# Patient Record
Sex: MALE | Race: WHITE | NOT HISPANIC OR LATINO | Employment: OTHER | URBAN - METROPOLITAN AREA
[De-identification: names, ages, dates, MRNs, and addresses within clinical notes are randomized per-mention and may not be internally consistent; named-entity substitution may affect disease eponyms.]

---

## 2017-02-01 ENCOUNTER — HISTORICAL (OUTPATIENT)
Dept: LAB | Facility: HOSPITAL | Age: 67
End: 2017-02-01

## 2017-02-10 ENCOUNTER — HISTORICAL (OUTPATIENT)
Dept: ADMINISTRATIVE | Facility: HOSPITAL | Age: 67
End: 2017-02-10

## 2017-08-01 ENCOUNTER — HISTORICAL (OUTPATIENT)
Dept: LAB | Facility: HOSPITAL | Age: 67
End: 2017-08-01

## 2017-08-01 LAB
BUN SERPL-MCNC: 29 MG/DL (ref 7–18)
CALCIUM SERPL-MCNC: 9.2 MG/DL (ref 8.5–10.1)
CHLORIDE SERPL-SCNC: 106 MMOL/L (ref 98–107)
CO2 SERPL-SCNC: 28.1 MMOL/L (ref 21–32)
CREAT SERPL-MCNC: 1.33 MG/DL (ref 0.6–1.3)
EST. AVERAGE GLUCOSE BLD GHB EST-MCNC: 137 MG/DL
GLUCOSE SERPL-MCNC: 127 MG/DL (ref 74–106)
HBA1C MFR BLD: 6.4 % (ref 4.5–6.2)
POTASSIUM SERPL-SCNC: 4.6 MMOL/L (ref 3.5–5.1)
SODIUM SERPL-SCNC: 143 MMOL/L (ref 136–145)

## 2017-12-21 ENCOUNTER — HISTORICAL (OUTPATIENT)
Dept: INFUSION THERAPY | Facility: HOSPITAL | Age: 67
End: 2017-12-21

## 2017-12-21 LAB
ALBUMIN SERPL-MCNC: 4.1 GM/DL (ref 3.4–5)
ALP SERPL-CCNC: 40 UNIT/L (ref 46–116)
ALT SERPL-CCNC: 67 UNIT/L (ref 12–78)
AST SERPL-CCNC: 26 UNIT/L (ref 15–37)
BILIRUB SERPL-MCNC: 0.9 MG/DL (ref 0.2–1)
BILIRUBIN DIRECT+TOT PNL SERPL-MCNC: 0.2 MG/DL (ref 0–0.2)
BILIRUBIN DIRECT+TOT PNL SERPL-MCNC: 0.7 MG/DL (ref 0–0.8)
CHOLEST SERPL-MCNC: 127 MG/DL (ref 0–200)
CHOLEST/HDLC SERPL: 2.8 {RATIO} (ref 0–5)
HDLC SERPL-MCNC: 46 MG/DL (ref 40–60)
LDLC SERPL CALC-MCNC: 51 MG/DL (ref 0–129)
PROT SERPL-MCNC: 7.3 GM/DL (ref 6.4–8.2)
TRIGL SERPL-MCNC: 151 MG/DL
VLDLC SERPL CALC-MCNC: 30 MG/DL

## 2018-02-01 ENCOUNTER — HISTORICAL (OUTPATIENT)
Dept: LAB | Facility: HOSPITAL | Age: 68
End: 2018-02-01

## 2018-02-01 LAB
ALBUMIN SERPL-MCNC: 4.1 GM/DL (ref 3.4–5)
ALBUMIN/GLOB SERPL: 1.2 RATIO (ref 1.1–2)
ALP SERPL-CCNC: 45 UNIT/L (ref 46–116)
ALT SERPL-CCNC: 49 UNIT/L (ref 12–78)
AST SERPL-CCNC: 21 UNIT/L (ref 15–37)
BILIRUB SERPL-MCNC: 1 MG/DL (ref 0.2–1)
BILIRUBIN DIRECT+TOT PNL SERPL-MCNC: 0.27 MG/DL (ref 0–0.2)
BILIRUBIN DIRECT+TOT PNL SERPL-MCNC: 0.77 MG/DL (ref 0–0.8)
BUN SERPL-MCNC: 25.9 MG/DL (ref 7–18)
CALCIUM SERPL-MCNC: 9.3 MG/DL (ref 8.5–10.1)
CHLORIDE SERPL-SCNC: 104 MMOL/L (ref 98–107)
CO2 SERPL-SCNC: 29.2 MMOL/L (ref 21–32)
CREAT SERPL-MCNC: 1.03 MG/DL (ref 0.6–1.3)
CREAT UR-MCNC: 80.5 MG/DL (ref 30–125)
EST. AVERAGE GLUCOSE BLD GHB EST-MCNC: 131 MG/DL
GLOBULIN SER-MCNC: 3.4 GM/DL (ref 2.4–3.5)
GLUCOSE SERPL-MCNC: 222 MG/DL (ref 74–106)
HBA1C MFR BLD: 6.2 % (ref 4.5–6.2)
MICROALBUMIN UR-MCNC: 10 MG/DL (ref 0–3)
MICROALBUMIN/CREAT RATIO PNL UR: 233.5 MG/GM CR (ref 0–30)
POTASSIUM SERPL-SCNC: 4.6 MMOL/L (ref 3.5–5.1)
PROT SERPL-MCNC: 7.5 GM/DL (ref 6.4–8.2)
SODIUM SERPL-SCNC: 139 MMOL/L (ref 136–145)

## 2018-02-22 ENCOUNTER — HISTORICAL (OUTPATIENT)
Dept: ADMINISTRATIVE | Facility: HOSPITAL | Age: 68
End: 2018-02-22

## 2018-02-24 LAB — FINAL CULTURE: NORMAL

## 2018-03-08 ENCOUNTER — HISTORICAL (OUTPATIENT)
Dept: ADMINISTRATIVE | Facility: HOSPITAL | Age: 68
End: 2018-03-08

## 2018-04-10 ENCOUNTER — HISTORICAL (OUTPATIENT)
Dept: LAB | Facility: HOSPITAL | Age: 68
End: 2018-04-10

## 2018-04-12 LAB — GRAM STN SPEC: NORMAL

## 2018-04-14 LAB — FINAL CULTURE: NORMAL

## 2018-05-19 LAB — FINAL CULTURE: NORMAL

## 2018-12-06 ENCOUNTER — HISTORICAL (OUTPATIENT)
Dept: LAB | Facility: HOSPITAL | Age: 68
End: 2018-12-06

## 2018-12-06 LAB
ABS NEUT (OLG): 3.69 X10(3)/MCL (ref 2.1–9.2)
BASOPHILS # BLD AUTO: 0 X10(3)/MCL (ref 0–0.2)
BASOPHILS NFR BLD AUTO: 1 %
BUN SERPL-MCNC: 24.9 MG/DL (ref 7–18)
CALCIUM SERPL-MCNC: 10 MG/DL (ref 8.5–10.1)
CHLORIDE SERPL-SCNC: 102 MMOL/L (ref 98–107)
CO2 SERPL-SCNC: 34.8 MMOL/L (ref 21–32)
CREAT SERPL-MCNC: 1.01 MG/DL (ref 0.6–1.3)
CREAT/UREA NIT SERPL: 25
EOSINOPHIL # BLD AUTO: 0.2 X10(3)/MCL (ref 0–0.9)
EOSINOPHIL NFR BLD AUTO: 3 %
ERYTHROCYTE [DISTWIDTH] IN BLOOD BY AUTOMATED COUNT: 12.5 % (ref 11.5–17)
EST. AVERAGE GLUCOSE BLD GHB EST-MCNC: 214 MG/DL
GLUCOSE SERPL-MCNC: 230 MG/DL (ref 74–106)
HBA1C MFR BLD: 9.1 % (ref 4.5–6.2)
HCT VFR BLD AUTO: 45.7 % (ref 42–52)
HGB BLD-MCNC: 15 GM/DL (ref 14–18)
IMM GRANULOCYTES # BLD AUTO: 0.03 % (ref 0–0.02)
IMM GRANULOCYTES NFR BLD AUTO: 0.5 % (ref 0–0.43)
LYMPHOCYTES # BLD AUTO: 1.6 X10(3)/MCL (ref 0.6–4.6)
LYMPHOCYTES NFR BLD AUTO: 25 %
MCH RBC QN AUTO: 31.8 PG (ref 27–31)
MCHC RBC AUTO-ENTMCNC: 32.8 GM/DL (ref 33–36)
MCV RBC AUTO: 96.8 FL (ref 80–94)
MONOCYTES # BLD AUTO: 0.6 X10(3)/MCL (ref 0.1–1.3)
MONOCYTES NFR BLD AUTO: 10 %
NEUTROPHILS # BLD AUTO: 3.69 X10(3)/MCL (ref 1.4–7.9)
NEUTROPHILS NFR BLD AUTO: 60 %
PLATELET # BLD AUTO: 186 X10(3)/MCL (ref 130–400)
PMV BLD AUTO: 11.2 FL (ref 9.4–12.4)
POTASSIUM SERPL-SCNC: 4.8 MMOL/L (ref 3.5–5.1)
RBC # BLD AUTO: 4.72 X10(6)/MCL (ref 4.7–6.1)
SODIUM SERPL-SCNC: 142 MMOL/L (ref 136–145)
WBC # SPEC AUTO: 6.1 X10(3)/MCL (ref 4.5–11.5)

## 2019-04-10 ENCOUNTER — HISTORICAL (OUTPATIENT)
Dept: LAB | Facility: HOSPITAL | Age: 69
End: 2019-04-10

## 2019-04-10 LAB
BUN SERPL-MCNC: 27.4 MG/DL (ref 7–18)
CALCIUM SERPL-MCNC: 9.5 MG/DL (ref 8.5–10.1)
CHLORIDE SERPL-SCNC: 102 MMOL/L (ref 98–107)
CO2 SERPL-SCNC: 31.7 MMOL/L (ref 21–32)
CREAT SERPL-MCNC: 0.97 MG/DL (ref 0.6–1.3)
CREAT/UREA NIT SERPL: 28
EST. AVERAGE GLUCOSE BLD GHB EST-MCNC: 177 MG/DL
GLUCOSE SERPL-MCNC: 196 MG/DL (ref 74–106)
HBA1C MFR BLD: 7.8 % (ref 4.5–6.2)
POTASSIUM SERPL-SCNC: 4.4 MMOL/L (ref 3.5–5.1)
SODIUM SERPL-SCNC: 140 MMOL/L (ref 136–145)

## 2019-04-16 ENCOUNTER — HISTORICAL (OUTPATIENT)
Dept: LAB | Facility: HOSPITAL | Age: 69
End: 2019-04-16

## 2019-04-16 LAB
CREAT UR-MCNC: 88 MG/DL
MICROALBUMIN UR-MCNC: 77.5 MG/DL
MICROALBUMIN/CREAT RATIO PNL UR: 880.7 MG/GM CR (ref 0–30)

## 2019-07-12 ENCOUNTER — HISTORICAL (OUTPATIENT)
Dept: LAB | Facility: HOSPITAL | Age: 69
End: 2019-07-12

## 2019-07-12 LAB
ALBUMIN SERPL-MCNC: 3.7 GM/DL (ref 3.4–5)
ALP SERPL-CCNC: 36 UNIT/L (ref 46–116)
ALT SERPL-CCNC: 40 UNIT/L (ref 12–78)
AST SERPL-CCNC: 12 UNIT/L (ref 15–37)
BILIRUB SERPL-MCNC: 0.9 MG/DL (ref 0.2–1)
BILIRUBIN DIRECT+TOT PNL SERPL-MCNC: 0.19 MG/DL (ref 0–0.2)
BILIRUBIN DIRECT+TOT PNL SERPL-MCNC: 0.71 MG/DL (ref 0–0.8)
CHOLEST SERPL-MCNC: 128 MG/DL (ref 0–200)
CHOLEST/HDLC SERPL: 2.5 {RATIO} (ref 0–5)
HDLC SERPL-MCNC: 52 MG/DL (ref 40–60)
LDLC SERPL CALC-MCNC: 68 MG/DL (ref 0–129)
PROT SERPL-MCNC: 6.8 GM/DL (ref 6.4–8.2)
TRIGL SERPL-MCNC: 40 MG/DL
VLDLC SERPL CALC-MCNC: 8 MG/DL

## 2019-10-09 ENCOUNTER — HISTORICAL (OUTPATIENT)
Dept: ADMINISTRATIVE | Facility: HOSPITAL | Age: 69
End: 2019-10-09

## 2019-10-09 LAB
ABS NEUT (OLG): 5.28 X10(3)/MCL (ref 2.1–9.2)
ALBUMIN SERPL-MCNC: 4 GM/DL (ref 3.4–5)
ALBUMIN/GLOB SERPL: 1.3 {RATIO}
ALP SERPL-CCNC: 44 UNIT/L (ref 50–136)
ALT SERPL-CCNC: 44 UNIT/L (ref 12–78)
AST SERPL-CCNC: 23 UNIT/L (ref 15–37)
BASOPHILS # BLD AUTO: 0.1 X10(3)/MCL (ref 0–0.2)
BASOPHILS NFR BLD AUTO: 1 %
BILIRUB SERPL-MCNC: 1.3 MG/DL (ref 0.2–1)
BILIRUBIN DIRECT+TOT PNL SERPL-MCNC: 0.3 MG/DL (ref 0–0.2)
BILIRUBIN DIRECT+TOT PNL SERPL-MCNC: 1 MG/DL (ref 0–0.8)
BNP BLD-MCNC: 10 PG/ML (ref 0–125)
BUN SERPL-MCNC: 22 MG/DL (ref 7–18)
CALCIUM SERPL-MCNC: 9.3 MG/DL (ref 8.5–10.1)
CHLORIDE SERPL-SCNC: 103 MMOL/L (ref 98–107)
CO2 SERPL-SCNC: 32 MMOL/L (ref 21–32)
CREAT SERPL-MCNC: 1.01 MG/DL (ref 0.7–1.3)
D DIMER PPP IA.FEU-MCNC: 288 NG/ML FEU (ref 0–500)
EOSINOPHIL # BLD AUTO: 0.3 X10(3)/MCL (ref 0–0.9)
EOSINOPHIL NFR BLD AUTO: 3 %
ERYTHROCYTE [DISTWIDTH] IN BLOOD BY AUTOMATED COUNT: 13.6 % (ref 11.5–17)
EST. AVERAGE GLUCOSE BLD GHB EST-MCNC: 128 MG/DL
GLOBULIN SER-MCNC: 3 GM/DL (ref 2.4–3.5)
GLUCOSE SERPL-MCNC: 82 MG/DL (ref 74–106)
HBA1C MFR BLD: 6.1 % (ref 4.2–6.3)
HCT VFR BLD AUTO: 46.6 % (ref 42–52)
HGB BLD-MCNC: 14.6 GM/DL (ref 14–18)
LYMPHOCYTES # BLD AUTO: 1.5 X10(3)/MCL (ref 0.6–4.6)
LYMPHOCYTES NFR BLD AUTO: 19 %
MCH RBC QN AUTO: 31.5 PG (ref 27–31)
MCHC RBC AUTO-ENTMCNC: 31.3 GM/DL (ref 33–36)
MCV RBC AUTO: 100.6 FL (ref 80–94)
MONOCYTES # BLD AUTO: 1 X10(3)/MCL (ref 0.1–1.3)
MONOCYTES NFR BLD AUTO: 12 %
NEUTROPHILS # BLD AUTO: 5.28 X10(3)/MCL (ref 2.1–9.2)
NEUTROPHILS NFR BLD AUTO: 65 %
PLATELET # BLD AUTO: 175 X10(3)/MCL (ref 130–400)
PMV BLD AUTO: 11.4 FL (ref 9.4–12.4)
POTASSIUM SERPL-SCNC: 4.2 MMOL/L (ref 3.5–5.1)
PROT SERPL-MCNC: 7 GM/DL (ref 6.4–8.2)
RBC # BLD AUTO: 4.63 X10(6)/MCL (ref 4.7–6.1)
SODIUM SERPL-SCNC: 142 MMOL/L (ref 136–145)
WBC # SPEC AUTO: 8.1 X10(3)/MCL (ref 4.5–11.5)

## 2019-10-18 ENCOUNTER — HISTORICAL (OUTPATIENT)
Dept: RESPIRATORY THERAPY | Facility: HOSPITAL | Age: 69
End: 2019-10-18

## 2019-10-23 ENCOUNTER — HISTORICAL (OUTPATIENT)
Dept: INFUSION THERAPY | Facility: HOSPITAL | Age: 69
End: 2019-10-23

## 2020-02-13 ENCOUNTER — HISTORICAL (OUTPATIENT)
Dept: LAB | Facility: HOSPITAL | Age: 70
End: 2020-02-13

## 2020-02-13 LAB
ALBUMIN SERPL-MCNC: 3.9 GM/DL (ref 3.4–5)
ALBUMIN/GLOB SERPL: 1.3 RATIO (ref 1.1–2)
ALP SERPL-CCNC: 37 UNIT/L (ref 46–116)
ALT SERPL-CCNC: 37 UNIT/L (ref 12–78)
AST SERPL-CCNC: 24 UNIT/L (ref 15–37)
BILIRUB SERPL-MCNC: 1.4 MG/DL (ref 0.2–1)
BILIRUBIN DIRECT+TOT PNL SERPL-MCNC: 0.3 MG/DL (ref 0–0.2)
BILIRUBIN DIRECT+TOT PNL SERPL-MCNC: 1.1 MG/DL (ref 0–0.8)
BUN SERPL-MCNC: 27.8 MG/DL (ref 7–18)
CALCIUM SERPL-MCNC: 9.3 MG/DL (ref 8.5–10.1)
CHLORIDE SERPL-SCNC: 106 MMOL/L (ref 98–107)
CO2 SERPL-SCNC: 27.3 MMOL/L (ref 21–32)
CREAT SERPL-MCNC: 0.93 MG/DL (ref 0.6–1.3)
CREAT UR-MCNC: 84 MG/DL (ref 30–125)
EST. AVERAGE GLUCOSE BLD GHB EST-MCNC: 143 MG/DL
GLOBULIN SER-MCNC: 3 GM/DL (ref 2.4–3.5)
GLUCOSE SERPL-MCNC: 115 MG/DL (ref 74–106)
HBA1C MFR BLD: 6.6 % (ref 4.5–6.2)
MICROALBUMIN UR-MCNC: >10 MG/DL (ref 0–3)
MICROALBUMIN/CREAT RATIO PNL UR: >119 MG/GM CR (ref 0–30)
POTASSIUM SERPL-SCNC: 5 MMOL/L (ref 3.5–5.1)
PROT SERPL-MCNC: 6.9 GM/DL (ref 6.4–8.2)
SODIUM SERPL-SCNC: 143 MMOL/L (ref 136–145)

## 2020-06-25 ENCOUNTER — HISTORICAL (OUTPATIENT)
Dept: RESPIRATORY THERAPY | Facility: HOSPITAL | Age: 70
End: 2020-06-25

## 2020-07-08 LAB — CRC RECOMMENDATION EXT: NORMAL

## 2020-12-18 ENCOUNTER — HISTORICAL (OUTPATIENT)
Dept: LAB | Facility: HOSPITAL | Age: 70
End: 2020-12-18

## 2020-12-18 LAB
BUN SERPL-MCNC: 40 MG/DL (ref 8.4–25.7)
CALCIUM SERPL-MCNC: 9.2 MG/DL (ref 8.8–10)
CHLORIDE SERPL-SCNC: 105 MMOL/L (ref 98–107)
CO2 SERPL-SCNC: 25 MMOL/L (ref 23–31)
CREAT SERPL-MCNC: 1.17 MG/DL (ref 0.73–1.18)
CREAT UR-MCNC: 103.1 MG/DL (ref 58–161)
CREAT/UREA NIT SERPL: 34
EST. AVERAGE GLUCOSE BLD GHB EST-MCNC: 122.6 MG/DL
GLUCOSE SERPL-MCNC: 108 MG/DL (ref 82–115)
HBA1C MFR BLD: 5.9 %
MICROALBUMIN UR-MCNC: 99.8 UG/ML
MICROALBUMIN/CREAT RATIO PNL UR: 96.8 MG/GM CR (ref 0–30)
POTASSIUM SERPL-SCNC: 5.3 MMOL/L (ref 3.5–5.1)
SODIUM SERPL-SCNC: 140 MMOL/L (ref 136–145)

## 2021-03-11 ENCOUNTER — HISTORICAL (OUTPATIENT)
Dept: RESPIRATORY THERAPY | Facility: HOSPITAL | Age: 71
End: 2021-03-11

## 2021-06-18 ENCOUNTER — HISTORICAL (OUTPATIENT)
Dept: LAB | Facility: HOSPITAL | Age: 71
End: 2021-06-18

## 2021-06-18 LAB
ALBUMIN SERPL-MCNC: 4.2 GM/DL (ref 3.4–4.8)
ALBUMIN/GLOB SERPL: 1.7 RATIO (ref 1.1–2)
ALP SERPL-CCNC: 38 UNIT/L (ref 40–150)
ALT SERPL-CCNC: 46 UNIT/L (ref 0–55)
AST SERPL-CCNC: 27 UNIT/L (ref 5–34)
BILIRUB SERPL-MCNC: 2.4 MG/DL
BILIRUBIN DIRECT+TOT PNL SERPL-MCNC: 0.9 MG/DL (ref 0–0.5)
BILIRUBIN DIRECT+TOT PNL SERPL-MCNC: 1.5 MG/DL (ref 0–0.8)
BUN SERPL-MCNC: 26.6 MG/DL (ref 8.4–25.7)
CALCIUM SERPL-MCNC: 9 MG/DL (ref 8.8–10)
CHLORIDE SERPL-SCNC: 106 MMOL/L (ref 98–107)
CHOLEST SERPL-MCNC: 130 MG/DL
CHOLEST/HDLC SERPL: 3 {RATIO} (ref 0–5)
CO2 SERPL-SCNC: 28 MMOL/L (ref 23–31)
CREAT SERPL-MCNC: 1.03 MG/DL (ref 0.73–1.18)
CREAT UR-MCNC: 66.4 MG/DL (ref 58–161)
EST. AVERAGE GLUCOSE BLD GHB EST-MCNC: 157.1 MG/DL
GLOBULIN SER-MCNC: 2.5 GM/DL (ref 2.4–3.5)
GLUCOSE SERPL-MCNC: 103 MG/DL (ref 82–115)
HBA1C MFR BLD: 7.1 %
HDLC SERPL-MCNC: 41 MG/DL (ref 35–60)
LDLC SERPL CALC-MCNC: 66 MG/DL (ref 50–140)
MICROALBUMIN UR-MCNC: 109.8 UG/ML
MICROALBUMIN/CREAT RATIO PNL UR: 165.4 MG/GM CR (ref 0–30)
POTASSIUM SERPL-SCNC: 4.8 MMOL/L (ref 3.5–5.1)
PROT SERPL-MCNC: 6.7 GM/DL (ref 5.8–7.6)
SODIUM SERPL-SCNC: 143 MMOL/L (ref 136–145)
TRIGL SERPL-MCNC: 115 MG/DL (ref 34–140)
VLDLC SERPL CALC-MCNC: 23 MG/DL

## 2021-07-22 ENCOUNTER — HISTORICAL (OUTPATIENT)
Dept: RESPIRATORY THERAPY | Facility: HOSPITAL | Age: 71
End: 2021-07-22

## 2021-11-12 LAB
LEFT EYE DM RETINOPATHY: NEGATIVE
RIGHT EYE DM RETINOPATHY: NEGATIVE

## 2021-12-30 ENCOUNTER — HISTORICAL (OUTPATIENT)
Dept: LAB | Facility: HOSPITAL | Age: 71
End: 2021-12-30

## 2021-12-30 LAB
ABS NEUT (OLG): 3.77 X10(3)/MCL (ref 2.1–9.2)
ALBUMIN SERPL-MCNC: 4.2 GM/DL (ref 3.4–4.8)
ALBUMIN/GLOB SERPL: 1.4 RATIO (ref 1.1–2)
ALP SERPL-CCNC: 40 UNIT/L (ref 40–150)
ALT SERPL-CCNC: 34 UNIT/L (ref 0–55)
AST SERPL-CCNC: 20 UNIT/L (ref 5–34)
BASOPHILS # BLD AUTO: 0.1 X10(3)/MCL (ref 0–0.2)
BASOPHILS NFR BLD AUTO: 1 %
BILIRUB SERPL-MCNC: 0.9 MG/DL
BILIRUBIN DIRECT+TOT PNL SERPL-MCNC: 0.3 MG/DL (ref 0–0.5)
BILIRUBIN DIRECT+TOT PNL SERPL-MCNC: 0.6 MG/DL (ref 0–0.8)
BUN SERPL-MCNC: 40.9 MG/DL (ref 8.4–25.7)
CALCIUM SERPL-MCNC: 10.1 MG/DL (ref 8.7–10.5)
CHLORIDE SERPL-SCNC: 102 MMOL/L (ref 98–107)
CHOLEST SERPL-MCNC: 154 MG/DL
CHOLEST/HDLC SERPL: 4 {RATIO} (ref 0–5)
CO2 SERPL-SCNC: 28 MMOL/L (ref 23–31)
CREAT SERPL-MCNC: 1.42 MG/DL (ref 0.73–1.18)
CREAT UR-MCNC: 78.1 MG/DL (ref 58–161)
EOSINOPHIL # BLD AUTO: 0.4 X10(3)/MCL (ref 0–0.9)
EOSINOPHIL NFR BLD AUTO: 7 %
ERYTHROCYTE [DISTWIDTH] IN BLOOD BY AUTOMATED COUNT: 12.3 % (ref 11.5–17)
EST. AVERAGE GLUCOSE BLD GHB EST-MCNC: 145.6 MG/DL
GLOBULIN SER-MCNC: 3.1 GM/DL (ref 2.4–3.5)
GLUCOSE SERPL-MCNC: 207 MG/DL (ref 82–115)
HBA1C MFR BLD: 6.7 %
HCT VFR BLD AUTO: 47.5 % (ref 42–52)
HDLC SERPL-MCNC: 41 MG/DL (ref 35–60)
HGB BLD-MCNC: 14.9 GM/DL (ref 14–18)
IMM GRANULOCYTES # BLD AUTO: 0.03 % (ref 0–0.02)
IMM GRANULOCYTES NFR BLD AUTO: 0.5 % (ref 0–0.43)
LDLC SERPL CALC-MCNC: 71 MG/DL (ref 50–140)
LYMPHOCYTES # BLD AUTO: 1.6 X10(3)/MCL (ref 0.6–4.6)
LYMPHOCYTES NFR BLD AUTO: 24 %
MCH RBC QN AUTO: 31.2 PG (ref 27–31)
MCHC RBC AUTO-ENTMCNC: 31.4 GM/DL (ref 33–36)
MCV RBC AUTO: 99.4 FL (ref 80–94)
MICROALBUMIN UR-MCNC: 101.4 UG/ML
MICROALBUMIN/CREAT RATIO PNL UR: 129.8 MG/GM CR (ref 0–30)
MONOCYTES # BLD AUTO: 0.7 X10(3)/MCL (ref 0.1–1.3)
MONOCYTES NFR BLD AUTO: 10 %
NEUTROPHILS # BLD AUTO: 3.77 X10(3)/MCL (ref 1.4–7.9)
NEUTROPHILS NFR BLD AUTO: 58 %
PLATELET # BLD AUTO: 175 X10(3)/MCL (ref 130–400)
PMV BLD AUTO: 10.9 FL (ref 9.4–12.4)
POTASSIUM SERPL-SCNC: 5.5 MMOL/L (ref 3.5–5.1)
PROT SERPL-MCNC: 7.3 GM/DL (ref 5.8–7.6)
PSA SERPL-MCNC: 0.92 NG/ML
RBC # BLD AUTO: 4.78 X10(6)/MCL (ref 4.7–6.1)
SODIUM SERPL-SCNC: 141 MMOL/L (ref 136–145)
TRIGL SERPL-MCNC: 210 MG/DL (ref 34–140)
VLDLC SERPL CALC-MCNC: 42 MG/DL
WBC # SPEC AUTO: 6.5 X10(3)/MCL (ref 4.5–11.5)

## 2022-03-23 ENCOUNTER — HISTORICAL (OUTPATIENT)
Dept: LAB | Facility: HOSPITAL | Age: 72
End: 2022-03-23

## 2022-03-23 LAB
ABS NEUT (OLG): 3.92 (ref 2.1–9.2)
BASOPHILS # BLD AUTO: 0 10*3/UL (ref 0–0.2)
BASOPHILS NFR BLD AUTO: 1 %
BUN SERPL-MCNC: 20.1 MG/DL (ref 8.4–25.7)
CALCIUM SERPL-MCNC: 9.8 MG/DL (ref 8.7–10.5)
CHLORIDE SERPL-SCNC: 107 MMOL/L (ref 98–107)
CO2 SERPL-SCNC: 25 MMOL/L (ref 23–31)
CREAT SERPL-MCNC: 1.14 MG/DL (ref 0.73–1.18)
CREAT/UREA NIT SERPL: 18
EOSINOPHIL # BLD AUTO: 0.3 10*3/UL (ref 0–0.9)
EOSINOPHIL NFR BLD AUTO: 4 %
ERYTHROCYTE [DISTWIDTH] IN BLOOD BY AUTOMATED COUNT: 13.7 % (ref 11.5–17)
GLUCOSE SERPL-MCNC: 49 MG/DL (ref 82–115)
HCT VFR BLD AUTO: 44.3 % (ref 42–52)
HEMOLYSIS INTERF INDEX SERPL-ACNC: 8
HGB BLD-MCNC: 14 G/DL (ref 14–18)
ICTERIC INTERF INDEX SERPL-ACNC: 1
IMM GRANULOCYTES # BLD AUTO: 0.03 10*3/UL (ref 0–0.02)
IMM GRANULOCYTES NFR BLD AUTO: 0.4 % (ref 0–0.43)
LIPEMIC INTERF INDEX SERPL-ACNC: 12
LYMPHOCYTES # BLD AUTO: 1.7 10*3/UL (ref 0.6–4.6)
LYMPHOCYTES NFR BLD AUTO: 25 %
MANUAL DIFF? (OHS): NO
MCH RBC QN AUTO: 31.9 PG (ref 27–31)
MCHC RBC AUTO-ENTMCNC: 31.6 G/DL (ref 33–36)
MCV RBC AUTO: 100.9 FL (ref 80–94)
MONOCYTES # BLD AUTO: 1 10*3/UL (ref 0.1–1.3)
MONOCYTES NFR BLD AUTO: 14 %
NEUTROPHILS # BLD AUTO: 3.92 10*3/UL (ref 1.4–7.9)
NEUTROPHILS NFR BLD AUTO: 57 %
PLATELET # BLD AUTO: 162 10*3/UL (ref 130–400)
PMV BLD AUTO: 11.1 FL (ref 9.4–12.4)
POTASSIUM SERPL-SCNC: 4.8 MMOL/L (ref 3.5–5.1)
RBC # BLD AUTO: 4.39 10*6/UL (ref 4.7–6.1)
SODIUM SERPL-SCNC: 137 MMOL/L (ref 136–145)
WBC # SPEC AUTO: 6.9 10*3/UL (ref 4.5–11.5)

## 2022-04-11 ENCOUNTER — HISTORICAL (OUTPATIENT)
Dept: ADMINISTRATIVE | Facility: HOSPITAL | Age: 72
End: 2022-04-11
Payer: MEDICARE

## 2022-04-28 VITALS
HEIGHT: 67 IN | OXYGEN SATURATION: 98 % | SYSTOLIC BLOOD PRESSURE: 120 MMHG | WEIGHT: 228.63 LBS | BODY MASS INDEX: 35.88 KG/M2 | DIASTOLIC BLOOD PRESSURE: 68 MMHG

## 2022-05-18 DIAGNOSIS — J44.9 CHRONIC OBSTRUCTIVE PULMONARY DISEASE WITHOUT EXACERBATION: Primary | ICD-10-CM

## 2022-05-23 ENCOUNTER — PROCEDURE VISIT (OUTPATIENT)
Dept: RESPIRATORY THERAPY | Facility: HOSPITAL | Age: 72
End: 2022-05-23
Attending: INTERNAL MEDICINE
Payer: MEDICARE

## 2022-05-23 DIAGNOSIS — J44.9 CHRONIC OBSTRUCTIVE PULMONARY DISEASE WITHOUT EXACERBATION: ICD-10-CM

## 2022-05-23 PROCEDURE — 94729 DIFFUSING CAPACITY: CPT

## 2022-05-23 PROCEDURE — 94727 GAS DIL/WSHOT DETER LNG VOL: CPT

## 2022-06-30 DIAGNOSIS — Z12.5 SCREENING PSA (PROSTATE SPECIFIC ANTIGEN): ICD-10-CM

## 2022-06-30 DIAGNOSIS — R53.83 FATIGUE, UNSPECIFIED TYPE: ICD-10-CM

## 2022-06-30 DIAGNOSIS — E11.69 TYPE 2 DIABETES MELLITUS WITH OTHER SPECIFIED COMPLICATION, WITH LONG-TERM CURRENT USE OF INSULIN: ICD-10-CM

## 2022-06-30 DIAGNOSIS — I10 HYPERTENSION, UNSPECIFIED TYPE: ICD-10-CM

## 2022-06-30 DIAGNOSIS — E78.5 HYPERLIPIDEMIA, UNSPECIFIED HYPERLIPIDEMIA TYPE: ICD-10-CM

## 2022-06-30 DIAGNOSIS — Z00.00 WELLNESS EXAMINATION: Primary | ICD-10-CM

## 2022-06-30 DIAGNOSIS — Z79.4 TYPE 2 DIABETES MELLITUS WITH OTHER SPECIFIED COMPLICATION, WITH LONG-TERM CURRENT USE OF INSULIN: ICD-10-CM

## 2022-07-05 ENCOUNTER — LAB VISIT (OUTPATIENT)
Dept: LAB | Facility: HOSPITAL | Age: 72
End: 2022-07-05
Attending: INTERNAL MEDICINE
Payer: MEDICARE

## 2022-07-05 DIAGNOSIS — Z12.5 SCREENING PSA (PROSTATE SPECIFIC ANTIGEN): ICD-10-CM

## 2022-07-05 DIAGNOSIS — E78.5 HYPERLIPIDEMIA, UNSPECIFIED HYPERLIPIDEMIA TYPE: ICD-10-CM

## 2022-07-05 DIAGNOSIS — Z00.00 WELLNESS EXAMINATION: ICD-10-CM

## 2022-07-05 DIAGNOSIS — E11.69 TYPE 2 DIABETES MELLITUS WITH OTHER SPECIFIED COMPLICATION, WITH LONG-TERM CURRENT USE OF INSULIN: ICD-10-CM

## 2022-07-05 DIAGNOSIS — R53.83 FATIGUE, UNSPECIFIED TYPE: ICD-10-CM

## 2022-07-05 DIAGNOSIS — Z79.4 TYPE 2 DIABETES MELLITUS WITH OTHER SPECIFIED COMPLICATION, WITH LONG-TERM CURRENT USE OF INSULIN: ICD-10-CM

## 2022-07-05 DIAGNOSIS — I10 HYPERTENSION, UNSPECIFIED TYPE: ICD-10-CM

## 2022-07-06 ENCOUNTER — TELEPHONE (OUTPATIENT)
Dept: INTERNAL MEDICINE | Facility: CLINIC | Age: 72
End: 2022-07-06
Payer: MEDICARE

## 2022-07-06 NOTE — TELEPHONE ENCOUNTER
----- Message from Cheryl Lejeune sent at 7/1/2022  8:07 AM CDT -----  Regarding: RE: vishnu haley 7/7 1300  Pt informed of OV, labs, and check in protocol.  He verbalized understanding.  ----- Message -----  From: Leah Hamilton LPN  Sent: 6/30/2022   5:42 PM CDT  To: Charlette Lejeune  Subject: vishnu haley 7/7 1300                                Are there any outstanding tasks in chart? No, but needs FASTING labs PRIOR to appt    Is there any documentation of tasks? no    Has the pt seen another physician, been to ER, UCC, or admitted to hospital since last visit?    Has the pt done blood work or imaging since last visit?

## 2022-07-07 ENCOUNTER — OFFICE VISIT (OUTPATIENT)
Dept: INTERNAL MEDICINE | Facility: CLINIC | Age: 72
End: 2022-07-07
Payer: MEDICARE

## 2022-07-07 VITALS
OXYGEN SATURATION: 96 % | RESPIRATION RATE: 20 BRPM | HEART RATE: 79 BPM | WEIGHT: 225 LBS | DIASTOLIC BLOOD PRESSURE: 58 MMHG | HEIGHT: 67 IN | SYSTOLIC BLOOD PRESSURE: 118 MMHG | BODY MASS INDEX: 35.31 KG/M2

## 2022-07-07 DIAGNOSIS — E11.9 TYPE 2 DIABETES MELLITUS WITHOUT COMPLICATION, WITHOUT LONG-TERM CURRENT USE OF INSULIN: ICD-10-CM

## 2022-07-07 DIAGNOSIS — R19.7 DIARRHEA, UNSPECIFIED TYPE: ICD-10-CM

## 2022-07-07 DIAGNOSIS — Z00.00 ANNUAL PHYSICAL EXAM: Primary | ICD-10-CM

## 2022-07-07 PROCEDURE — 3288F PR FALLS RISK ASSESSMENT DOCUMENTED: ICD-10-PCS | Mod: CPTII,,, | Performed by: INTERNAL MEDICINE

## 2022-07-07 PROCEDURE — 1160F RVW MEDS BY RX/DR IN RCRD: CPT | Mod: CPTII,,, | Performed by: INTERNAL MEDICINE

## 2022-07-07 PROCEDURE — 1160F PR REVIEW ALL MEDS BY PRESCRIBER/CLIN PHARMACIST DOCUMENTED: ICD-10-PCS | Mod: CPTII,,, | Performed by: INTERNAL MEDICINE

## 2022-07-07 PROCEDURE — 3008F BODY MASS INDEX DOCD: CPT | Mod: CPTII,,, | Performed by: INTERNAL MEDICINE

## 2022-07-07 PROCEDURE — G0439 PR MEDICARE ANNUAL WELLNESS SUBSEQUENT VISIT: ICD-10-PCS | Mod: ,,, | Performed by: INTERNAL MEDICINE

## 2022-07-07 PROCEDURE — 3078F DIAST BP <80 MM HG: CPT | Mod: CPTII,,, | Performed by: INTERNAL MEDICINE

## 2022-07-07 PROCEDURE — 1101F PR PT FALLS ASSESS DOC 0-1 FALLS W/OUT INJ PAST YR: ICD-10-PCS | Mod: CPTII,,, | Performed by: INTERNAL MEDICINE

## 2022-07-07 PROCEDURE — 1159F MED LIST DOCD IN RCRD: CPT | Mod: CPTII,,, | Performed by: INTERNAL MEDICINE

## 2022-07-07 PROCEDURE — 3074F PR MOST RECENT SYSTOLIC BLOOD PRESSURE < 130 MM HG: ICD-10-PCS | Mod: CPTII,,, | Performed by: INTERNAL MEDICINE

## 2022-07-07 PROCEDURE — 3288F FALL RISK ASSESSMENT DOCD: CPT | Mod: CPTII,,, | Performed by: INTERNAL MEDICINE

## 2022-07-07 PROCEDURE — 3008F PR BODY MASS INDEX (BMI) DOCUMENTED: ICD-10-PCS | Mod: CPTII,,, | Performed by: INTERNAL MEDICINE

## 2022-07-07 PROCEDURE — 1101F PT FALLS ASSESS-DOCD LE1/YR: CPT | Mod: CPTII,,, | Performed by: INTERNAL MEDICINE

## 2022-07-07 PROCEDURE — G0439 PPPS, SUBSEQ VISIT: HCPCS | Mod: ,,, | Performed by: INTERNAL MEDICINE

## 2022-07-07 PROCEDURE — 1159F PR MEDICATION LIST DOCUMENTED IN MEDICAL RECORD: ICD-10-PCS | Mod: CPTII,,, | Performed by: INTERNAL MEDICINE

## 2022-07-07 PROCEDURE — 4010F ACE/ARB THERAPY RXD/TAKEN: CPT | Mod: CPTII,,, | Performed by: INTERNAL MEDICINE

## 2022-07-07 PROCEDURE — 4010F PR ACE/ARB THEARPY RXD/TAKEN: ICD-10-PCS | Mod: CPTII,,, | Performed by: INTERNAL MEDICINE

## 2022-07-07 PROCEDURE — 3078F PR MOST RECENT DIASTOLIC BLOOD PRESSURE < 80 MM HG: ICD-10-PCS | Mod: CPTII,,, | Performed by: INTERNAL MEDICINE

## 2022-07-07 PROCEDURE — 3074F SYST BP LT 130 MM HG: CPT | Mod: CPTII,,, | Performed by: INTERNAL MEDICINE

## 2022-07-07 RX ORDER — CITALOPRAM 20 MG/1
20 TABLET, FILM COATED ORAL DAILY
COMMUNITY
Start: 2022-05-25 | End: 2022-09-28 | Stop reason: SDUPTHER

## 2022-07-07 RX ORDER — GLIPIZIDE AND METFORMIN HCL 2.5; 25 MG/1; MG/1
1 TABLET, FILM COATED ORAL DAILY
COMMUNITY
Start: 2022-05-25 | End: 2022-09-28 | Stop reason: SDUPTHER

## 2022-07-07 RX ORDER — INSULIN GLARGINE 300 U/ML
30 INJECTION, SOLUTION SUBCUTANEOUS NIGHTLY
COMMUNITY
Start: 2022-04-11 | End: 2022-08-25 | Stop reason: SDUPTHER

## 2022-07-07 RX ORDER — MECLIZINE HCL 12.5 MG 12.5 MG/1
12.5 TABLET ORAL 2 TIMES DAILY
COMMUNITY
Start: 2022-04-21 | End: 2022-07-07 | Stop reason: SDUPTHER

## 2022-07-07 RX ORDER — INSULIN LISPRO 100 [IU]/ML
20 INJECTION, SOLUTION INTRAVENOUS; SUBCUTANEOUS 3 TIMES DAILY
COMMUNITY
Start: 2022-02-03 | End: 2022-08-25 | Stop reason: SDUPTHER

## 2022-07-07 RX ORDER — SIMVASTATIN 40 MG/1
40 TABLET, FILM COATED ORAL
COMMUNITY
Start: 2022-06-29 | End: 2022-09-28 | Stop reason: SDUPTHER

## 2022-07-07 RX ORDER — WARFARIN 10 MG/1
10 TABLET ORAL DAILY
COMMUNITY
Start: 2022-05-25 | End: 2023-05-18

## 2022-07-07 RX ORDER — POTASSIUM CHLORIDE 1500 MG/1
20 TABLET, EXTENDED RELEASE ORAL DAILY
COMMUNITY
Start: 2022-06-17 | End: 2022-09-28 | Stop reason: SDUPTHER

## 2022-07-07 RX ORDER — LISINOPRIL 10 MG/1
10 TABLET ORAL DAILY
COMMUNITY
Start: 2022-05-25 | End: 2022-09-28 | Stop reason: SDUPTHER

## 2022-07-07 RX ORDER — MECLIZINE HCL 12.5 MG 12.5 MG/1
12.5 TABLET ORAL 2 TIMES DAILY
Qty: 90 TABLET | Refills: 3 | Status: SHIPPED | OUTPATIENT
Start: 2022-07-07 | End: 2022-09-28 | Stop reason: SDUPTHER

## 2022-07-07 RX ORDER — DOXAZOSIN 8 MG/1
4 TABLET ORAL EVERY 12 HOURS
COMMUNITY
Start: 2022-02-04 | End: 2022-09-28 | Stop reason: SDUPTHER

## 2022-07-07 RX ORDER — FUROSEMIDE 40 MG/1
40 TABLET ORAL DAILY
COMMUNITY
Start: 2022-05-25 | End: 2022-09-28 | Stop reason: SDUPTHER

## 2022-07-07 RX ORDER — ALBUTEROL SULFATE 0.83 MG/ML
SOLUTION RESPIRATORY (INHALATION)
COMMUNITY
Start: 2022-06-27 | End: 2024-01-23 | Stop reason: SDUPTHER

## 2022-07-07 NOTE — PROGRESS NOTES
Patient ID: Stevie Cummins is a 72 y.o. male.    Chief Complaint: Medicare AWV (Discuss labs 7/5/C/o watery BM)      HPI:   Patient presents here today for above reason.     To discuss blood work.  All blood were discussed with the patient within normal limits since patient used the continues monitoring for diabetes his blood sugar has significantly improved from 6.7-6.2  Other no wellness exam patient complaining of watering soft stools after each meal.  With that said I provided with samples are Zenpep to take with the 1st bite of each meal if symptoms improve to contact my office  Patient advised also once he completes the combination medication has metformin monitor.  Metformin altogether which may be also the culprit of the abdominal bloatedness diarrhea etc.      The patient's Health Maintenance was reviewed and the following appears to be due at this time:   Health Maintenance Due   Topic Date Due    Hepatitis C Screening  Never done    Shingles Vaccine (1 of 2) Never done    Abdominal Aortic Aneurysm Screening  Never done    Pneumococcal Vaccines (Age 65+) (2 - PCV) 10/13/2015    COVID-19 Vaccine (4 - Booster for Moderna series) 04/02/2022        Past Medical History:  Past Medical History:   Diagnosis Date    BPH (benign prostatic hyperplasia)     CVA (cerebral vascular accident)     Depression     History of claustrophobia     HLD (hyperlipidemia)     HTN (hypertension)     Type 2 diabetes mellitus without complications      Past Surgical History:   Procedure Laterality Date    PROSTATE SURGERY  2021    [facility]     Review of patient's allergies indicates:   Allergen Reactions    Nitrofurantoin Hives    Iodinated contrast media      No current outpatient medications on file prior to visit.     No current facility-administered medications on file prior to visit.     Social History     Socioeconomic History    Marital status:    Tobacco Use    Smoking status: Former Smoker    Smokeless  "tobacco: Never Used   Substance and Sexual Activity    Alcohol use: Not Currently    Drug use: Never    Sexual activity: Not Currently     History reviewed. No pertinent family history.    ROS:   Review of Systems  Constitutional: No weight gain, No fever, No chills, No fatigue.   Eyes: No blurring, No visual disturbances.   Ear/Nose/Mouth/Throat: No decreased hearing, No ear pain, No nasal congestion, No sore throat.   Respiratory: No shortness of breath, No cough, No wheezing.   Cardiovascular: No chest pain, No palpitations, No peripheral edema.   Gastrointestinal: No nausea, No vomiting, No diarrhea, No constipation, No abdominal pain.   Genitourinary: No dysuria, No hematuria.   Hematology/Lymphatics: No bruising tendency, No bleeding tendency, No swollen lymph glands.   Endocrine: No excessive thirst, No polyuria, No excessive hunger.   Musculoskeletal: No joint pain, No muscle pain, No decreased range of motion.   Integumentary: No rash, No pruritus.   Neurologic: No abnormal balance, No confusion, No headache.   Psychiatric: No anxiety, No depression, Not suicidal, No hallucinations.      Vitals/PE:   BP (!) 118/58 (BP Location: Left arm)   Pulse 79   Resp 20   Ht 5' 6.93" (1.7 m)   Wt 102.1 kg (225 lb)   SpO2 96%   BMI 35.31 kg/m²   Physical Exam    General: Alert and oriented, No acute distress.   Eye: Normal conjunctiva without exudate.  HENMT: Normocephalic/AT, Normal hearing, Oral mucosa is moist and pink   Neck: No goiter visualized.   Respiratory: Lungs CTAB, Respirations are non-labored, Breath sounds are equal, Symmetrical chest wall expansion.  Cardiovascular: Normal rate, Regular rhythm, No murmur, No edema.   Gastrointestinal: Non-distended.   Genitourinary: Deferred.  Musculoskeletal: Normal ROM, Normal gait, No deformities or amputations.  Integumentary: Warm, Dry, Intact. No diaphoresis, or flushing.  Neurologic: No focal deficits, Cranial Nerves II-XII are grossly intact. "   Psychiatric: Cooperative, Appropriate mood & affect, Normal judgment, Non-suicidal.    Assessment/Plan:   ..  Problem List Items Addressed This Visit    None     Visit Diagnoses     Annual physical exam    -  Primary    Type 2 diabetes mellitus without complication, without long-term current use of insulin        Diarrhea, unspecified type             Recommendations:  Diet (healthy food choices, reduce portions and overall calorie intake)  Exercise 30-45 minutes at least 3x per week  Avoid excessive alcohol intake and tobacco use  Stay UTD with immunizations and preventative screenings   Yearly Labs     ..  Medications Ordered This Encounter   Medications    meclizine (ANTIVERT) 12.5 mg tablet     Sig: Take 1 tablet (12.5 mg total) by mouth 2 (two) times daily. [facility]     Dispense:  90 tablet     Refill:  3        ..No orders of the defined types were placed in this encounter.        I have changed Stevie Cummins's meclizine. I am also having him maintain his albuterol, citalopram, doxazosin, furosemide, glipizide-metformin, TOUJEO SOLOSTAR U-300 INSULIN, insulin lispro, lisinopriL, KLOR-CON M20, simvastatin, and warfarin.    No orders of the defined types were placed in this encounter.      Education and counseling done face to face regarding medical conditions and plan. Contact office if new symptoms develop. Should any symptoms ever significantly worsen seek emergency medical attention/go to ER. Follow up at least yearly for wellness or sooner PRN. Nurse to call patient with any results. The patient is receptive, expresses understanding and is agreeable to plan. All questions have been answered.    No follow-ups on file.       RTC  6 months

## 2022-07-07 NOTE — PROGRESS NOTES
"TIME UP & GO (TUG)  Test begins with patient sitting back in standard arm chair.   When "Go" is said, the patient stands up and walks 10 feet at a normal pace before turning, walking back and sitting down.    Observe the patients postural stability, gait, stride length, and sway.  Check all that apply:  ? [x] Slow tentative pace  ? [] Loss of balance  ? [x] Short strides  ? [] Little or no arm swing  ? [] Steadying self on walls  ? [] Shuffling  ? [] En bloc turning  ? [] Not using assistive device properly    Time in seconds:  9 Seconds  (Older adults who takes = or > 12 seconds to complete TUG is at risk for falling.      WHISPER TEST  Test begins with patient standing arms length away (2 feet), facing away from examiner.  Patient covers the ear that is NOT being tested with one finger over the tragus.  Whisper a number-letter-number combination.  If a patient gets 3 total letters and/or numbers correct after a second attempt, it is considered a pass.    Right Ear: passed    [] 8-M-3   [] K-5-R   [] 2-K-7   [] S-4-G  Left Ear: passed       [] 8-M-3   [] K-5-R   [] 2-K-7   [] S-4-G      VISION SCREENING  unable to measure      MINI-COGNITIVE  Three Word Registration   []Version 1 []Version 2 []Version 3 []Version 4 []Version 5 []Version 6   [facility] Captain Daughter   Amado Season Kitchen Nation Garden Heaven   Chair Table Baby Finger Picture Moutain     Word Recall 3 points  Clock Drawing 2      HOME SAFETY QUESTIONNAIRE  Are emergency numbers kept by the phone and regularly updated? Yes  Are all household members aware of the dangers of smoking, especially in bed? Yes  Are working smoke alarm(s) and fire extinguisher(s) available for use? Yes  Do all household members know how to use them? Yes  Are firearms stored unloaded and securely locked? Yes  Have throw rugs been removed or fastened down? Yes  Are non-slip mats in all bathtubs and showers?  Yes  Do all stairways have a railing or " banister?  Yes  Are sidewalks and all outdoor steps clear of tools, toys and other articles?  Yes  Are doorways, halls, and stairs free of clutter?  Yes  Are all electrical cords in working order, easily seen, and not run under rug/carpets or wrapped around nails? N/A

## 2022-07-28 ENCOUNTER — TELEPHONE (OUTPATIENT)
Dept: INTERNAL MEDICINE | Facility: CLINIC | Age: 72
End: 2022-07-28
Payer: MEDICARE

## 2022-07-28 DIAGNOSIS — E11.9 TYPE 2 DIABETES MELLITUS WITHOUT COMPLICATION, WITHOUT LONG-TERM CURRENT USE OF INSULIN: Primary | ICD-10-CM

## 2022-07-28 RX ORDER — FLASH GLUCOSE SENSOR
2 KIT MISCELLANEOUS
Qty: 2 KIT | Refills: 11 | Status: SHIPPED | OUTPATIENT
Start: 2022-07-28 | End: 2024-01-23 | Stop reason: SDUPTHER

## 2022-07-28 RX ORDER — FLASH GLUCOSE SENSOR
1 KIT MISCELLANEOUS
Qty: 1 KIT | Refills: 0 | Status: SHIPPED | OUTPATIENT
Start: 2022-07-28

## 2022-07-28 NOTE — TELEPHONE ENCOUNTER
Pt is no longer able to use [facility] for Freestyle Hailey Device or Sensors.  Pt spoke to insurance and now is able to use [facility]  Phone #[phone]  Fax #[phone] (direct dept) or [phone] (alt)     **Chart Note, Demographics and Order has been printed and Faxed

## 2022-08-08 ENCOUNTER — TELEPHONE (OUTPATIENT)
Dept: INTERNAL MEDICINE | Facility: CLINIC | Age: 72
End: 2022-08-08
Payer: MEDICARE

## 2022-08-11 ENCOUNTER — LAB VISIT (OUTPATIENT)
Dept: LAB | Facility: HOSPITAL | Age: 72
End: 2022-08-11
Attending: INTERNAL MEDICINE
Payer: MEDICARE

## 2022-08-11 DIAGNOSIS — E78.5 HYPERLIPIDEMIA, UNSPECIFIED HYPERLIPIDEMIA TYPE: ICD-10-CM

## 2022-08-11 DIAGNOSIS — I10 ESSENTIAL HYPERTENSION, MALIGNANT: Primary | ICD-10-CM

## 2022-08-11 LAB
ALBUMIN SERPL-MCNC: 4 GM/DL (ref 3.4–4.8)
ALBUMIN/GLOB SERPL: 1.3 RATIO (ref 1.1–2)
ALP SERPL-CCNC: 46 UNIT/L (ref 40–150)
ALT SERPL-CCNC: 29 UNIT/L (ref 0–55)
AST SERPL-CCNC: 18 UNIT/L (ref 5–34)
BILIRUBIN DIRECT+TOT PNL SERPL-MCNC: 1.2 MG/DL
BUN SERPL-MCNC: 22.8 MG/DL (ref 8.4–25.7)
CALCIUM SERPL-MCNC: 9.7 MG/DL (ref 8.8–10)
CHLORIDE SERPL-SCNC: 105 MMOL/L (ref 98–107)
CHOLEST SERPL-MCNC: 125 MG/DL
CHOLEST/HDLC SERPL: 3 {RATIO} (ref 0–5)
CO2 SERPL-SCNC: 27 MMOL/L (ref 23–31)
CREAT SERPL-MCNC: 0.9 MG/DL (ref 0.73–1.18)
GFR SERPLBLD CREATININE-BSD FMLA CKD-EPI: >60 MLS/MIN/1.73/M2
GLOBULIN SER-MCNC: 3 GM/DL (ref 2.4–3.5)
GLUCOSE SERPL-MCNC: 167 MG/DL (ref 82–115)
HDLC SERPL-MCNC: 38 MG/DL (ref 35–60)
LDLC SERPL CALC-MCNC: 62 MG/DL (ref 50–140)
POTASSIUM SERPL-SCNC: 5 MMOL/L (ref 3.5–5.1)
PROT SERPL-MCNC: 7 GM/DL (ref 5.8–7.6)
SODIUM SERPL-SCNC: 140 MMOL/L (ref 136–145)
TRIGL SERPL-MCNC: 123 MG/DL (ref 34–140)
VLDLC SERPL CALC-MCNC: 25 MG/DL

## 2022-08-11 PROCEDURE — 80061 LIPID PANEL: CPT

## 2022-08-11 PROCEDURE — 36415 COLL VENOUS BLD VENIPUNCTURE: CPT

## 2022-08-11 PROCEDURE — 80053 COMPREHEN METABOLIC PANEL: CPT

## 2022-08-12 ENCOUNTER — HOSPITAL ENCOUNTER (OUTPATIENT)
Dept: RADIOLOGY | Facility: HOSPITAL | Age: 72
Discharge: HOME | End: 2022-08-12
Attending: INTERNAL MEDICINE
Payer: MEDICARE

## 2022-08-12 DIAGNOSIS — R07.89 OTHER CHEST PAIN: ICD-10-CM

## 2022-08-12 DIAGNOSIS — I25.118 ATHSCL HEART DISEASE OF NATIVE COR ART W OTH ANG PCTRS: ICD-10-CM

## 2022-08-12 DIAGNOSIS — R06.09 OTHER FORM OF DYSPNEA: ICD-10-CM

## 2022-08-12 PROCEDURE — 71046 X-RAY EXAM CHEST 2 VIEWS: CPT | Mod: TC

## 2022-08-24 ENCOUNTER — DOCUMENTATION ONLY (OUTPATIENT)
Dept: INTERNAL MEDICINE | Facility: CLINIC | Age: 72
End: 2022-08-24
Payer: MEDICARE

## 2022-08-25 DIAGNOSIS — E11.9 TYPE 2 DIABETES MELLITUS WITHOUT COMPLICATION, WITHOUT LONG-TERM CURRENT USE OF INSULIN: Primary | ICD-10-CM

## 2022-08-25 RX ORDER — INSULIN GLARGINE 300 U/ML
30 INJECTION, SOLUTION SUBCUTANEOUS NIGHTLY
Qty: 4 PEN | Refills: 5 | Status: SHIPPED | OUTPATIENT
Start: 2022-08-25 | End: 2023-01-23 | Stop reason: SDUPTHER

## 2022-08-25 RX ORDER — INSULIN LISPRO 100 [IU]/ML
20 INJECTION, SOLUTION INTRAVENOUS; SUBCUTANEOUS 3 TIMES DAILY
Qty: 10 ML | Refills: 4 | Status: SHIPPED | OUTPATIENT
Start: 2022-08-25 | End: 2022-12-07 | Stop reason: SDUPTHER

## 2022-08-31 ENCOUNTER — TELEPHONE (OUTPATIENT)
Dept: INTERNAL MEDICINE | Facility: CLINIC | Age: 72
End: 2022-08-31
Payer: MEDICARE

## 2022-08-31 NOTE — TELEPHONE ENCOUNTER
Due to new [employer] Insurance Coverage--Hailey Device info placed in mail today for [facility] [place]    Also scanned into chart

## 2022-09-28 DIAGNOSIS — F32.A DEPRESSION, UNSPECIFIED DEPRESSION TYPE: ICD-10-CM

## 2022-09-28 DIAGNOSIS — R42 DIZZINESS: ICD-10-CM

## 2022-09-28 DIAGNOSIS — E11.9 TYPE 2 DIABETES MELLITUS WITHOUT COMPLICATION, WITHOUT LONG-TERM CURRENT USE OF INSULIN: Primary | ICD-10-CM

## 2022-09-28 DIAGNOSIS — R60.9 FLUID RETENTION: ICD-10-CM

## 2022-09-28 DIAGNOSIS — E87.6 LOW POTASSIUM SYNDROME: ICD-10-CM

## 2022-09-28 DIAGNOSIS — I10 HYPERTENSION, UNSPECIFIED TYPE: ICD-10-CM

## 2022-09-28 DIAGNOSIS — E78.5 HYPERLIPIDEMIA, UNSPECIFIED HYPERLIPIDEMIA TYPE: ICD-10-CM

## 2022-09-28 RX ORDER — CITALOPRAM 20 MG/1
20 TABLET, FILM COATED ORAL DAILY
Qty: 90 TABLET | Refills: 3 | Status: SHIPPED | OUTPATIENT
Start: 2022-09-28 | End: 2024-01-23 | Stop reason: SDUPTHER

## 2022-09-28 RX ORDER — DOXAZOSIN 8 MG/1
TABLET ORAL
Qty: 90 TABLET | Refills: 3 | Status: SHIPPED | OUTPATIENT
Start: 2022-09-28 | End: 2023-10-05 | Stop reason: SDUPTHER

## 2022-09-28 RX ORDER — POTASSIUM CHLORIDE 1500 MG/1
20 TABLET, EXTENDED RELEASE ORAL DAILY
Qty: 90 TABLET | Refills: 3 | Status: SHIPPED | OUTPATIENT
Start: 2022-09-28 | End: 2023-09-22 | Stop reason: SDUPTHER

## 2022-09-28 RX ORDER — SIMVASTATIN 40 MG/1
40 TABLET, FILM COATED ORAL
Qty: 36 TABLET | Refills: 3 | Status: SHIPPED | OUTPATIENT
Start: 2022-09-28 | End: 2022-10-17 | Stop reason: SDUPTHER

## 2022-09-28 RX ORDER — GLIPIZIDE AND METFORMIN HCL 2.5; 25 MG/1; MG/1
1 TABLET, FILM COATED ORAL DAILY
Qty: 90 TABLET | Refills: 3 | Status: SHIPPED | OUTPATIENT
Start: 2022-09-28 | End: 2024-01-23 | Stop reason: SDUPTHER

## 2022-09-28 RX ORDER — MECLIZINE HCL 12.5 MG 12.5 MG/1
12.5 TABLET ORAL 2 TIMES DAILY
Qty: 180 TABLET | Refills: 3 | Status: SHIPPED | OUTPATIENT
Start: 2022-09-28 | End: 2022-11-17 | Stop reason: SDUPTHER

## 2022-09-28 RX ORDER — FUROSEMIDE 40 MG/1
40 TABLET ORAL DAILY
Qty: 90 TABLET | Refills: 3 | Status: SHIPPED | OUTPATIENT
Start: 2022-09-28 | End: 2024-01-23

## 2022-09-28 RX ORDER — LISINOPRIL 10 MG/1
10 TABLET ORAL DAILY
Qty: 90 TABLET | Refills: 3 | Status: ON HOLD | OUTPATIENT
Start: 2022-09-28 | End: 2023-12-13 | Stop reason: HOSPADM

## 2022-10-04 ENCOUNTER — TELEPHONE (OUTPATIENT)
Dept: INTERNAL MEDICINE | Facility: CLINIC | Age: 72
End: 2022-10-04
Payer: MEDICARE

## 2022-10-04 DIAGNOSIS — T78.40XS ALLERGY, SEQUELA: Primary | ICD-10-CM

## 2022-10-04 NOTE — TELEPHONE ENCOUNTER
Patient assistance Meds-Toujeo are in. Pt has been notified and he stated he will pick meds up tomorrow

## 2022-10-05 RX ORDER — MONTELUKAST SODIUM 10 MG/1
10 TABLET ORAL NIGHTLY
COMMUNITY
End: 2022-10-05 | Stop reason: SDUPTHER

## 2022-10-05 RX ORDER — MONTELUKAST SODIUM 10 MG/1
10 TABLET ORAL NIGHTLY
Qty: 90 TABLET | Refills: 3 | Status: SHIPPED | OUTPATIENT
Start: 2022-10-05 | End: 2023-10-30 | Stop reason: SDUPTHER

## 2022-10-17 DIAGNOSIS — E78.5 HYPERLIPIDEMIA, UNSPECIFIED HYPERLIPIDEMIA TYPE: ICD-10-CM

## 2022-10-17 RX ORDER — SIMVASTATIN 40 MG/1
40 TABLET, FILM COATED ORAL
Qty: 36 TABLET | Refills: 3 | Status: SHIPPED | OUTPATIENT
Start: 2022-10-17 | End: 2023-10-02

## 2022-11-17 DIAGNOSIS — R42 DIZZINESS: ICD-10-CM

## 2022-11-17 RX ORDER — MECLIZINE HCL 12.5 MG 12.5 MG/1
12.5 TABLET ORAL 2 TIMES DAILY
Qty: 180 TABLET | Refills: 3 | Status: SHIPPED | OUTPATIENT
Start: 2022-11-17 | End: 2023-06-12

## 2022-12-07 ENCOUNTER — TELEPHONE (OUTPATIENT)
Dept: INTERNAL MEDICINE | Facility: CLINIC | Age: 72
End: 2022-12-07
Payer: MEDICARE

## 2022-12-07 DIAGNOSIS — E11.9 TYPE 2 DIABETES MELLITUS WITHOUT COMPLICATION, WITHOUT LONG-TERM CURRENT USE OF INSULIN: ICD-10-CM

## 2022-12-07 RX ORDER — INSULIN LISPRO 100 [IU]/ML
20 INJECTION, SOLUTION INTRAVENOUS; SUBCUTANEOUS 3 TIMES DAILY
Qty: 10 ML | Refills: 4 | Status: SHIPPED | OUTPATIENT
Start: 2023-01-01 | End: 2023-01-23 | Stop reason: SDUPTHER

## 2022-12-07 NOTE — TELEPHONE ENCOUNTER
Received PAP Toujeo. Attempted to contact pt, left voicemail to either pickup meds at his convenience or call us if he had questions

## 2022-12-07 NOTE — TELEPHONE ENCOUNTER
Diabetes Management/Supplies Update:    [facility]--Dexcom and Sensors  [employer]--Toujeo   Yamileth--Humalog

## 2022-12-13 ENCOUNTER — DOCUMENTATION ONLY (OUTPATIENT)
Dept: FAMILY MEDICINE | Facility: CLINIC | Age: 72
End: 2022-12-13
Payer: MEDICARE

## 2023-01-12 ENCOUNTER — OFFICE VISIT (OUTPATIENT)
Dept: INTERNAL MEDICINE | Facility: CLINIC | Age: 73
End: 2023-01-12
Payer: MEDICARE

## 2023-01-12 VITALS
HEART RATE: 81 BPM | OXYGEN SATURATION: 96 % | RESPIRATION RATE: 20 BRPM | BODY MASS INDEX: 35.31 KG/M2 | DIASTOLIC BLOOD PRESSURE: 62 MMHG | HEIGHT: 67 IN | WEIGHT: 225 LBS | SYSTOLIC BLOOD PRESSURE: 110 MMHG

## 2023-01-12 DIAGNOSIS — N39.45 CONTINUOUS LEAKAGE OF URINE: ICD-10-CM

## 2023-01-12 DIAGNOSIS — I10 PRIMARY HYPERTENSION: ICD-10-CM

## 2023-01-12 DIAGNOSIS — E11.9 TYPE 2 DIABETES MELLITUS WITHOUT COMPLICATION, WITHOUT LONG-TERM CURRENT USE OF INSULIN: Primary | ICD-10-CM

## 2023-01-12 PROCEDURE — 3074F SYST BP LT 130 MM HG: CPT | Mod: CPTII,,, | Performed by: INTERNAL MEDICINE

## 2023-01-12 PROCEDURE — 3074F PR MOST RECENT SYSTOLIC BLOOD PRESSURE < 130 MM HG: ICD-10-PCS | Mod: CPTII,,, | Performed by: INTERNAL MEDICINE

## 2023-01-12 PROCEDURE — 1160F PR REVIEW ALL MEDS BY PRESCRIBER/CLIN PHARMACIST DOCUMENTED: ICD-10-PCS | Mod: CPTII,,, | Performed by: INTERNAL MEDICINE

## 2023-01-12 PROCEDURE — 1159F MED LIST DOCD IN RCRD: CPT | Mod: CPTII,,, | Performed by: INTERNAL MEDICINE

## 2023-01-12 PROCEDURE — 3288F FALL RISK ASSESSMENT DOCD: CPT | Mod: CPTII,,, | Performed by: INTERNAL MEDICINE

## 2023-01-12 PROCEDURE — 3288F PR FALLS RISK ASSESSMENT DOCUMENTED: ICD-10-PCS | Mod: CPTII,,, | Performed by: INTERNAL MEDICINE

## 2023-01-12 PROCEDURE — 99214 PR OFFICE/OUTPT VISIT, EST, LEVL IV, 30-39 MIN: ICD-10-PCS | Mod: ,,, | Performed by: INTERNAL MEDICINE

## 2023-01-12 PROCEDURE — 99214 OFFICE O/P EST MOD 30 MIN: CPT | Mod: ,,, | Performed by: INTERNAL MEDICINE

## 2023-01-12 PROCEDURE — 1160F RVW MEDS BY RX/DR IN RCRD: CPT | Mod: CPTII,,, | Performed by: INTERNAL MEDICINE

## 2023-01-12 PROCEDURE — 1159F PR MEDICATION LIST DOCUMENTED IN MEDICAL RECORD: ICD-10-PCS | Mod: CPTII,,, | Performed by: INTERNAL MEDICINE

## 2023-01-12 PROCEDURE — 1101F PR PT FALLS ASSESS DOC 0-1 FALLS W/OUT INJ PAST YR: ICD-10-PCS | Mod: CPTII,,, | Performed by: INTERNAL MEDICINE

## 2023-01-12 PROCEDURE — 3078F DIAST BP <80 MM HG: CPT | Mod: CPTII,,, | Performed by: INTERNAL MEDICINE

## 2023-01-12 PROCEDURE — 3078F PR MOST RECENT DIASTOLIC BLOOD PRESSURE < 80 MM HG: ICD-10-PCS | Mod: CPTII,,, | Performed by: INTERNAL MEDICINE

## 2023-01-12 PROCEDURE — 1101F PT FALLS ASSESS-DOCD LE1/YR: CPT | Mod: CPTII,,, | Performed by: INTERNAL MEDICINE

## 2023-01-12 RX ORDER — OXYBUTYNIN CHLORIDE 5 MG/1
5 TABLET ORAL
COMMUNITY
Start: 2023-01-06 | End: 2023-01-12

## 2023-01-12 RX ORDER — TAMSULOSIN HYDROCHLORIDE 0.4 MG/1
0.4 CAPSULE ORAL DAILY
Qty: 90 CAPSULE | Refills: 3 | Status: SHIPPED | OUTPATIENT
Start: 2023-01-12 | End: 2023-05-18

## 2023-01-12 NOTE — PROGRESS NOTES
Subjective:       Patient ID: Stevie Cummins is a 73 y.o. male.    Chief Complaint: Follow-up (6 mo) and Med concerns (Oxybutynin-dizziness)    HPI    73-year-old male history of insulin-dependent diabetes, hypertension, dyslipidemia, he will follow-up in the recent past Dr. Granados did [facility], he worked for few weeks, now he has got urinary incontinence with leakage.  They had tried Ditropan a half tablet of 5 mg twice a day but worsening his chronic vertigo.  With that said will go ahead and give her samples of mybetriq  to take at supper time and a prescription for Flomax to take in the morning.  Patient advised after few weeks on it to give us an update.  Otherwise no fever no chills     Review of Systems    Pertinent for urinary leakage, otherwise nocturia.  No fever no chills denies chest pain or shortness of breath, occasionally chronic vertigo worsening since the addition of oxybutynin  The rest of the review of systems essentially negative  Objective:      Physical Exam  patient alert oriented x3   HEENT within normal limits neck supple   Heart regular rhythm faint murmur 2/6   Lungs clear bilaterally no wheezing rales or rhonchi   Abdomen benign  Extremities no edema  Dry ear canal more so on the right side patient advised to put related of Benadryl on q tip   Assessment:       1. Type 2 diabetes mellitus without complication, without long-term current use of insulin    2. Continuous leakage of urine    3. Primary hypertension      Plan:        Samples of mybetric prescription for Flomax 0.4 hours once a day, continue same medication.  Return to clinic in 4 months with a hemoglobin A1c BMP

## 2023-01-23 DIAGNOSIS — E11.9 TYPE 2 DIABETES MELLITUS WITHOUT COMPLICATION, WITHOUT LONG-TERM CURRENT USE OF INSULIN: ICD-10-CM

## 2023-01-23 RX ORDER — INSULIN GLARGINE 300 U/ML
30 INJECTION, SOLUTION SUBCUTANEOUS NIGHTLY
Qty: 4 PEN | Refills: 5 | Status: SHIPPED | OUTPATIENT
Start: 2023-01-23 | End: 2024-01-23 | Stop reason: SDUPTHER

## 2023-01-23 RX ORDER — INSULIN LISPRO 100 [IU]/ML
20 INJECTION, SOLUTION INTRAVENOUS; SUBCUTANEOUS 3 TIMES DAILY
Qty: 10 ML | Refills: 4 | Status: SHIPPED | OUTPATIENT
Start: 2023-01-23 | End: 2023-09-28 | Stop reason: SDUPTHER

## 2023-01-26 ENCOUNTER — DOCUMENTATION ONLY (OUTPATIENT)
Dept: ADMINISTRATIVE | Facility: HOSPITAL | Age: 73
End: 2023-01-26
Payer: MEDICARE

## 2023-01-27 DIAGNOSIS — M54.50 LUMBAR PAIN: Primary | ICD-10-CM

## 2023-02-06 ENCOUNTER — TELEPHONE (OUTPATIENT)
Dept: NEUROSURGERY | Facility: CLINIC | Age: 73
End: 2023-02-06
Payer: MEDICARE

## 2023-02-06 NOTE — TELEPHONE ENCOUNTER
The patient is being referred to our office for low back pain.  I spoke to Dr. Solis's office to see if they had any testing or office notes in regards to the lower back.  She stated that the patient just complained of low back pain and asked to be referred to Dr. Trejo.  I tried calling the patient to see if he's ok with seeing Dr. Morris and get his symptoms and testing.  BH

## 2023-03-22 ENCOUNTER — TELEPHONE (OUTPATIENT)
Dept: INTERNAL MEDICINE | Facility: CLINIC | Age: 73
End: 2023-03-22
Payer: MEDICARE

## 2023-03-22 DIAGNOSIS — J44.9 CHRONIC OBSTRUCTIVE PULMONARY DISEASE, UNSPECIFIED COPD TYPE: Primary | ICD-10-CM

## 2023-03-22 DIAGNOSIS — M54.9 BACK PAIN, UNSPECIFIED BACK LOCATION, UNSPECIFIED BACK PAIN LATERALITY, UNSPECIFIED CHRONICITY: Primary | ICD-10-CM

## 2023-03-22 NOTE — TELEPHONE ENCOUNTER
----- Message from Olivia Montes sent at 3/21/2023 11:13 AM CDT -----  Regarding: referral  Type:  Patient Requesting Referral    Who Called:pt  Does the patient already have the specialty appointment scheduled?:  If yes, what is the date of that appointment?:  Referral to What Specialty:pain mgmnt  Reason for Referral:  Does the patient want the referral with a specific physician?:Dr Noonan @ [phone]  Is the specialist an [facility]?:  Patient Requesting a Response?:yes  Would the patient rather a call back or a response via [facility]? C/b  Best Call Back Number:[phone]  Additional Information: pt requested referral for doctor for pain management  Pt would like a call back  Thank you

## 2023-03-23 ENCOUNTER — PROCEDURE VISIT (OUTPATIENT)
Dept: RESPIRATORY THERAPY | Facility: HOSPITAL | Age: 73
End: 2023-03-23
Attending: INTERNAL MEDICINE
Payer: MEDICARE

## 2023-03-23 DIAGNOSIS — J44.9 CHRONIC OBSTRUCTIVE PULMONARY DISEASE, UNSPECIFIED COPD TYPE: ICD-10-CM

## 2023-03-23 PROCEDURE — 94729 DIFFUSING CAPACITY: CPT

## 2023-03-23 PROCEDURE — 99900031 HC PATIENT EDUCATION (STAT)

## 2023-03-23 PROCEDURE — 94727 GAS DIL/WSHOT DETER LNG VOL: CPT

## 2023-04-13 NOTE — TELEPHONE ENCOUNTER
I was able to speak to the patient.  He states that he went to [facility] and saw Dr. Patel.  He has an MRI lumbar and cervical there along with one or two injections in his back.  He stated that the injections only gave him temporary relief.  He has a CD of the images he will bring to the office and he will ask [facility] to fax over records for review.  Once I receive this information I will call to schedule him accordingly.  TIO

## 2023-04-19 NOTE — TELEPHONE ENCOUNTER
I tried calling the patient to check for an update on the records and images.  I spoke to his wife who stated that he was at the store.  She will ask him to return my call.  TIO

## 2023-04-20 NOTE — TELEPHONE ENCOUNTER
The patient returned my call.  He stated that he is waiting on a call back from [facility] that all of his records and imaging is ready to be picked up.  He will drop everything off to our office and I will review and schedule appropriately with Dr. Morris.  BH

## 2023-04-24 NOTE — TELEPHONE ENCOUNTER
[place], looks like patient dropped off information from [facility]. I do not think he dropped disc off, just reports. I tried to call him to see if he had disc, but no answer and was unable to leave a vm.   63

## 2023-04-26 ENCOUNTER — TELEPHONE (OUTPATIENT)
Dept: INTERNAL MEDICINE | Facility: CLINIC | Age: 73
End: 2023-04-26
Payer: MEDICARE

## 2023-04-26 NOTE — TELEPHONE ENCOUNTER
Spoke to Maribel, she advised that the complete referral did not come through.    Referral Printed and Faxed

## 2023-04-26 NOTE — PROGRESS NOTES
Patient contacted the office c/o back pain, requesting referral to Dr. Hutchinson.  Will refer to Dr. Hutchinson for further evaluation    For information on Fall & Injury Prevention, visit [email or web address]

## 2023-04-26 NOTE — TELEPHONE ENCOUNTER
----- Message from Wendi Turner sent at 4/26/2023  1:41 PM CDT -----  Regarding: Referral  .Type:  Needs Medical Advice    Who Called: Dr Jasper López's office  Symptoms (please be specific):    How long has patient had these symptoms:    Pharmacy name and phone #:    Would the patient rather a call back or a response via [facility]? Call back  Best Call Back Number: [phone]  Additional Information: Needs to speak with someone regarding a referral that was sent over.

## 2023-05-10 ENCOUNTER — TELEPHONE (OUTPATIENT)
Dept: INTERNAL MEDICINE | Facility: CLINIC | Age: 73
End: 2023-05-10
Payer: MEDICARE

## 2023-05-10 NOTE — TELEPHONE ENCOUNTER
All of this info, including Rx was faxed to [facility] in January, it is good for the year.  Patient also given copy when he last came into the office.    Faxed Again

## 2023-05-10 NOTE — TELEPHONE ENCOUNTER
----- Message from Heena Avalos sent at 5/10/2023  3:04 PM CDT -----  Regarding: message for Ghanshyam  Type:  Needs Medical Advice    Who Called: pt   Would the patient rather a call back or a response via [facility]? Call back  Best Call Back Number: [phone]  Additional Information: pt ask me to send this message to Ghanshyam.  He stated that she is aware the medication Novolog.  He ask that she send the script to [facility] and to give this contact number for them [phone].  Please call patient when able

## 2023-05-11 ENCOUNTER — TELEPHONE (OUTPATIENT)
Dept: INTERNAL MEDICINE | Facility: CLINIC | Age: 73
End: 2023-05-11
Payer: MEDICARE

## 2023-05-11 NOTE — TELEPHONE ENCOUNTER
"----- Message from Gloria Saunders MA sent at 5/11/2023  9:48 AM CDT -----  Regarding: KARISSA Alarcon 5/18 @10:20  Are there any outstanding tasks in chart? No, but needs Non FASTING labs, TO BE DONE AT  "[facility]" or lab location of choice PRIOR to appt    Is there any documentation of tasks? no    Has the pt seen another physician, been to ER, UCC, or admitted to hospital since last visit?    Has the pt done blood work or imaging since last visit?    5. PLEASE HAVE PATIENT BRING MEDICATION LIST OR BOTTLES TO EVERY OFFICE VISIT     "

## 2023-05-13 ENCOUNTER — LAB VISIT (OUTPATIENT)
Dept: LAB | Facility: HOSPITAL | Age: 73
End: 2023-05-13
Attending: INTERNAL MEDICINE
Payer: MEDICARE

## 2023-05-13 DIAGNOSIS — E11.9 TYPE 2 DIABETES MELLITUS WITHOUT COMPLICATION, WITHOUT LONG-TERM CURRENT USE OF INSULIN: ICD-10-CM

## 2023-05-13 LAB
ALBUMIN SERPL-MCNC: 4.4 G/DL (ref 3.4–4.8)
ALBUMIN/GLOB SERPL: 1.5 RATIO (ref 1.1–2)
ALP SERPL-CCNC: 47 UNIT/L (ref 40–150)
ALT SERPL-CCNC: 24 UNIT/L (ref 0–55)
AST SERPL-CCNC: 17 UNIT/L (ref 5–34)
BILIRUBIN DIRECT+TOT PNL SERPL-MCNC: 1.4 MG/DL
BUN SERPL-MCNC: 27.2 MG/DL (ref 8.4–25.7)
CALCIUM SERPL-MCNC: 9.9 MG/DL (ref 8.8–10)
CHLORIDE SERPL-SCNC: 105 MMOL/L (ref 98–107)
CO2 SERPL-SCNC: 27 MMOL/L (ref 23–31)
CREAT SERPL-MCNC: 1.02 MG/DL (ref 0.73–1.18)
CREAT UR-MCNC: 69 MG/DL (ref 63–166)
EST. AVERAGE GLUCOSE BLD GHB EST-MCNC: 108.3 MG/DL
GFR SERPLBLD CREATININE-BSD FMLA CKD-EPI: >60 MLS/MIN/1.73/M2
GLOBULIN SER-MCNC: 3 GM/DL (ref 2.4–3.5)
GLUCOSE SERPL-MCNC: 147 MG/DL (ref 82–115)
HBA1C MFR BLD: 5.4 %
MICROALBUMIN UR-MCNC: 221 UG/ML
MICROALBUMIN/CREAT RATIO PNL UR: 320.3 MG/GM CR (ref 0–30)
POTASSIUM SERPL-SCNC: 4.9 MMOL/L (ref 3.5–5.1)
PROT SERPL-MCNC: 7.4 GM/DL (ref 5.8–7.6)
SODIUM SERPL-SCNC: 139 MMOL/L (ref 136–145)

## 2023-05-13 PROCEDURE — 82043 UR ALBUMIN QUANTITATIVE: CPT

## 2023-05-13 PROCEDURE — 36415 COLL VENOUS BLD VENIPUNCTURE: CPT

## 2023-05-13 PROCEDURE — 80053 COMPREHEN METABOLIC PANEL: CPT

## 2023-05-13 PROCEDURE — 83036 HEMOGLOBIN GLYCOSYLATED A1C: CPT

## 2023-05-18 ENCOUNTER — OFFICE VISIT (OUTPATIENT)
Dept: INTERNAL MEDICINE | Facility: CLINIC | Age: 73
End: 2023-05-18
Payer: MEDICARE

## 2023-05-18 ENCOUNTER — TELEPHONE (OUTPATIENT)
Dept: INTERNAL MEDICINE | Facility: CLINIC | Age: 73
End: 2023-05-18

## 2023-05-18 VITALS
HEIGHT: 67 IN | HEART RATE: 87 BPM | DIASTOLIC BLOOD PRESSURE: 62 MMHG | RESPIRATION RATE: 18 BRPM | OXYGEN SATURATION: 95 % | BODY MASS INDEX: 35.31 KG/M2 | SYSTOLIC BLOOD PRESSURE: 110 MMHG | WEIGHT: 225 LBS

## 2023-05-18 DIAGNOSIS — J44.9 CHRONIC OBSTRUCTIVE PULMONARY DISEASE, UNSPECIFIED COPD TYPE: ICD-10-CM

## 2023-05-18 DIAGNOSIS — E11.9 TYPE 2 DIABETES MELLITUS WITHOUT COMPLICATION, WITHOUT LONG-TERM CURRENT USE OF INSULIN: Primary | ICD-10-CM

## 2023-05-18 DIAGNOSIS — I50.42 CHRONIC COMBINED SYSTOLIC AND DIASTOLIC HEART FAILURE: ICD-10-CM

## 2023-05-18 DIAGNOSIS — I10 PRIMARY HYPERTENSION: ICD-10-CM

## 2023-05-18 DIAGNOSIS — I25.118 ATHSCL HEART DISEASE OF NATIVE COR ART W OTH ANG PCTRS: ICD-10-CM

## 2023-05-18 DIAGNOSIS — M54.9 BACK PAIN, UNSPECIFIED BACK LOCATION, UNSPECIFIED BACK PAIN LATERALITY, UNSPECIFIED CHRONICITY: Primary | ICD-10-CM

## 2023-05-18 DIAGNOSIS — E66.01 SEVERE OBESITY (BMI 35.0-39.9) WITH COMORBIDITY: ICD-10-CM

## 2023-05-18 PROBLEM — E11.69 TYPE 2 DIABETES MELLITUS WITH OTHER SPECIFIED COMPLICATION, WITHOUT LONG-TERM CURRENT USE OF INSULIN: Status: ACTIVE | Noted: 2023-05-18

## 2023-05-18 PROCEDURE — 99213 PR OFFICE/OUTPT VISIT, EST, LEVL III, 20-29 MIN: ICD-10-PCS | Mod: ,,, | Performed by: INTERNAL MEDICINE

## 2023-05-18 PROCEDURE — 3066F NEPHROPATHY DOC TX: CPT | Mod: CPTII,,, | Performed by: INTERNAL MEDICINE

## 2023-05-18 PROCEDURE — 1101F PT FALLS ASSESS-DOCD LE1/YR: CPT | Mod: CPTII,,, | Performed by: INTERNAL MEDICINE

## 2023-05-18 PROCEDURE — 4010F PR ACE/ARB THEARPY RXD/TAKEN: ICD-10-PCS | Mod: CPTII,,, | Performed by: INTERNAL MEDICINE

## 2023-05-18 PROCEDURE — 1159F MED LIST DOCD IN RCRD: CPT | Mod: CPTII,,, | Performed by: INTERNAL MEDICINE

## 2023-05-18 PROCEDURE — 4010F ACE/ARB THERAPY RXD/TAKEN: CPT | Mod: CPTII,,, | Performed by: INTERNAL MEDICINE

## 2023-05-18 PROCEDURE — 3078F PR MOST RECENT DIASTOLIC BLOOD PRESSURE < 80 MM HG: ICD-10-PCS | Mod: CPTII,,, | Performed by: INTERNAL MEDICINE

## 2023-05-18 PROCEDURE — 3062F PR POS MACROALBUMINURIA RESULT DOCUMENTED/REVIEW: ICD-10-PCS | Mod: CPTII,,, | Performed by: INTERNAL MEDICINE

## 2023-05-18 PROCEDURE — 3074F SYST BP LT 130 MM HG: CPT | Mod: CPTII,,, | Performed by: INTERNAL MEDICINE

## 2023-05-18 PROCEDURE — 3288F PR FALLS RISK ASSESSMENT DOCUMENTED: ICD-10-PCS | Mod: CPTII,,, | Performed by: INTERNAL MEDICINE

## 2023-05-18 PROCEDURE — 3066F PR DOCUMENTATION OF TREATMENT FOR NEPHROPATHY: ICD-10-PCS | Mod: CPTII,,, | Performed by: INTERNAL MEDICINE

## 2023-05-18 PROCEDURE — 1160F RVW MEDS BY RX/DR IN RCRD: CPT | Mod: CPTII,,, | Performed by: INTERNAL MEDICINE

## 2023-05-18 PROCEDURE — 3062F POS MACROALBUMINURIA REV: CPT | Mod: CPTII,,, | Performed by: INTERNAL MEDICINE

## 2023-05-18 PROCEDURE — 1160F PR REVIEW ALL MEDS BY PRESCRIBER/CLIN PHARMACIST DOCUMENTED: ICD-10-PCS | Mod: CPTII,,, | Performed by: INTERNAL MEDICINE

## 2023-05-18 PROCEDURE — 99213 OFFICE O/P EST LOW 20 MIN: CPT | Mod: ,,, | Performed by: INTERNAL MEDICINE

## 2023-05-18 PROCEDURE — 1101F PR PT FALLS ASSESS DOC 0-1 FALLS W/OUT INJ PAST YR: ICD-10-PCS | Mod: CPTII,,, | Performed by: INTERNAL MEDICINE

## 2023-05-18 PROCEDURE — 1159F PR MEDICATION LIST DOCUMENTED IN MEDICAL RECORD: ICD-10-PCS | Mod: CPTII,,, | Performed by: INTERNAL MEDICINE

## 2023-05-18 PROCEDURE — 3288F FALL RISK ASSESSMENT DOCD: CPT | Mod: CPTII,,, | Performed by: INTERNAL MEDICINE

## 2023-05-18 PROCEDURE — 3074F PR MOST RECENT SYSTOLIC BLOOD PRESSURE < 130 MM HG: ICD-10-PCS | Mod: CPTII,,, | Performed by: INTERNAL MEDICINE

## 2023-05-18 PROCEDURE — 3078F DIAST BP <80 MM HG: CPT | Mod: CPTII,,, | Performed by: INTERNAL MEDICINE

## 2023-05-18 RX ORDER — MIRABEGRON 50 MG/1
1 TABLET, FILM COATED, EXTENDED RELEASE ORAL DAILY
COMMUNITY
Start: 2023-05-03 | End: 2024-01-23 | Stop reason: SDUPTHER

## 2023-05-18 NOTE — PROGRESS NOTES
Subjective:       Patient ID: Stevie Cummins is a 73 y.o. male.    Chief Complaint: Follow-up (Discuss labs 5/13) and NEEDS foot exam    HPI   72 yo male DM , CAD, HBP, CHF  compensated , here to discuss blood work.  Patient under the care of Dr. Hutchinson neurosurgeon due to spinal stenosis.  Has appointment coming up with him for the next plan of care   Patient refers significantly lower extremity pain and lower extremity weakness after he walks 50 yd or so.  No recent trauma no falls     Review of Systems    Pertinent for radiculopathy with lower  extremity weakness ,  refers  BRAVO, no angina ,  Denies nausea vomiting diarrhea constipation  Denies polyuria polydipsia polyphagia   No recent falls no chest pain    Objective:      Physical Exam       Patient alert O X3    HEENT  WNL    HEAR R1R2   LUNGS  CTA   no whezzing   ABD  benign   EXT  see foot exam      Protective Sensation (w/ 10 gram monofilament):  Right: Intact  Left: Intact    Visual Inspection:  Normal -  Bilateral    Pedal Pulses:   Right: Present  Left: Present1.    Posterior Tibialis Pulses:   Right:Present  Left: Present   Assessment:       1. Type 2 diabetes mellitus without complication, without long-term current use of insulin  - Hemoglobin A1C; Future  - Comprehensive Metabolic Panel; Future  - Microalbumin/Creatinine Ratio, Urine; Future    2. Primary hypertension    3. Severe obesity (BMI 35.0-39.9) with comorbidity    4. Chronic combined systolic and diastolic heart failure    5. 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 Chronic obstructive pulmonary disease, unspecified COPD type      Plan:          Hga1c great ,  continue  same  treatment , tolerates well meds  , controlled   Stable , well controlled   Low caloric intake ,dietary education provided   Compensated  , tolerates well meds   Stable ,continue  monitoring  Stable  , no exacerbation    RTC 4 months  Hga1c

## 2023-05-18 NOTE — TELEPHONE ENCOUNTER
Patient is asking for Rx of Norco 7.5 and Methocarbamol 750 due to back pain.  Indicated Dr. Alarcon wrote the scripts in the past.    Meds not indicated in med list--Not Proposed   Please Advise

## 2023-05-19 RX ORDER — METHOCARBAMOL 500 MG/1
500 TABLET, FILM COATED ORAL NIGHTLY
Qty: 20 TABLET | Refills: 0 | Status: SHIPPED | OUTPATIENT
Start: 2023-05-19 | End: 2023-09-22 | Stop reason: SDUPTHER

## 2023-05-19 RX ORDER — IBUPROFEN 600 MG/1
600 TABLET ORAL 2 TIMES DAILY
Qty: 10 TABLET | Refills: 0 | Status: SHIPPED | OUTPATIENT
Start: 2023-05-19 | End: 2023-05-24

## 2023-05-25 NOTE — TELEPHONE ENCOUNTER
I called the patient to schedule with Zoey since his testing is from 2021 and see if he received a CD of the images.  He stated that he had his doctors confused and brought a CD of the images to Dr. Hutchinson's office.  He is treating with Dr. Hutchinson.  I cancelled the referral with us.  TIO

## 2023-06-09 DIAGNOSIS — R42 DIZZINESS: ICD-10-CM

## 2023-06-12 RX ORDER — MECLIZINE HCL 12.5 MG 12.5 MG/1
TABLET ORAL
Qty: 180 TABLET | Refills: 0 | Status: ON HOLD | OUTPATIENT
Start: 2023-06-12 | End: 2023-12-13 | Stop reason: HOSPADM

## 2023-06-22 ENCOUNTER — TELEPHONE (OUTPATIENT)
Dept: INTERNAL MEDICINE | Facility: CLINIC | Age: 73
End: 2023-06-22
Payer: MEDICARE

## 2023-06-22 NOTE — TELEPHONE ENCOUNTER
PAP Toujeo received. Attempted to contact Mr Hubbard; mobile number did not work x2 attempts. Left detailed VM regarding medication on home number.    2 boxes received and placed in the medication refrigerator.

## 2023-06-26 ENCOUNTER — TELEPHONE (OUTPATIENT)
Dept: INTERNAL MEDICINE | Facility: CLINIC | Age: 73
End: 2023-06-26
Payer: MEDICARE

## 2023-07-10 NOTE — PROGRESS NOTES
All blood were discussed with the patient within normal limits since patient used the continues monitoring for diabetes his blood sugar has significantly improved from 7.1-5.4

## 2023-07-18 DIAGNOSIS — M47.817 SPONDYLOSIS WITHOUT MYELOPATHY OR RADICULOPATHY, LUMBOSACRAL REGION: Primary | ICD-10-CM

## 2023-07-21 ENCOUNTER — HOSPITAL ENCOUNTER (OUTPATIENT)
Dept: RADIOLOGY | Facility: HOSPITAL | Age: 73
Discharge: HOME | End: 2023-07-21
Attending: NEUROLOGICAL SURGERY
Payer: MEDICARE

## 2023-07-21 DIAGNOSIS — M47.817 SPONDYLOSIS WITHOUT MYELOPATHY OR RADICULOPATHY, LUMBOSACRAL REGION: ICD-10-CM

## 2023-07-21 PROCEDURE — 72148 MRI LUMBAR SPINE W/O DYE: CPT | Mod: TC

## 2023-07-22 ENCOUNTER — HOSPITAL ENCOUNTER (EMERGENCY)
Facility: HOSPITAL | Age: 73
Discharge: HOME | End: 2023-07-22
Attending: FAMILY MEDICINE
Payer: MEDICARE

## 2023-07-22 VITALS
RESPIRATION RATE: 18 BRPM | HEART RATE: 89 BPM | BODY MASS INDEX: 34.8 KG/M2 | TEMPERATURE: 99 F | OXYGEN SATURATION: 95 % | WEIGHT: 235 LBS | SYSTOLIC BLOOD PRESSURE: 131 MMHG | HEIGHT: 69 IN | DIASTOLIC BLOOD PRESSURE: 80 MMHG
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
## 2023-07-22 DIAGNOSIS — S61.451A DOG BITE OF RIGHT HAND, INITIAL ENCOUNTER: Primary | ICD-10-CM

## 2023-07-22 PROCEDURE — 63600175 PHARM REV CODE 636 W HCPCS: Performed by: FAMILY MEDICINE

## 2023-07-22 PROCEDURE — 99284 EMERGENCY DEPT VISIT MOD MDM: CPT

## 2023-07-22 PROCEDURE — 96372 THER/PROPH/DIAG INJ SC/IM: CPT | Performed by: FAMILY MEDICINE

## 2023-07-22 RX ORDER — CEFTRIAXONE 1 G/1
1 INJECTION, POWDER, FOR SOLUTION INTRAMUSCULAR; INTRAVENOUS
Status: COMPLETED | OUTPATIENT
Start: 2023-07-22 | End: 2023-07-22

## 2023-07-22 RX ORDER — AMOXICILLIN AND CLAVULANATE POTASSIUM 875; 125 MG/1; MG/1
1 TABLET, FILM COATED ORAL 2 TIMES DAILY
Qty: 14 TABLET | Refills: 0 | Status: SHIPPED | OUTPATIENT
Start: 2023-07-22 | End: 2023-07-29

## 2023-07-22 RX ORDER — DICLOFENAC SODIUM 50 MG/1
50 TABLET, DELAYED RELEASE ORAL 3 TIMES DAILY
Qty: 9 TABLET | Refills: 0 | Status: SHIPPED | OUTPATIENT
Start: 2023-07-22 | End: 2023-07-25

## 2023-07-22 RX ADMIN — CEFTRIAXONE SODIUM 1 G: 1 INJECTION, POWDER, FOR SOLUTION INTRAMUSCULAR; INTRAVENOUS at 11:07

## 2023-07-22 NOTE — ED PROVIDER NOTES
Encounter Date: 7/22/2023       History     Chief Complaint   Patient presents with    Animal Bite     Pt reports had a dog bite from 2 weeks right hand pain with swelling and redness      Dog bite  73-year-old male patient comes in with dog bite to the right hand 2 weeks ago since then patient has had inflammation swelling consistent with cellulitis patient otherwise is a diabetic and subsequently developed a ladder infection patient has no fever chills or night sweats no dizziness no chest pain patient has chronic history of diabetes could be related to the infection patient otherwise is own history source      Review of patient's allergies indicates:   Allergen Reactions    Nitrofurantoin Hives    Iodinated contrast media      Past Medical History:   Diagnosis Date    BPH (benign prostatic hyperplasia)     CVA (cerebral vascular accident)     Depression     History of claustrophobia     HLD (hyperlipidemia)     HTN (hypertension)     Personal history of colonic polyps 07/08/2020    Colonoscopy Report    Type 2 diabetes mellitus without complications      Past Surgical History:   Procedure Laterality Date    COLONOSCOPY  07/08/2020    [facility], Sha Grimaldo MD    PATENT FORAMEN OVALE CLOSURE  11/04/2022    PROSTATE SURGERY  2021    [facility]     No family history on file.  Social History     Tobacco Use    Smoking status: Former    Smokeless tobacco: Never   Substance Use Topics    Alcohol use: Not Currently    Drug use: Never     Review of Systems   Constitutional:  Negative for fever.   HENT:  Negative for sore throat.    Respiratory:  Negative for shortness of breath.    Cardiovascular:  Negative for chest pain.   Gastrointestinal:  Negative for nausea.   Genitourinary:  Negative for dysuria.   Musculoskeletal:  Negative for back pain.   Skin:  Positive for rash and wound.   Neurological:  Negative for weakness.   Hematological:  Does not bruise/bleed easily.     Physical Exam     Initial  Vitals [07/22/23 1101]   BP Pulse Resp Temp SpO2   131/80 89 18 98.7 °F (37.1 °C) 95 %      MAP       --         Physical Exam    Nursing note and vitals reviewed.  Constitutional: He appears well-developed and well-nourished. He is not diaphoretic. No distress.   HENT:   Head: Normocephalic and atraumatic.   Right Ear: External ear normal.   Left Ear: External ear normal.   Nose: Nose normal.   Mouth/Throat: Oropharynx is clear and moist. No oropharyngeal exudate.   Eyes: Conjunctivae and EOM are normal. Pupils are equal, round, and reactive to light. Right eye exhibits no discharge. Left eye exhibits no discharge.   Neck: Neck supple. No thyromegaly present. No tracheal deviation present. No JVD present.   Normal range of motion.  Cardiovascular:  Normal rate, regular rhythm, normal heart sounds and intact distal pulses.     Exam reveals no gallop and no friction rub.       No murmur heard.  Pulmonary/Chest: Breath sounds normal. No stridor. No respiratory distress. He has no wheezes. He has no rhonchi. He has no rales. He exhibits no tenderness.   Abdominal: Abdomen is soft. Bowel sounds are normal. He exhibits no distension. There is no abdominal tenderness. There is no rebound and no guarding.   Musculoskeletal:         General: Edema present. No tenderness. Normal range of motion.      Cervical back: Normal range of motion and neck supple.     Lymphadenopathy:     He has no cervical adenopathy.   Neurological: He is alert and oriented to person, place, and time. He has normal strength and normal reflexes. No cranial nerve deficit or sensory deficit. GCS score is 15. GCS eye subscore is 4. GCS verbal subscore is 5. GCS motor subscore is 6.   Skin: Skin is warm and dry. Rash noted. No abscess noted. No erythema.   Psychiatric: He has a normal mood and affect. His behavior is normal. Judgment and thought content normal.       ED Course   Procedures  Labs Reviewed - No data to display       Imaging Results     None          Medications   cefTRIAXone injection 1 g (has no administration in time range)     Medical Decision Making:   Differential Diagnosis:   Infection contusion injury                        Clinical Impression:   Final diagnoses:  [S61.451A, W54.0XXA] Dog bite of right hand, initial encounter (Primary)        ED Disposition Condition    Discharge Stable          ED Prescriptions       Medication Sig Dispense Start Date End Date Auth. Provider    amoxicillin-clavulanate 875-125mg (AUGMENTIN) 875-125 mg per tablet Take 1 tablet by mouth 2 (two) times daily. for 7 days 14 tablet 7/22/2023 7/29/2023 Rishi Young MD    diclofenac (VOLTAREN) 50 MG EC tablet Take 1 tablet (50 mg total) by mouth 3 (three) times daily. for 3 days 9 tablet 7/22/2023 7/25/2023 Rishi Young MD          Follow-up Information    None          Rishi Young MD  07/22/23 4567       Rishi Young MD  07/22/23 2334

## 2023-09-14 ENCOUNTER — TELEPHONE (OUTPATIENT)
Dept: INTERNAL MEDICINE | Facility: CLINIC | Age: 73
End: 2023-09-14
Payer: MEDICARE

## 2023-09-14 DIAGNOSIS — E78.2 MIXED HYPERLIPIDEMIA: ICD-10-CM

## 2023-09-14 DIAGNOSIS — I10 PRIMARY HYPERTENSION: ICD-10-CM

## 2023-09-14 DIAGNOSIS — Z00.00 WELL ADULT EXAM: Primary | ICD-10-CM

## 2023-09-14 DIAGNOSIS — R73.01 IMPAIRED FASTING GLUCOSE: ICD-10-CM

## 2023-09-14 DIAGNOSIS — Z12.5 SCREENING PSA (PROSTATE SPECIFIC ANTIGEN): ICD-10-CM

## 2023-09-14 DIAGNOSIS — E11.9 TYPE 2 DIABETES MELLITUS WITHOUT COMPLICATION, WITHOUT LONG-TERM CURRENT USE OF INSULIN: ICD-10-CM

## 2023-09-14 NOTE — TELEPHONE ENCOUNTER
"----- Message from Camille Herrera MA sent at 9/14/2023 12:56 PM CDT -----  Regarding: DR. TINAJERO PV 9/22/2023 10:20 CHANGE TO WELLNESS  Are there any outstanding tasks in chart? No, but needs FASTING labs, TO BE DONE AT  "[facility]" or lab location of choice PRIOR to appt     Is there any documentation of tasks? no     Has the pt seen another physician, been to ER, [facility], or admitted to hospital since last visit?     Has the pt done blood work or imaging since last visit?    PLEASE BRING MEDS OR LIST OF MEDS      "

## 2023-09-22 ENCOUNTER — OFFICE VISIT (OUTPATIENT)
Dept: INTERNAL MEDICINE | Facility: CLINIC | Age: 73
End: 2023-09-22
Payer: MEDICARE

## 2023-09-22 ENCOUNTER — LAB VISIT (OUTPATIENT)
Dept: LAB | Facility: HOSPITAL | Age: 73
End: 2023-09-22
Attending: INTERNAL MEDICINE
Payer: MEDICARE

## 2023-09-22 VITALS
HEIGHT: 69 IN | OXYGEN SATURATION: 94 % | HEART RATE: 83 BPM | WEIGHT: 223 LBS | BODY MASS INDEX: 33.03 KG/M2 | SYSTOLIC BLOOD PRESSURE: 122 MMHG | DIASTOLIC BLOOD PRESSURE: 72 MMHG

## 2023-09-22 DIAGNOSIS — E87.6 LOW POTASSIUM SYNDROME: ICD-10-CM

## 2023-09-22 DIAGNOSIS — R73.01 IMPAIRED FASTING GLUCOSE: ICD-10-CM

## 2023-09-22 DIAGNOSIS — G89.4 CHRONIC PAIN SYNDROME: ICD-10-CM

## 2023-09-22 DIAGNOSIS — I10 PRIMARY HYPERTENSION: ICD-10-CM

## 2023-09-22 DIAGNOSIS — M54.9 BACK PAIN, UNSPECIFIED BACK LOCATION, UNSPECIFIED BACK PAIN LATERALITY, UNSPECIFIED CHRONICITY: ICD-10-CM

## 2023-09-22 DIAGNOSIS — E11.9 TYPE 2 DIABETES MELLITUS WITHOUT COMPLICATION, WITHOUT LONG-TERM CURRENT USE OF INSULIN: ICD-10-CM

## 2023-09-22 DIAGNOSIS — I73.9 PERIPHERAL VASCULAR DISEASE, UNSPECIFIED: ICD-10-CM

## 2023-09-22 DIAGNOSIS — E11.69 TYPE 2 DIABETES MELLITUS WITH OTHER SPECIFIED COMPLICATION, WITH LONG-TERM CURRENT USE OF INSULIN: ICD-10-CM

## 2023-09-22 DIAGNOSIS — Z00.00 WELL ADULT EXAM: ICD-10-CM

## 2023-09-22 DIAGNOSIS — M14.60 NEUROPATHIC ARTHRITIS: ICD-10-CM

## 2023-09-22 DIAGNOSIS — Z00.00 ANNUAL PHYSICAL EXAM: Primary | ICD-10-CM

## 2023-09-22 DIAGNOSIS — E78.2 MIXED HYPERLIPIDEMIA: ICD-10-CM

## 2023-09-22 DIAGNOSIS — M54.42 CHRONIC MIDLINE LOW BACK PAIN WITH BILATERAL SCIATICA: ICD-10-CM

## 2023-09-22 DIAGNOSIS — Z79.4 TYPE 2 DIABETES MELLITUS WITH OTHER SPECIFIED COMPLICATION, WITH LONG-TERM CURRENT USE OF INSULIN: ICD-10-CM

## 2023-09-22 DIAGNOSIS — M54.41 CHRONIC MIDLINE LOW BACK PAIN WITH BILATERAL SCIATICA: ICD-10-CM

## 2023-09-22 DIAGNOSIS — Z12.5 SCREENING PSA (PROSTATE SPECIFIC ANTIGEN): ICD-10-CM

## 2023-09-22 DIAGNOSIS — G89.29 CHRONIC MIDLINE LOW BACK PAIN WITH BILATERAL SCIATICA: ICD-10-CM

## 2023-09-22 DIAGNOSIS — I48.20 CHRONIC ATRIAL FIBRILLATION: ICD-10-CM

## 2023-09-22 LAB
ALBUMIN SERPL-MCNC: 4.2 G/DL (ref 3.4–4.8)
ALBUMIN/GLOB SERPL: 1.3 RATIO (ref 1.1–2)
ALP SERPL-CCNC: 46 UNIT/L (ref 40–150)
ALT SERPL-CCNC: 26 UNIT/L (ref 0–55)
AST SERPL-CCNC: 20 UNIT/L (ref 5–34)
BASOPHILS # BLD AUTO: 0.06 X10(3)/MCL
BASOPHILS NFR BLD AUTO: 0.6 %
BILIRUB SERPL-MCNC: 1.3 MG/DL
BUN SERPL-MCNC: 25.5 MG/DL (ref 8.4–25.7)
CALCIUM SERPL-MCNC: 10.1 MG/DL (ref 8.8–10)
CHLORIDE SERPL-SCNC: 105 MMOL/L (ref 98–107)
CHOLEST SERPL-MCNC: 156 MG/DL
CHOLEST/HDLC SERPL: 4 {RATIO} (ref 0–5)
CO2 SERPL-SCNC: 30 MMOL/L (ref 23–31)
CREAT SERPL-MCNC: 1.08 MG/DL (ref 0.73–1.18)
CREAT UR-MCNC: 55.1 MG/DL (ref 63–166)
EOSINOPHIL # BLD AUTO: 0.23 X10(3)/MCL (ref 0–0.9)
EOSINOPHIL NFR BLD AUTO: 2.4 %
ERYTHROCYTE [DISTWIDTH] IN BLOOD BY AUTOMATED COUNT: 13.5 % (ref 11.5–17)
EST. AVERAGE GLUCOSE BLD GHB EST-MCNC: 108.3 MG/DL
GFR SERPLBLD CREATININE-BSD FMLA CKD-EPI: >60 MLS/MIN/1.73/M2
GLOBULIN SER-MCNC: 3.3 GM/DL (ref 2.4–3.5)
GLUCOSE SERPL-MCNC: 67 MG/DL (ref 82–115)
HBA1C MFR BLD: 5.4 %
HCT VFR BLD AUTO: 44.2 % (ref 42–52)
HDLC SERPL-MCNC: 38 MG/DL (ref 35–60)
HGB BLD-MCNC: 13.7 G/DL (ref 14–18)
IMM GRANULOCYTES # BLD AUTO: 0.07 X10(3)/MCL (ref 0–0.04)
IMM GRANULOCYTES NFR BLD AUTO: 0.7 %
LDLC SERPL CALC-MCNC: 88 MG/DL (ref 50–140)
LYMPHOCYTES # BLD AUTO: 1.39 X10(3)/MCL (ref 0.6–4.6)
LYMPHOCYTES NFR BLD AUTO: 14.2 %
MCH RBC QN AUTO: 31.5 PG (ref 27–31)
MCHC RBC AUTO-ENTMCNC: 31 G/DL (ref 33–36)
MCV RBC AUTO: 101.6 FL (ref 80–94)
MICROALBUMIN UR-MCNC: 136.7 UG/ML
MICROALBUMIN/CREAT RATIO PNL UR: 248.1 MG/GM CR (ref 0–30)
MONOCYTES # BLD AUTO: 1.04 X10(3)/MCL (ref 0.1–1.3)
MONOCYTES NFR BLD AUTO: 10.6 %
NEUTROPHILS # BLD AUTO: 6.99 X10(3)/MCL (ref 2.1–9.2)
NEUTROPHILS NFR BLD AUTO: 71.5 %
PLATELET # BLD AUTO: 178 X10(3)/MCL (ref 130–400)
PMV BLD AUTO: 10.7 FL (ref 7.4–10.4)
POTASSIUM SERPL-SCNC: 4.6 MMOL/L (ref 3.5–5.1)
PROT SERPL-MCNC: 7.5 GM/DL (ref 5.8–7.6)
PSA SERPL-MCNC: 0.74 NG/ML
RBC # BLD AUTO: 4.35 X10(6)/MCL (ref 4.7–6.1)
SODIUM SERPL-SCNC: 143 MMOL/L (ref 136–145)
TRIGL SERPL-MCNC: 149 MG/DL (ref 34–140)
TSH SERPL-ACNC: 3.19 UIU/ML (ref 0.35–4.94)
VLDLC SERPL CALC-MCNC: 30 MG/DL
WBC # SPEC AUTO: 9.78 X10(3)/MCL (ref 4.5–11.5)

## 2023-09-22 PROCEDURE — 1100F PTFALLS ASSESS-DOCD GE2>/YR: CPT | Mod: CPTII,,, | Performed by: INTERNAL MEDICINE

## 2023-09-22 PROCEDURE — 1159F MED LIST DOCD IN RCRD: CPT | Mod: CPTII,,, | Performed by: INTERNAL MEDICINE

## 2023-09-22 PROCEDURE — 1160F RVW MEDS BY RX/DR IN RCRD: CPT | Mod: CPTII,,, | Performed by: INTERNAL MEDICINE

## 2023-09-22 PROCEDURE — 84153 ASSAY OF PSA TOTAL: CPT

## 2023-09-22 PROCEDURE — 82043 UR ALBUMIN QUANTITATIVE: CPT

## 2023-09-22 PROCEDURE — 3062F PR POS MACROALBUMINURIA RESULT DOCUMENTED/REVIEW: ICD-10-PCS | Mod: CPTII,,, | Performed by: INTERNAL MEDICINE

## 2023-09-22 PROCEDURE — 3078F PR MOST RECENT DIASTOLIC BLOOD PRESSURE < 80 MM HG: ICD-10-PCS | Mod: CPTII,,, | Performed by: INTERNAL MEDICINE

## 2023-09-22 PROCEDURE — 84443 ASSAY THYROID STIM HORMONE: CPT

## 2023-09-22 PROCEDURE — 80061 LIPID PANEL: CPT

## 2023-09-22 PROCEDURE — 3066F NEPHROPATHY DOC TX: CPT | Mod: CPTII,,, | Performed by: INTERNAL MEDICINE

## 2023-09-22 PROCEDURE — 4010F PR ACE/ARB THEARPY RXD/TAKEN: ICD-10-PCS | Mod: CPTII,,, | Performed by: INTERNAL MEDICINE

## 2023-09-22 PROCEDURE — 3062F POS MACROALBUMINURIA REV: CPT | Mod: CPTII,,, | Performed by: INTERNAL MEDICINE

## 2023-09-22 PROCEDURE — 80053 COMPREHEN METABOLIC PANEL: CPT

## 2023-09-22 PROCEDURE — 3078F DIAST BP <80 MM HG: CPT | Mod: CPTII,,, | Performed by: INTERNAL MEDICINE

## 2023-09-22 PROCEDURE — G0439 PPPS, SUBSEQ VISIT: HCPCS | Mod: ,,, | Performed by: INTERNAL MEDICINE

## 2023-09-22 PROCEDURE — 1100F PR PT FALLS ASSESS DOC 2+ FALLS/FALL W/INJURY/YR: ICD-10-PCS | Mod: CPTII,,, | Performed by: INTERNAL MEDICINE

## 2023-09-22 PROCEDURE — 4010F ACE/ARB THERAPY RXD/TAKEN: CPT | Mod: CPTII,,, | Performed by: INTERNAL MEDICINE

## 2023-09-22 PROCEDURE — 3288F PR FALLS RISK ASSESSMENT DOCUMENTED: ICD-10-PCS | Mod: CPTII,,, | Performed by: INTERNAL MEDICINE

## 2023-09-22 PROCEDURE — 3066F PR DOCUMENTATION OF TREATMENT FOR NEPHROPATHY: ICD-10-PCS | Mod: CPTII,,, | Performed by: INTERNAL MEDICINE

## 2023-09-22 PROCEDURE — 3288F FALL RISK ASSESSMENT DOCD: CPT | Mod: CPTII,,, | Performed by: INTERNAL MEDICINE

## 2023-09-22 PROCEDURE — G0439 PR MEDICARE ANNUAL WELLNESS SUBSEQUENT VISIT: ICD-10-PCS | Mod: ,,, | Performed by: INTERNAL MEDICINE

## 2023-09-22 PROCEDURE — 3074F PR MOST RECENT SYSTOLIC BLOOD PRESSURE < 130 MM HG: ICD-10-PCS | Mod: CPTII,,, | Performed by: INTERNAL MEDICINE

## 2023-09-22 PROCEDURE — 3044F HG A1C LEVEL LT 7.0%: CPT | Mod: CPTII,,, | Performed by: INTERNAL MEDICINE

## 2023-09-22 PROCEDURE — 83036 HEMOGLOBIN GLYCOSYLATED A1C: CPT

## 2023-09-22 PROCEDURE — 36415 COLL VENOUS BLD VENIPUNCTURE: CPT

## 2023-09-22 PROCEDURE — 1160F PR REVIEW ALL MEDS BY PRESCRIBER/CLIN PHARMACIST DOCUMENTED: ICD-10-PCS | Mod: CPTII,,, | Performed by: INTERNAL MEDICINE

## 2023-09-22 PROCEDURE — 3044F PR MOST RECENT HEMOGLOBIN A1C LEVEL <7.0%: ICD-10-PCS | Mod: CPTII,,, | Performed by: INTERNAL MEDICINE

## 2023-09-22 PROCEDURE — 3074F SYST BP LT 130 MM HG: CPT | Mod: CPTII,,, | Performed by: INTERNAL MEDICINE

## 2023-09-22 PROCEDURE — 1159F PR MEDICATION LIST DOCUMENTED IN MEDICAL RECORD: ICD-10-PCS | Mod: CPTII,,, | Performed by: INTERNAL MEDICINE

## 2023-09-22 PROCEDURE — 85025 COMPLETE CBC W/AUTO DIFF WBC: CPT

## 2023-09-22 RX ORDER — ASPIRIN 81 MG/1
81 TABLET ORAL DAILY
COMMUNITY
End: 2024-01-23 | Stop reason: SDUPTHER

## 2023-09-22 RX ORDER — TAMSULOSIN HYDROCHLORIDE 0.4 MG/1
1 CAPSULE ORAL DAILY
COMMUNITY
Start: 2023-08-23 | End: 2024-01-23 | Stop reason: SDUPTHER

## 2023-09-22 RX ORDER — GABAPENTIN 300 MG/1
300 CAPSULE ORAL 3 TIMES DAILY
Qty: 90 CAPSULE | Refills: 11 | Status: SHIPPED | OUTPATIENT
Start: 2023-09-22 | End: 2024-01-23

## 2023-09-22 RX ORDER — POTASSIUM CHLORIDE 1500 MG/1
20 TABLET, EXTENDED RELEASE ORAL DAILY
Qty: 90 TABLET | Refills: 3 | Status: SHIPPED | OUTPATIENT
Start: 2023-09-22 | End: 2024-01-23

## 2023-09-22 RX ORDER — TRAMADOL HYDROCHLORIDE 50 MG/1
50 TABLET ORAL
Qty: 30 TABLET | Refills: 1 | Status: ON HOLD | OUTPATIENT
Start: 2023-09-22 | End: 2023-11-29 | Stop reason: HOSPADM

## 2023-09-22 RX ORDER — METHOCARBAMOL 500 MG/1
500 TABLET, FILM COATED ORAL NIGHTLY
Qty: 20 TABLET | Refills: 0 | Status: ON HOLD | OUTPATIENT
Start: 2023-09-22 | End: 2023-12-13 | Stop reason: HOSPADM

## 2023-09-22 RX ORDER — METHOCARBAMOL 500 MG/1
500 TABLET, FILM COATED ORAL 2 TIMES DAILY
Qty: 14 TABLET | Refills: 0 | Status: SHIPPED | OUTPATIENT
Start: 2023-09-22 | End: 2023-09-26 | Stop reason: SDUPTHER

## 2023-09-22 RX ORDER — MULTIVITAMIN
1 TABLET ORAL EVERY MORNING
COMMUNITY
End: 2024-01-23 | Stop reason: SDUPTHER

## 2023-09-22 NOTE — PROGRESS NOTES
Patient ID: Stevie Cummins is a 73 y.o. male.    Chief Complaint: Medicare AWV      Patient Care Team:  Robert Alarcon MD as PCP - General (Internal Medicine)  Jordan Serna Jr., MD as Consulting Physician (Pulmonary Disease)  Néstor Solis MD as Consulting Physician (Urology)  Arya Hutchinson MD as Consulting Physician (Neurosurgery)     HPI:   Patient presents here today for above reason.     Has not done blood work he will do right after the appointment   Severe low back pain with neuropathy and radiculopathy more so to the left lower extremity, unable to do his daily living activities at home, pain is severe, he had seen Dr. Nuñez with the epidural shots with only a short relief of symptoms   Will go ahead and give her a prescription for methocarbamol take twice a day, a prescription for gabapentin to take at bedtime  a prescription for tramadol to take together with the anti-inflammatory that he takes to break through the pain.  In my opinion patient is not a surgical candidate.    The patient's Health Maintenance was reviewed and the following appears to be due at this time:   Health Maintenance Due   Topic Date Due    Hepatitis C Screening  Never done    Shingles Vaccine (1 of 2) Never done    Abdominal Aortic Aneurysm Screening  Never done    Pneumococcal Vaccines (Age 65+) (2 - PCV) 10/13/2015    COVID-19 Vaccine (4 - Moderna series) 01/27/2022    Eye Exam  11/12/2022    Lipid Panel  08/11/2023    Influenza Vaccine (1) 09/01/2023        Past Medical History:  Past Medical History:   Diagnosis Date    BPH (benign prostatic hyperplasia)     CVA (cerebral vascular accident)     Depression     History of claustrophobia     HLD (hyperlipidemia)     HTN (hypertension)     Personal history of colonic polyps 07/08/2020    Colonoscopy Report    Type 2 diabetes mellitus without complications      Past Surgical History:   Procedure Laterality Date    [facility]  07/08/[facility] [place]    PATENT FORAMEN OVALE CLOSURE  11/04/2022    PROSTATE SURGERY  2021    Urolift     Review of patient's allergies indicates:   Allergen Reactions    Nitrofurantoin Hives    Iodinated contrast media      Current Outpatient Medications on File Prior to Visit   Medication Sig Dispense Refill    albuterol (PROVENTIL) 2.5 mg /3 mL (0.083 %) nebulizer solution Take by nebulization.      citalopram (CELEXA) 20 MG tablet Take 1 tablet (20 mg total) by mouth once daily. 90 tablet 3    doxazosin (CARDURA) 8 MG Tab Take 0.5 tabs bid (Patient taking differently: Take 4 mg by mouth every 12 (twelve) hours. Take 0.5 tabs bid) 90 tablet 3    flash glucose sensor (FREESTYLE MITCH 14 DAY SENSOR) Kit 1 each by Misc.(Non-Drug; Combo Route) route 2 hours after meals and at bedtime. 1 kit 0    flash glucose sensor (FREESTYLE MITCH 2 SENSOR) Kit 2 each by Misc.(Non-Drug; Combo Route) route 2 hours after meals and at bedtime. 2 kit 11    furosemide (LASIX) 40 MG tablet Take 1 tablet (40 mg total) by mouth once daily. 90 tablet 3    glipizide-metformin (METAGLIP) 2.5-250 mg per tablet Take 1 tablet by mouth once daily. 90 tablet 3    insulin lispro 100 unit/mL pen Inject 20 Units into the skin 3 (three) times daily. 10 mL 4    lisinopriL 10 MG tablet Take 1 tablet (10 mg total) by mouth once daily. 90 tablet 3    meclizine (ANTIVERT) 12.5 mg tablet TAKE 1 TABLET BY MOUTH TWICE DAILY FOR DIZZINESS 180 tablet 0    montelukast (SINGULAIR) 10 mg tablet Take 1 tablet (10 mg total) by mouth every evening. 90 tablet 3    MYRBETRIQ 50 mg Tb24 Take 1 tablet by mouth once daily.      simvastatin (ZOCOR) 40 MG tablet Take 1 tablet (40 mg total) by mouth 3 (three) times a week. MWF 36 tablet 3    TOUJEO SOLOSTAR U-300 INSULIN 300 unit/mL (1.5 mL) InPn pen Inject 30 Units into the skin every evening. 4 pen 5    [DISCONTINUED] KLOR-CON M20 20 mEq tablet Take 1 tablet (20 mEq total) by mouth once daily. 90 tablet 3    [DISCONTINUED]  methocarbamoL (ROBAXIN) 500 MG Tab Take 1 tablet (500 mg total) by mouth every evening. 20 tablet 0    aspirin (ECOTRIN) 81 MG EC tablet Take 81 mg by mouth once daily.      fluticasone-umeclidin-vilanter (TRELEGY ELLIPTA) 100-62.5-25 mcg DsDv Inhale 1 puff into the lungs nightly.      multivitamin (THERAGRAN) per tablet Take 1 tablet by mouth every morning.      tamsulosin (FLOMAX) 0.4 mg Cap Take 1 capsule by mouth once daily.       No current facility-administered medications on file prior to visit.     Social History     Socioeconomic History    Marital status:    Tobacco Use    Smoking status: Former    Smokeless tobacco: Never   Substance and Sexual Activity    Alcohol use: Not Currently    Drug use: Never    Sexual activity: Not Currently     History reviewed. No pertinent family history.    ROS:   Review of Systems  Constitutional: No weight gain, No fever, No chills, No fatigue.   Eyes: No blurring, No visual disturbances.   Ear/Nose/Mouth/Throat: No decreased hearing, No ear pain, No nasal congestion, No sore throat.   Respiratory: No shortness of breath, No cough, No wheezing.   Cardiovascular: No chest pain, No palpitations, No peripheral edema.   Gastrointestinal: No nausea, No vomiting, No diarrhea, No constipation, No abdominal pain.   Genitourinary: No dysuria, No hematuria. Frequency    Hematology/Lymphatics: No bruising tendency, No bleeding tendency, No swollen lymph glands.   Endocrine: No excessive thirst, No polyuria, No excessive hunger.   Musculoskeletal: severe  joint pain, No muscle pain, severe  decreased range of motion. Low back with radiculopathy   Integumentary: No rash, No pruritus.   Neurologic: No abnormal balance, No confusion, No headache. Sciatica   Psychiatric: No anxiety, No depression, Not suicidal, No hallucinations.        Patient Reported Health Risk Assessment  What [age] 70-79  Are you male or female?: Male  During the past four weeks, how much have you  been bothered by emotional problems such as feeling anxious, depressed, irritable, sad, or downhearted and blue?: Quite a bit  During the past five weeks, has your physical and/or emotional health limited your social activities with family, friends, neighbors, or groups?: Moderately  During the past four weeks, how much bodily pain have you generally had?: Mild pain  During the past four weeks, was someone available to help if you needed and wanted help?: Yes, as much as I wanted  During the past four weeks, what was the hardest physical activity you could do for at least two minutes?: Light  Can you get to places out of walking distance without help?  (For example, can you travel alone on buses or taxis, or drive your own car?): Yes  Can you go shopping for groceries or clothes without someone's help?: Yes  Can you prepare your own meals?: Yes  Can you do your own housework without help?: Yes  Because of any health problems, do you need the help of another person with your personal care needs such as eating, bathing, dressing, or getting around the house?: Yes  Can you handle your own money without help?: Yes  During the past four weeks, how would you rate your health in general?: Good  How have things been going for you during the past four weeks?: Pretty well  Are you having difficulties driving your car?: No  Do you always fasten your seat belt when you are in a car?: Yes, usually  How often in the past four weeks have you been bothered by falling or dizzy when standing up?: Never  How often in the past four weeks have you been bothered by sexual problems?: Never  How often in the past four weeks have you been bothered by trouble eating well?: Never  How often in the past four weeks have you been bothered by teeth or denture problems?: Never  How often in the past four weeks have you been bothered with problems using the telephone?: Never  How often in the past four weeks have you been bothered by tiredness or  "fatigue?: Never  Have you fallen two or more times in the past year?: Yes  Are you afraid of falling?: No  Are you a smoker?: Yes, I might quit  During the past four weeks, how many drinks of wine, beer, or other alcoholic beverages did you have?: No alcohol at all  Do you exercise for about 20 minutes three or more days a week?: No, I usually do not exercise this much  Have you been given any information to help you with hazards in your house that might hurt you?: No  Have you been given any information to help you with keeping track of your medications?: Yes  How often do you have trouble taking medicines the way you've been told to take them?: I always take them as prescribed  How confident are you that you can control and manage most of your health problems?: Somewhat confident  What is your race? (Check all that apply.):       Vitals/PE:   /72 (BP Location: Left arm, Patient Position: Sitting, BP Method: Medium (Manual))   Pulse 83   Ht 5' 9" (1.753 m)   Wt 101.2 kg (223 lb)   SpO2 (!) 94%   BMI 32.93 kg/m²   Physical Exam    General: Alert and oriented, No acute distress.   Eye: Normal conjunctiva without exudate.  HENMT: Normocephalic/AT, Normal hearing, Oral mucosa is moist and pink   Neck: No goiter visualized.   Respiratory: Lungs CTAB, Respirations are non-labored, Breath sounds are equal, Symmetrical chest wall expansion.  Cardiovascular: Normal rate, Regular rhythm, No murmur, No edema.   Gastrointestinal: Non-distended.   Genitourinary: Deferred.  Musculoskeletal: decrease ROM of lumbar area,abnormal  gait using  rolling walker to ambulate , No deformities or amputations.  Integumentary: Warm, Dry, Intact. No diaphoresis, or flushing.  Neurologic: No focal deficits, Cranial Nerves II-XII are grossly intact.   Psychiatric: Cooperative, Appropriate mood & affect, Normal judgment, Non-suicidal.        7/7/2022     2:00 PM   Checklist of Activities of Daily Living   Bathing Independent "   Dressing Independent   Grooming Independent   Oral Care Independent   Toileting Independent   Transferring Independent   Walking Independent   Climbing Stairs Independent   Eating Independent   Shopping Independent   Cooking Independent   Managing Medications Independent   Using the Phone Independent   Housework Indpendent   Laundry Independent   Driving Independent   Managing Finances Independent         9/22/2023    10:20 AM 5/18/2023    10:20 AM 1/12/2023    10:00 AM 7/7/2022     1:00 PM   Fall Risk Assessment - Outpatient   Mobility Status Ambulatory w/ assistance Ambulatory Ambulatory Ambulatory   Number of falls 1 with injury 1 0 0   Identified as fall risk True False False False           Depression Screening  Over the past two weeks, has the patient felt down, depressed, or hopeless?: Yes  Over the past two weeks, has the patient felt little interest or pleasure in doing things?: No  Functional Ability/Safety Screening  Was the patient's timed Up & Go test unsteady or longer than 30 seconds?: No  Does the patient need help with phone, transportation, shopping, preparing meals, housework, laundry, meds, or managing money?: No  Does the patient's home have rugs in the hallway, lack grab bars in the bathroom, lack handrails on the stairs or have poor lighting?: No  Have you noticed any hearing difficulties?: No  Cognitive Function (Assessed through direct observation with due consideration of information obtained by way of patient reports and/or concerns raised by family, friends, caretakers, or others)    Does the patient repeat questions/statements in the same day?: No  Does the patient have trouble remembering the date, year, and time?: No  Does the patient have difficulty managing finances?: No  Does the patient have a decreased sense of direction?: No    Assessment/Plan:   ..  Problem List Items Addressed This Visit          Neuro    Chronic pain syndrome    Relevant Medications    traMADoL (ULTRAM) 50  mg tablet       Cardiac/Vascular    Chronic atrial fibrillation    Peripheral vascular disease, unspecified       Endocrine    Type 2 diabetes mellitus with other specified complication, with long-term current use of insulin       Orthopedic    Chronic midline low back pain with bilateral sciatica    Neuropathic arthritis    Relevant Medications    gabapentin (NEURONTIN) 300 MG capsule       Other    Annual physical exam - Primary     Other Visit Diagnoses       Back pain, unspecified back location, unspecified back pain laterality, unspecified chronicity        Relevant Medications    methocarbamoL (ROBAXIN) 500 MG Tab    Low potassium syndrome        Relevant Medications    KLOR-CON M20 20 mEq tablet           Recommendations:  Diet (healthy food choices, reduce portions and overall calorie intake)  Exercise 30-45 minutes at least 3x per week  Avoid excessive alcohol intake and tobacco use  Stay UTD with immunizations and preventative screenings   Yearly Labs     ..  Medications Ordered This Encounter   Medications    gabapentin (NEURONTIN) 300 MG capsule     Sig: Take 1 capsule (300 mg total) by mouth 3 (three) times daily.     Dispense:  90 capsule     Refill:  11    KLOR-CON M20 20 mEq tablet     Sig: Take 1 tablet (20 mEq total) by mouth once daily.     Dispense:  90 tablet     Refill:  3    methocarbamoL (ROBAXIN) 500 MG Tab     Sig: Take 1 tablet (500 mg total) by mouth every evening.     Dispense:  20 tablet     Refill:  0    methocarbamoL (ROBAXIN) 500 MG Tab     Sig: Take 1 tablet (500 mg total) by mouth 2 (two) times daily. for 10 days     Dispense:  14 tablet     Refill:  0    traMADoL (ULTRAM) 50 mg tablet     Sig: Take 1 tablet (50 mg total) by mouth every 24 hours as needed for Pain.     Dispense:  30 tablet     Refill:  1     Quantity prescribed more than 7 day supply? Yes, quantity medically necessary     Order Specific Question:   I have reviewed the Prescription Drug Monitoring Program (PDMP)  database for this patient prior to prescribing the above opioid medication     Answer:   Yes        ..No orders of the defined types were placed in this encounter.        I am having Stevie Cummins start on gabapentin, traMADoL, and methocarbamoL. I am also having him maintain his albuterol, FREESTYLE MITCH 14 DAY SENSOR, FREESTYLE MITCH 2 SENSOR, citalopram, doxazosin, furosemide, glipizide-metformin, lisinopriL, montelukast, simvastatin, insulin lispro, TOUJEO SOLOSTAR U-300 INSULIN, MYRBETRIQ, meclizine, aspirin, fluticasone-umeclidin-vilanter, multivitamin, tamsulosin, methocarbamoL, and KLOR-CON M20.    No orders of the defined types were placed in this encounter.      Education and counseling done face to face regarding medical conditions and plan. Contact office if new symptoms develop. Should any symptoms ever significantly worsen seek emergency medical attention/go to ER. Follow up at least yearly for wellness or sooner PRN. Nurse to call patient with any results. The patient is receptive, expresses understanding and is agreeable to plan. All questions have been answered.    In detail patient informed of plan , Rx tramadol  once a day  a Rx  for gabapentin at HS and  Robaxin  bid     Refills  Meds  sent  to Mail order      Advance Care Planning   I attest to discussing Advance Care Planning with patient and/or family member.  Education was provided including the importance of the Health Care Power of , Advance Directives, and/or [place] documentation.  The patient expressed understanding to the importance of this information and discussion.           Follow up in about 4 months (around 1/22/2024).

## 2023-09-26 ENCOUNTER — TELEPHONE (OUTPATIENT)
Dept: INTERNAL MEDICINE | Facility: CLINIC | Age: 73
End: 2023-09-26
Payer: MEDICARE

## 2023-09-26 DIAGNOSIS — M54.41 CHRONIC MIDLINE LOW BACK PAIN WITH BILATERAL SCIATICA: Primary | ICD-10-CM

## 2023-09-26 DIAGNOSIS — M54.42 CHRONIC MIDLINE LOW BACK PAIN WITH BILATERAL SCIATICA: Primary | ICD-10-CM

## 2023-09-26 DIAGNOSIS — G89.29 CHRONIC MIDLINE LOW BACK PAIN WITH BILATERAL SCIATICA: Primary | ICD-10-CM

## 2023-09-26 DIAGNOSIS — M62.838 MUSCLE SPASM: ICD-10-CM

## 2023-09-26 RX ORDER — METHOCARBAMOL 500 MG/1
500 TABLET, FILM COATED ORAL 2 TIMES DAILY
Qty: 60 TABLET | Refills: 0 | Status: SHIPPED | OUTPATIENT
Start: 2023-09-26 | End: 2023-10-26

## 2023-09-26 NOTE — TELEPHONE ENCOUNTER
2 Scripts of Robaxin on file, neither have a dispense amount of #60--Okay to send to [facility] for #60?

## 2023-09-26 NOTE — TELEPHONE ENCOUNTER
----- Message from Fany Navarro sent at 9/26/2023  1:46 PM CDT -----  Regarding: Medication  .Type:  Patient Returning Call    Who Called:Stevie  Who Left Message for Patient:PT  Does the patient know what this is regarding?:Medication  Would the patient rather a call back or a response via [facility]?   Best Call Back Number:[phone] or [phone]   Additional Information: Mail order does not want to fill with out prior authorization methocarbamoL (ROBAXIN) 500 MG Tab    Patient also say this can be sent to [facility] for 60 days so he can get this because he is out. Please call back.

## 2023-09-28 ENCOUNTER — DOCUMENTATION ONLY (OUTPATIENT)
Dept: INTERNAL MEDICINE | Facility: CLINIC | Age: 73
End: 2023-09-28
Payer: MEDICARE

## 2023-09-28 DIAGNOSIS — E11.9 TYPE 2 DIABETES MELLITUS WITHOUT COMPLICATION, WITHOUT LONG-TERM CURRENT USE OF INSULIN: ICD-10-CM

## 2023-09-28 RX ORDER — INSULIN LISPRO 100 [IU]/ML
20 INJECTION, SOLUTION INTRAVENOUS; SUBCUTANEOUS 3 TIMES DAILY
Qty: 10 ML | Refills: 4 | Status: SHIPPED | OUTPATIENT
Start: 2023-09-28 | End: 2024-01-23 | Stop reason: SDUPTHER

## 2023-09-30 DIAGNOSIS — E78.5 HYPERLIPIDEMIA, UNSPECIFIED HYPERLIPIDEMIA TYPE: ICD-10-CM

## 2023-10-02 RX ORDER — SIMVASTATIN 40 MG/1
TABLET, FILM COATED ORAL
Qty: 36 TABLET | Refills: 3 | Status: SHIPPED | OUTPATIENT
Start: 2023-10-02 | End: 2024-01-23 | Stop reason: SDUPTHER

## 2023-10-05 DIAGNOSIS — I10 HYPERTENSION, UNSPECIFIED TYPE: ICD-10-CM

## 2023-10-05 RX ORDER — DOXAZOSIN 8 MG/1
TABLET ORAL
Qty: 90 TABLET | Refills: 3 | Status: SHIPPED | OUTPATIENT
Start: 2023-10-05 | End: 2024-01-23 | Stop reason: SDUPTHER

## 2023-10-26 DIAGNOSIS — M54.41 CHRONIC MIDLINE LOW BACK PAIN WITH BILATERAL SCIATICA: ICD-10-CM

## 2023-10-26 DIAGNOSIS — M54.42 CHRONIC MIDLINE LOW BACK PAIN WITH BILATERAL SCIATICA: ICD-10-CM

## 2023-10-26 DIAGNOSIS — G89.29 CHRONIC MIDLINE LOW BACK PAIN WITH BILATERAL SCIATICA: ICD-10-CM

## 2023-10-26 DIAGNOSIS — M62.838 MUSCLE SPASM: ICD-10-CM

## 2023-10-26 RX ORDER — METHOCARBAMOL 500 MG/1
500 TABLET, FILM COATED ORAL 2 TIMES DAILY
Qty: 60 TABLET | Refills: 0 | Status: ON HOLD | OUTPATIENT
Start: 2023-10-26 | End: 2023-11-29 | Stop reason: HOSPADM

## 2023-10-30 ENCOUNTER — TELEPHONE (OUTPATIENT)
Dept: INTERNAL MEDICINE | Facility: CLINIC | Age: 73
End: 2023-10-30
Payer: MEDICARE

## 2023-10-30 DIAGNOSIS — T78.40XS ALLERGY, SEQUELA: ICD-10-CM

## 2023-10-30 DIAGNOSIS — M25.50 ARTHRALGIA, UNSPECIFIED JOINT: Primary | ICD-10-CM

## 2023-10-30 RX ORDER — MONTELUKAST SODIUM 10 MG/1
10 TABLET ORAL NIGHTLY
Qty: 90 TABLET | Refills: 3 | Status: SHIPPED | OUTPATIENT
Start: 2023-10-30 | End: 2024-01-23 | Stop reason: SDUPTHER

## 2023-10-30 RX ORDER — DICLOFENAC POTASSIUM 50 MG/1
50 TABLET, FILM COATED ORAL DAILY
COMMUNITY
End: 2023-10-30 | Stop reason: SDUPTHER

## 2023-10-30 RX ORDER — DICLOFENAC POTASSIUM 50 MG/1
50 TABLET, FILM COATED ORAL DAILY
Qty: 90 TABLET | Refills: 3 | Status: SHIPPED | OUTPATIENT
Start: 2023-10-30 | End: 2024-03-15 | Stop reason: SDUPTHER

## 2023-10-30 NOTE — TELEPHONE ENCOUNTER
----- Message from Ronit Mckeon sent at 10/30/2023 10:30 AM CDT -----  Regarding: Refill Request  .Type:  RX Refill Request    Who Called: pt  Refill or New Rx:refill  RX Name and Strength:montelukast (SINGULAIR) 10 mg tablet  How is the patient currently taking it? (ex. 1XDay):1  Is this a 30 day or 90 day RX:90  Preferred Pharmacy with phone number:[facility]  Local or Mail Order:mail  Ordering Provider:ISABEL Alarcon  Would the patient rather a call back or a response via [facility]?   Best Call Back Number:[phone]  Additional Information: refill request     .Type:  RX Refill Request    Who Called: pt  Refill or New Rx:refill  RX Name and Strength:diclofenac (VOLTAREN) 50 MG EC tablet  How is the patient currently taking it? (ex. 1XDay):1x  Is this a 30 day or 90 day RX:90  Preferred Pharmacy with phone number:[facility] AT [facility] [facility]  Local or Mail Order:Mail  Ordering Provider:ISABEL Alarcon  Would the patient rather a call back or a response via [facility]?   Best Call Back Number:[phone]  Additional Information: refill request

## 2023-11-03 ENCOUNTER — TELEPHONE (OUTPATIENT)
Dept: INTERNAL MEDICINE | Facility: CLINIC | Age: 73
End: 2023-11-03
Payer: MEDICARE

## 2023-11-03 NOTE — TELEPHONE ENCOUNTER
[employer] Patient Assistance Paperwork--Physician portion completed.  Contacted patient for Income Verification Needed, also need to sign patient portion of paperwork.    No Answer/Left VM--Advised patient he can come into the office some time next week to complete forms

## 2023-11-14 ENCOUNTER — HOSPITAL ENCOUNTER (INPATIENT)
Facility: HOSPITAL | Age: 73
LOS: 15 days | Discharge: NURSING, REHAB OR LONG-TERM CARE | DRG: 472 | End: 2023-11-29
Attending: STUDENT IN AN ORGANIZED HEALTH CARE EDUCATION/TRAINING PROGRAM | Admitting: INTERNAL MEDICINE
Payer: MEDICARE

## 2023-11-14 DIAGNOSIS — M46.44 DISCITIS OF THORACIC REGION: ICD-10-CM

## 2023-11-14 DIAGNOSIS — N17.9 AKI (ACUTE KIDNEY INJURY): ICD-10-CM

## 2023-11-14 DIAGNOSIS — M48.00 SPINAL STENOSIS, UNSPECIFIED SPINAL REGION: ICD-10-CM

## 2023-11-14 DIAGNOSIS — I35.0 AORTIC VALVE STENOSIS, ETIOLOGY OF CARDIAC VALVE DISEASE UNSPECIFIED: Primary | ICD-10-CM

## 2023-11-14 DIAGNOSIS — E87.5 HYPERKALEMIA: ICD-10-CM

## 2023-11-14 DIAGNOSIS — M48.00 SPINAL STENOSIS: ICD-10-CM

## 2023-11-14 LAB
ALBUMIN SERPL-MCNC: 4.1 G/DL (ref 3.4–4.8)
ALBUMIN/GLOB SERPL: 1.5 RATIO (ref 1.1–2)
ALP SERPL-CCNC: 48 UNIT/L (ref 40–150)
ALT SERPL-CCNC: 31 UNIT/L (ref 0–55)
APPEARANCE UR: CLEAR
AST SERPL-CCNC: 28 UNIT/L (ref 5–34)
BACTERIA #/AREA URNS AUTO: NORMAL /HPF
BASOPHILS # BLD AUTO: 0.11 X10(3)/MCL
BASOPHILS NFR BLD AUTO: 1.1 %
BILIRUB SERPL-MCNC: 0.8 MG/DL
BILIRUB UR QL STRIP.AUTO: NEGATIVE
BUN SERPL-MCNC: 57.7 MG/DL (ref 8.4–25.7)
CALCIUM SERPL-MCNC: 9.7 MG/DL (ref 8.8–10)
CHLORIDE SERPL-SCNC: 105 MMOL/L (ref 98–107)
CO2 SERPL-SCNC: 27 MMOL/L (ref 23–31)
COLOR UR AUTO: NORMAL
CREAT SERPL-MCNC: 1.53 MG/DL (ref 0.73–1.18)
CRP SERPL-MCNC: 2.5 MG/L
EOSINOPHIL # BLD AUTO: 0.41 X10(3)/MCL (ref 0–0.9)
EOSINOPHIL NFR BLD AUTO: 4.1 %
ERYTHROCYTE [DISTWIDTH] IN BLOOD BY AUTOMATED COUNT: 12.9 % (ref 11.5–17)
GFR SERPLBLD CREATININE-BSD FMLA CKD-EPI: 47 MLS/MIN/1.73/M2
GLOBULIN SER-MCNC: 2.8 GM/DL (ref 2.4–3.5)
GLUCOSE SERPL-MCNC: 217 MG/DL (ref 70–110)
GLUCOSE SERPL-MCNC: 79 MG/DL (ref 82–115)
GLUCOSE UR QL STRIP.AUTO: NORMAL
HCT VFR BLD AUTO: 44 % (ref 42–52)
HGB BLD-MCNC: 14 G/DL (ref 14–18)
IMM GRANULOCYTES # BLD AUTO: 0.08 X10(3)/MCL (ref 0–0.04)
IMM GRANULOCYTES NFR BLD AUTO: 0.8 %
KETONES UR QL STRIP.AUTO: NEGATIVE
LACTATE SERPL-SCNC: 1 MMOL/L (ref 0.5–2.2)
LEUKOCYTE ESTERASE UR QL STRIP.AUTO: NEGATIVE
LYMPHOCYTES # BLD AUTO: 1.49 X10(3)/MCL (ref 0.6–4.6)
LYMPHOCYTES NFR BLD AUTO: 14.8 %
MCH RBC QN AUTO: 31.7 PG (ref 27–31)
MCHC RBC AUTO-ENTMCNC: 31.8 G/DL (ref 33–36)
MCV RBC AUTO: 99.8 FL (ref 80–94)
MONOCYTES # BLD AUTO: 1.23 X10(3)/MCL (ref 0.1–1.3)
MONOCYTES NFR BLD AUTO: 12.2 %
NEUTROPHILS # BLD AUTO: 6.75 X10(3)/MCL (ref 2.1–9.2)
NEUTROPHILS NFR BLD AUTO: 67 %
NITRITE UR QL STRIP.AUTO: NEGATIVE
NRBC BLD AUTO-RTO: 0 %
PH UR STRIP.AUTO: 5 [PH]
PLATELET # BLD AUTO: 149 X10(3)/MCL (ref 130–400)
PMV BLD AUTO: 11.1 FL (ref 7.4–10.4)
POCT GLUCOSE: 217 MG/DL (ref 70–110)
POCT GLUCOSE: 80 MG/DL (ref 70–110)
POCT GLUCOSE: 99 MG/DL (ref 70–110)
POTASSIUM SERPL-SCNC: 6.1 MMOL/L (ref 3.5–5.1)
PROT SERPL-MCNC: 6.9 GM/DL (ref 5.8–7.6)
PROT UR QL STRIP.AUTO: NEGATIVE
RBC # BLD AUTO: 4.41 X10(6)/MCL (ref 4.7–6.1)
RBC #/AREA URNS AUTO: NORMAL /HPF
RBC UR QL AUTO: NEGATIVE
SODIUM SERPL-SCNC: 141 MMOL/L (ref 136–145)
SP GR UR STRIP.AUTO: 1.01 (ref 1–1.03)
SQUAMOUS #/AREA URNS LPF: NORMAL /HPF
UROBILINOGEN UR STRIP-ACNC: NORMAL
WBC # SPEC AUTO: 10.07 X10(3)/MCL (ref 4.5–11.5)
WBC #/AREA URNS AUTO: NORMAL /HPF

## 2023-11-14 PROCEDURE — 80053 COMPREHEN METABOLIC PANEL: CPT

## 2023-11-14 PROCEDURE — 99285 EMERGENCY DEPT VISIT HI MDM: CPT | Mod: 25

## 2023-11-14 PROCEDURE — 11000001 HC ACUTE MED/SURG PRIVATE ROOM

## 2023-11-14 PROCEDURE — 25000003 PHARM REV CODE 250: Performed by: STUDENT IN AN ORGANIZED HEALTH CARE EDUCATION/TRAINING PROGRAM

## 2023-11-14 PROCEDURE — 96361 HYDRATE IV INFUSION ADD-ON: CPT

## 2023-11-14 PROCEDURE — 86140 C-REACTIVE PROTEIN: CPT | Performed by: PHYSICIAN ASSISTANT

## 2023-11-14 PROCEDURE — 82962 GLUCOSE BLOOD TEST: CPT

## 2023-11-14 PROCEDURE — 83605 ASSAY OF LACTIC ACID: CPT | Performed by: PHYSICIAN ASSISTANT

## 2023-11-14 PROCEDURE — 81001 URINALYSIS AUTO W/SCOPE: CPT

## 2023-11-14 PROCEDURE — 85025 COMPLETE CBC W/AUTO DIFF WBC: CPT

## 2023-11-14 PROCEDURE — 96375 TX/PRO/DX INJ NEW DRUG ADDON: CPT

## 2023-11-14 PROCEDURE — 96374 THER/PROPH/DIAG INJ IV PUSH: CPT

## 2023-11-14 PROCEDURE — 63600175 PHARM REV CODE 636 W HCPCS: Performed by: STUDENT IN AN ORGANIZED HEALTH CARE EDUCATION/TRAINING PROGRAM

## 2023-11-14 PROCEDURE — 87040 BLOOD CULTURE FOR BACTERIA: CPT | Performed by: PHYSICIAN ASSISTANT

## 2023-11-14 RX ORDER — CALCIUM GLUCONATE 20 MG/ML
1 INJECTION, SOLUTION INTRAVENOUS
Status: COMPLETED | OUTPATIENT
Start: 2023-11-14 | End: 2023-11-14

## 2023-11-14 RX ORDER — ONDANSETRON HYDROCHLORIDE 2 MG/ML
4 INJECTION, SOLUTION INTRAVENOUS EVERY 8 HOURS PRN
Status: DISCONTINUED | OUTPATIENT
Start: 2023-11-14 | End: 2023-11-29 | Stop reason: HOSPADM

## 2023-11-14 RX ORDER — ACETAMINOPHEN 325 MG/1
650 TABLET ORAL EVERY 8 HOURS PRN
Status: DISCONTINUED | OUTPATIENT
Start: 2023-11-14 | End: 2023-11-20

## 2023-11-14 RX ORDER — HYDROCODONE BITARTRATE AND ACETAMINOPHEN 5; 325 MG/1; MG/1
1 TABLET ORAL EVERY 4 HOURS PRN
Status: DISCONTINUED | OUTPATIENT
Start: 2023-11-14 | End: 2023-11-20

## 2023-11-14 RX ORDER — HYDROMORPHONE HYDROCHLORIDE 2 MG/ML
1 INJECTION, SOLUTION INTRAMUSCULAR; INTRAVENOUS; SUBCUTANEOUS EVERY 4 HOURS PRN
Status: DISCONTINUED | OUTPATIENT
Start: 2023-11-14 | End: 2023-11-20

## 2023-11-14 RX ORDER — TALC
6 POWDER (GRAM) TOPICAL NIGHTLY PRN
Status: DISCONTINUED | OUTPATIENT
Start: 2023-11-14 | End: 2023-11-29 | Stop reason: HOSPADM

## 2023-11-14 RX ORDER — CALCIUM GLUCONATE 20 MG/ML
1 INJECTION, SOLUTION INTRAVENOUS EVERY 10 MIN PRN
Status: DISCONTINUED | OUTPATIENT
Start: 2023-11-14 | End: 2023-11-29 | Stop reason: HOSPADM

## 2023-11-14 RX ORDER — SODIUM CHLORIDE 0.9 % (FLUSH) 0.9 %
10 SYRINGE (ML) INJECTION
Status: DISCONTINUED | OUTPATIENT
Start: 2023-11-14 | End: 2023-11-29 | Stop reason: HOSPADM

## 2023-11-14 RX ADMIN — DEXTROSE MONOHYDRATE 500 ML: 100 INJECTION, SOLUTION INTRAVENOUS at 06:11

## 2023-11-14 RX ADMIN — SODIUM CHLORIDE, POTASSIUM CHLORIDE, SODIUM LACTATE AND CALCIUM CHLORIDE 1000 ML: 600; 310; 30; 20 INJECTION, SOLUTION INTRAVENOUS at 07:11

## 2023-11-14 RX ADMIN — SODIUM ZIRCONIUM CYCLOSILICATE 10 G: 10 POWDER, FOR SUSPENSION ORAL at 06:11

## 2023-11-14 RX ADMIN — INSULIN HUMAN 10 UNITS: 100 INJECTION, SOLUTION PARENTERAL at 07:11

## 2023-11-14 RX ADMIN — CALCIUM GLUCONATE 1 G: 20 INJECTION, SOLUTION INTRAVENOUS at 06:11

## 2023-11-14 NOTE — Clinical Note
Diagnosis: Spinal stenosis [827483]   Admitting Provider:: KRISTI TINAJERO [[id number]]   Admit to which facility:: [facility] [[id number]]   Reason for IP Medical Treatment  (Clinical interventions that can only be accomplished in the IP setting? ) :: Spinal stenosis   I certify that Inpatient services for greater than or equal to 2 midnights are medically necessary:: Yes   Plans for Post-Acute care--if anticipated (pick the single best option):: A. No post acute care anticipated at this time

## 2023-11-14 NOTE — ED PROVIDER NOTES
Encounter Date: 11/14/2023       History     Chief Complaint   Patient presents with    Extremity Weakness     Hx of spinal stenosis. Reports weakness and trouble walking over the last few days. States he feels as if his legs and arms are weak.      73-year-old male presents to ED for evaluation of generalized weakness worsening over the past 4 days.  Patient reports he was had weakness in his arms in his legs increasing over the last several months.  States that he was seen and worked up for severe spinal stenosis.  States he follows with neurosurgery who recommended surgery.  Reports that he had been doing physical therapy without improvement.  States that he would previously been able to walk at home with walker but over the last 2 days he was unable to move around his house at all due to weakness.  Patient reports intermittent neck and back pain.  States he was not able to get up to urinate due to pain.  Follows with Dr. Hutchinson.    The history is provided by the patient. No  was used.     Review of patient's allergies indicates:   Allergen Reactions    Nitrofurantoin Hives    Iodinated contrast media      Past Medical History:   Diagnosis Date    BPH (benign prostatic hyperplasia)     CVA (cerebral vascular accident)     Depression     History of claustrophobia     HLD (hyperlipidemia)     HTN (hypertension)     Personal history of colonic polyps 07/08/2020    Colonoscopy Report    Type 2 diabetes mellitus without complications      Past Surgical History:   Procedure Laterality Date    COLONOSCOPY  07/08/2020    [facility], Sha Grimaldo MD    PATENT FORAMEN OVALE CLOSURE  11/04/2022    PROSTATE SURGERY  2021    Urolift     No family history on file.  Social History     Tobacco Use    Smoking status: Former    Smokeless tobacco: Never   Substance Use Topics    Alcohol use: Not Currently    Drug use: Never     Review of Systems   Constitutional:  Negative for chills, fatigue  and fever.   HENT:  Negative for sore throat.    Respiratory:  Negative for cough and shortness of breath.    Cardiovascular:  Negative for chest pain.   Gastrointestinal:  Negative for abdominal pain, nausea and vomiting.   Genitourinary:  Negative for dysuria and frequency.   Musculoskeletal:  Positive for back pain and myalgias. Negative for neck pain.   Skin:  Negative for rash.   Neurological:  Positive for weakness. Negative for dizziness and headaches.   Hematological:  Does not bruise/bleed easily.   All other systems reviewed and are negative.      Physical Exam     Initial Vitals [11/14/23 1256]   BP Pulse Resp Temp SpO2   (!) 171/89 83 16 97.9 °F (36.6 °C) 99 %      MAP       --         Physical Exam    Nursing note and vitals reviewed.  Constitutional: He appears well-developed. He is cooperative.   HENT:   Head: Normocephalic and atraumatic.   Right Ear: Tympanic membrane and external ear normal.   Left Ear: Tympanic membrane and external ear normal.   Mouth/Throat: Uvula is midline, oropharynx is clear and moist and mucous membranes are normal. No trismus in the jaw. No uvula swelling.   Eyes: Conjunctivae are normal. Pupils are equal, round, and reactive to light.   Neck: Neck supple.   Normal range of motion.  Cardiovascular:  Normal rate, regular rhythm and normal heart sounds.           Pulmonary/Chest: Breath sounds normal. No respiratory distress. He has no wheezes. He has no rhonchi. He has no rales.   Abdominal: Abdomen is soft. Bowel sounds are normal. There is no abdominal tenderness. There is no rebound and no guarding.   Musculoskeletal:         General: Normal range of motion.      Cervical back: Normal range of motion and neck supple. Tenderness present. No swelling, deformity, spasms or bony tenderness.      Thoracic back: Tenderness present. No swelling, deformity, spasms or bony tenderness.      Lumbar back: Tenderness present. No swelling, deformity, spasms or bony tenderness.      Neurological: He is alert and oriented to person, place, and time. He has normal strength. No cranial nerve deficit or sensory deficit. GCS score is 15. GCS eye subscore is 4. GCS verbal subscore is 5. GCS motor subscore is 6.   Skin: Skin is warm and dry. Capillary refill takes less than 2 seconds.   Psychiatric: He has a normal mood and affect.         ED Course   Procedures  Labs Reviewed   COMPREHENSIVE METABOLIC PANEL - Abnormal; Notable for the following components:       Result Value    Potassium Level 6.1 (*)     Glucose Level 79 (*)     Blood Urea Nitrogen 57.7 (*)     Creatinine 1.53 (*)     All other components within normal limits   CBC WITH DIFFERENTIAL - Abnormal; Notable for the following components:    RBC 4.41 (*)     MCV 99.8 (*)     MCH 31.7 (*)     MCHC 31.8 (*)     MPV 11.1 (*)     IG# 0.08 (*)     All other components within normal limits   URINALYSIS, REFLEX TO URINE CULTURE - Normal   LACTIC ACID, PLASMA - Normal   C-REACTIVE PROTEIN - Normal   BLOOD CULTURE OLG   BLOOD CULTURE OLG   CBC W/ AUTO DIFFERENTIAL    Narrative:     The following orders were created for panel order CBC auto differential.  Procedure                               Abnormality         Status                     ---------                               -----------         ------                     CBC with Differential[[id number]]        Abnormal            Final result                 Please view results for these tests on the individual orders.   POCT GLUCOSE   POCT GLUCOSE MONITORING CONTINUOUS          Imaging Results              MRI Lumbar Spine Without Contrast (Final result)  Result time 11/14/23 18:16:57      Final result by Emilia Palacio MD (11/14/23 18:16:57)                   Impression:      Unchanged chronic degenerative change with central canal and neural foraminal stenosis as above.      Electronically signed by: Emilia Palacio  Date:    11/14/2023  Time:    18:16               Narrative:     EXAMINATION:  MRI LUMBAR SPINE WITHOUT CONTRAST    CLINICAL HISTORY:  Low back pain, progressive neurologic deficit;    TECHNIQUE:  Multiplanar, multisequence MR images were acquired from the thoracolumbar junction to the sacrum without the administration of contrast.    COMPARISON:  Lumbar spine 07/21/2023    FINDINGS:  Alignment: Normal.    Vertebrae: Normal marrow signal. No fracture.    Discs: Normal height and signal.    Cord: Normal.  Conus terminates at L1    Degenerative findings:    T12-L1: Disc space narrowing with small posterior protrusion.  No significant central canal or neural foraminal stenosis.    L1-L2: Disc space narrowing with tiny circumferential disc bulge.  No significant central canal or neural foraminal stenosis.    L2-L3: Small disc bulge, ligamentum flavum hypertrophy and facet hypertrophy.  Narrowing of the central canal.  AP diameter of the thecal sac approximately 5 mm.  There is effacement of the CSF with crowding of the nerve roots, unchanged from previous.  No significant neural foraminal stenosis.    L3-L4: Disc bulge, ligamentum flavum hypertrophy and facet hypertrophy.  Narrowing of the central canal.  AP diameter of the thecal sac approximately 4 mm.  There is effacement of the CSF with crowding of the nerve roots, unchanged from previous.  Mild narrowing of the neural foramina inferiorly without appreciable nerve root impingement.    L4-L5: Disc space narrowing.  Disc bulge, ligamentum flavum hypertrophy and facet arthropathy.  Severe narrowing of the central canal.  AP diameter of the thecal sac less than 4 mm.  There is effacement of the CSF with crowding of the nerve roots, similar to previous.  Severe bilateral neural foraminal stenosis.    L5-S1: Severe bilateral facet arthropathy.  Facet arthropathy contributes to severe bilateral lateral recess and neural foraminal stenosis.    Paraspinal muscles & soft tissues: Aortic atherosclerosis.  Incidental renal cyst on the  right.                                        MRI Thoracic Spine Without Contrast (Final result)  Result time 11/14/23 18:17:44      Final result by Angeles Mims MD (11/14/23 18:17:44)                   Impression:      Slightly increased signal seen in the disc at T11-T12 possibly representing early discitis.  No epidural abscess is seen.  No evidence of osteomyelitis is seen.  Follow-up is recommended.    Multilevel degenerative changes seen in the mid to lower thoracic spine    This report was flagged in Epic as abnormal.      Electronically signed by: Angeles Mims  Date:    11/14/2023  Time:    18:17               Narrative:    EXAMINATION:  MRI THORACIC SPINE WITHOUT CONTRAST    CLINICAL HISTORY:  Mid-back pain, neuro deficit;    TECHNIQUE:  Multiplanar, multisequence images were performed through the thoracic spine.  Contrast was not administered.    COMPARISON:  None    FINDINGS:  The vertebral body heights are well maintained.  Alignment is well maintained.  No fracture is seen.  No dislocation is seen.  Cord signal appears normal.  Cord ends at T12-L1.  Conus appears unremarkable.  There is some areas of mild broad-based disc bulge seen at multiple levels in the lower thoracic spine with some areas of lateral recess narrowing.  This is seen predominately from the level of T5 through T12.  There is an area of focal increased signal seen within the disc at T11-T12.  Findings could represent early discitis.  No significant marrow edema or osteomyelitis is seen.  No epidural abscess or lesion is seen.                                       MRI Cervical Spine Without Contrast (Final result)  Result time 11/14/23 18:10:20      Final result by Emilia Palacio MD (11/14/23 18:10:20)                   Impression:      Moderately limited exam due to motion artifact.  There is chronic multilevel central canal stenosis with effacement of the CSF.  This is most severe at C3-C4 with mild chronic  cord compression.  Limited assessment for cord signal abnormality due to significant artifact.      Electronically signed by: Emilia Palacio  Date:    11/14/2023  Time:    18:10               Narrative:    EXAMINATION:  MRI CERVICAL SPINE WITHOUT CONTRAST    CLINICAL HISTORY:  Spinal stenosis, cervical;    TECHNIQUE:  Multiplanar, multisequence MR images of the cervical spine were performed without the administration of contrast.    COMPARISON:  CT cervical spine 08/25/2019    FINDINGS:  LIMITATIONS: Motion degraded exam.    Alignment: Normal.    Vertebrae: Normal marrow signal. No fracture.    Discs: Disc space narrowing.  See below.    Cord: Limited assessment for cord signal abnormality due to significant motion artifact.  Probable mild chronic cord compression at C3-C4.  No appreciable hemorrhage/susceptibility artifact.    Skull base and craniocervical junction: Normal.    Degenerative findings:    C2-C3: No significant central canal or neural foraminal stenosis.    C3-C4: Circumferential disc bulge grossly similar to prior CT exam.  Narrowing of the central canal with effacement of the CSF.  AP diameter of the thecal sac estimated 6 mm.  Limited assessment of neural foraminal stenosis due to motion artifact.    C4-C5: Small circumferential disc bulge similar to previous CT.  Narrowing of the central canal with effacement of the CSF anteriorly.  AP diameter of the thecal sac estimated 7 mm.  Limited assessment of neural foramina due to motion artifact.    C5-C6: Moderate to severe narrowing of the disc space with anterior and posterior disc osteophytes.  This is similar to prior CT exam.  On prior CT exam there was evidence of left uncovertebral hypertrophy with severe narrowing of the left neural foramen.  This is suboptimally evaluated on the current images but likely similar.  AP diameter of the thecal sac estimated 7 mm with effacement of the anterior CSF.    C6-C7: Moderate to severe narrowing of the  disc space with anterior and posterior disc osteophytes.  This is similar to prior CT exam.  AP diameter of the thecal sac estimated 7 mm with effacement of the anterior CSF.  On prior CT exam there was bilateral uncovertebral hypertrophy with mild neural foraminal stenosis.  Suboptimally characterized on the current images.    C7-T1: No significant central canal stenosis.    T1-T2: See dedicated MRI thoracic spine    Paraspinal muscles & soft tissues: Unremarkable.                                       Medications   dextrose 10% bolus 500 mL 500 mL (0 mLs Intravenous Stopped 11/14/23 1935)     And   dextrose 10% bolus 250 mL 250 mL (has no administration in time range)     And   insulin regular injection 10 Units 0.1 mL (10 Units Intravenous Given 11/14/23 1946)   calcium gluconate 1 g in NS IVPB (premixed) (0 g Intravenous Stopped 11/14/23 1820)     And   calcium gluconate 1 g in NS IVPB (premixed) (has no administration in time range)   sodium chloride 0.9% flush 10 mL (has no administration in time range)   melatonin tablet 6 mg (has no administration in time range)   acetaminophen tablet 650 mg (has no administration in time range)   HYDROcodone-acetaminophen 5-325 mg per tablet 1 tablet (has no administration in time range)   HYDROmorphone (PF) injection 1 mg (has no administration in time range)   ondansetron injection 4 mg (has no administration in time range)   sodium zirconium cyclosilicate packet 10 g (10 g Oral Given 11/14/23 1851)   lactated ringers bolus 1,000 mL (0 mLs Intravenous Stopped 11/14/23 2047)     Medical Decision Making  73-year-old male presents to ED for evaluation of generalized weakness worsening over the past 4 days.  Patient reports he was had weakness in his arms in his legs increasing over the last several months.  States that he was seen and worked up for severe spinal stenosis.  States he follows with neurosurgery who recommended surgery.  Reports that he had been doing physical  therapy without improvement.  States that he would previously been able to walk at home with walker but over the last 2 days he was unable to move around his house at all due to weakness.  Patient reports intermittent neck and back pain.  States he was not able to get up to urinate due to pain.  Follows with Dr. Hutchinson.    Amount and/or Complexity of Data Reviewed  Labs: ordered. Decision-making details documented in ED Course.  Radiology: ordered.    Risk  OTC drugs.  Prescription drug management.  Decision regarding hospitalization.  Risk Details: Patient afebrile presents to ED for evaluation of back pain and weakness worsening over the past 3-5 days. Patient with history of severe spinal stenosis. States worsening weakness where he is unable to walk at home over the 2-3 days. States urinating on himself. Follows with neurosurgery, Dr. Hutchinson, who recommended surgery. No leukocytosis noted. CRP negative with no elevation of lactic. UA negative. CORKY noted with hyperkalemia. Hyperkalemia meds initiated with fluids. MRI of spine obtained showing chronic stenosis with cord compression. Possible discitis noted to thoracic. Discussed case with Dr. Burton, neurosurgery on call, reviewed imaging and labs. Will see patient in consult. Will hold antibiotics at this time. As patient is afebrile, no leukocytosis, with negative CRP. Discussed likely chronic stenosis vs discitis. Will have Dr. Hutchinson see patient tomorrow. Discussed with Dr. Medina, PCP on call, will admit patient. Discussed with ED attending, Dr. Delcid, he had face to face encounter with patient.                 ED Course as of 11/14/23 2124 Tue Nov 14, 2023 2108 WBC: 10.07 [SL]   2108 CRP: 2.50 [SL]   2108 Lactate, Hiram: 1.0 [SL]   2108 Potassium(!): 6.1 [SL]   2108 Creatinine(!): 1.53  Hyperkalemia meds started. Starting fluids. [SL]      ED Course User Index  [SL] Camille Stern PA                    Clinical Impression:   Final diagnoses:  [N17.9] CORKY (acute  kidney injury) (Primary)  [E87.5] Hyperkalemia  [M48.00] Spinal stenosis, unspecified spinal region  [M48.00] Spinal stenosis        ED Disposition Condition    Admit                 Camille Stern PA  11/14/23 [id number]

## 2023-11-14 NOTE — FIRST PROVIDER EVALUATION
Medical screening examination initiated.  I have conducted a focused provider triage encounter, findings are as follows:    Brief history of present illness:  patient arrived to ED due to extremity weakness and recurrent falls for several weeks. States he is seeing Dr. Hutchinson regarding complaints. States he has difficulty using the restroom at times due to unsteadiness on his feet.     Vitals:    11/14/23 1318 11/14/23 1326 11/14/23 1328 11/14/23 1329   BP: 136/61      Pulse:  78 79 80   Resp:  10  20   Temp:       TempSrc:       SpO2:    97%   Weight:       Height:           Pertinent physical exam:  awake, alert, has non-labored breathing, follows commands.     Brief workup plan:  labs & imaging     Preliminary workup initiated; this workup will be continued and followed by the physician or advanced practice provider that is assigned to the patient when roomed.

## 2023-11-15 PROBLEM — M46.44 DISCITIS OF THORACIC REGION: Status: ACTIVE | Noted: 2023-11-15

## 2023-11-15 LAB
ALBUMIN SERPL-MCNC: 3.9 G/DL (ref 3.4–4.8)
ALBUMIN/GLOB SERPL: 1.3 RATIO (ref 1.1–2)
ALP SERPL-CCNC: 45 UNIT/L (ref 40–150)
ALT SERPL-CCNC: 28 UNIT/L (ref 0–55)
AST SERPL-CCNC: 24 UNIT/L (ref 5–34)
BASOPHILS # BLD AUTO: 0.14 X10(3)/MCL
BASOPHILS NFR BLD AUTO: 1.4 %
BILIRUB SERPL-MCNC: 1.1 MG/DL
BUN SERPL-MCNC: 44.7 MG/DL (ref 8.4–25.7)
CALCIUM SERPL-MCNC: 9.9 MG/DL (ref 8.8–10)
CHLORIDE SERPL-SCNC: 106 MMOL/L (ref 98–107)
CO2 SERPL-SCNC: 24 MMOL/L (ref 23–31)
CREAT SERPL-MCNC: 1.13 MG/DL (ref 0.73–1.18)
CRP SERPL HS-MCNC: 2.04 MG/L
EOSINOPHIL # BLD AUTO: 0.44 X10(3)/MCL (ref 0–0.9)
EOSINOPHIL NFR BLD AUTO: 4.3 %
ERYTHROCYTE [DISTWIDTH] IN BLOOD BY AUTOMATED COUNT: 13 % (ref 11.5–17)
GFR SERPLBLD CREATININE-BSD FMLA CKD-EPI: >60 MLS/MIN/1.73/M2
GLOBULIN SER-MCNC: 2.9 GM/DL (ref 2.4–3.5)
GLUCOSE SERPL-MCNC: 96 MG/DL (ref 82–115)
HCT VFR BLD AUTO: 42.8 % (ref 42–52)
HGB BLD-MCNC: 13.9 G/DL (ref 14–18)
IMM GRANULOCYTES # BLD AUTO: 0.1 X10(3)/MCL (ref 0–0.04)
IMM GRANULOCYTES NFR BLD AUTO: 1 %
LYMPHOCYTES # BLD AUTO: 1.77 X10(3)/MCL (ref 0.6–4.6)
LYMPHOCYTES NFR BLD AUTO: 17.2 %
MCH RBC QN AUTO: 31.6 PG (ref 27–31)
MCHC RBC AUTO-ENTMCNC: 32.5 G/DL (ref 33–36)
MCV RBC AUTO: 97.3 FL (ref 80–94)
MONOCYTES # BLD AUTO: 1.37 X10(3)/MCL (ref 0.1–1.3)
MONOCYTES NFR BLD AUTO: 13.3 %
NEUTROPHILS # BLD AUTO: 6.5 X10(3)/MCL (ref 2.1–9.2)
NEUTROPHILS NFR BLD AUTO: 62.8 %
NRBC BLD AUTO-RTO: 0 %
PLATELET # BLD AUTO: 149 X10(3)/MCL (ref 130–400)
PMV BLD AUTO: 10.9 FL (ref 7.4–10.4)
POCT GLUCOSE: 106 MG/DL (ref 70–110)
POCT GLUCOSE: 134 MG/DL (ref 70–110)
POCT GLUCOSE: 91 MG/DL (ref 70–110)
POTASSIUM SERPL-SCNC: 5.2 MMOL/L (ref 3.5–5.1)
PROT SERPL-MCNC: 6.8 GM/DL (ref 5.8–7.6)
RBC # BLD AUTO: 4.4 X10(6)/MCL (ref 4.7–6.1)
SODIUM SERPL-SCNC: 139 MMOL/L (ref 136–145)
WBC # SPEC AUTO: 10.32 X10(3)/MCL (ref 4.5–11.5)

## 2023-11-15 PROCEDURE — A9577 INJ MULTIHANCE: HCPCS | Performed by: INTERNAL MEDICINE

## 2023-11-15 PROCEDURE — 25000003 PHARM REV CODE 250: Performed by: INTERNAL MEDICINE

## 2023-11-15 PROCEDURE — 86141 C-REACTIVE PROTEIN HS: CPT | Performed by: NURSE PRACTITIONER

## 2023-11-15 PROCEDURE — 99233 SBSQ HOSP IP/OBS HIGH 50: CPT | Mod: ,,, | Performed by: INTERNAL MEDICINE

## 2023-11-15 PROCEDURE — 25500020 PHARM REV CODE 255: Performed by: INTERNAL MEDICINE

## 2023-11-15 PROCEDURE — 85025 COMPLETE CBC W/AUTO DIFF WBC: CPT | Performed by: STUDENT IN AN ORGANIZED HEALTH CARE EDUCATION/TRAINING PROGRAM

## 2023-11-15 PROCEDURE — 21400001 HC TELEMETRY ROOM

## 2023-11-15 PROCEDURE — 80053 COMPREHEN METABOLIC PANEL: CPT | Performed by: STUDENT IN AN ORGANIZED HEALTH CARE EDUCATION/TRAINING PROGRAM

## 2023-11-15 PROCEDURE — 99223 1ST HOSP IP/OBS HIGH 75: CPT | Mod: FS,,, | Performed by: GENERAL PRACTICE

## 2023-11-15 RX ORDER — SODIUM CHLORIDE 450 MG/100ML
INJECTION, SOLUTION INTRAVENOUS CONTINUOUS
Status: ACTIVE | OUTPATIENT
Start: 2023-11-15 | End: 2023-11-17

## 2023-11-15 RX ORDER — MICONAZOLE NITRATE 2 %
POWDER (GRAM) TOPICAL 2 TIMES DAILY
Status: DISCONTINUED | OUTPATIENT
Start: 2023-11-15 | End: 2023-11-29 | Stop reason: HOSPADM

## 2023-11-15 RX ADMIN — GADOBENATE DIMEGLUMINE 20 ML: 529 INJECTION, SOLUTION INTRAVENOUS at 11:11

## 2023-11-15 RX ADMIN — SODIUM CHLORIDE: 4.5 INJECTION, SOLUTION INTRAVENOUS at 10:11

## 2023-11-15 RX ADMIN — MICONAZOLE NITRATE: 20 POWDER TOPICAL at 01:11

## 2023-11-15 RX ADMIN — MICONAZOLE NITRATE: 20 POWDER TOPICAL at 08:11

## 2023-11-15 NOTE — NURSING
Nurses Note -- 4 Eyes      11/15/2023   12:00 PM      Skin assessed during: Admit      [x] No Altered Skin Integrity Present    [x]Prevention Measures Documented      [] Yes- Altered Skin Integrity Present or Discovered   [] LDA Added if Not in Epic (Describe Wound)   [] New Altered Skin Integrity was Present on Admit and Documented in LDA   [] Wound Image Taken    Wound Care Consulted? No    Attending Nurse:  Martha Prado RN/Staff Member:  Grazyna Chua RN

## 2023-11-15 NOTE — H&P
Progress Note  Hospital Medicine  HISTORY  and  PHYSICAL    Patient Name: Stevie Cummins  YOB: 1950    Admit Date: 11/14/2023                     LOS: 1    SUBJECTIVE:     Reason for Admission:  Discitis of thoracic region  See H&P for detailed presentating history and ROS.      Interval history:  Old male diabetes, hypertension, CAD, who was in his usual health until yesterday when he started loading noticing lower extremity weakness to a point he could not walk.  Patient decided to come to the emergency room due to known history of lumbar disc disease for which he has been followed closely by Dr. Mcconnell.  Accidental findings of diskitis in between T 2011, with that said will go ahead and consult Infectious Disease once we get a CT-guided aspiration to find out if he is infectious or not for antibiotic recommendation   Will also get physical therapy involved to assess his strength and weakness, resume home medications,      OBJECTIVE:     Vital Signs Range (Last 24H):  Temp:  [97.6 °F (36.4 °C)-98.8 °F (37.1 °C)]   Pulse:  [67-82]   Resp:  [11-19]   BP: (115-156)/(58-77)   SpO2:  [92 %-96 %] Body mass index is 32.34 kg/m².  Wt Readings from Last 3 Encounters:   11/14/23 1256 99.3 kg (219 lb)   09/22/23 1011 101.2 kg (223 lb)   07/22/23 1101 106.6 kg (235 lb)       I & O (Last 24H):  Intake/Output Summary (Last 24 hours) at 11/15/2023 1415  Last data filed at 11/15/2023 0200  Gross per 24 hour   Intake --   Output 1301 ml   Net -1301 ml       Physical Exam:  Patient alert oriented x3 in no distress   HEENT within normal limits   Heart irregular irregular systolic murmur 2/6  Lungs are clear bilaterally on anterior and posterior approach with no wheezing rales or rhonchi   Abdomen obese soft and depressible nontender   Extremities full range of motion adequate strength against resistance with no edema    Diagnostic Results:  Lab Results   Component Value Date    WBC 10.32 11/15/2023    HGB 13.9 (L)  11/15/2023    HCT 42.8 11/15/2023    MCV 97.3 (H) 11/15/2023     11/15/2023     Recent Labs   Lab 11/15/23  0309      K 5.2*   CO2 24   BUN 44.7*   CREATININE 1.13   CALCIUM 9.9     Lab Results   Component Value Date    INR 1.1 11/04/2022    INR 1.1 03/29/2022    INR 1.4 (H) 08/25/2019    PROTIME 17.0 (H) 08/25/2019    PROTIME 27.0 (H) 02/21/2018     Lab Results   Component Value Date    HGBA1C 5.4 09/22/2023     Recent Labs     11/14/23  1822 11/14/23  1938 11/14/23  2136 11/15/23  1058   POCTGLUCOSE 80 217* 99 106       ASSESSMENT/PLAN:     Active Hospital Problems    Diagnosis  POA    *Discitis of thoracic region [M46.44]  Unknown      Resolved Hospital Problems   No resolved problems to display.        Problems Addressed Today:    No problem-specific Assessment & Plan notes found for this encounter.        DISCHARGE PLANNING:   Start IV fluids, consult cardiologist in case he would need surgery if everything comes out negative for infection for clearance   Consult infectious disease for the recommendations after the aspiration for antibiotic treatment   Resume home meds repeat labs in the am     Signing Physician:  Robert Valdes MD

## 2023-11-15 NOTE — PLAN OF CARE
Patient stated that he was getting outpatient rehab prior to admission at [facility] but he was progressively getting weaker. Discussed with him possible discharge options for inpatient rehab

## 2023-11-15 NOTE — CONSULTS
[facility]  Infectious Diseases Consult        ASSESSMENT & PLAN:   He is a 73-year-old male with a past medical history of hypertension, diabetes mellitus, and BPH who presented to the emergency room on 11/14/2023 with worsening generalized weakness over the past 4-6 weeks as well as worsening issues with bowel and bladder incontinence.  He would previously been seen by Dr. Carmichael and was found to have severe lumbar spinal stenosis with recommendations for surgery if no improvement with physical therapy.  On admit here, he underwent noncontrasted MRIs of the cervical, thoracic, and lumbar spine which again showed severe spinal stenosis in the lumbar spine and disc bulging as well as concern for discitis at T11-T12.  Patient denies any recent fevers, chills, sweats, infections, or procedures.  He is not currently on any antimicrobials and has not received any recently.  Blood cultures were obtained today and are pending.  Neurosurgery has been consulted - awaiting input.  We have been consulted given concern for thoracic discitis.     Abnormal MRI thoracic spine with concern for discitis at T11-12  Severe lumbar spine stenosis  Lumbar radiculopathy / incontinence suspect s/t above  DM2 / HTN      PLAN:  Send inflammatory markers.  Get MRIs thoracic and lumbar spines w/contrast - performed yesterday w/o.  BCx obtained today - follow.  Maintain off abx.   F/u NSY input and plans. If surgery planned for lumbar spine, would appreciate cultures of thoracic disc.  If no NSY intervention planned and BCx negative, may need to pursue IR biopsy.  Discussed with patient, nursing, and Dr. Alarcon.         HISTORY OF PRESENT ILLNESS:     He is a 73-year-old male with a past medical history of hypertension, diabetes mellitus, and BPH who presented to the emergency room on 11/14/2023 with worsening generalized weakness over the past 4-6 weeks as well as worsening issues with bowel and bladder incontinence.  He would previously been seen  by Dr. Carmichael and was found to have severe lumbar spinal stenosis with recommendations for surgery if no improvement with physical therapy.  On admit here, he underwent noncontrasted MRIs of the cervical, thoracic, and lumbar spine which again showed severe spinal stenosis in the lumbar spine and disc bulging as well as concern for discitis at T11-T12.  Patient denies any recent fevers, chills, sweats, infections, or procedures.  He is not currently on any antimicrobials and has not received any recently.  Blood cultures were obtained today and are pending.  Neurosurgery has been consulted - awaiting input.  We have been consulted given concern for thoracic discitis.       PAST MEDICAL HISTORY:     Past Medical History:   Diagnosis Date    BPH (benign prostatic hyperplasia)     CVA (cerebral vascular accident)     Depression     History of claustrophobia     HLD (hyperlipidemia)     HTN (hypertension)     Personal history of colonic polyps 07/08/2020    Colonoscopy Report    Type 2 diabetes mellitus without complications        PAST SURGICAL HISTORY:     Past Surgical History:   Procedure Laterality Date    COLONOSCOPY  07/08/2020    [facility] [place], Sha Grimaldo MD    PATENT FORAMEN OVALE CLOSURE  11/04/2022    PROSTATE SURGERY  2021    Urolift       ALLERGIES:   Nitrofurantoin and Iodinated contrast media    FAMILY HISTORY:   Reviewed and non-contributory     SOCIAL HISTORY:     Social History     Tobacco Use    Smoking status: Former    Smokeless tobacco: Never   Substance Use Topics    Alcohol use: Not Currently        MEDICATIONS:   Reviewed in EMR    REVIEW OF SYSTEMS:   Except as documented, all other systems reviewed and negative     PHYSICAL EXAM:   T 98.8 °F (37.1 °C)   /73   P 81   RR 18   O2 (!) 94 %  GENERAL: NAD; does not appear toxic  SKIN: no rash  HEENT: sclera non-icteric; PERRL  NECK: supple; no LAD  CHEST: CTA; nonlabored, equal expansion; no adventitious BS  CARDIOVASCULAR:  "RRR, S1S2; no murmur; strong, equal peripheral pulses; no edema  ABDOMEN:  active bowel sounds; abdomen soft, nondistended, nontender to palpation  GENITOURINARY: no suprapubic tenderness; no CVA tenderness   EXTREMITIES: no cyanosis or clubbing  NEURO: AAO x4; CN II-XII grossly intact  PSYCH: Mentation and affect appropriate     LABS AND IMAGING:     Recent Labs     11/14/23  1426 11/15/23  0309   WBC 10.07 10.32   RBC 4.41* 4.40*   HGB 14.0 13.9*   HCT 44.0 42.8   MCV 99.8* 97.3*   MCH 31.7* 31.6*   MCHC 31.8* 32.5*   RDW 12.9 13.0    149     Recent Labs     11/14/23  1924   LACTIC 1.0     No results for input(s): "INR", "APTT", "D-DIMER" in the last 72 hours.  No results for input(s): "HGBA1C", "CHOL", "TRIG", "LDL", "VLDL", "HDL" in the last 72 hours.   Recent Labs     11/14/23  1426 11/15/23  0309    139   K 6.1* 5.2*   CHLORIDE 105 106   CO2 27 24   BUN 57.7* 44.7*   CREATININE 1.53* 1.13   GLUCOSE 79* 96   CALCIUM 9.7 9.9   ALBUMIN 4.1 3.9   GLOBULIN 2.8 2.9   ALKPHOS 48 45   ALT 31 28   AST 28 24   BILITOT 0.8 1.1   CRP 2.50  --      No results for input(s): "BNP", "CPK", "TROPONINI" in the last 72 hours.       MRI Thoracic Spine Without Contrast  Narrative: EXAMINATION:  MRI THORACIC SPINE WITHOUT CONTRAST    CLINICAL HISTORY:  Mid-back pain, neuro deficit;    TECHNIQUE:  Multiplanar, multisequence images were performed through the thoracic spine.  Contrast was not administered.    COMPARISON:  None    FINDINGS:  The vertebral body heights are well maintained.  Alignment is well maintained.  No fracture is seen.  No dislocation is seen.  Cord signal appears normal.  Cord ends at T12-L1.  Conus appears unremarkable.  There is some areas of mild broad-based disc bulge seen at multiple levels in the lower thoracic spine with some areas of lateral recess narrowing.  This is seen predominately from the level of T5 through T12.  There is an area of focal increased signal seen within the disc at " T11-T12.  Findings could represent early discitis.  No significant marrow edema or osteomyelitis is seen.  No epidural abscess or lesion is seen.  Impression: Slightly increased signal seen in the disc at T11-T12 possibly representing early discitis.  No epidural abscess is seen.  No evidence of osteomyelitis is seen.  Follow-up is recommended.    Multilevel degenerative changes seen in the mid to lower thoracic spine    This report was flagged in Epic as abnormal.    Electronically signed by: Angeles Mims  Date:    11/14/2023  Time:    18:17  MRI Lumbar Spine Without Contrast  Narrative: EXAMINATION:  MRI LUMBAR SPINE WITHOUT CONTRAST    CLINICAL HISTORY:  Low back pain, progressive neurologic deficit;    TECHNIQUE:  Multiplanar, multisequence MR images were acquired from the thoracolumbar junction to the sacrum without the administration of contrast.    COMPARISON:  Lumbar spine 07/21/2023    FINDINGS:  Alignment: Normal.    Vertebrae: Normal marrow signal. No fracture.    Discs: Normal height and signal.    Cord: Normal.  Conus terminates at L1    Degenerative findings:    T12-L1: Disc space narrowing with small posterior protrusion.  No significant central canal or neural foraminal stenosis.    L1-L2: Disc space narrowing with tiny circumferential disc bulge.  No significant central canal or neural foraminal stenosis.    L2-L3: Small disc bulge, ligamentum flavum hypertrophy and facet hypertrophy.  Narrowing of the central canal.  AP diameter of the thecal sac approximately 5 mm.  There is effacement of the CSF with crowding of the nerve roots, unchanged from previous.  No significant neural foraminal stenosis.    L3-L4: Disc bulge, ligamentum flavum hypertrophy and facet hypertrophy.  Narrowing of the central canal.  AP diameter of the thecal sac approximately 4 mm.  There is effacement of the CSF with crowding of the nerve roots, unchanged from previous.  Mild narrowing of the neural foramina inferiorly  without appreciable nerve root impingement.    L4-L5: Disc space narrowing.  Disc bulge, ligamentum flavum hypertrophy and facet arthropathy.  Severe narrowing of the central canal.  AP diameter of the thecal sac less than 4 mm.  There is effacement of the CSF with crowding of the nerve roots, similar to previous.  Severe bilateral neural foraminal stenosis.    L5-S1: Severe bilateral facet arthropathy.  Facet arthropathy contributes to severe bilateral lateral recess and neural foraminal stenosis.    Paraspinal muscles & soft tissues: Aortic atherosclerosis.  Incidental renal cyst on the right.  Impression: Unchanged chronic degenerative change with central canal and neural foraminal stenosis as above.    Electronically signed by: Emilia Palacio  Date:    11/14/2023  Time:    18:16  MRI Cervical Spine Without Contrast  Narrative: EXAMINATION:  MRI CERVICAL SPINE WITHOUT CONTRAST    CLINICAL HISTORY:  Spinal stenosis, cervical;    TECHNIQUE:  Multiplanar, multisequence MR images of the cervical spine were performed without the administration of contrast.    COMPARISON:  CT cervical spine 08/25/2019    FINDINGS:  LIMITATIONS: Motion degraded exam.    Alignment: Normal.    Vertebrae: Normal marrow signal. No fracture.    Discs: Disc space narrowing.  See below.    Cord: Limited assessment for cord signal abnormality due to significant motion artifact.  Probable mild chronic cord compression at C3-C4.  No appreciable hemorrhage/susceptibility artifact.    Skull base and craniocervical junction: Normal.    Degenerative findings:    C2-C3: No significant central canal or neural foraminal stenosis.    C3-C4: Circumferential disc bulge grossly similar to prior CT exam.  Narrowing of the central canal with effacement of the CSF.  AP diameter of the thecal sac estimated 6 mm.  Limited assessment of neural foraminal stenosis due to motion artifact.    C4-C5: Small circumferential disc bulge similar to previous CT.   Narrowing of the central canal with effacement of the CSF anteriorly.  AP diameter of the thecal sac estimated 7 mm.  Limited assessment of neural foramina due to motion artifact.    C5-C6: Moderate to severe narrowing of the disc space with anterior and posterior disc osteophytes.  This is similar to prior CT exam.  On prior CT exam there was evidence of left uncovertebral hypertrophy with severe narrowing of the left neural foramen.  This is suboptimally evaluated on the current images but likely similar.  AP diameter of the thecal sac estimated 7 mm with effacement of the anterior CSF.    C6-C7: Moderate to severe narrowing of the disc space with anterior and posterior disc osteophytes.  This is similar to prior CT exam.  AP diameter of the thecal sac estimated 7 mm with effacement of the anterior CSF.  On prior CT exam there was bilateral uncovertebral hypertrophy with mild neural foraminal stenosis.  Suboptimally characterized on the current images.    C7-T1: No significant central canal stenosis.    T1-T2: See dedicated MRI thoracic spine    Paraspinal muscles & soft tissues: Unremarkable.  Impression: Moderately limited exam due to motion artifact.  There is chronic multilevel central canal stenosis with effacement of the CSF.  This is most severe at C3-C4 with mild chronic cord compression.  Limited assessment for cord signal abnormality due to significant artifact.    Electronically signed by: Emliia Palacio  Date:    11/14/2023  Time:    18:10         NIYAH Dyer  Infectious Diseases

## 2023-11-15 NOTE — CONSULTS
[facility] - 5th Floor Med Surg  Neurosurgery  Consult Note    Inpatient consult to Neurosurgery  Consult performed by: Anais Zhang PA  Consult ordered by: Robert Alarcon MD        Subjective:     Chief Complaint/Reason for Admission: Leg weakness, progressive over the last 2 months    History of Present Illness: Patient is a 73-year-old male with PMHx significant for HTN, CVA, HLD, DM and BPH, who presents to ED on 11/14 for evaluation of generalized weakness worsening over the past 2 months.  Patient reports he was had weakness in his arms and legs that has been increasing over the last several months. States that he was seen and worked up for severe spinal stenosis by Dr. Hutchinson with plans for lumbar surgery. Reports that he had been doing physical therapy without improvement.  States that he would previously been able to walk at home with walker but over the last 2 days he was unable to move around his house at all due to weakness.  Patient reports intermittent neck and back pain.  States he was not able to get up to urinate due to pain.     MRI of his T spine was notable for slightly increased signal seen in the disc at T11-T12 possibly representing early discitis. His lumbar MRI is unchanged from prior exams with severe stenosis at L3-5. Dr. Hutchinson was consulted for evaluation and treatment recommendations.    On PE today he is sitting up in bed, NAD. He denies neck and back pain. He c/o weakness with standing. He c/o numbness in the bottom of both feet with numbness in all lesser toes. He denies saddle anesthesia and bladder and bowel dysfunction. He also c/o dizziness upon standing which causes him to be unsteady on his feet.     PTA Medications   Medication Sig    albuterol (PROVENTIL) 2.5 mg /3 mL (0.083 %) nebulizer solution Take by nebulization.    aspirin (ECOTRIN) 81 MG EC tablet Take 81 mg by mouth once daily.    citalopram (CELEXA) 20 MG tablet Take 1 tablet (20 mg total) by  mouth once daily.    diclofenac (CATAFLAM) 50 MG tablet Take 1 tablet (50 mg total) by mouth Daily.    doxazosin (CARDURA) 8 MG Tab Take 0.5 tabs bid    flash glucose sensor (FREESTYLE MITCH 14 DAY SENSOR) Kit 1 each by Misc.(Non-Drug; Combo Route) route 2 hours after meals and at bedtime.    flash glucose sensor (FREESTYLE MITCH 2 SENSOR) Kit 2 each by Misc.(Non-Drug; Combo Route) route 2 hours after meals and at bedtime.    fluticasone-umeclidin-vilanter (TRELEGY ELLIPTA) 100-62.5-25 mcg DsDv Inhale 1 puff into the lungs nightly.    furosemide (LASIX) 40 MG tablet Take 1 tablet (40 mg total) by mouth once daily.    gabapentin (NEURONTIN) 300 MG capsule Take 1 capsule (300 mg total) by mouth 3 (three) times daily.    glipizide-metformin (METAGLIP) 2.5-250 mg per tablet Take 1 tablet by mouth once daily.    insulin lispro 100 unit/mL pen Inject 20 Units into the skin 3 (three) times daily.    KLOR-CON M20 20 mEq tablet Take 1 tablet (20 mEq total) by mouth once daily.    lisinopriL 10 MG tablet Take 1 tablet (10 mg total) by mouth once daily.    meclizine (ANTIVERT) 12.5 mg tablet TAKE 1 TABLET BY MOUTH TWICE DAILY FOR DIZZINESS    methocarbamoL (ROBAXIN) 500 MG Tab Take 1 tablet (500 mg total) by mouth every evening.    methocarbamoL (ROBAXIN) 500 MG Tab Take 1 tablet by mouth twice daily    montelukast (SINGULAIR) 10 mg tablet Take 1 tablet (10 mg total) by mouth every evening.    multivitamin (THERAGRAN) per tablet Take 1 tablet by mouth every morning.    MYRBETRIQ 50 mg Tb24 Take 1 tablet by mouth once daily.    simvastatin (ZOCOR) 40 MG tablet TAKE 1 TABLET THREE TIMES  WEEKLY MONDAY, WEDNESDAY,  AND FRIDAY    tamsulosin (FLOMAX) 0.4 mg Cap Take 1 capsule by mouth once daily.    TOUJEO SOLOSTAR U-300 INSULIN 300 unit/mL (1.5 mL) InPn pen Inject 30 Units into the skin every evening.    traMADoL (ULTRAM) 50 mg tablet Take 1 tablet (50 mg total) by mouth every 24 hours as needed for Pain.       Review of patient's  allergies indicates:   Allergen Reactions    Nitrofurantoin Hives    Iodinated contrast media        Past Medical History:   Diagnosis Date    BPH (benign prostatic hyperplasia)     CVA (cerebral vascular accident)     Depression     History of claustrophobia     HLD (hyperlipidemia)     HTN (hypertension)     Personal history of colonic polyps 07/08/2020    Colonoscopy Report    Type 2 diabetes mellitus without complications      Past Surgical History:   Procedure Laterality Date    COLONOSCOPY  07/08/2020    [facility], Sha Grimaldo MD    PATENT FORAMEN OVALE CLOSURE  11/04/2022    PROSTATE SURGERY  2021    Urolift     Family History    None       Tobacco Use    Smoking status: Former    Smokeless tobacco: Never   Substance and Sexual Activity    Alcohol use: Not Currently    Drug use: Never    Sexual activity: Not Currently     Review of Systems  Objective:   12 pt ROS WNL, except for HPI    Weight: 99.3 kg (219 lb)  Body mass index is 32.34 kg/m².  Vital Signs (Most Recent):  Temp: 97.6 °F (36.4 °C) (11/15/23 0906)  Pulse: 78 (11/15/23 0906)  Resp: 19 (11/15/23 0733)  BP: 132/66 (11/15/23 0906)  SpO2: (!) 92 % (11/15/23 0906) Vital Signs (24h Range):  Temp:  [97.6 °F (36.4 °C)-97.9 °F (36.6 °C)] 97.6 °F (36.4 °C)  Pulse:  [67-83] 78  Resp:  [10-20] 19  SpO2:  [92 %-99 %] 92 %  BP: (115-171)/(58-89) 132/66       Physical Exam:  Nursing note and vitals reviewed.    Constitutional: He appears well-developed and well-nourished.     Eyes: Pupils are equal, round, and reactive to light. EOM are normal.     Cardiovascular: Intact distal pulses.     Abdominal: Soft.     Skin:   Rash to bilateral calves     Psych/Behavior: He is alert. He is oriented to person, place, and time. He has a normal mood and affect.     Musculoskeletal:        Neck: Range of motion is full.        Back: Range of motion is full.        Right Upper Extremities: Range of motion is full. Muscle strength is 5/5.        Left Upper  "Extremities: Range of motion is full. Muscle strength is 5/5.       Right Lower Extremities: Range of motion is full. Muscle strength is 5/5.        Left Lower Extremities: Range of motion is full. Muscle strength is 5/5.      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 There is sensory deficit in the extremities. Sensory deficit is located Bilateral plantar feet.        DTRs: DTRs are DTRS NORMAL AND SYMMETRICnormal and symmetric. He displays no Babinski's sign on the right side. He displays no Babinski's sign on the left side.        Cranial nerves: Cranial nerve(s) II, III, IV, V, VI, VII, VIII, IX, X, XI and XII are intact.     Neg Hoffmans  Neg clonus    Significant Labs:  Recent Labs   Lab 11/14/23  1426 11/15/23  0309    139   K 6.1* 5.2*   CO2 27 24   BUN 57.7* 44.7*   CREATININE 1.53* 1.13   CALCIUM 9.7 9.9     Recent Labs   Lab 11/14/23  1426 11/15/23  0309   WBC 10.07 10.32   HGB 14.0 13.9*   HCT 44.0 42.8    149     No results for input(s): "LABPT", "INR", "APTT" in the last 48 hours.  Microbiology Results (last 7 days)       Procedure Component Value Units Date/Time    Blood culture #2 **CANNOT BE ORDERED STAT** [[id number]] Collected: 11/14/23 1924    Order Status: Resulted Specimen: Blood Updated: 11/14/23 2209    Blood culture #1 **CANNOT BE ORDERED STAT** [[id number]] Collected: 11/14/23 1925    Order Status: Resulted Specimen: Blood Updated: 11/14/23 1933            Significant Diagnostics:  MRI Cervical Spine Without Contrast [[id number]] Resulted: 11/14/23 1810   Order Status: Completed Updated: 11/14/23 1812   Narrative:     EXAMINATION:  MRI CERVICAL SPINE WITHOUT CONTRAST    CLINICAL HISTORY:  Spinal stenosis, cervical;    TECHNIQUE:  Multiplanar, multisequence MR images of the cervical spine were performed without the administration of contrast.    COMPARISON:  CT cervical spine " 08/25/2019    FINDINGS:  LIMITATIONS: Motion degraded exam.    Alignment: Normal.    Vertebrae: Normal marrow signal. No fracture.    Discs: Disc space narrowing.  See below.    Cord: Limited assessment for cord signal abnormality due to significant motion artifact.  Probable mild chronic cord compression at C3-C4.  No appreciable hemorrhage/susceptibility artifact.    Skull base and craniocervical junction: Normal.    Degenerative findings:    C2-C3: No significant central canal or neural foraminal stenosis.    C3-C4: Circumferential disc bulge grossly similar to prior CT exam.  Narrowing of the central canal with effacement of the CSF.  AP diameter of the thecal sac estimated 6 mm.  Limited assessment of neural foraminal stenosis due to motion artifact.    C4-C5: Small circumferential disc bulge similar to previous CT.  Narrowing of the central canal with effacement of the CSF anteriorly.  AP diameter of the thecal sac estimated 7 mm.  Limited assessment of neural foramina due to motion artifact.    C5-C6: Moderate to severe narrowing of the disc space with anterior and posterior disc osteophytes.  This is similar to prior CT exam.  On prior CT exam there was evidence of left uncovertebral hypertrophy with severe narrowing of the left neural foramen.  This is suboptimally evaluated on the current images but likely similar.  AP diameter of the thecal sac estimated 7 mm with effacement of the anterior CSF.    C6-C7: Moderate to severe narrowing of the disc space with anterior and posterior disc osteophytes.  This is similar to prior CT exam.  AP diameter of the thecal sac estimated 7 mm with effacement of the anterior CSF.  On prior CT exam there was bilateral uncovertebral hypertrophy with mild neural foraminal stenosis.  Suboptimally characterized on the current images.    C7-T1: No significant central canal stenosis.    T1-T2: See dedicated MRI thoracic spine    Paraspinal muscles & soft tissues: Unremarkable.    Impression:       Moderately limited exam due to motion artifact.  There is chronic multilevel central canal stenosis with effacement of the CSF.  This is most severe at C3-C4 with mild chronic cord compression.  Limited assessment for cord signal abnormality due to significant artifact.     MRI Thoracic Spine Without Contrast [[id number]] (Abnormal) Resulted: 11/14/23 1817   Order Status: Completed Updated: 11/14/23 1820   Narrative:     EXAMINATION:  MRI THORACIC SPINE WITHOUT CONTRAST    CLINICAL HISTORY:  Mid-back pain, neuro deficit;    TECHNIQUE:  Multiplanar, multisequence images were performed through the thoracic spine.  Contrast was not administered.    COMPARISON:  None    FINDINGS:  The vertebral body heights are well maintained.  Alignment is well maintained.  No fracture is seen.  No dislocation is seen.  Cord signal appears normal.  Cord ends at T12-L1.  Conus appears unremarkable.  There is some areas of mild broad-based disc bulge seen at multiple levels in the lower thoracic spine with some areas of lateral recess narrowing.  This is seen predominately from the level of T5 through T12.  There is an area of focal increased signal seen within the disc at T11-T12.  Findings could represent early discitis.  No significant marrow edema or osteomyelitis is seen.  No epidural abscess or lesion is seen.   Impression:       Slightly increased signal seen in the disc at T11-T12 possibly representing early discitis.  No epidural abscess is seen.  No evidence of osteomyelitis is seen.  Follow-up is recommended.    Multilevel degenerative changes seen in the mid to lower thoracic spine     MRI Lumbar Spine Without Contrast [[id number]] Resulted: 11/14/23 1816   Order Status: Completed Updated: 11/14/23 1819   Narrative:     EXAMINATION:  MRI LUMBAR SPINE WITHOUT CONTRAST    CLINICAL HISTORY:  Low back pain, progressive neurologic deficit;    TECHNIQUE:  Multiplanar, multisequence MR images were acquired from  the thoracolumbar junction to the sacrum without the administration of contrast.    COMPARISON:  Lumbar spine 07/21/2023    FINDINGS:  Alignment: Normal.    Vertebrae: Normal marrow signal. No fracture.    Discs: Normal height and signal.    Cord: Normal.  Conus terminates at L1    Degenerative findings:    T12-L1: Disc space narrowing with small posterior protrusion.  No significant central canal or neural foraminal stenosis.    L1-L2: Disc space narrowing with tiny circumferential disc bulge.  No significant central canal or neural foraminal stenosis.    L2-L3: Small disc bulge, ligamentum flavum hypertrophy and facet hypertrophy.  Narrowing of the central canal.  AP diameter of the thecal sac approximately 5 mm.  There is effacement of the CSF with crowding of the nerve roots, unchanged from previous.  No significant neural foraminal stenosis.    L3-L4: Disc bulge, ligamentum flavum hypertrophy and facet hypertrophy.  Narrowing of the central canal.  AP diameter of the thecal sac approximately 4 mm.  There is effacement of the CSF with crowding of the nerve roots, unchanged from previous.  Mild narrowing of the neural foramina inferiorly without appreciable nerve root impingement.    L4-L5: Disc space narrowing.  Disc bulge, ligamentum flavum hypertrophy and facet arthropathy.  Severe narrowing of the central canal.  AP diameter of the thecal sac less than 4 mm.  There is effacement of the CSF with crowding of the nerve roots, similar to previous.  Severe bilateral neural foraminal stenosis.    L5-S1: Severe bilateral facet arthropathy.  Facet arthropathy contributes to severe bilateral lateral recess and neural foraminal stenosis.    Paraspinal muscles & soft tissues: Aortic atherosclerosis.  Incidental renal cyst on the right.   Impression:       Unchanged chronic degenerative change with central canal and neural foraminal stenosis as above.     Assessment/Plan:     Active Diagnoses:    Diagnosis Date Noted POA     PRINCIPAL PROBLEM:  Discitis of thoracic region [M46.44] 11/15/2023 Unknown      Problems Resolved During this Admission:     He currently denies back pain. LE are motor intact.  I will have Dr. Hutchinson review MRI for recs  I will order flex/ext xrays of his lumbar spine  ID consulted for possible discitis. WBC and CRP WNL. Sed rate pending.  He will need cardiac clearance  Continue PT/OT  Further recs to follow    Thank you for your consult. I will follow-up with patient. Please contact us if you have any additional questions.    TAYLOR Rossi  Neurosurgery  [facility] - 5th Floor Med Surg

## 2023-11-15 NOTE — PLAN OF CARE
11/15/23 1113   Discharge Assessment   Assessment Type Discharge Planning Assessment   Confirmed/corrected address, phone number and insurance Yes   Confirmed Demographics Correct on Facesheet   Source of Information patient   Communicated NAZANIN with patient/caregiver Date not available/Unable to determine   Reason For Admission CORKY, Spinal stenosis   People in Home significant other   Do you expect to return to your current living situation? Yes   Do you have help at home or someone to help you manage your care at home? Yes   Who are your caregiver(s) and their phone number(s)? DESI MICHELLE (Significant other)  [phone]   Prior to hospitilization cognitive status: Alert/Oriented   Current cognitive status: Alert/Oriented   Walking or Climbing Stairs ambulation difficulty, requires equipment;stair climbing difficulty, assistance 1 person;transferring difficulty, dependent   Mobility Management walker   Home Accessibility wheelchair accessible   Number of Stairs, Main Entrance one;other (see comments)  (has a ramp)   Stair Railings, Main Entrance none   Equipment Currently Used at Home power chair;walker, rolling   Readmission within 30 days? No   Patient currently being followed by outpatient case management? No   Do you currently have service(s) that help you manage your care at home? No   Do you take prescription medications? Yes   Do you have prescription coverage? Yes   Coverage [employer]   Do you have any problems affording any of your prescribed medications? No   Is the patient taking medications as prescribed? yes   Who is going to help you get home at discharge? Bill Quinn   How do you get to doctors appointments? car, drives self;family or friend will provide   Are you on dialysis? No   Do you take coumadin? No   DME Needed Upon Discharge  none   Discharge Plan discussed with: Patient   Transition of Care Barriers None   Discharge Plan A Rehab   Discharge Plan B Skilled Nursing Facility

## 2023-11-16 LAB
ALBUMIN SERPL-MCNC: 4 G/DL (ref 3.4–4.8)
ALBUMIN/GLOB SERPL: 1.5 RATIO (ref 1.1–2)
ALP SERPL-CCNC: 49 UNIT/L (ref 40–150)
ALT SERPL-CCNC: 27 UNIT/L (ref 0–55)
AST SERPL-CCNC: 21 UNIT/L (ref 5–34)
BASOPHILS # BLD AUTO: 0.15 X10(3)/MCL
BASOPHILS NFR BLD AUTO: 1.3 %
BILIRUB SERPL-MCNC: 1 MG/DL
BUN SERPL-MCNC: 27.4 MG/DL (ref 8.4–25.7)
CALCIUM SERPL-MCNC: 9.6 MG/DL (ref 8.8–10)
CHLORIDE SERPL-SCNC: 106 MMOL/L (ref 98–107)
CO2 SERPL-SCNC: 27 MMOL/L (ref 23–31)
CREAT SERPL-MCNC: 1.12 MG/DL (ref 0.73–1.18)
EOSINOPHIL # BLD AUTO: 0.38 X10(3)/MCL (ref 0–0.9)
EOSINOPHIL NFR BLD AUTO: 3.3 %
ERYTHROCYTE [DISTWIDTH] IN BLOOD BY AUTOMATED COUNT: 12.9 % (ref 11.5–17)
ERYTHROCYTE [SEDIMENTATION RATE] IN BLOOD: 12 MM/HR (ref 0–15)
GFR SERPLBLD CREATININE-BSD FMLA CKD-EPI: >60 MLS/MIN/1.73/M2
GLOBULIN SER-MCNC: 2.7 GM/DL (ref 2.4–3.5)
GLUCOSE SERPL-MCNC: 127 MG/DL (ref 82–115)
HCT VFR BLD AUTO: 46.3 % (ref 42–52)
HGB BLD-MCNC: 15.2 G/DL (ref 14–18)
IMM GRANULOCYTES # BLD AUTO: 0.12 X10(3)/MCL (ref 0–0.04)
IMM GRANULOCYTES NFR BLD AUTO: 1 %
INR PPP: 1
LYMPHOCYTES # BLD AUTO: 1.75 X10(3)/MCL (ref 0.6–4.6)
LYMPHOCYTES NFR BLD AUTO: 15.1 %
MCH RBC QN AUTO: 32.2 PG (ref 27–31)
MCHC RBC AUTO-ENTMCNC: 32.8 G/DL (ref 33–36)
MCV RBC AUTO: 98.1 FL (ref 80–94)
MONOCYTES # BLD AUTO: 1.34 X10(3)/MCL (ref 0.1–1.3)
MONOCYTES NFR BLD AUTO: 11.5 %
NEUTROPHILS # BLD AUTO: 7.88 X10(3)/MCL (ref 2.1–9.2)
NEUTROPHILS NFR BLD AUTO: 67.8 %
NRBC BLD AUTO-RTO: 0 %
PLATELET # BLD AUTO: 137 X10(3)/MCL (ref 130–400)
PMV BLD AUTO: 11.5 FL (ref 7.4–10.4)
POCT GLUCOSE: 112 MG/DL (ref 70–110)
POCT GLUCOSE: 123 MG/DL (ref 70–110)
POCT GLUCOSE: 141 MG/DL (ref 70–110)
POTASSIUM SERPL-SCNC: 5.3 MMOL/L (ref 3.5–5.1)
PROT SERPL-MCNC: 6.7 GM/DL (ref 5.8–7.6)
PROTHROMBIN TIME: 13.4 SECONDS (ref 12.5–14.5)
RBC # BLD AUTO: 4.72 X10(6)/MCL (ref 4.7–6.1)
SODIUM SERPL-SCNC: 139 MMOL/L (ref 136–145)
WBC # SPEC AUTO: 11.62 X10(3)/MCL (ref 4.5–11.5)

## 2023-11-16 PROCEDURE — 25000003 PHARM REV CODE 250: Performed by: INTERNAL MEDICINE

## 2023-11-16 PROCEDURE — 85652 RBC SED RATE AUTOMATED: CPT | Performed by: INTERNAL MEDICINE

## 2023-11-16 PROCEDURE — 85610 PROTHROMBIN TIME: CPT | Performed by: NEUROLOGICAL SURGERY

## 2023-11-16 PROCEDURE — 85025 COMPLETE CBC W/AUTO DIFF WBC: CPT | Performed by: INTERNAL MEDICINE

## 2023-11-16 PROCEDURE — 21400001 HC TELEMETRY ROOM

## 2023-11-16 PROCEDURE — 99232 SBSQ HOSP IP/OBS MODERATE 35: CPT | Mod: FS,,, | Performed by: GENERAL PRACTICE

## 2023-11-16 PROCEDURE — 99233 SBSQ HOSP IP/OBS HIGH 50: CPT | Mod: ,,, | Performed by: INTERNAL MEDICINE

## 2023-11-16 PROCEDURE — 80053 COMPREHEN METABOLIC PANEL: CPT | Performed by: INTERNAL MEDICINE

## 2023-11-16 PROCEDURE — 97162 PT EVAL MOD COMPLEX 30 MIN: CPT

## 2023-11-16 RX ORDER — CEFAZOLIN SODIUM 2 G/50ML
2 SOLUTION INTRAVENOUS
Status: DISCONTINUED | OUTPATIENT
Start: 2023-11-20 | End: 2023-11-20 | Stop reason: SDUPTHER

## 2023-11-16 RX ADMIN — MICONAZOLE NITRATE: 20 POWDER TOPICAL at 09:11

## 2023-11-16 RX ADMIN — SODIUM CHLORIDE: 4.5 INJECTION, SOLUTION INTRAVENOUS at 01:11

## 2023-11-16 NOTE — PT/OT/SLP EVAL
Physical Therapy Evaluation    Patient Name:  Stevie Cummins   MRN:  [id number]    Recommendations:     Discharge therapy intensity: High Intensity Therapy   Discharge Equipment Recommendations: none   Barriers to discharge: Ongoing medical needs    Assessment:     Stevie Cummins is a 73 y.o. male admitted with a medical diagnosis of Discitis of thoracic region.  He presents with the following impairments/functional limitations: weakness, impaired functional mobility, decreased safety awareness, impaired coordination, impaired endurance, gait instability, impaired balance, decreased lower extremity function, decreased upper extremity function. Pt cleared for mobility by neurosx prior to possible neck sx. Pt with weakness in BLE (L>R) and significant coordination/proprioception impairments increasing risk for falls. Pt required ModA for stand step t/f to bedside chair, ataxic stepping present with B knee buckling, unsafe for ambulation away from bedside at this time.     Rehab Prognosis: Good; patient would benefit from acute skilled PT services to address these deficits and reach maximum level of function.    Recent Surgery: Procedure(s) (LRB):  FUSION, SPINE, POSTERIOR SPINAL COLUMN, CERVICAL, USING COMPUTER-ASSISTED NAVIGATION (N/A)      Plan:     During this hospitalization, patient to be seen 6 x/week to address the identified rehab impairments via gait training, therapeutic activities, therapeutic exercises and progress toward the following goals:    Plan of Care Expires:  12/16/23    Subjective     Chief Complaint: weakness  Patient/Family Comments/goals: to get stronger  Pain/Comfort:  Pain Rating 1: 0/10    Patients cultural, spiritual, Taoist conflicts given the current situation:      Living Environment:  Pt lives with girlfriend in a 2 story home but everything needed is on ground level.   Prior to admission, patients level of function was Ignacio with RW, progressive decline over the past 3 weeks unable to  ambulate .  Equipment used at home: walker, rolling, bedside commode, shower chair.  DME owned (not currently used):  ordered a power scooter but not currently in .  Upon discharge, patient will have assistance from gf.    Objective:     Communicated with RN and neurosx PA prior to session.  Patient found HOB elevated with peripheral IV  upon PT entry to room.    General Precautions: Standard, fall  Orthopedic Precautions:N/A   Braces: N/A  Respiratory Status: Room air      Exams:  Cognitive Exam:  Patient is oriented to Person, Place, Time, and Situation  Fine Motor Coordination:    -       Impaired  RLE heel shin   and LLE heel shin    Sensation:    -       Intact  RLE Strength: Deficits: 4/5  LLE Strength: Deficits: 3- hip flexion, 4/5 DF, knee extension        Functional Mobility:  Bed Mobility:     Rolling Left:  stand by assistance  Supine to Sit: minimum assistance  Transfers:     Sit to Stand:  moderate assistance with rolling walker  Bed to Chair: moderate assistance with  hand-held assist  using  Step Transfer, ataxic stepping pattern with knee buckling present      AM-PAC 6 CLICK MOBILITY  Total Score:15       Treatment & Education:    Patient provided with verbal education education regarding PT POC.  Understanding was verbalized.     Patient left up in chair with all lines intact, call button in reach, and CNA notified.    GOALS:   Multidisciplinary Problems       Physical Therapy Goals          Problem: Physical Therapy    Goal Priority Disciplines Outcome Goal Variances Interventions   Physical Therapy Goal     PT, PT/OT Ongoing, Progressing     Description: Goals to be met by: d/c     Patient will increase functional independence with mobility by performin. Supine to sit with Stand-by Assistance  2. Sit to supine with Stand-by Assistance  3. Sit to stand transfer with Stand-by Assistance  4. Bed to chair transfer with Stand-by Assistance using Rolling Walker  5. Gait  x 150 feet with Stand-by  Assistance using Rolling Walker.                          History:     Past Medical History:   Diagnosis Date    BPH (benign prostatic hyperplasia)     CVA (cerebral vascular accident)     Depression     History of claustrophobia     HLD (hyperlipidemia)     HTN (hypertension)     Personal history of colonic polyps 07/08/2020    Colonoscopy Report    Type 2 diabetes mellitus without complications        Past Surgical History:   Procedure Laterality Date    COLONOSCOPY  07/08/2020    [facility], Sha Grimaldo MD    PATENT FORAMEN OVALE CLOSURE  11/04/2022    PROSTATE SURGERY  2021    Urolift       Time Tracking:     PT Received On: 11/16/23  PT Start Time: 0826     PT Stop Time: 0846  PT Total Time (min): 20 min     Billable Minutes: Evaluation 20      11/16/2023

## 2023-11-16 NOTE — PROGRESS NOTES
[facility]  Infectious Disease Progress Note            ASSESSMENT & PLAN:     He is a 73-year-old male with a past medical history of hypertension, diabetes mellitus, and BPH who presented to the emergency room on 11/14/2023 with worsening generalized weakness over the past 4-6 weeks as well as worsening issues with bowel and bladder incontinence.  He would previously been seen by Dr. Carmichael and was found to have severe lumbar spinal stenosis with recommendations for surgery if no improvement with physical therapy.  On admit here, he underwent noncontrasted MRIs of the cervical, thoracic, and lumbar spine which again showed severe spinal stenosis in the lumbar spine and disc bulging as well as concern for discitis at T11-T12.  Patient denies any recent fevers, chills, sweats, infections, or procedures.  He is not currently on any antimicrobials and has not received any recently.  Blood cultures were obtained today and are pending.  Neurosurgery has been consulted - awaiting input.  We have been consulted given concern for thoracic discitis.      Abnormal MRI thoracic spine with concern for discitis at T11-12  Severe lumbar spine stenosis  Lumbar radiculopathy / incontinence suspect s/t above  DM2 / HTN        PLAN:  MRIs with contrast unremarkable for evidence of infection.   No need for further work-up from infection standpoint or abx.  Defer timing of prior planned neurosurgical intervention to NSY.  Will sign off.  Please call if need arises.   Discussed with patient.       SUBJECTIVE:   AF, VSS.  No new issues or complaints.     MEDICATIONS:   Reviewed in EMR    REVIEW OF SYSTEMS:   Except as documented, all other systems reviewed and negative     PHYSICAL EXAM:   T 98.3 °F (36.8 °C)   BP (!) 150/78   P 94   RR 18   O2 95 %  GENERAL: NAD; does not appear toxic  SKIN: no rash  HEENT: sclera non-icteric; PERRL  NECK: supple; no LAD  CHEST: CTA; nonlabored, equal expansion; no adventitious BS  CARDIOVASCULAR: RRR,  "S1S2; no murmur; strong, equal peripheral pulses; no edema  ABDOMEN:  active bowel sounds; abdomen soft, nondistended, nontender to palpation  EXTREMITIES: no cyanosis or clubbing  NEURO: AAO x4; CN II-XII grossly intact  PSYCH: Mentation and affect appropriate     LABS AND IMAGING:     Recent Labs     11/15/23  0309 11/16/23  0515   WBC 10.32 11.62*   RBC 4.40* 4.72   HGB 13.9* 15.2   HCT 42.8 46.3   MCV 97.3* 98.1*   MCH 31.6* 32.2*   MCHC 32.5* 32.8*   RDW 13.0 12.9    137     Recent Labs     11/14/23  1924   LACTIC 1.0     Recent Labs     11/16/23  0740   INR 1.0     No results for input(s): "HGBA1C", "CHOL", "TRIG", "LDL", "VLDL", "HDL" in the last 72 hours.   Recent Labs     11/14/23  1426 11/15/23  0309 11/16/23  0515    139 139   K 6.1* 5.2* 5.3*   CHLORIDE 105 106 106   CO2 27 24 27   BUN 57.7* 44.7* 27.4*   CREATININE 1.53* 1.13 1.12   GLUCOSE 79* 96 127*   CALCIUM 9.7 9.9 9.6   ALBUMIN 4.1 3.9 4.0   GLOBULIN 2.8 2.9 2.7   ALKPHOS 48 45 49   ALT 31 28 27   AST 28 24 21   BILITOT 0.8 1.1 1.0   CRP 2.50  --   --      No results for input(s): "BNP", "CPK", "TROPONINI" in the last 72 hours.       MRI Lumbar Spine With Contrast  Narrative: EXAMINATION:  MRI LUMBAR SPINE WITH CONTRAST    CLINICAL HISTORY:  concern for infection;    TECHNIQUE:  MRI lumbar spine performed after intravenous contrast using routine protocol.  20 cc administered.    COMPARISON:  Noncontrast MRI lumbar spine 11/14/2023    FINDINGS:  Postcontrast images demonstrate no abnormal osseous or soft tissue enhancement to suggest infection.  Impression: No abnormal soft tissue or osseous enhancement to suggest infection.    Electronically signed by: Rosa Arana MD  Date:    11/16/2023  Time:    08:45  MRI Thoracic Spine With Contrast  Narrative: EXAMINATION:  MRI THORACIC SPINE WITH CONTRAST    CLINICAL HISTORY:  concern for infection;    TECHNIQUE:  MRI thoracic spine performed after intravenous contrast using routine " protocol.  20 cc administered.    COMPARISON:  Noncontrast MRI thoracic spine 11/14/2023    FINDINGS:  Advanced degenerative disc changes noted including severe disc space narrowing at the mid to lower thoracic levels with severe multilevel disc bulging.  Facet arthrosis also noted at the lower thoracic levels with mild or moderate foraminal narrowing.  No abnormal osseous or soft tissue enhancement on postcontrast images.  Impression: No abnormal enhancement to suggest infection.    Electronically signed by: Rosa Arana MD  Date:    11/16/2023  Time:    08:44          NIYAH Dyer  Infectious Disease

## 2023-11-16 NOTE — PROGRESS NOTES
Progress Note  Hospital Medicine    Patient Name: Stevie Cummins  YOB: 1950    Admit Date: 11/14/2023                     LOS: 2    SUBJECTIVE:     Reason for Admission:  Discitis of thoracic region  See H&P for detailed presentating history and ROS.      Interval history:  Patient had a rough night, complaining of lower extremity weakness, and had seen Dr. Tesha og who apparently will have him schedule if all clear for Monday to do neck surgery         OBJECTIVE:     Vital Signs Range (Last 24H):  Temp:  [97.8 °F (36.6 °C)-99.1 °F (37.3 °C)]   Pulse:  [77-94]   Resp:  [16-20]   BP: (121-150)/(62-81)   SpO2:  [92 %-95 %] Body mass index is 32.34 kg/m².  Wt Readings from Last 3 Encounters:   11/14/23 1256 99.3 kg (219 lb)   09/22/23 1011 101.2 kg (223 lb)   07/22/23 1101 106.6 kg (235 lb)       I & O (Last 24H):  Intake/Output Summary (Last 24 hours) at 11/16/2023 1318  Last data filed at 11/16/2023 0544  Gross per 24 hour   Intake 960 ml   Output 1300 ml   Net -340 ml       Physical Exam:  Patient alert laying down in bed in no obvious distress   HEENT within normal limits   Heart systolic murmur 2/6 irregular irregular  Lungs are essentially clear bilaterally with no wheezing rales or rhonchi   Abdomen markedly obese soft and depressible nontender   Extremities no edema    Diagnostic Results:  Lab Results   Component Value Date    WBC 11.62 (H) 11/16/2023    HGB 15.2 11/16/2023    HCT 46.3 11/16/2023    MCV 98.1 (H) 11/16/2023     11/16/2023     Recent Labs   Lab 11/16/23  0515      K 5.3*   CO2 27   BUN 27.4*   CREATININE 1.12   CALCIUM 9.6     Lab Results   Component Value Date    INR 1.0 11/16/2023    INR 1.1 11/04/2022    INR 1.1 03/29/2022    PROTIME 13.4 11/16/2023    PROTIME 17.0 (H) 08/25/2019    PROTIME 27.0 (H) 02/21/2018     Lab Results   Component Value Date    HGBA1C 5.4 09/22/2023     Recent Labs     11/14/23  1822 11/14/23  1938 11/14/23 2136 11/15/23  1058  11/15/23  1654 11/15/23  2018 11/16/23  0534 11/16/23  1109   POCTGLUCOSE 80 217* 99 106 91 134* 112* 141*       ASSESSMENT/PLAN:     Active Hospital Problems    Diagnosis  POA    *Discitis of thoracic region [M46.44]  Unknown      Resolved Hospital Problems   No resolved problems to display.        Problems Addressed Today:    No problem-specific Assessment & Plan notes found for this encounter.        DISCHARGE PLANNING:     Get cardiac clearance prior to neck surgery, still waiting on recommendations by Infectious Disease, they did an MRI that does not reflect any evidence of infection, most likely severe arthritis with that said if that is the case patient will be cleared to have neck surgery  Signing Physician:  Robert Valdes MD

## 2023-11-16 NOTE — CONSULTS
[facility]                       [address] [place] [id number]    PATIENT NAME:       AARON CRAWFORD  YOB: 1950  CSN:                [id number]   MRN:                [id number]  ADMIT DATE:         11/14/2023 13:04:00  PHYSICIAN:          Arya Hutchinson MD                            CONSULTATION    DATE OF CONSULT:      Mr. Crawford continues to have difficulty with arms and legs.  His back does not   hurt much.  He has weakness in his arms, numbness.  He walks with a spastic   unsteady gait.  I reviewed the scans, MRI of cervical, thoracic, lumbar spine.    We are waiting for the results of the repeat MRI of the thoracic spine with   contrast.  At this time, his main concern is severe stenosis in the cervical   spine.  He would need a posterior cervical decompression fusion.  I discussed   with him how it is done.  He is in agreement in doing it.  We will wait for ID   to discuss his infection status in the spine.  It is unclear if infection is   just degenerative changes in the spine it is probably what it is.  I will wait   for the final results.  If those are negative, we will proceed with a posterior   cervical decompression and fusion, we discussed how it is done.  Discussed risk   of bleeding, infection, weakness, prior CSF leak, hemorrhage discussed with him   in detail.  He wants me to proceed with surgery.        ______________________________  MD NANCY Farfan/AQS  DD:  11/16/2023  Time:  06:28AM  DT:  11/16/2023  Time:  06:54AM  Job #:  [id number]      CONSULTATION

## 2023-11-16 NOTE — CONSULTS
Cardiovascular Consultation    Patient Name: Stevie Cummins  Age: 73 y.o.  : 1950  MRN: [id number]  Admission Date: 2023  Primary Cardiologist: Luis Eduardo  ?  Chief Complaint:   Chief Complaint   Patient presents with    Extremity Weakness     Hx of spinal stenosis. Reports weakness and trouble walking over the last few days. States he feels as if his legs and arms are weak.        History of Present Illness:  Stevie Cummins is a 73 y.o. male with past medical history including CAD, mild AS, PFO closure in , PVD post LE stenting, HTN, HLD, and prior CVA.     Patient had been followed in the outpatient NSY clinic. There were plans for possible surgery but patient reports he was instructed to complete a PT program first.  He presented to [facility] ER on 2023 with complaints of progression weakness to the UE and LE.  He had MRI with severe lumbar stenosis and possible discitis. NSY and ID consulted. ID had evaluated patient do not feel that there is an infectious process at this time. NSY is planning for cervical decompression and fusion, likely next week. Cardiology has been consulted for surgery clearance. Patient did have an CORKY and hyperkalemia on admission. Renal function has improved but potassium level remains high normal. CBC stable.     Patient denies any complaints of chest discomfort, shortness of breath, dizziness, or palpitations. He only notes spastic extremities and trouble walking. Patient had outpatient PET stress test in 2022 that was without ischemia. Note that patient was recently evaluated by Dr. Hoff in the clinic and he has already been cleared for surgery procedure pending echo.            Review of Systems:  Review of Systems - 12 point review of systems was performed and reviewed with the patient and was negative except as indicated in the History of Present Illness.    Health Status  Review of patient's allergies indicates:   Allergen Reactions    Nitrofurantoin Hives     Iodinated contrast media        Past Medical History:   Diagnosis Date    BPH (benign prostatic hyperplasia)     CVA (cerebral vascular accident)     Depression     History of claustrophobia     HLD (hyperlipidemia)     HTN (hypertension)     Personal history of colonic polyps 07/08/2020    Colonoscopy Report    Type 2 diabetes mellitus without complications        Current Facility-Administered Medications   Medication Dose Route Frequency Provider Last Rate Last Admin    0.45% NaCl infusion   Intravenous Continuous Robert Alarcon MD 75 mL/hr at 11/16/23 0132 New Bag at 11/16/23 0132    acetaminophen tablet 650 mg  650 mg Oral Q8H PRN Robert Alarcon MD        calcium gluconate 1 g in NS IVPB (premixed)  1 g Intravenous Q10 Min PRN Robert Alarcon MD        [START ON 11/20/2023] cefazolin (ANCEF) 2 gram in dextrose 5% 50 mL IVPB (premix)  2 g Intravenous Q8H Arya Hutchinson MD        HYDROcodone-acetaminophen 5-325 mg per tablet 1 tablet  1 tablet Oral Q4H PRN Robert Alarcon MD        HYDROmorphone (PF) injection 1 mg  1 mg Intravenous Q4H PRN Robert Alarcon MD        melatonin tablet 6 mg  6 mg Oral Nightly PRN Robert Alarcon MD        miconazole NITRATE 2 % top powder   Topical (Top) BID Robert Alarcon MD   Given at 11/16/23 0902    ondansetron injection 4 mg  4 mg Intravenous Q8H PRN Robert Alarcon MD        sodium chloride 0.9% flush 10 mL  10 mL Intravenous PRN Robert Alarcon MD           No family history on file.    Past Surgical History:   Procedure Laterality Date    COLONOSCOPY  07/08/2020    [facility] [place], Sha Grimaldo MD    PATENT FORAMEN OVALE CLOSURE  11/04/2022    PROSTATE SURGERY  2021    Urolift       Social History     Socioeconomic History    Marital status:    Tobacco Use    Smoking status: Former    Smokeless tobacco: Never   Substance and Sexual Activity    Alcohol use: Not Currently    Drug use: Never    Sexual activity: Not Currently       Physical Examination:  Vital  signs:  Temp:  [97.8 °F (36.6 °C)-99.1 °F (37.3 °C)] 98.1 °F (36.7 °C)  Pulse:  [77-87] 87  Resp:  [16-20] 20  SpO2:  [92 %-95 %] 93 %  BP: (121-147)/(62-81) 147/81  Patient Vitals for the past 8 hrs:   BP Temp Temp src Pulse Resp SpO2   11/16/23 0731 (!) 147/81 98.1 °F (36.7 °C) Oral 87 20 (!) 93 %   11/16/23 0400 -- -- -- -- 16 --   11/16/23 0330 -- -- -- -- -- (!) 94 %   11/16/23 0249 129/72 97.8 °F (36.6 °C) Oral 77 -- (!) 92 %        Recent Results (from the past 24 hour(s))   POCT glucose    Collection Time: 11/15/23 10:58 AM   Result Value Ref Range    POCT Glucose 106 70 - 110 mg/dL   POCT glucose    Collection Time: 11/15/23  4:54 PM   Result Value Ref Range    POCT Glucose 91 70 - 110 mg/dL   POCT glucose    Collection Time: 11/15/23  8:18 PM   Result Value Ref Range    POCT Glucose 134 (H) 70 - 110 mg/dL   Comprehensive Metabolic Panel    Collection Time: 11/16/23  5:15 AM   Result Value Ref Range    Sodium Level 139 136 - 145 mmol/L    Potassium Level 5.3 (H) 3.5 - 5.1 mmol/L    Chloride 106 98 - 107 mmol/L    Carbon Dioxide 27 23 - 31 mmol/L    Glucose Level 127 (H) 82 - 115 mg/dL    Blood Urea Nitrogen 27.4 (H) 8.4 - 25.7 mg/dL    Creatinine 1.12 0.73 - 1.18 mg/dL    Calcium Level Total 9.6 8.8 - 10.0 mg/dL    Protein Total 6.7 5.8 - 7.6 gm/dL    Albumin Level 4.0 3.4 - 4.8 g/dL    Globulin 2.7 2.4 - 3.5 gm/dL    Albumin/Globulin Ratio 1.5 1.1 - 2.0 ratio    Bilirubin Total 1.0 <=1.5 mg/dL    Alkaline Phosphatase 49 40 - 150 unit/L    Alanine Aminotransferase 27 0 - 55 unit/L    Aspartate Aminotransferase 21 5 - 34 unit/L    eGFR >60 mls/min/1.73/m2   CBC with Differential    Collection Time: 11/16/23  5:15 AM   Result Value Ref Range    WBC 11.62 (H) 4.50 - 11.50 x10(3)/mcL    RBC 4.72 4.70 - 6.10 x10(6)/mcL    Hgb 15.2 14.0 - 18.0 g/dL    Hct 46.3 42.0 - 52.0 %    MCV 98.1 (H) 80.0 - 94.0 fL    MCH 32.2 (H) 27.0 - 31.0 pg    MCHC 32.8 (L) 33.0 - 36.0 g/dL    RDW 12.9 11.5 - 17.0 %    Platelet 137 130  - 400 x10(3)/mcL    MPV 11.5 (H) 7.4 - 10.4 fL    Neut % 67.8 %    Lymph % 15.1 %    Mono % 11.5 %    Eos % 3.3 %    Basophil % 1.3 %    Lymph # 1.75 0.6 - 4.6 x10(3)/mcL    Neut # 7.88 2.1 - 9.2 x10(3)/mcL    Mono # 1.34 (H) 0.1 - 1.3 x10(3)/mcL    Eos # 0.38 0 - 0.9 x10(3)/mcL    Baso # 0.15 <=0.2 x10(3)/mcL    IG# 0.12 (H) 0 - 0.04 x10(3)/mcL    IG% 1.0 %    NRBC% 0.0 %   POCT glucose    Collection Time: 11/16/23  5:34 AM   Result Value Ref Range    POCT Glucose 112 (H) 70 - 110 mg/dL   Sedimentation rate    Collection Time: 11/16/23  7:40 AM   Result Value Ref Range    Sed Rate 12 0 - 15 mm/hr   Protime-INR    Collection Time: 11/16/23  7:40 AM   Result Value Ref Range    PT 13.4 12.5 - 14.5 seconds    INR 1.0 <=1.3     [unfilled]  Wt Readings from Last 3 Encounters:   11/14/23 99.3 kg (219 lb)   09/22/23 101.2 kg (223 lb)   07/22/23 106.6 kg (235 lb)         Physical Exam   Constitutional: Oriented to person, place, and time and well-developed, well-nourished, and in no distress.   Eyes: Conjunctivae and EOM are normal. Pupils are equal, round, and reactive to light.   Neck: Normal range of motion. Neck supple.   Cardiovascular: Normal rate, regular rhythm and normal heart sounds.   Pulmonary/Chest: Effort normal and breath sounds normal.   Abdominal: Soft. Bowel sounds are normal. There is no tenderness.   Musculoskeletal: Normal range of motion.   Neurological: Alert and oriented to person, place, and time. Gait normal.   Skin: Skin is warm and dry.   Psychiatric: Affect normal.       Assessment/Plan:    Lumbar and cervical stenosis  - NSY planning for surgery  - will check echo now   - if echo with stable AS, will give patient a low risk for cardiac events during procedure with NSY  - further plan pending     Mild AS  - echo as above    CAD  - non-obstructive  - had PET stress test in August 2022 that was without ischemia  - monitor for angina  - monitor on telemetry and call with concerns    Prior PFO  closure  - can re-eval on echo  - would resume Aspirin therapy when ok with NSY team (likely post-op phase    HTN  - BP mildly elevated today  - home meds to be resumed when reconciled    Hyperkalemia, CORKY  - renal function normalized but potassium remains high-normal  - on Lisinopril at home - would hold off on resumption for now  - can resume cardura when dose is verified    Addendum:   Patient examined independently, and chart reviewed.  Patient with  lumbar stenosis, and mild aortic valve stenosis we will review echocardiogram to evaluate surgical risk prior to Cardiac clearance.    ADIEL Young M.D.         *Patient of Dr. Hoff.           NIYAH Torres-GENEVA  [facility]

## 2023-11-17 LAB
AV INDEX (PROSTH): 0.34
AV MEAN GRADIENT: 10 MMHG
AV PEAK GRADIENT: 18 MMHG
AV VELOCITY RATIO: 0.41
BSA FOR ECHO PROCEDURE: 2.2 M2
CV ECHO LV RWT: 0.48 CM
DOP CALC AO PEAK VEL: 2.13 M/S
DOP CALC AO VTI: 42.8 CM
DOP CALC LVOT PEAK VEL: 0.88 M/S
DOP CALCLVOT PEAK VEL VTI: 14.7 CM
E WAVE DECELERATION TIME: 172 MSEC
E/A RATIO: 0.52
E/E' RATIO: 7.5 M/S
ECHO LV POSTERIOR WALL: 1.24 CM (ref 0.6–1.1)
FRACTIONAL SHORTENING: 20 % (ref 28–44)
INTERVENTRICULAR SEPTUM: 0.92 CM (ref 0.6–1.1)
LEFT ATRIUM SIZE: 4 CM
LEFT ATRIUM VOLUME INDEX MOD: 31.8 ML/M2
LEFT ATRIUM VOLUME MOD: 68.3 CM3
LEFT INTERNAL DIMENSION IN SYSTOLE: 4.14 CM (ref 2.1–4)
LEFT VENTRICLE DIASTOLIC VOLUME INDEX: 59.07 ML/M2
LEFT VENTRICLE DIASTOLIC VOLUME: 127 ML
LEFT VENTRICLE MASS INDEX: 99 G/M2
LEFT VENTRICLE SYSTOLIC VOLUME INDEX: 35.3 ML/M2
LEFT VENTRICLE SYSTOLIC VOLUME: 75.9 ML
LEFT VENTRICULAR INTERNAL DIMENSION IN DIASTOLE: 5.16 CM (ref 3.5–6)
LEFT VENTRICULAR MASS: 212.62 G
LV LATERAL E/E' RATIO: 6 M/S
LV SEPTAL E/E' RATIO: 10 M/S
LVOT MG: 2 MMHG
LVOT MV: 0.57 CM/S
MV PEAK A VEL: 1.16 M/S
MV PEAK E VEL: 0.6 M/S
OHS LV EJECTION FRACTION SIMPSONS BIPLANE MOD: 52 %
POCT GLUCOSE: 148 MG/DL (ref 70–110)
POCT GLUCOSE: 195 MG/DL (ref 70–110)
POCT GLUCOSE: 196 MG/DL (ref 70–110)
TDI LATERAL: 0.1 M/S
TDI SEPTAL: 0.06 M/S
TDI: 0.08 M/S
TRICUSPID ANNULAR PLANE SYSTOLIC EXCURSION: 1.97 CM
Z-SCORE OF LEFT VENTRICULAR DIMENSION IN END DIASTOLE: -3.02
Z-SCORE OF LEFT VENTRICULAR DIMENSION IN END SYSTOLE: -0.21

## 2023-11-17 PROCEDURE — 25000003 PHARM REV CODE 250: Performed by: INTERNAL MEDICINE

## 2023-11-17 PROCEDURE — 86901 BLOOD TYPING SEROLOGIC RH(D): CPT | Performed by: NEUROLOGICAL SURGERY

## 2023-11-17 PROCEDURE — 97530 THERAPEUTIC ACTIVITIES: CPT

## 2023-11-17 PROCEDURE — 21400001 HC TELEMETRY ROOM

## 2023-11-17 PROCEDURE — 99233 SBSQ HOSP IP/OBS HIGH 50: CPT | Mod: ,,, | Performed by: INTERNAL MEDICINE

## 2023-11-17 PROCEDURE — 97110 THERAPEUTIC EXERCISES: CPT

## 2023-11-17 PROCEDURE — 25000003 PHARM REV CODE 250: Performed by: NURSE PRACTITIONER

## 2023-11-17 RX ORDER — DOXAZOSIN 4 MG/1
4 TABLET ORAL DAILY
Status: DISCONTINUED | OUTPATIENT
Start: 2023-11-17 | End: 2023-11-29 | Stop reason: HOSPADM

## 2023-11-17 RX ORDER — GABAPENTIN 100 MG/1
100 CAPSULE ORAL NIGHTLY
Status: COMPLETED | OUTPATIENT
Start: 2023-11-17 | End: 2023-11-21

## 2023-11-17 RX ADMIN — GABAPENTIN 100 MG: 100 CAPSULE ORAL at 08:11

## 2023-11-17 RX ADMIN — HYDROCODONE BITARTRATE AND ACETAMINOPHEN 1 TABLET: 5; 325 TABLET ORAL at 05:11

## 2023-11-17 RX ADMIN — HYDROCODONE BITARTRATE AND ACETAMINOPHEN 1 TABLET: 5; 325 TABLET ORAL at 08:11

## 2023-11-17 RX ADMIN — MICONAZOLE NITRATE: 20 POWDER TOPICAL at 08:11

## 2023-11-17 RX ADMIN — DOXAZOSIN 4 MG: 4 TABLET ORAL at 08:11

## 2023-11-17 NOTE — PROGRESS NOTES
"Progress Note  Hospital Medicine    Patient Name: Stevie Cummins  YOB: 1950    Admit Date: 11/14/2023                     LOS: 3    SUBJECTIVE:     Reason for Admission:  Discitis of thoracic region  See H&P for detailed presentating history and ROS.      Interval history:  Uneventful night last night, except patient with severe lower extremity weakness upon standing up and significant neuropathic pain.  Otherwise patient has been cleared by cardiologist for surgery on the cervical area on Monday by Dr. Young      OBJECTIVE:     Vital Signs Range (Last 24H):  Temp:  [98 °F (36.7 °C)-98.9 °F (37.2 °C)]   Pulse:  []   Resp:  [18-22]   BP: (136-162)/(70-92)   SpO2:  [82 %-94 %] Body mass index is 32.34 kg/m².  Wt Readings from Last 3 Encounters:   11/17/23 0759 99.3 kg (219 lb)   11/14/23 1256 99.3 kg (219 lb)   09/22/23 1011 101.2 kg (223 lb)   07/22/23 1101 106.6 kg (235 lb)       I & O (Last 24H):  Intake/Output Summary (Last 24 hours) at 11/17/2023 1226  Last data filed at 11/17/2023 0404  Gross per 24 hour   Intake 800 ml   Output 2300 ml   Net -1500 ml       Physical Exam:  Alert oriented x3 participating with physical therapy, is noticeable his lower extremity weakness  HEENT within normal limits   Heart systolic murmur 2/6   Lungs essentially clear bilateral no wheezing rales or rhonchi   Abdomen obese soft and depressible nontender  Extremities trace pedal edema    Diagnostic Results:  Lab Results   Component Value Date    WBC 11.62 (H) 11/16/2023    HGB 15.2 11/16/2023    HCT 46.3 11/16/2023    MCV 98.1 (H) 11/16/2023     11/16/2023     No results for input(s): "GLU", "NA", "K", "CL", "CO2", "BUN", "CREATININE", "CALCIUM", "MG" in the last 24 hours.  Lab Results   Component Value Date    INR 1.0 11/16/2023    INR 1.1 11/04/2022    INR 1.1 03/29/2022    PROTIME 13.4 11/16/2023    PROTIME 17.0 (H) 08/25/2019    PROTIME 27.0 (H) 02/21/2018     Lab Results   Component Value Date    " HGBA1C 5.4 09/22/2023     Recent Labs     11/14/23  2136 11/15/23  1058 11/15/23  1654 11/15/23  2018 11/16/23  0534 11/16/23  1109 11/16/23 2027 11/17/23  0532   POCTGLUCOSE 99 106 91 134* 112* 141* 123* 148*       ASSESSMENT/PLAN:     Active Hospital Problems    Diagnosis  POA    *Discitis of thoracic region [M46.44]  No      Resolved Hospital Problems   No resolved problems to display.        Problems Addressed Today:    No problem-specific Assessment & Plan notes found for this encounter.        DISCHARGE PLANNING:     Add gabapentin or Neurontin at bedtime, also add occupational therapy  Signing Physician:  Robert Valdes MD

## 2023-11-17 NOTE — PROGRESS NOTES
[facility] - 5th Floor Med Surg  Neurosurgery  Progress Note    Subjective:     Interval History: No acute events overnight. Complains of continued cervical pain, decreased upper extremity motor function, and gait instability.     Post-Op Info:  Procedure(s) (LRB):  FUSION, SPINE, POSTERIOR SPINAL COLUMN, CERVICAL, USING COMPUTER-ASSISTED NAVIGATION (N/A)          Medications:  Continuous Infusions:   sodium chloride 0.45% 75 mL/hr at 11/16/23 0132     Scheduled Meds:   [START ON 11/20/2023] ceFAZolin (ANCEF) IVPB  2 g Intravenous Q8H    doxazosin  4 mg Oral Daily    miconazole NITRATE 2 %   Topical (Top) BID     PRN Meds:acetaminophen, [COMPLETED] calcium gluconate IVPB **AND** calcium gluconate IVPB, HYDROcodone-acetaminophen, HYDROmorphone, melatonin, ondansetron, sodium chloride 0.9%       Objective:     Weight: 99.3 kg (219 lb)  Body mass index is 32.34 kg/m².  Vital Signs (Most Recent):  Temp: 98 °F (36.7 °C) (11/17/23 0500)  Pulse: 81 (11/17/23 0500)  Resp: 18 (11/17/23 0542)  BP: (!) 162/92 (11/17/23 0500)  SpO2: (!) 94 % (11/17/23 0500) Vital Signs (24h Range):  Temp:  [98 °F (36.7 °C)-98.7 °F (37.1 °C)] 98 °F (36.7 °C)  Pulse:  [81-94] 81  Resp:  [18-20] 18  SpO2:  [92 %-95 %] 94 %  BP: (136-162)/(71-92) 162/92                              Neurosurgery Physical Exam  Awake, alert, oriented.   Resting in bed. NAD.   Decreased upper motor coordination. Weakness bilaterally.   EOMI. Answers questions appropriately.     Significant Labs:  Recent Labs   Lab 11/16/23 0515      K 5.3*   CO2 27   BUN 27.4*   CREATININE 1.12   CALCIUM 9.6     Recent Labs   Lab 11/16/23  0515   WBC 11.62*   HGB 15.2   HCT 46.3        Recent Labs   Lab 11/16/23  0740   INR 1.0     Microbiology Results (last 7 days)       Procedure Component Value Units Date/Time    Blood culture #1 **CANNOT BE ORDERED STAT** [[id number]]  (Normal) Collected: 11/14/23 1925    Order Status: Completed Specimen: Blood  Updated: 11/16/23 2300     CULTURE, BLOOD ([facility]) No Growth At 48 Hours    Blood culture #2 **CANNOT BE ORDERED STAT** [[id number]]  (Normal) Collected: 11/14/23 1924    Order Status: Completed Specimen: Blood Updated: 11/16/23 2300     CULTURE, BLOOD ([facility]) No Growth At 48 Hours              Assessment/Plan:     Active Diagnoses:    Diagnosis Date Noted POA    PRINCIPAL PROBLEM:  Discitis of thoracic region [M46.44] 11/15/2023 No      Problems Resolved During this Admission:     -Dr. Hutchinson planning for PCDF C3-T1 on 11/20/23.   -Discussed surgery in detail with patient.   -NPO at midnight 11/19/23.     TAYLOR Cabrera  Neurosurgery  [facility] - 5th Floor Med Surg

## 2023-11-17 NOTE — PT/OT/SLP PROGRESS
Physical Therapy Treatment    Patient Name:  Stevie Cummins   MRN:  [id number]    Recommendations:     Discharge therapy intensity: High Intensity Therapy   Discharge Equipment Recommendations: none  Barriers to discharge: Impaired mobility    Assessment:     Stevie Cummins is a 73 y.o. male admitted with a medical diagnosis of Discitis of thoracic region.  He presents with the following impairments/functional limitations: weakness, impaired functional mobility, decreased safety awareness, impaired coordination, impaired endurance, gait instability, impaired balance, decreased lower extremity function, decreased upper extremity function. Per documentation, plans for sx 11/20. Pt with increased pain and burning to BLE this session, requiring MaxA x 2 for transfers. Significant difficulty with glut clearance, left on  jt pad for choi t/f back to bed for safety.     Rehab Prognosis: Good; patient would benefit from acute skilled PT services to address these deficits and reach maximum level of function.    Recent Surgery: Procedure(s) (LRB):  FUSION, SPINE, POSTERIOR SPINAL COLUMN, CERVICAL, USING COMPUTER-ASSISTED NAVIGATION (N/A)      Plan:     During this hospitalization, patient to be seen 6 x/week to address the identified rehab impairments via gait training, therapeutic activities, therapeutic exercises and progress toward the following goals:    Plan of Care Expires:  12/16/23    Subjective     Chief Complaint: BLE burning   Patient/Family Comments/goals: to return to PLOF  Pain/Comfort:  Pain Rating 1: 6/10  Location - Side 1: Bilateral  Location 1: foot      Objective:     Communicated with RN prior to session.  Patient found HOB elevated with peripheral IV upon PT entry to room.     General Precautions: Standard, fall  Orthopedic Precautions: N/A  Braces: N/A  Respiratory Status: Room air  Skin Integrity: Visible skin intact      Functional Mobility:  Bed Mobility:     Rolling Left:  minimum assistance  Rolling  Right: minimum assistance  Supine to Sit: moderate assistance  Transfers:     Sit to Stand:  maximal assistance and of 2 persons with rolling walker  Bed to Chair: maximal assistance and of 2 persons with  hand-held assist  using  Squat Pivot    Therapeutic Activities/Exercises:  Seated 2 x 10  LAQ  MARCHING  ANKLE PUMP  HEEL RAISES    Education:  Patient provided with verbal education education regarding pt poc.  Understanding was verbalized.     Patient left up in chair with all lines intact, call button in reach, RN/CNA notified, and pt left on jt pad ..    GOALS:   Multidisciplinary Problems       Physical Therapy Goals          Problem: Physical Therapy    Goal Priority Disciplines Outcome Goal Variances Interventions   Physical Therapy Goal     PT, PT/OT Ongoing, Progressing     Description: Goals to be met by: d/c     Patient will increase functional independence with mobility by performin. Supine to sit with Stand-by Assistance  2. Sit to supine with Stand-by Assistance  3. Sit to stand transfer with Stand-by Assistance  4. Bed to chair transfer with Stand-by Assistance using Rolling Walker  5. Gait  x 150 feet with Stand-by Assistance using Rolling Walker.                          Time Tracking:     PT Received On: 23  PT Start Time: 924     PT Stop Time: 947  PT Total Time (min): 23 min     Billable Minutes: Therapeutic Activity 15 and Therapeutic Exercise 8    Treatment Type: Treatment  PT/PTA: PT     Number of PTA visits since last PT visit: 2023

## 2023-11-17 NOTE — PROGRESS NOTES
Cardiology Daily Progress Note    Patient Name: Stevie Cummins  Age: 73 y.o.  : 1950  MRN: [id number]  Admission Date: 2023      Subjective: No acute cardiac events overnight.  Patient feels well.      Review of Systems   General ROS: negative.  Respiratory ROS: no cough, shortness of breath, or wheezing.  Cardiovascular ROS: no chest pain or dyspnea on exertion.  Gastrointestinal ROS: no abdominal pain, change in bowel habits, or black or bloody stools.  Genito-Urinary ROS: no dysuria, trouble voiding, or hematuria.  Musculoskeletal ROS: negative.  Neurological ROS: negative.      Health Status:  Review of patient's allergies indicates:   Allergen Reactions    Nitrofurantoin Hives    Iodinated contrast media        Current Facility-Administered Medications   Medication Dose Route Frequency Provider Last Rate Last Admin    0.45% NaCl infusion   Intravenous Continuous Robert Alarcon MD 75 mL/hr at 23 0132 New Bag at 23 0132    acetaminophen tablet 650 mg  650 mg Oral Q8H PRN Robert Alarcon MD        calcium gluconate 1 g in NS IVPB (premixed)  1 g Intravenous Q10 Min PRN Robert Alarcon MD        [START ON 2023] cefazolin (ANCEF) 2 gram in dextrose 5% 50 mL IVPB (premix)  2 g Intravenous Q8H Arya Hutchinson MD        HYDROcodone-acetaminophen 5-325 mg per tablet 1 tablet  1 tablet Oral Q4H PRN Robert Alarcon MD   1 tablet at 23 0542    HYDROmorphone (PF) injection 1 mg  1 mg Intravenous Q4H PRN Robert Alarcon MD        melatonin tablet 6 mg  6 mg Oral Nightly PRN Robert Alarcon MD        miconazole NITRATE 2 % top powder   Topical (Top) BID Robert Alarcon MD   Given at 23 0902    ondansetron injection 4 mg  4 mg Intravenous Q8H PRN Robert Alarcon MD        sodium chloride 0.9% flush 10 mL  10 mL Intravenous PRN Robert Alarcon MD           Objective:  Patient Vitals for the past 24 hrs:   BP Temp Temp src Pulse Resp SpO2   23 0542 -- -- -- -- 18 --   23 0500 (!) 162/92  98 °F (36.7 °C) -- 81 18 (!) 94 %   11/16/23 2300 (!) 145/75 98 °F (36.7 °C) Oral 85 20 (!) 92 %   11/16/23 1956 (!) 155/72 98.7 °F (37.1 °C) Oral 82 18 (!) 93 %   11/16/23 1621 136/71 98.6 °F (37 °C) Oral 90 18 (!) 93 %   11/16/23 1129 (!) 150/78 98.3 °F (36.8 °C) Oral 94 18 95 %   11/16/23 0731 (!) 147/81 98.1 °F (36.7 °C) Oral 87 20 (!) 93 %     Recent Results (from the past 24 hour(s))   Sedimentation rate    Collection Time: 11/16/23  7:40 AM   Result Value Ref Range    Sed Rate 12 0 - 15 mm/hr   Protime-INR    Collection Time: 11/16/23  7:40 AM   Result Value Ref Range    PT 13.4 12.5 - 14.5 seconds    INR 1.0 <=1.3   POCT glucose    Collection Time: 11/16/23 11:09 AM   Result Value Ref Range    POCT Glucose 141 (H) 70 - 110 mg/dL   Echo Saline Bubble? No    Collection Time: 11/16/23  1:51 PM   Result Value Ref Range    BSA 2.2 m2    Ramirez's Biplane MOD Ejection Fraction 52 %    LVIDd 5.16 3.5 - 6.0 cm    LV Systolic Volume 75.90 mL    LV Systolic Volume Index 35.3 mL/m2    LVIDs 4.14 (A) 2.1 - 4.0 cm    LV Diastolic Volume 127.00 mL    LV Diastolic Volume Index 59.07 mL/m2    IVS 0.92 0.6 - 1.1 cm    FS 20 (A) 28 - 44 %    Left Ventricle Relative Wall Thickness 0.48 cm    Posterior Wall 1.24 (A) 0.6 - 1.1 cm    LV mass 212.62 g    LV Mass Index 99 g/m2    MV Peak E Steffen 0.60 m/s    TDI LATERAL 0.10 m/s    TDI SEPTAL 0.06 m/s    E/E' ratio 7.50 m/s    MV Peak A Steffen 1.16 m/s    E/A ratio 0.52     E wave deceleration time 172.00 msec    LV SEPTAL E/E' RATIO 10.00 m/s    LV LATERAL E/E' RATIO 6.00 m/s    LVOT peak steffen 0.88 m/s    Left Ventricular Outflow Tract Mean Velocity 0.57 cm/s    Left Ventricular Outflow Tract Mean Gradient 2.00 mmHg    TAPSE 1.97 cm    LA size 4.00 cm    LA volume (mod) 68.30 cm3    LA Volume Index (Mod) 31.8 mL/m2    AV mean gradient 10 mmHg    AV peak gradient 18 mmHg    Ao peak steffen 2.13 m/s    Ao VTI 42.80 cm    LVOT peak VTI 14.70 cm    AV Velocity Ratio 0.41     AV index  (prosthetic) 0.34     Mean e' 0.08 m/s    ZLVIDS -0.21     ZLVIDD -3.02    POCT glucose    Collection Time: 11/16/23  8:27 PM   Result Value Ref Range    POCT Glucose 123 (H) 70 - 110 mg/dL   POCT glucose    Collection Time: 11/17/23  5:32 AM   Result Value Ref Range    POCT Glucose 148 (H) 70 - 110 mg/dL     [unfilled]  Wt Readings from Last 3 Encounters:   11/14/23 99.3 kg (219 lb)   09/22/23 101.2 kg (223 lb)   07/22/23 106.6 kg (235 lb)       Physical Exam:  General: Alert and oriented, no acute distress.  Neck: No carotid bruit, no jugular venous distention.  Respiratory: Breath sounds are equal, symmetrical chest wall expansion. Breath sounds are clear, on room air.  Cardiovascular: Normal rate, regular rhythm. No murmur. No gallop. No edema noted. Patient is normal sinus rhythm on tele.  Integumentary: Clean, warm, dry, and intact.  Neurologic: Alert and oriented.   Psychiatric: Cooperative, appropriate mood and affect.        Assessment/Plan:    Lumbar and cervical stenosis  - NSY planning for surgery  - echo with LVEF 50-55%, grade 1 diastolic dysfunction, mild aortic stenosis  - okay from Cardiology standpoint to proceed with procedure as planned with low risk for cardiac events      Mild AS  - echo as above     CAD  - non-obstructive  - had PET stress test in August 2022 that was without ischemia  - monitor for angina  - monitor on telemetry and call with concerns     Prior PFO closure  - would resume Aspirin therapy when ok with NSY team (likely post-op phase     HTN  - BP mildly elevated today  - resume Cardura 4 mg daily  - monitor and continue to adjust as indicated     Hyperkalemia, CORKY  - renal function normalized but potassium remains high-normal one day prior   - labs pending for this morning  - on Lisinopril at home - would hold off on resumption for now          *Patient of Dr. Hoff. Will likely sign off today given echo with stable findings. He will need to follow up with Dr. Hoff in the  clinic after surgery and any type of rehab program. Patient states understanding.           NIYAH Torres-C  [facility]

## 2023-11-18 PROBLEM — M47.10: Status: ACTIVE | Noted: 2023-11-18

## 2023-11-18 PROBLEM — M25.579 ANKLE PAIN: Status: ACTIVE | Noted: 2023-11-18

## 2023-11-18 LAB
ABORH RETYPE: NORMAL
GROUP & RH: NORMAL
INDIRECT COOMBS: NORMAL
POCT GLUCOSE: 179 MG/DL (ref 70–110)
POCT GLUCOSE: 185 MG/DL (ref 70–110)
POCT GLUCOSE: 191 MG/DL (ref 70–110)
POCT GLUCOSE: 207 MG/DL (ref 70–110)
SPECIMEN OUTDATE: NORMAL
URATE SERPL-MCNC: 8.6 MG/DL (ref 3.5–7.2)

## 2023-11-18 PROCEDURE — 25000003 PHARM REV CODE 250: Performed by: NURSE PRACTITIONER

## 2023-11-18 PROCEDURE — 25000003 PHARM REV CODE 250: Performed by: INTERNAL MEDICINE

## 2023-11-18 PROCEDURE — 97530 THERAPEUTIC ACTIVITIES: CPT | Mod: CQ

## 2023-11-18 PROCEDURE — 84550 ASSAY OF BLOOD/URIC ACID: CPT | Performed by: INTERNAL MEDICINE

## 2023-11-18 PROCEDURE — 21400001 HC TELEMETRY ROOM

## 2023-11-18 PROCEDURE — 99233 SBSQ HOSP IP/OBS HIGH 50: CPT | Mod: ,,, | Performed by: INTERNAL MEDICINE

## 2023-11-18 RX ORDER — TAMSULOSIN HYDROCHLORIDE 0.4 MG/1
1 CAPSULE ORAL DAILY
Status: DISCONTINUED | OUTPATIENT
Start: 2023-11-18 | End: 2023-11-29 | Stop reason: HOSPADM

## 2023-11-18 RX ORDER — COLCHICINE 0.6 MG/1
1.2 TABLET, FILM COATED ORAL ONCE
Status: COMPLETED | OUTPATIENT
Start: 2023-11-18 | End: 2023-11-18

## 2023-11-18 RX ORDER — GLUCAGON 1 MG
1 KIT INJECTION
Status: DISCONTINUED | OUTPATIENT
Start: 2023-11-18 | End: 2023-11-25

## 2023-11-18 RX ORDER — COLCHICINE 0.6 MG/1
0.6 TABLET, FILM COATED ORAL ONCE
Status: COMPLETED | OUTPATIENT
Start: 2023-11-18 | End: 2023-11-18

## 2023-11-18 RX ORDER — LISINOPRIL 10 MG/1
10 TABLET ORAL DAILY
Status: DISCONTINUED | OUTPATIENT
Start: 2023-11-18 | End: 2023-11-29 | Stop reason: HOSPADM

## 2023-11-18 RX ORDER — IBUPROFEN 200 MG
24 TABLET ORAL
Status: DISCONTINUED | OUTPATIENT
Start: 2023-11-18 | End: 2023-11-25

## 2023-11-18 RX ORDER — CITALOPRAM 20 MG/1
20 TABLET, FILM COATED ORAL DAILY
Status: DISCONTINUED | OUTPATIENT
Start: 2023-11-18 | End: 2023-11-29 | Stop reason: HOSPADM

## 2023-11-18 RX ORDER — IBUPROFEN 200 MG
16 TABLET ORAL
Status: DISCONTINUED | OUTPATIENT
Start: 2023-11-18 | End: 2023-11-25

## 2023-11-18 RX ADMIN — MICONAZOLE NITRATE: 20 POWDER TOPICAL at 08:11

## 2023-11-18 RX ADMIN — MICONAZOLE NITRATE: 20 POWDER TOPICAL at 09:11

## 2023-11-18 RX ADMIN — LISINOPRIL 10 MG: 10 TABLET ORAL at 11:11

## 2023-11-18 RX ADMIN — COLCHICINE 0.6 MG: 0.6 CAPSULE ORAL at 08:11

## 2023-11-18 RX ADMIN — CITALOPRAM HYDROBROMIDE 20 MG: 20 TABLET ORAL at 11:11

## 2023-11-18 RX ADMIN — HYDROCODONE BITARTRATE AND ACETAMINOPHEN 1 TABLET: 5; 325 TABLET ORAL at 04:11

## 2023-11-18 RX ADMIN — DOXAZOSIN 4 MG: 4 TABLET ORAL at 08:11

## 2023-11-18 RX ADMIN — GABAPENTIN 100 MG: 100 CAPSULE ORAL at 08:11

## 2023-11-18 RX ADMIN — COLCHICINE 1.2 MG: 0.6 CAPSULE ORAL at 04:11

## 2023-11-18 RX ADMIN — TAMSULOSIN HYDROCHLORIDE 0.4 MG: 0.4 CAPSULE ORAL at 11:11

## 2023-11-18 NOTE — ASSESSMENT & PLAN NOTE
Chronic, uncontrolled. Latest blood pressure and vitals reviewed-     Temp:  [98.4 °F (36.9 °C)-100.3 °F (37.9 °C)]   Pulse:  []   Resp:  [16-20]   BP: (137-172)/(67-78)   SpO2:  [82 %-95 %] .   Home meds for hypertension were reviewed and noted below.   Hypertension Medications               doxazosin (CARDURA) 8 MG Tab Take 0.5 tabs bid    furosemide (LASIX) 40 MG tablet Take 1 tablet (40 mg total) by mouth once daily.    lisinopriL 10 MG tablet Take 1 tablet (10 mg total) by mouth once daily.            While in the hospital, will manage blood pressure as follows; Continue home antihypertensive regimen    Will utilize p.r.n. blood pressure medication only if patient's blood pressure greater than 160/100 and he develops symptoms such as worsening chest pain or shortness of breath.

## 2023-11-18 NOTE — SUBJECTIVE & OBJECTIVE
Interval History: Patient doing ok this am.  He stood up with PT, but he has ankle pain that is bothering him.  No h/o gout.    Review of Systems  Objective:     Vital Signs (Most Recent):  Temp: 98.7 °F (37.1 °C) (11/18/23 0822)  Pulse: 87 (11/18/23 0822)  Resp: 20 (11/18/23 0822)  BP: (!) 146/67 (11/18/23 0822)  SpO2: (!) 90 % (11/18/23 0822) Vital Signs (24h Range):  Temp:  [98.4 °F (36.9 °C)-100.3 °F (37.9 °C)] 98.7 °F (37.1 °C)  Pulse:  [] 87  Resp:  [16-20] 20  SpO2:  [82 %-95 %] 90 %  BP: (137-172)/(67-78) 146/67     Weight: 99.3 kg (219 lb)  Body mass index is 32.34 kg/m².    Intake/Output Summary (Last 24 hours) at 11/18/2023 1027  Last data filed at 11/17/2023 2100  Gross per 24 hour   Intake 360 ml   Output 650 ml   Net -290 ml         Physical Exam  Constitutional:       General: He is not in acute distress.     Appearance: Normal appearance. He is not toxic-appearing.   HENT:      Head: Normocephalic and atraumatic.   Eyes:      General: No scleral icterus.  Pulmonary:      Effort: Pulmonary effort is normal.   Musculoskeletal:         General: Tenderness (pain w/ passive rom of both ankles) present.   Skin:     General: Skin is warm and dry.   Neurological:      General: No focal deficit present.      Mental Status: He is alert and oriented to person, place, and time. Mental status is at baseline.      Gait: Gait is intact.   Psychiatric:         Mood and Affect: Mood normal.         Behavior: Behavior normal.         Thought Content: Thought content normal.         Judgment: Judgment normal.             Significant Labs: All pertinent labs within the past 24 hours have been reviewed.    Significant Imaging: I have reviewed all pertinent imaging results/findings within the past 24 hours.

## 2023-11-18 NOTE — PROGRESS NOTES
[facility] - 5th Floor [facility]  Progress Note    Patient Name: Stevie Cummins  MRN: [id number]  Patient Class: IP- Inpatient   Admission Date: 11/14/2023  Length of Stay: 4 days  Attending Physician: Robert Alarcon MD  Primary Care Provider: Robert Alarcon MD        Subjective:     Principal Problem:Discitis of thoracic region        HPI:  No notes on file    Overview/Hospital Course:  No notes on file    Interval History: Patient doing ok this am.  He stood up with PT, but he has ankle pain that is bothering him.  No h/o gout.    Review of Systems  Objective:     Vital Signs (Most Recent):  Temp: 98.7 °F (37.1 °C) (11/18/23 0822)  Pulse: 87 (11/18/23 0822)  Resp: 20 (11/18/23 0822)  BP: (!) 146/67 (11/18/23 0822)  SpO2: (!) 90 % (11/18/23 0822) Vital Signs (24h Range):  Temp:  [98.4 °F (36.9 °C)-100.3 °F (37.9 °C)] 98.7 °F (37.1 °C)  Pulse:  [] 87  Resp:  [16-20] 20  SpO2:  [82 %-95 %] 90 %  BP: (137-172)/(67-78) 146/67     Weight: 99.3 kg (219 lb)  Body mass index is 32.34 kg/m².    Intake/Output Summary (Last 24 hours) at 11/18/2023 1027  Last data filed at 11/17/2023 2100  Gross per 24 hour   Intake 360 ml   Output 650 ml   Net -290 ml         Physical Exam  Constitutional:       General: He is not in acute distress.     Appearance: Normal appearance. He is not toxic-appearing.   HENT:      Head: Normocephalic and atraumatic.   Eyes:      General: No scleral icterus.  Pulmonary:      Effort: Pulmonary effort is normal.   Musculoskeletal:         General: Tenderness (pain w/ passive rom of both ankles) present.   Skin:     General: Skin is warm and dry.   Neurological:      General: No focal deficit present.      Mental Status: He is alert and oriented to person, place, and time. Mental status is at baseline.      Gait: Gait is intact.   Psychiatric:         Mood and Affect: Mood normal.         Behavior: Behavior normal.         Thought Content: Thought content normal.          Judgment: Judgment normal.             Significant Labs: All pertinent labs within the past 24 hours have been reviewed.    Significant Imaging: I have reviewed all pertinent imaging results/findings within the past 24 hours.    Assessment/Plan:      Ankle pain  Check uric acid level.      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 Hutchinson on Monday.      Primary hypertension  Chronic, uncontrolled. Latest blood pressure and vitals reviewed-     Temp:  [98.4 °F (36.9 °C)-100.3 °F (37.9 °C)]   Pulse:  []   Resp:  [16-20]   BP: (137-172)/(67-78)   SpO2:  [82 %-95 %] .   Home meds for hypertension were reviewed and noted below.   Hypertension Medications               doxazosin (CARDURA) 8 MG Tab Take 0.5 tabs bid    furosemide (LASIX) 40 MG tablet Take 1 tablet (40 mg total) by mouth once daily.    lisinopriL 10 MG tablet Take 1 tablet (10 mg total) by mouth once daily.            While in the hospital, will manage blood pressure as follows; Continue home antihypertensive regimen    Will utilize p.r.n. blood pressure medication only if patient's blood pressure greater than 160/100 and he develops symptoms such as worsening chest pain or shortness of breath.    Type 2 diabetes mellitus with other specified complication, with long-term current use of insulin  He isn't currently on his home meds.  Will add SSI prn.      VTE Risk Mitigation (From admission, onward)           Ordered     IP VTE HIGH RISK PATIENT  Once         11/14/23 2111     Place sequential compression device  Until discontinued         11/14/23 2111                    Discharge Planning   NAZANIN:      Code Status: Full Code   Is the patient medically ready for discharge?:     Reason for patient still in hospital (select all that apply): Patient trending condition  Discharge Plan A: Rehab                  Gloria Turk MD  Department of Hospital Medicine   [facility] - 5th Floor Med Surg

## 2023-11-18 NOTE — PT/OT/SLP PROGRESS
Physical Therapy Treatment    Patient Name:  Stevie Cummins   MRN:  [id number]    Recommendations:     Discharge Recommendations: High Intensity Therapy  Discharge Equipment Recommendations: none  Barriers to discharge: Decreased caregiver support    Assessment:     Stevie Cummins is a 73 y.o. male admitted with a medical diagnosis of Discitis of thoracic region.  He presents with the following impairments/functional limitations: weakness, impaired functional mobility, decreased safety awareness, impaired coordination, impaired endurance, gait instability, impaired balance, decreased lower extremity function, decreased upper extremity function .    Rehab Prognosis: Good; patient would benefit from acute skilled PT services to address these deficits and reach maximum level of function.    Recent Surgery: Procedure(s) (LRB):  FUSION, SPINE, POSTERIOR SPINAL COLUMN, CERVICAL, USING COMPUTER-ASSISTED NAVIGATION (N/A)      Plan:     During this hospitalization, patient to be seen 6 x/week to address the identified rehab impairments via gait training, therapeutic activities, therapeutic exercises and progress toward the following goals:    Plan of Care Expires:  12/16/23    Subjective     Chief Complaint: his B ankle  Patient/Family Comments/goals: to walk and transfer safely  Pain/Comfort: B ankles         Objective:     Communicated with nsg prior to session.  Patient found supine with   upon PT entry to room.     General Precautions: Standard, fall  Orthopedic Precautions: N/A  Braces: N/A  Respiratory Status: Room air     Functional Mobility:  Pt was CGA from supine to sitting EOB with many VC pt was able to transfer.pt sat EOB for several minutes during B LE therex for 20 reps, pt was asst with sit to standing 3 trials needing max A pt and needing many VC to stand upright and push down on the RW. Pt was able to return himself back to supine from sitting with CGA.       AM-PAC 6 CLICK MOBILITY          Treatment &  Education:      Patient left supine with call button in reach..    GOALS:   Multidisciplinary Problems       Physical Therapy Goals          Problem: Physical Therapy    Goal Priority Disciplines Outcome Goal Variances Interventions   Physical Therapy Goal     PT, PT/OT Ongoing, Progressing     Description: Goals to be met by: d/c     Patient will increase functional independence with mobility by performin. Supine to sit with Stand-by Assistance  2. Sit to supine with Stand-by Assistance  3. Sit to stand transfer with Stand-by Assistance  4. Bed to chair transfer with Stand-by Assistance using Rolling Walker  5. Gait  x 150 feet with Stand-by Assistance using Rolling Walker.                          Time Tracking:     PT Received On:    PT Start Time:       PT Stop Time:    PT Total Time (min):       Billable Minutes: Therapeutic Activity 23       PT/PTA: PTA     Number of PTA visits since last PT visit: 2     2023

## 2023-11-19 LAB
BACTERIA BLD CULT: NORMAL
BACTERIA BLD CULT: NORMAL
POCT GLUCOSE: 187 MG/DL (ref 70–110)
POCT GLUCOSE: 193 MG/DL (ref 70–110)
POCT GLUCOSE: 193 MG/DL (ref 70–110)
POCT GLUCOSE: 213 MG/DL (ref 70–110)

## 2023-11-19 PROCEDURE — 99900031 HC PATIENT EDUCATION (STAT)

## 2023-11-19 PROCEDURE — 25000003 PHARM REV CODE 250: Performed by: NURSE PRACTITIONER

## 2023-11-19 PROCEDURE — 21400001 HC TELEMETRY ROOM

## 2023-11-19 PROCEDURE — 63600175 PHARM REV CODE 636 W HCPCS: Performed by: INTERNAL MEDICINE

## 2023-11-19 PROCEDURE — 94761 N-INVAS EAR/PLS OXIMETRY MLT: CPT

## 2023-11-19 PROCEDURE — 27000221 HC OXYGEN, UP TO 24 HOURS

## 2023-11-19 PROCEDURE — 25000003 PHARM REV CODE 250: Performed by: INTERNAL MEDICINE

## 2023-11-19 PROCEDURE — 97166 OT EVAL MOD COMPLEX 45 MIN: CPT

## 2023-11-19 PROCEDURE — 99233 SBSQ HOSP IP/OBS HIGH 50: CPT | Mod: ,,, | Performed by: INTERNAL MEDICINE

## 2023-11-19 RX ORDER — GLUCAGON 1 MG
1 KIT INJECTION
Status: DISCONTINUED | OUTPATIENT
Start: 2023-11-19 | End: 2023-11-19

## 2023-11-19 RX ORDER — INSULIN ASPART 100 [IU]/ML
0-5 INJECTION, SOLUTION INTRAVENOUS; SUBCUTANEOUS
Status: DISCONTINUED | OUTPATIENT
Start: 2023-11-19 | End: 2023-11-19

## 2023-11-19 RX ORDER — IBUPROFEN 200 MG
24 TABLET ORAL
Status: DISCONTINUED | OUTPATIENT
Start: 2023-11-19 | End: 2023-11-19

## 2023-11-19 RX ORDER — IBUPROFEN 200 MG
16 TABLET ORAL
Status: DISCONTINUED | OUTPATIENT
Start: 2023-11-19 | End: 2023-11-19

## 2023-11-19 RX ORDER — INSULIN ASPART 100 [IU]/ML
0-5 INJECTION, SOLUTION INTRAVENOUS; SUBCUTANEOUS
Status: DISCONTINUED | OUTPATIENT
Start: 2023-11-19 | End: 2023-11-29 | Stop reason: HOSPADM

## 2023-11-19 RX ORDER — ALLOPURINOL 100 MG/1
100 TABLET ORAL DAILY
Status: DISCONTINUED | OUTPATIENT
Start: 2023-11-19 | End: 2023-11-29 | Stop reason: HOSPADM

## 2023-11-19 RX ADMIN — LISINOPRIL 10 MG: 10 TABLET ORAL at 08:11

## 2023-11-19 RX ADMIN — ALLOPURINOL 100 MG: 100 TABLET ORAL at 10:11

## 2023-11-19 RX ADMIN — HYDROCODONE BITARTRATE AND ACETAMINOPHEN 1 TABLET: 5; 325 TABLET ORAL at 08:11

## 2023-11-19 RX ADMIN — CITALOPRAM HYDROBROMIDE 20 MG: 20 TABLET ORAL at 08:11

## 2023-11-19 RX ADMIN — MICONAZOLE NITRATE: 20 POWDER TOPICAL at 08:11

## 2023-11-19 RX ADMIN — TAMSULOSIN HYDROCHLORIDE 0.4 MG: 0.4 CAPSULE ORAL at 08:11

## 2023-11-19 RX ADMIN — GABAPENTIN 100 MG: 100 CAPSULE ORAL at 08:11

## 2023-11-19 RX ADMIN — INSULIN ASPART 2 UNITS: 100 INJECTION, SOLUTION INTRAVENOUS; SUBCUTANEOUS at 04:11

## 2023-11-19 RX ADMIN — MICONAZOLE NITRATE: 20 POWDER TOPICAL at 09:11

## 2023-11-19 RX ADMIN — DOXAZOSIN 4 MG: 4 TABLET ORAL at 08:11

## 2023-11-19 NOTE — PLAN OF CARE
Problem: Occupational Therapy  Goal: Occupational Therapy Goal  Description: Goals to be met by: 12/17/23     Patient will increase functional independence with ADLs by performing:    LE Dressing with Modified [place].  Grooming while standing with Modified [place].  Toileting from toilet with Modified [place] for hygiene and clothing management.   Toilet transfer to toilet with Modified [place].    Outcome: Ongoing, Progressing

## 2023-11-19 NOTE — PT/OT/SLP EVAL
Occupational Therapy  Evaluation    Name: Stevie Cummins  MRN: [id number]  Admitting Diagnosis: T spine discitis  Recent Surgery: Procedure(s) (LRB):  FUSION, SPINE, POSTERIOR SPINAL COLUMN, CERVICAL, USING COMPUTER-ASSISTED NAVIGATION (N/A)      Recommendations:     Discharge therapy intensity: High Intensity Therapy   Discharge Equipment Recommendations:  none  Barriers to discharge:  None    Assessment:     Stevie Cummins is a 73 y.o. male with a medical diagnosis of C-spine osteo.  He presents with good effort with activity, still with pain in B feet likelyh d/t gout per pt but improved standing and able to take a few side steps at EOB, he also presents with the following performance deficits affecting function: weakness, impaired self care skills, impaired functional mobility, impaired balance, decreased lower extremity function, pain.     Rehab Prognosis: good; patient would benefit from acute skilled OT services to address these deficits and reach maximum level of function.       Plan:     Patient to be seen 5 x/week to address the above listed problems via self-care/home management, therapeutic activities, therapeutic exercises  Plan of Care Expires:    Plan of Care Reviewed with: patient    Subjective     Chief Complaint: c/o some heel pain  Patient/Family Comments/goals: sit up EOB to eat    Occupational Profile:  Living Environment: lives with GF in 2 story home but live downstairs, steps to enter but with ramp, shower  Previous level of function: independent except for socks- GF assists, ambulated with RW and without  Roles and Routines: Boyfriend, dog dad  Equipment Used at Home: walker, rolling, bedside commode, shower chair  Assistance upon Discharge: yes, GF    Pain/Comfort:  Pain Rating 1: 4/10 (B heels)  Pain Addressed 1: Reposition, Distraction, Cessation of Activity    Patients cultural, spiritual, Roman [employer] conflicts given the current situation:      Objective:     OT communicated with nsg prior to  session.      Patient was found supine with peripheral IV upon OT entry to room.    General Precautions: Standard, fall  Orthopedic Precautions:    Braces:      Vital Signs:     Bed Mobility:    Sup to sit with min assist  Sit to sup with SBA    Functional Mobility/Transfers:  Sit to stand with mod assist  Functional Mobility: able to take side steps with RW with min assist    Activities of Daily Living:  Socks with total assist EOB    AMPAC 6 Click ADL:  AMPA Total Score:      Functional Cognition:  intact    Visual Perceptual Skills:  intact    Upper Extremity Function:  Right Upper Extremity:   wfl    Left Upper Extremity:  Wfl, noted with old digit amputations    Balance:   Good seated Eob, fair standing with RW      Patient Education:  Patient provided with verbal education education regarding OT role/goals/POC, fall prevention, safety awareness, and Discharge/DME recommendations.  Understanding was verbalized.     Patient left HOB elevated with all lines intact, call button in reach, and nsg notified that pt is ok to sit EOB for meals with min assist sup to sit but with good balance EOB.    GOALS:   Multidisciplinary Problems       Occupational Therapy Goals          Problem: Occupational Therapy    Goal Priority Disciplines Outcome Interventions   Occupational Therapy Goal     OT, PT/OT Ongoing, Progressing    Description: Goals to be met by: 12/17/23     Patient will increase functional independence with ADLs by performing:    LE Dressing with Modified [place].  Grooming while standing with Modified [place].  Toileting from toilet with Modified [place] for hygiene and clothing management.   Toilet transfer to toilet with Modified [place].                         History:     Past Medical History:   Diagnosis Date    BPH (benign prostatic hyperplasia)     CVA (cerebral vascular accident)     Depression     History of claustrophobia     HLD (hyperlipidemia)     HTN (hypertension)      Personal history of colonic polyps 07/08/2020    Colonoscopy Report    Type 2 diabetes mellitus without complications          Past Surgical History:   Procedure Laterality Date    COLONOSCOPY  07/08/2020    [facility], Sha Grimaldo MD    PATENT FORAMEN OVALE CLOSURE  11/04/2022    PROSTATE SURGERY  2021    Urolift       Time Tracking:     OT Date of Treatment: 11/19/23  OT Start Time: 1030  OT Stop Time: 1056  OT Total Time (min): 26 min    Billable Minutes:Evaluation mod complexity    11/19/2023

## 2023-11-19 NOTE — PROGRESS NOTES
[facility] - 5th Floor [facility] Medicine  Progress Note    Patient Name: Stevie Cummins  MRN: [id number]  Patient Class: IP- Inpatient   Admission Date: 11/14/2023  Length of Stay: 5 days  Attending Physician: Robert Alarcon MD  Primary Care Provider: Robert Alarcon MD        Subjective:     Principal Problem:Discitis of thoracic region        HPI:  No notes on file    Overview/Hospital Course:  No notes on file    Interval History: Mr. Cummins's ankle pain is better after the colchicine yesterday.  He is also c/o his right sided trigger finger.    Review of Systems  Objective:     Vital Signs (Most Recent):  Temp: 97.6 °F (36.4 °C) (11/19/23 0752)  Pulse: 80 (11/19/23 0807)  Resp: 18 (11/19/23 0807)  BP: 117/66 (11/19/23 0432)  SpO2: (!) 94 % (11/19/23 0807) Vital Signs (24h Range):  Temp:  [97.6 °F (36.4 °C)-99.7 °F (37.6 °C)] 97.6 °F (36.4 °C)  Pulse:  [80-96] 80  Resp:  [18-20] 18  SpO2:  [89 %-94 %] 94 %  BP: (116-133)/(66-72) 117/66     Weight: 99.3 kg (219 lb)  Body mass index is 32.34 kg/m².    Intake/Output Summary (Last 24 hours) at 11/19/2023 1025  Last data filed at 11/19/2023 0600  Gross per 24 hour   Intake 480 ml   Output 800 ml   Net -320 ml         Physical Exam  Constitutional:       General: He is not in acute distress.     Appearance: Normal appearance. He is not toxic-appearing.   HENT:      Head: Normocephalic and atraumatic.   Eyes:      General: No scleral icterus.  Pulmonary:      Effort: Pulmonary effort is normal.   Musculoskeletal:         General: Deformity (rt middle trigger finger) present.      Comments: Rom in both ankles is much better today -- significantly less pain     Skin:     General: Skin is warm and dry.   Neurological:      General: No focal deficit present.      Mental Status: He is alert and oriented to person, place, and time. Mental status is at baseline.      Gait: Gait is intact.   Psychiatric:         Mood and Affect: Mood normal.          Behavior: Behavior normal.         Thought Content: Thought content normal.         Judgment: Judgment normal.             Significant Labs: All pertinent labs within the past 24 hours have been reviewed.  Recent Lab Results         11/18/23  2032   11/18/23  1521   11/18/23  1108   11/18/23  1101        POCT Glucose 207   185   179         Uric Acid       8.6               Significant Imaging: I have reviewed all pertinent imaging results/findings within the past 24 hours.    Assessment/Plan:      Ankle pain  Uric acid level was elevated.  Pain seemed to improve with colchicine.  Will start allopurinol.      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 Hutchinson on Monday.      Primary hypertension  BP is stable.    Type 2 diabetes mellitus with other specified complication, with long-term current use of insulin  He isn't currently on his home meds.  Resume SSI.    Trigger finger:  Consider outpatient referral for injection.  VTE Risk Mitigation (From admission, onward)           Ordered     IP VTE HIGH RISK PATIENT  Once         11/14/23 2111     Place sequential compression device  Until discontinued         11/14/23 2111                    Discharge Planning   NAZANIN:      Code Status: Full Code   Is the patient medically ready for discharge?:     Reason for patient still in hospital (select all that apply): Consult recommendations  Discharge Plan A: Rehab                  Gloria Turk MD  Department of Hospital Medicine   [facility] - 5th Floor Med Surg

## 2023-11-19 NOTE — SUBJECTIVE & OBJECTIVE
Interval History: Mr. Cummins's ankle pain is better after the colchicine yesterday.  He is also c/o his right sided trigger finger.    Review of Systems  Objective:     Vital Signs (Most Recent):  Temp: 97.6 °F (36.4 °C) (11/19/23 0752)  Pulse: 80 (11/19/23 0807)  Resp: 18 (11/19/23 0807)  BP: 117/66 (11/19/23 0432)  SpO2: (!) 94 % (11/19/23 0807) Vital Signs (24h Range):  Temp:  [97.6 °F (36.4 °C)-99.7 °F (37.6 °C)] 97.6 °F (36.4 °C)  Pulse:  [80-96] 80  Resp:  [18-20] 18  SpO2:  [89 %-94 %] 94 %  BP: (116-133)/(66-72) 117/66     Weight: 99.3 kg (219 lb)  Body mass index is 32.34 kg/m².    Intake/Output Summary (Last 24 hours) at 11/19/2023 1025  Last data filed at 11/19/2023 0600  Gross per 24 hour   Intake 480 ml   Output 800 ml   Net -320 ml         Physical Exam  Constitutional:       General: He is not in acute distress.     Appearance: Normal appearance. He is not toxic-appearing.   HENT:      Head: Normocephalic and atraumatic.   Eyes:      General: No scleral icterus.  Pulmonary:      Effort: Pulmonary effort is normal.   Musculoskeletal:         General: Deformity (rt middle trigger finger) present.      Comments: Rom in both ankles is much better today -- significantly less pain     Skin:     General: Skin is warm and dry.   Neurological:      General: No focal deficit present.      Mental Status: He is alert and oriented to person, place, and time. Mental status is at baseline.      Gait: Gait is intact.   Psychiatric:         Mood and Affect: Mood normal.         Behavior: Behavior normal.         Thought Content: Thought content normal.         Judgment: Judgment normal.             Significant Labs: All pertinent labs within the past 24 hours have been reviewed.  Recent Lab Results         11/18/23  2032 11/18/23  1521   11/18/23  1108   11/18/23  1101        POCT Glucose 207   185   179         Uric Acid       8.6               Significant Imaging: I have reviewed all pertinent imaging  results/findings within the past 24 hours.

## 2023-11-19 NOTE — ASSESSMENT & PLAN NOTE
Plan is for surgery with Dr. Hutchinson on Monday.     Problem: Patient Care Overview  Goal: Plan of Care Review  Outcome: Ongoing (interventions implemented as appropriate)   09/13/19 6097   Coping/Psychosocial   Care Plan Reviewed With mother   Plan of Care Review   Progress improving   OTHER   Outcome Summary vss, head to toe wnl, breastfeeding fair, voiding and stooling

## 2023-11-19 NOTE — PROGRESS NOTES
Neurologically stable; still with quadriparesis from myelopathy  MRI thoracic spine with contrast shows no evidence of infection   Surgery tomorrow with Dr. Hutchinson

## 2023-11-20 ENCOUNTER — ANESTHESIA EVENT (OUTPATIENT)
Dept: SURGERY | Facility: HOSPITAL | Age: 73
DRG: 472 | End: 2023-11-20
Payer: MEDICARE

## 2023-11-20 ENCOUNTER — ANESTHESIA (OUTPATIENT)
Dept: SURGERY | Facility: HOSPITAL | Age: 73
DRG: 472 | End: 2023-11-20
Payer: MEDICARE

## 2023-11-20 LAB
ANION GAP SERPL CALC-SCNC: 6 MEQ/L
BASOPHILS # BLD AUTO: 0.15 X10(3)/MCL
BASOPHILS NFR BLD AUTO: 1.3 %
BUN SERPL-MCNC: 53.3 MG/DL (ref 8.4–25.7)
CALCIUM SERPL-MCNC: 9.1 MG/DL (ref 8.8–10)
CHLORIDE SERPL-SCNC: 107 MMOL/L (ref 98–107)
CO2 SERPL-SCNC: 28 MMOL/L (ref 23–31)
CREAT SERPL-MCNC: 1.28 MG/DL (ref 0.73–1.18)
CREAT/UREA NIT SERPL: 42
EOSINOPHIL # BLD AUTO: 0.25 X10(3)/MCL (ref 0–0.9)
EOSINOPHIL NFR BLD AUTO: 2.1 %
ERYTHROCYTE [DISTWIDTH] IN BLOOD BY AUTOMATED COUNT: 12.5 % (ref 11.5–17)
GFR SERPLBLD CREATININE-BSD FMLA CKD-EPI: 58 MLS/MIN/1.73/M2
GLUCOSE SERPL-MCNC: 174 MG/DL (ref 82–115)
HCT VFR BLD AUTO: 43.4 % (ref 42–52)
HGB BLD-MCNC: 14 G/DL (ref 14–18)
IMM GRANULOCYTES # BLD AUTO: 0.18 X10(3)/MCL (ref 0–0.04)
IMM GRANULOCYTES NFR BLD AUTO: 1.5 %
LYMPHOCYTES # BLD AUTO: 1.21 X10(3)/MCL (ref 0.6–4.6)
LYMPHOCYTES NFR BLD AUTO: 10.2 %
MCH RBC QN AUTO: 32.3 PG (ref 27–31)
MCHC RBC AUTO-ENTMCNC: 32.3 G/DL (ref 33–36)
MCV RBC AUTO: 100.2 FL (ref 80–94)
MONOCYTES # BLD AUTO: 1.54 X10(3)/MCL (ref 0.1–1.3)
MONOCYTES NFR BLD AUTO: 12.9 %
NEUTROPHILS # BLD AUTO: 8.59 X10(3)/MCL (ref 2.1–9.2)
NEUTROPHILS NFR BLD AUTO: 72 %
NRBC BLD AUTO-RTO: 0 %
PLATELET # BLD AUTO: 158 X10(3)/MCL (ref 130–400)
PMV BLD AUTO: 11.4 FL (ref 7.4–10.4)
POCT GLUCOSE: 164 MG/DL (ref 70–110)
POCT GLUCOSE: 235 MG/DL (ref 70–110)
POCT GLUCOSE: 313 MG/DL (ref 70–110)
POTASSIUM SERPL-SCNC: 4.7 MMOL/L (ref 3.5–5.1)
RBC # BLD AUTO: 4.33 X10(6)/MCL (ref 4.7–6.1)
SODIUM SERPL-SCNC: 141 MMOL/L (ref 136–145)
WBC # SPEC AUTO: 11.92 X10(3)/MCL (ref 4.5–11.5)

## 2023-11-20 PROCEDURE — 25000003 PHARM REV CODE 250: Performed by: INTERNAL MEDICINE

## 2023-11-20 PROCEDURE — 63600175 PHARM REV CODE 636 W HCPCS: Performed by: INTERNAL MEDICINE

## 2023-11-20 PROCEDURE — D9220A PRA ANESTHESIA: Mod: ANES,,, | Performed by: ANESTHESIOLOGY

## 2023-11-20 PROCEDURE — 25000003 PHARM REV CODE 250: Performed by: NURSE PRACTITIONER

## 2023-11-20 PROCEDURE — 00NW0ZZ RELEASE CERVICAL SPINAL CORD, OPEN APPROACH: ICD-10-PCS | Performed by: NEUROLOGICAL SURGERY

## 2023-11-20 PROCEDURE — 01N10ZZ RELEASE CERVICAL NERVE, OPEN APPROACH: ICD-10-PCS | Performed by: NEUROLOGICAL SURGERY

## 2023-11-20 PROCEDURE — 25000003 PHARM REV CODE 250: Performed by: NEUROLOGICAL SURGERY

## 2023-11-20 PROCEDURE — 25000003 PHARM REV CODE 250: Performed by: NURSE ANESTHETIST, CERTIFIED REGISTERED

## 2023-11-20 PROCEDURE — 63600175 PHARM REV CODE 636 W HCPCS: Performed by: NEUROLOGICAL SURGERY

## 2023-11-20 PROCEDURE — 63600175 PHARM REV CODE 636 W HCPCS

## 2023-11-20 PROCEDURE — 80048 BASIC METABOLIC PNL TOTAL CA: CPT | Performed by: INTERNAL MEDICINE

## 2023-11-20 PROCEDURE — 0RG2071 FUSION OF 2 OR MORE CERVICAL VERTEBRAL JOINTS WITH AUTOLOGOUS TISSUE SUBSTITUTE, POSTERIOR APPROACH, POSTERIOR COLUMN, OPEN APPROACH: ICD-10-PCS | Performed by: NEUROLOGICAL SURGERY

## 2023-11-20 PROCEDURE — D9220A PRA ANESTHESIA: ICD-10-PCS | Mod: CRNA,,, | Performed by: NURSE ANESTHETIST, CERTIFIED REGISTERED

## 2023-11-20 PROCEDURE — 36620 INSERTION CATHETER ARTERY: CPT | Performed by: NURSE ANESTHETIST, CERTIFIED REGISTERED

## 2023-11-20 PROCEDURE — 86923 COMPATIBILITY TEST ELECTRIC: CPT | Performed by: NEUROLOGICAL SURGERY

## 2023-11-20 PROCEDURE — 27000221 HC OXYGEN, UP TO 24 HOURS

## 2023-11-20 PROCEDURE — 0RG4071 FUSION OF CERVICOTHORACIC VERTEBRAL JOINT WITH AUTOLOGOUS TISSUE SUBSTITUTE, POSTERIOR APPROACH, POSTERIOR COLUMN, OPEN APPROACH: ICD-10-PCS | Performed by: NEUROLOGICAL SURGERY

## 2023-11-20 PROCEDURE — 37000008 HC ANESTHESIA 1ST 15 MINUTES: Performed by: NEUROLOGICAL SURGERY

## 2023-11-20 PROCEDURE — 36000712 HC OR TIME LEV V 1ST 15 MIN: Performed by: NEUROLOGICAL SURGERY

## 2023-11-20 PROCEDURE — 01N80ZZ RELEASE THORACIC NERVE, OPEN APPROACH: ICD-10-PCS | Performed by: NEUROLOGICAL SURGERY

## 2023-11-20 PROCEDURE — 21400001 HC TELEMETRY ROOM

## 2023-11-20 PROCEDURE — 71000033 HC RECOVERY, INTIAL HOUR: Performed by: NEUROLOGICAL SURGERY

## 2023-11-20 PROCEDURE — 94799 UNLISTED PULMONARY SVC/PX: CPT | Mod: XB

## 2023-11-20 PROCEDURE — 00NX0ZZ RELEASE THORACIC SPINAL CORD, OPEN APPROACH: ICD-10-PCS | Performed by: NEUROLOGICAL SURGERY

## 2023-11-20 PROCEDURE — 94761 N-INVAS EAR/PLS OXIMETRY MLT: CPT

## 2023-11-20 PROCEDURE — 27201423 OPTIME MED/SURG SUP & DEVICES STERILE SUPPLY: Performed by: NEUROLOGICAL SURGERY

## 2023-11-20 PROCEDURE — 37000009 HC ANESTHESIA EA ADD 15 MINS: Performed by: NEUROLOGICAL SURGERY

## 2023-11-20 PROCEDURE — 63600175 PHARM REV CODE 636 W HCPCS: Performed by: NURSE ANESTHETIST, CERTIFIED REGISTERED

## 2023-11-20 PROCEDURE — D9220A PRA ANESTHESIA: ICD-10-PCS | Mod: ANES,,, | Performed by: ANESTHESIOLOGY

## 2023-11-20 PROCEDURE — 71000039 HC RECOVERY, EACH ADD'L HOUR: Performed by: NEUROLOGICAL SURGERY

## 2023-11-20 PROCEDURE — 99233 SBSQ HOSP IP/OBS HIGH 50: CPT | Mod: ,,, | Performed by: INTERNAL MEDICINE

## 2023-11-20 PROCEDURE — 25000003 PHARM REV CODE 250

## 2023-11-20 PROCEDURE — 63600175 PHARM REV CODE 636 W HCPCS: Performed by: ANESTHESIOLOGY

## 2023-11-20 PROCEDURE — C1713 ANCHOR/SCREW BN/BN,TIS/BN: HCPCS | Performed by: NEUROLOGICAL SURGERY

## 2023-11-20 PROCEDURE — 85025 COMPLETE CBC W/AUTO DIFF WBC: CPT | Performed by: INTERNAL MEDICINE

## 2023-11-20 PROCEDURE — D9220A PRA ANESTHESIA: Mod: CRNA,,, | Performed by: NURSE ANESTHETIST, CERTIFIED REGISTERED

## 2023-11-20 PROCEDURE — 36000713 HC OR TIME LEV V EA ADD 15 MIN: Performed by: NEUROLOGICAL SURGERY

## 2023-11-20 DEVICE — FILLER BONE SYN 1CC PARTIC: Type: IMPLANTABLE DEVICE | Site: SPINE CERVICAL | Status: FUNCTIONAL

## 2023-11-20 DEVICE — IMPLANTABLE DEVICE: Type: IMPLANTABLE DEVICE | Site: SPINE CERVICAL | Status: FUNCTIONAL

## 2023-11-20 DEVICE — SCREW POLYAXIAL CERV 3.8X14MM: Type: IMPLANTABLE DEVICE | Site: SPINE CERVICAL | Status: FUNCTIONAL

## 2023-11-20 RX ORDER — ACETAMINOPHEN 325 MG/1
650 TABLET ORAL EVERY 4 HOURS PRN
Status: DISCONTINUED | OUTPATIENT
Start: 2023-11-20 | End: 2023-11-29 | Stop reason: HOSPADM

## 2023-11-20 RX ORDER — HYDROCODONE BITARTRATE AND ACETAMINOPHEN 5; 325 MG/1; MG/1
1 TABLET ORAL EVERY 4 HOURS PRN
Status: DISCONTINUED | OUTPATIENT
Start: 2023-11-20 | End: 2023-11-29 | Stop reason: HOSPADM

## 2023-11-20 RX ORDER — MORPHINE SULFATE 10 MG/ML
1 INJECTION INTRAMUSCULAR; INTRAVENOUS; SUBCUTANEOUS
Status: DISCONTINUED | OUTPATIENT
Start: 2023-11-20 | End: 2023-11-29 | Stop reason: HOSPADM

## 2023-11-20 RX ORDER — ENOXAPARIN SODIUM 100 MG/ML
40 INJECTION SUBCUTANEOUS EVERY 24 HOURS
Status: DISCONTINUED | OUTPATIENT
Start: 2023-11-22 | End: 2023-11-29 | Stop reason: HOSPADM

## 2023-11-20 RX ORDER — ONDANSETRON HYDROCHLORIDE 2 MG/ML
4 INJECTION, SOLUTION INTRAVENOUS DAILY PRN
Status: DISCONTINUED | OUTPATIENT
Start: 2023-11-20 | End: 2023-11-20 | Stop reason: HOSPADM

## 2023-11-20 RX ORDER — MORPHINE SULFATE 10 MG/ML
2 INJECTION INTRAMUSCULAR; INTRAVENOUS; SUBCUTANEOUS
Status: DISCONTINUED | OUTPATIENT
Start: 2023-11-20 | End: 2023-11-29 | Stop reason: HOSPADM

## 2023-11-20 RX ORDER — DEXMEDETOMIDINE HYDROCHLORIDE 100 UG/ML
INJECTION, SOLUTION INTRAVENOUS
Status: DISCONTINUED | OUTPATIENT
Start: 2023-11-20 | End: 2023-11-20

## 2023-11-20 RX ORDER — HYDROCODONE BITARTRATE AND ACETAMINOPHEN 7.5; 325 MG/1; MG/1
2 TABLET ORAL EVERY 4 HOURS PRN
Status: DISCONTINUED | OUTPATIENT
Start: 2023-11-20 | End: 2023-11-29 | Stop reason: HOSPADM

## 2023-11-20 RX ORDER — LIDOCAINE HYDROCHLORIDE 20 MG/ML
INJECTION, SOLUTION EPIDURAL; INFILTRATION; INTRACAUDAL; PERINEURAL
Status: DISCONTINUED | OUTPATIENT
Start: 2023-11-20 | End: 2023-11-20

## 2023-11-20 RX ORDER — HYDROCODONE BITARTRATE AND ACETAMINOPHEN 10; 325 MG/1; MG/1
1 TABLET ORAL EVERY 4 HOURS PRN
Status: DISCONTINUED | OUTPATIENT
Start: 2023-11-20 | End: 2023-11-29 | Stop reason: HOSPADM

## 2023-11-20 RX ORDER — CEFAZOLIN SODIUM IN 0.9 % NACL 2 G/100 ML
PLASTIC BAG, INJECTION (ML) INTRAVENOUS
Status: DISCONTINUED | OUTPATIENT
Start: 2023-11-20 | End: 2023-11-20

## 2023-11-20 RX ORDER — ADHESIVE BANDAGE
30 BANDAGE TOPICAL DAILY PRN
Status: DISCONTINUED | OUTPATIENT
Start: 2023-11-20 | End: 2023-11-29 | Stop reason: HOSPADM

## 2023-11-20 RX ORDER — CEFAZOLIN SODIUM 2 G/50ML
2 SOLUTION INTRAVENOUS
Status: COMPLETED | OUTPATIENT
Start: 2023-11-20 | End: 2023-11-21

## 2023-11-20 RX ORDER — VASOPRESSIN 20 [USP'U]/ML
INJECTION, SOLUTION INTRAMUSCULAR; SUBCUTANEOUS
Status: DISCONTINUED | OUTPATIENT
Start: 2023-11-20 | End: 2023-11-20

## 2023-11-20 RX ORDER — DEXAMETHASONE SODIUM PHOSPHATE 4 MG/ML
INJECTION, SOLUTION INTRA-ARTICULAR; INTRALESIONAL; INTRAMUSCULAR; INTRAVENOUS; SOFT TISSUE
Status: DISCONTINUED | OUTPATIENT
Start: 2023-11-20 | End: 2023-11-20

## 2023-11-20 RX ORDER — MIDAZOLAM HYDROCHLORIDE 1 MG/ML
INJECTION INTRAMUSCULAR; INTRAVENOUS
Status: DISCONTINUED | OUTPATIENT
Start: 2023-11-20 | End: 2023-11-20

## 2023-11-20 RX ORDER — PROPOFOL 10 MG/ML
VIAL (ML) INTRAVENOUS
Status: DISCONTINUED | OUTPATIENT
Start: 2023-11-20 | End: 2023-11-20

## 2023-11-20 RX ORDER — HYDROMORPHONE HYDROCHLORIDE 2 MG/ML
0.4 INJECTION, SOLUTION INTRAMUSCULAR; INTRAVENOUS; SUBCUTANEOUS EVERY 5 MIN PRN
Status: DISCONTINUED | OUTPATIENT
Start: 2023-11-20 | End: 2023-11-20 | Stop reason: HOSPADM

## 2023-11-20 RX ORDER — GLYCOPYRROLATE 0.2 MG/ML
INJECTION INTRAMUSCULAR; INTRAVENOUS
Status: DISCONTINUED | OUTPATIENT
Start: 2023-11-20 | End: 2023-11-20

## 2023-11-20 RX ORDER — PROPOFOL 10 MG/ML
VIAL (ML) INTRAVENOUS CONTINUOUS PRN
Status: DISCONTINUED | OUTPATIENT
Start: 2023-11-20 | End: 2023-11-20

## 2023-11-20 RX ORDER — BACITRACIN 500 [USP'U]/G
OINTMENT TOPICAL
Status: DISCONTINUED | OUTPATIENT
Start: 2023-11-20 | End: 2023-11-20 | Stop reason: HOSPADM

## 2023-11-20 RX ORDER — SODIUM CHLORIDE 0.9 % (FLUSH) 0.9 %
10 SYRINGE (ML) INJECTION
Status: DISCONTINUED | OUTPATIENT
Start: 2023-11-20 | End: 2023-11-20 | Stop reason: HOSPADM

## 2023-11-20 RX ORDER — PROCHLORPERAZINE EDISYLATE 5 MG/ML
10 INJECTION INTRAMUSCULAR; INTRAVENOUS EVERY 6 HOURS PRN
Status: DISCONTINUED | OUTPATIENT
Start: 2023-11-20 | End: 2023-11-29 | Stop reason: HOSPADM

## 2023-11-20 RX ORDER — ACETAMINOPHEN 325 MG/1
650 TABLET ORAL EVERY 6 HOURS PRN
Status: DISCONTINUED | OUTPATIENT
Start: 2023-11-20 | End: 2023-11-29 | Stop reason: HOSPADM

## 2023-11-20 RX ORDER — ONDANSETRON 2 MG/ML
INJECTION INTRAMUSCULAR; INTRAVENOUS
Status: DISCONTINUED | OUTPATIENT
Start: 2023-11-20 | End: 2023-11-20

## 2023-11-20 RX ORDER — PHENYLEPHRINE HYDROCHLORIDE 10 MG/ML
INJECTION INTRAVENOUS
Status: DISCONTINUED | OUTPATIENT
Start: 2023-11-20 | End: 2023-11-20

## 2023-11-20 RX ORDER — ALUMINUM HYDROXIDE, MAGNESIUM HYDROXIDE, AND SIMETHICONE 1200; 120; 1200 MG/30ML; MG/30ML; MG/30ML
30 SUSPENSION ORAL EVERY 4 HOURS PRN
Status: DISCONTINUED | OUTPATIENT
Start: 2023-11-20 | End: 2023-11-29 | Stop reason: HOSPADM

## 2023-11-20 RX ORDER — SODIUM CHLORIDE 9 MG/ML
INJECTION, SOLUTION INTRAVENOUS CONTINUOUS
Status: DISCONTINUED | OUTPATIENT
Start: 2023-11-20 | End: 2023-11-29 | Stop reason: HOSPADM

## 2023-11-20 RX ORDER — EPHEDRINE SULFATE 50 MG/ML
INJECTION, SOLUTION INTRAVENOUS
Status: DISCONTINUED | OUTPATIENT
Start: 2023-11-20 | End: 2023-11-20

## 2023-11-20 RX ORDER — FENTANYL CITRATE 50 UG/ML
INJECTION, SOLUTION INTRAMUSCULAR; INTRAVENOUS
Status: DISCONTINUED | OUTPATIENT
Start: 2023-11-20 | End: 2023-11-20

## 2023-11-20 RX ORDER — METHOCARBAMOL 750 MG/1
750 TABLET, FILM COATED ORAL 3 TIMES DAILY
Status: DISCONTINUED | OUTPATIENT
Start: 2023-11-20 | End: 2023-11-29 | Stop reason: HOSPADM

## 2023-11-20 RX ORDER — CEFAZOLIN SODIUM 1 G/3ML
INJECTION, POWDER, FOR SOLUTION INTRAMUSCULAR; INTRAVENOUS
Status: DISCONTINUED | OUTPATIENT
Start: 2023-11-20 | End: 2023-11-20 | Stop reason: HOSPADM

## 2023-11-20 RX ORDER — ACETAMINOPHEN 10 MG/ML
INJECTION, SOLUTION INTRAVENOUS
Status: DISCONTINUED | OUTPATIENT
Start: 2023-11-20 | End: 2023-11-20

## 2023-11-20 RX ORDER — AMOXICILLIN 250 MG
2 CAPSULE ORAL 2 TIMES DAILY
Status: DISCONTINUED | OUTPATIENT
Start: 2023-11-20 | End: 2023-11-29 | Stop reason: HOSPADM

## 2023-11-20 RX ORDER — SUCCINYLCHOLINE CHLORIDE 20 MG/ML
INJECTION INTRAMUSCULAR; INTRAVENOUS
Status: DISCONTINUED | OUTPATIENT
Start: 2023-11-20 | End: 2023-11-20

## 2023-11-20 RX ORDER — LIDOCAINE HYDROCHLORIDE AND EPINEPHRINE 5; 5 MG/ML; UG/ML
INJECTION, SOLUTION INFILTRATION; PERINEURAL
Status: DISCONTINUED | OUTPATIENT
Start: 2023-11-20 | End: 2023-11-20 | Stop reason: HOSPADM

## 2023-11-20 RX ORDER — MORPHINE SULFATE 10 MG/ML
4 INJECTION INTRAMUSCULAR; INTRAVENOUS; SUBCUTANEOUS
Status: DISCONTINUED | OUTPATIENT
Start: 2023-11-20 | End: 2023-11-29 | Stop reason: HOSPADM

## 2023-11-20 RX ORDER — BISACODYL 10 MG/1
10 SUPPOSITORY RECTAL DAILY
Status: DISCONTINUED | OUTPATIENT
Start: 2023-11-20 | End: 2023-11-29 | Stop reason: HOSPADM

## 2023-11-20 RX ADMIN — LIDOCAINE HYDROCHLORIDE 80 MG: 20 INJECTION, SOLUTION EPIDURAL; INFILTRATION; INTRACAUDAL; PERINEURAL at 11:11

## 2023-11-20 RX ADMIN — MICONAZOLE NITRATE: 20 POWDER TOPICAL at 08:11

## 2023-11-20 RX ADMIN — DEXAMETHASONE SODIUM PHOSPHATE 10 MG: 4 INJECTION, SOLUTION INTRA-ARTICULAR; INTRALESIONAL; INTRAMUSCULAR; INTRAVENOUS; SOFT TISSUE at 12:11

## 2023-11-20 RX ADMIN — PHENYLEPHRINE HYDROCHLORIDE 100 MCG: 10 INJECTION INTRAVENOUS at 11:11

## 2023-11-20 RX ADMIN — PROPOFOL 180 MG: 10 INJECTION, EMULSION INTRAVENOUS at 11:11

## 2023-11-20 RX ADMIN — CEFAZOLIN SODIUM 2 G: 2 SOLUTION INTRAVENOUS at 08:11

## 2023-11-20 RX ADMIN — PHENYLEPHRINE HYDROCHLORIDE 40 MCG/MIN: 10 INJECTION INTRAVENOUS at 12:11

## 2023-11-20 RX ADMIN — ACETAMINOPHEN 1000 MG: 10 INJECTION, SOLUTION INTRAVENOUS at 02:11

## 2023-11-20 RX ADMIN — DOXAZOSIN 4 MG: 4 TABLET ORAL at 09:11

## 2023-11-20 RX ADMIN — FENTANYL CITRATE 100 MCG: 50 INJECTION, SOLUTION INTRAMUSCULAR; INTRAVENOUS at 11:11

## 2023-11-20 RX ADMIN — HYDROMORPHONE HYDROCHLORIDE 0.4 MG: 2 INJECTION INTRAMUSCULAR; INTRAVENOUS; SUBCUTANEOUS at 04:11

## 2023-11-20 RX ADMIN — MIDAZOLAM HYDROCHLORIDE 2 MG: 1 INJECTION, SOLUTION INTRAMUSCULAR; INTRAVENOUS at 11:11

## 2023-11-20 RX ADMIN — INSULIN ASPART 2 UNITS: 100 INJECTION, SOLUTION INTRAVENOUS; SUBCUTANEOUS at 09:11

## 2023-11-20 RX ADMIN — SUCCINYLCHOLINE CHLORIDE 160 MG: 20 INJECTION, SOLUTION INTRAMUSCULAR; INTRAVENOUS at 11:11

## 2023-11-20 RX ADMIN — Medication 2 G: at 11:11

## 2023-11-20 RX ADMIN — VASOPRESSIN 2 UNITS: 20 INJECTION INTRAVENOUS at 11:11

## 2023-11-20 RX ADMIN — GLYCOPYRROLATE 0.2 MG: 0.2 INJECTION INTRAMUSCULAR; INTRAVENOUS at 11:11

## 2023-11-20 RX ADMIN — SENNOSIDES AND DOCUSATE SODIUM 2 TABLET: 8.6; 5 TABLET ORAL at 08:11

## 2023-11-20 RX ADMIN — ALLOPURINOL 100 MG: 100 TABLET ORAL at 09:11

## 2023-11-20 RX ADMIN — DEXMEDETOMIDINE 2 MCG: 200 INJECTION, SOLUTION INTRAVENOUS at 02:11

## 2023-11-20 RX ADMIN — METHOCARBAMOL 750 MG: 750 TABLET ORAL at 08:11

## 2023-11-20 RX ADMIN — SODIUM CHLORIDE, SODIUM GLUCONATE, SODIUM ACETATE, POTASSIUM CHLORIDE AND MAGNESIUM CHLORIDE: 526; 502; 368; 37; 30 INJECTION, SOLUTION INTRAVENOUS at 11:11

## 2023-11-20 RX ADMIN — ONDANSETRON 4 MG: 2 INJECTION INTRAMUSCULAR; INTRAVENOUS at 02:11

## 2023-11-20 RX ADMIN — PROPOFOL 55 MCG/KG/MIN: 10 INJECTION, EMULSION INTRAVENOUS at 11:11

## 2023-11-20 RX ADMIN — CITALOPRAM HYDROBROMIDE 20 MG: 20 TABLET ORAL at 09:11

## 2023-11-20 RX ADMIN — EPHEDRINE SULFATE 10 MG: 50 INJECTION INTRAVENOUS at 11:11

## 2023-11-20 RX ADMIN — LISINOPRIL 10 MG: 10 TABLET ORAL at 09:11

## 2023-11-20 RX ADMIN — TAMSULOSIN HYDROCHLORIDE 0.4 MG: 0.4 CAPSULE ORAL at 09:11

## 2023-11-20 RX ADMIN — GABAPENTIN 100 MG: 100 CAPSULE ORAL at 08:11

## 2023-11-20 RX ADMIN — SODIUM CHLORIDE: 9 INJECTION, SOLUTION INTRAVENOUS at 05:11

## 2023-11-20 RX ADMIN — REMIFENTANIL HYDROCHLORIDE 0.08 MCG/KG/MIN: 1 INJECTION, POWDER, LYOPHILIZED, FOR SOLUTION INTRAVENOUS at 11:11

## 2023-11-20 NOTE — PT/OT/SLP PROGRESS
Occupational Therapy      Patient Name:  Stevie Cummins   MRN:  [id number]    Patient off floor in surgery with neurosurgery. OT to follow up once neurosurgery clears patient post surgery.     11/20/2023

## 2023-11-20 NOTE — PT/OT/SLP PROGRESS
Physical Therapy      Patient Name:  Stevie Cummins   MRN:  [id number]    Patient not seen today secondary to  off floor for sx. Will need new orders s/p to mobilize. Will follow-up as appropriate.

## 2023-11-20 NOTE — PROGRESS NOTES
Progress Note  Hospital Medicine    Patient Name: Stevie Cummins  YOB: 1950    Admit Date: 11/14/2023                     LOS: 6    SUBJECTIVE:     Reason for Admission:  Discitis of thoracic region  See H&P for detailed presentating history and ROS.      Interval history:  At the moment of doing rales patient was in the company of family members, waiting to be sent for the operating room to fix the cervical area, patient with no acute complaints at this moment refers had a good weekend      OBJECTIVE:     Vital Signs Range (Last 24H):  Temp:  [96.8 °F (36 °C)-99.4 °F (37.4 °C)]   Pulse:  [85-95]   Resp:  [13-20]   BP: (119-155)/(56-77)   SpO2:  [92 %-99 %]   Arterial Line BP: (127-134)/(39-40) Body mass index is 32.34 kg/m².  Wt Readings from Last 3 Encounters:   11/17/23 0759 99.3 kg (219 lb)   11/14/23 1256 99.3 kg (219 lb)   09/22/23 1011 101.2 kg (223 lb)   07/22/23 1101 106.6 kg (235 lb)       I & O (Last 24H):  Intake/Output Summary (Last 24 hours) at 11/20/2023 1705  Last data filed at 11/20/2023 1521  Gross per 24 hour   Intake 2083.83 ml   Output 2140 ml   Net -56.17 ml       Physical Exam:   alert oriented x3   HEENT within normal limits   Heart regular rhythm faint murmur 2/6   Lungs are clear bilateral no wheezing rales or rhonchi   Abdomen obese soft and depressible bowel sounds are positive nontender   Extremities no edema    Diagnostic Results:  Lab Results   Component Value Date    WBC 11.92 (H) 11/20/2023    HGB 14.0 11/20/2023    HCT 43.4 11/20/2023    .2 (H) 11/20/2023     11/20/2023     Recent Labs   Lab 11/20/23  0500      K 4.7   CO2 28   BUN 53.3*   CREATININE 1.28*   CALCIUM 9.1     Lab Results   Component Value Date    INR 1.0 11/16/2023    INR 1.1 11/04/2022    INR 1.1 03/29/2022    PROTIME 13.4 11/16/2023    PROTIME 17.0 (H) 08/25/2019    PROTIME 27.0 (H) 02/21/2018     Lab Results   Component Value Date    HGBA1C 5.4 09/22/2023     Recent Labs      11/18/23  1521 11/18/23 2032 11/19/23  0503 11/19/23  1056 11/19/23  1508 11/19/23 2022 11/20/23  0618 11/20/23  1532   POCTGLUCOSE 185* 207* 193* 193* 213* 187* 164* 235*       ASSESSMENT/PLAN:     Active Hospital Problems    Diagnosis  POA    *Discitis of thoracic region [M46.44]  No    Myelopathy due to spondylosis [M47.10]  Yes    Ankle pain [M25.579]  Yes    Primary hypertension [I10]  Yes    Type 2 diabetes mellitus with other specified complication, with long-term current use of insulin [E11.69, Z79.4]  Not Applicable      Resolved Hospital Problems   No resolved problems to display.        Problems Addressed Today:    Myelopathy due to spondylosis  Plan is for surgery with Dr. Hutchinson on Monday.      Ankle pain  Check uric acid level.      Primary hypertension  Chronic, uncontrolled. Latest blood pressure and vitals reviewed-     Temp:  [98.4 °F (36.9 °C)-100.3 °F (37.9 °C)]   Pulse:  []   Resp:  [16-20]   BP: (137-172)/(67-78)   SpO2:  [82 %-95 %] .   Home meds for hypertension were reviewed and noted below.   Hypertension Medications               doxazosin (CARDURA) 8 MG Tab Take 0.5 tabs bid    furosemide (LASIX) 40 MG tablet Take 1 tablet (40 mg total) by mouth once daily.    lisinopriL 10 MG tablet Take 1 tablet (10 mg total) by mouth once daily.            While in the hospital, will manage blood pressure as follows; Continue home antihypertensive regimen    Will utilize p.r.n. blood pressure medication only if patient's blood pressure greater than 160/100 and he develops symptoms such as worsening chest pain or shortness of breath.    Type 2 diabetes mellitus with other specified complication, with long-term current use of insulin  He isn't currently on his home meds.  Will add SSI prn.    Ankle pain  Uric acid level was elevated.  Pain seemed to improve with colchicine.  Will start allopurinol.      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 Hutchinson on Monday.      Primary  hypertension  BP is stable.    Type 2 diabetes mellitus with other specified complication, with long-term current use of insulin  He isn't currently on his home meds.  Resume SSI.      DISCHARGE PLANNING:   As scheduled proceed with surgery, repeat labs after surgery, the plan will be rehab    Signing Physician:  Robert Valdes MD

## 2023-11-20 NOTE — BRIEF OP NOTE
[facility] - Periop Services  Brief Operative Note    SUMMARY     Surgery Date: 11/20/2023     Surgeon(s) and Role:     * Arya Hutchinson MD - Primary    Assisting Surgeon: Mian Chavis PA-C      Pre-op Diagnosis:  Spinal stenosis in cervical region [M48.02]    Post-op Diagnosis:  Post-Op Diagnosis Codes:     * Spinal stenosis in cervical region [M48.02]    Procedure(s) (LRB):  FUSION, SPINE, POSTERIOR SPINAL COLUMN, CERVICAL, USING COMPUTER-ASSISTED NAVIGATION (N/A)  Posterior cervical decompressoin and fusion using Spinewave screws (Rt C3: 3.8x14; Louis C4: 3.8x14;Louis C5: 3.8x14; Louis C6: 3.8x14; Rt C7: 3.8x14; Louis T1: 3.8x22), Medtronic DBM, and osteoamp DBM. O arm assisted.     Anesthesia: General    Implants:  Implant Name Type Inv. Item Serial No.  Lot No. LRB No. Used Action   DBM Putty 5c   [id number] [employer] N/A N/A 1 Implanted   DBM Putty 5cc   [id number] [employer] N/A N/A 1 Implanted   FILLER BONE SYN 1CC PARTIC - SN/A  FILLER BONE SYN 1CC PARTIC N/A [employer] [id number] N/A 2 Implanted   SCREW POLYAXIAL CERV 3.8X14MM - [id number]  SCREW POLYAXIAL CERV 3.8X14MM  [employer]  N/A 8 Implanted   SCREW PROFICIENT ERNESTO 3.8X22MM - [id number]  SCREW PROFICIENT ERNESTO 3.8X22MM  SPINE [employer]  N/A 2 Implanted   Spine Wave Set Caps    SPINE [employer]AVE INC  N/A 10 Implanted   90mm Pre Bent Jorge    SPINE [employer] INC  N/A 2 Implanted       Operative Findings:  Dictated    Estimated Blood Loss: 400 mL    Estimated Blood Loss has been documented.         Specimens:   Specimen (24h ago, onward)      None            [id number]

## 2023-11-20 NOTE — ANESTHESIA PREPROCEDURE EVALUATION
"                                                                                                             11/20/2023  Stevie Cummins is a 73 y.o., male with cervical spinal stenosis and extremity weakness for posterior cervical fusion.  He has been seen and optimized for this procedure by cardiology.  He is calm and conversant in holding, denies chest pain, denies SOB.    "   Extremity Weakness       Hx of spinal stenosis. Reports weakness and trouble walking over the last few days. States he feels as if his legs and arms are weak.          History of Present Illness:  Stevie Cummins is a 73 y.o. male with past medical history including CAD, mild AS, PFO closure in 2022, PVD post LE stenting, HTN, HLD, and prior CVA.      Patient had been followed in the outpatient NSY clinic. There were plans for possible surgery but patient reports he was instructed to complete a PT program first.  He presented to [facility] ER on 11- with complaints of progression weakness to the UE and LE.  He had MRI with severe lumbar stenosis and possible discitis. NSY and ID consulted. ID had evaluated patient do not feel that there is an infectious process at this time. NSY is planning for cervical decompression and fusion, likely next week. Cardiology has been consulted for surgery clearance. Patient did have an CORKY and hyperkalemia on admission. Renal function has improved but potassium level remains high normal. CBC stable.      Patient denies any complaints of chest discomfort, shortness of breath, dizziness, or palpitations. He only notes spastic extremities and trouble walking. Patient had outpatient PET stress test in August of 2022 that was without ischemia. Note that patient was recently evaluated by Dr. Hoff in the clinic and he has already been cleared for surgery procedure pending echo.      "Summary         Left Ventricle: The left ventricle is mildly dilated. Normal wall thickness. There is low normal systolic function " "with a visually estimated ejection fraction of 50 - 55%. Grade I diastolic dysfunction.    Right Ventricle: Normal right ventricular cavity size. Systolic function is normal. TAPSE is 1.97 cm.    Left Atrium: Left atrium is mildly dilated.    Aortic Valve: There is mild aortic valve sclerosis. Mildly restricted motion. There is mild stenosis. Aortic valve peak velocity is 2.13 m/s. Mean gradient is 10 mmHg. The dimensionless index is 0.34.    Pericardium: There is no pericardial effusion."           Review of Systems:  Review of Systems - 12 point review of systems was performed and reviewed with the patient and was negative except as indicated in the History of Present Illness....      Assessment/Plan:     Lumbar and cervical stenosis  - NSY planning for surgery  - will check echo now   - if echo with stable AS, will give patient a low risk for cardiac events during procedure with NSY  - further plan pending      Mild AS  - echo as above     CAD  - non-obstructive  - had PET stress test in August 2022 that was without ischemia  - monitor for angina  - monitor on telemetry and call with concerns     Prior PFO closure  - can re-eval on echo  - would resume Aspirin therapy when ok with NSY team (likely post-op phase     HTN  - BP mildly elevated today  - home meds to be resumed when reconciled     Hyperkalemia, CORKY  - renal function normalized but potassium remains high-normal  - on Lisinopril at home - would hold off on resumption for now  - can resume cardura when dose is verified     Addendum:   Patient examined independently, and chart reviewed.  Patient with  lumbar stenosis, and mild aortic valve stenosis we will review echocardiogram to evaluate surgical risk prior to Cardiac clearance."    Pre-op Assessment    I have reviewed the Patient Summary Reports.     I have reviewed the Nursing Notes. I have reviewed the NPO Status.   I have reviewed the Medications.     Review of Systems  Anesthesia Hx:  No problems " with previous Anesthesia             Denies Family Hx of Anesthesia complications.    Denies Personal Hx of Anesthesia complications.                    Cardiovascular:     Hypertension Valvular problems/Murmurs  CAD      Angina                                  Pulmonary:   COPD                     Neurological:   CVA Neuromuscular Disease,                                   Endocrine:  Diabetes         Obesity / BMI > 30  Psych:  Psychiatric History                  Physical Exam  General: Well nourished, Cooperative, Alert and Oriented    Airway:  Mallampati: III   Mouth Opening: Normal  TM Distance: Normal  Tongue: Normal  Neck ROM: Extension Decreased    Dental:  Intact    Chest/Lungs:  Clear to auscultation, Normal Respiratory Rate    Heart:  Rate: Normal  Rhythm: Regular Rhythm        Anesthesia Plan  Type of Anesthesia, risks & benefits discussed:    Anesthesia Type: Gen ETT  Intra-op Monitoring Plan: Standard ASA Monitors and Art Line  Post Op Pain Control Plan: multimodal analgesia and IV/PO Opioids PRN  Induction:  IV  Airway Plan: Video, Post-Induction  Informed Consent: Informed consent signed with the Patient and all parties understand the risks and agree with anesthesia plan.  All questions answered.   ASA Score: 3  Day of Surgery Review of History & Physical: H&P Update referred to the surgeon/provider.    Ready For Surgery From Anesthesia Perspective.     .

## 2023-11-20 NOTE — TRANSFER OF CARE
"Anesthesia Transfer of Care Note    Patient: Stevie Cummins    Procedure(s) Performed: Procedure(s) (LRB):  FUSION, SPINE, POSTERIOR SPINAL COLUMN, CERVICAL, USING COMPUTER-ASSISTED NAVIGATION (N/A)    Patient location: PACU    Anesthesia Type: general    Transport from OR: Transported from OR on room air with adequate spontaneous ventilation    Post pain: adequate analgesia    Post assessment: no apparent anesthetic complications    Post vital signs: stable    Level of consciousness: awake    Nausea/Vomiting: no nausea/vomiting    Complications: none    Transfer of care protocol was followed      Last vitals: Visit Vitals  BP (!) 120/58   Pulse 92   Temp 36 °C (96.8 °F)   Resp (!) 0   Ht 5' 9" (1.753 m)   Wt 99.3 kg (219 lb)   SpO2 95%   BMI 32.34 kg/m²     "

## 2023-11-20 NOTE — ANESTHESIA PROCEDURE NOTES
Intubation    Date/Time: 11/20/2023 11:42 AM    Performed by: Kevan Montanez CRNA  Authorized by: Mark La MD    Intubation:     Induction:  Intravenous    Mask Ventilation:  Easy mask    Attempts:  1    Attempted By:  CRNA    Method of Intubation:  Direct and video laryngoscopy    Blade:  Marjorie 3    Laryngeal View Grade: Grade I - full view of cords      Difficult Airway Encountered?: No      Complications:  None    Airway Device:  Oral endotracheal tube    Airway Device Size:  7.5    Style/Cuff Inflation:  Cuffed    Tube secured:  21    Secured at:  The lips    Placement Verified By:  Capnometry    Complicating Factors:  None    Findings Post-Intubation:  BS equal bilateral  Notes:      No cervical manipulation during laryngosocopy

## 2023-11-20 NOTE — PROGRESS NOTES
"Inpatient Nutrition Evaluation    Admit Date: 11/14/2023   Total duration of encounter: 6 days    Nutrition Recommendation/Prescription     Advance diet as tolerated to diabetic diet  If appetite decreased, consider oral supplement  Monitor need for bowel regimen   RD to monitor po intake and weight    Nutrition Assessment     Chart Review    Reason Seen: length of stay    Malnutrition Screening Tool Results   Have you recently lost weight without trying?: No  Have you been eating poorly because of a decreased appetite?: No   MST Score: 0     Diagnosis:  Ankle pain  Myelopathy due to spondylosis  HTN    Relevant Medical History: HTN, DM 2    Nutrition-Related Medications: insulin aspart prn    Nutrition-Related Labs: 11/20: WBC-11.92, RBC-4.33, BUN-53.3, creat-1.28, glu-174, poct glu-235      Diet Order: Diet NPO Except for: Sips with Medication  Diet Adult Regular  Oral Supplement Order: none  Appetite/Oral Intake: good/% of meals  Factors Affecting Nutritional Intake: none identified  Food/Baptism/Cultural Preferences: unable to obtain  Food Allergies: no known food allergies    Skin Integrity: incision  Wound(s):       Comments    11/20: pt in OR for fusion at time of visit. No reports of decreased appetite or GI complaints per notes. % intake documented. Per MST, no decreased appetite or weight loss prior admission. Per EMR weights, possible 7% weight loss in 4 months noted (not significant).    Anthropometrics    Height: 5' 9" (175.3 cm) Height Method: Stated  Last Weight: 99.3 kg (219 lb) (11/17/23 0759) Weight Method: Bed Scale  BMI (Calculated): 32.3  BMI Classification: obese grade I (BMI 30-34.9)        Ideal Body Weight (IBW), Male: 160 lb     % Ideal Body Weight, Male (lb): 136.88 %                          Usual Weight Provided By: EMR weight history    Wt Readings from Last 5 Encounters:   11/17/23 99.3 kg (219 lb)   09/22/23 101.2 kg (223 lb)   07/22/23 106.6 kg (235 lb)   05/18/23 102.1 " kg (225 lb)   01/12/23 102.1 kg (225 lb)     Weight Change(s) Since Admission:  Admit Weight: 99.3 kg (219 lb) (11/14/23 1256)      Patient Education    Not applicable.    Monitoring & Evaluation     Dietitian will monitor food and beverage intake, weight, and glucose/endocrine profile.  Nutrition Risk/Follow-Up: low (follow-up in 5-7 days)  Patients assigned 'low nutrition risk' status do not qualify for a full nutritional assessment but will be monitored and re-evaluated in a 5-7 day time period. Please consult if re-evaluation needed sooner.

## 2023-11-20 NOTE — ANESTHESIA PROCEDURE NOTES
Arterial    Diagnosis: intraop blood pressure monitoring    Patient location during procedure: holding area  Procedure start time: 11/20/2023 10:37 AM    Staffing  Authorizing Provider: Mark La MD  Performing Provider: Kevan Montanez CRNA    Staffing  Performed by: Keavn Montanez CRNA  Authorized by: Mark La MD      Preanesthetic Checklist  Completed: patient identified, IV checked, site marked, risks and benefits discussed, surgical consent, monitors and equipment checked, pre-op evaluation, timeout performed and anesthesia consent givenArterial  Skin Prep: alcohol swabs  Local Infiltration: none  Orientation: right  Location: radial    Catheter Size: 20 G  Catheter placement by Anatomical landmarks. Heme positive aspiration all ports. Insertion Attempts: 1  Assessment  Dressing: secured with tape and tegaderm  Patient: Tolerated well

## 2023-11-21 LAB
ABO + RH BLD: NORMAL
ABO + RH BLD: NORMAL
ANION GAP SERPL CALC-SCNC: 9 MEQ/L
BASOPHILS # BLD AUTO: 0.07 X10(3)/MCL
BASOPHILS NFR BLD AUTO: 0.4 %
BLD PROD TYP BPU: NORMAL
BLD PROD TYP BPU: NORMAL
BLOOD UNIT EXPIRATION DATE: NORMAL
BLOOD UNIT EXPIRATION DATE: NORMAL
BLOOD UNIT TYPE CODE: 5100
BLOOD UNIT TYPE CODE: 5100
BUN SERPL-MCNC: 53.1 MG/DL (ref 8.4–25.7)
CALCIUM SERPL-MCNC: 8.8 MG/DL (ref 8.8–10)
CHLORIDE SERPL-SCNC: 108 MMOL/L (ref 98–107)
CO2 SERPL-SCNC: 26 MMOL/L (ref 23–31)
CREAT SERPL-MCNC: 1.19 MG/DL (ref 0.73–1.18)
CREAT/UREA NIT SERPL: 45
CROSSMATCH INTERPRETATION: NORMAL
CROSSMATCH INTERPRETATION: NORMAL
DISPENSE STATUS: NORMAL
DISPENSE STATUS: NORMAL
EOSINOPHIL # BLD AUTO: 0 X10(3)/MCL (ref 0–0.9)
EOSINOPHIL NFR BLD AUTO: 0 %
ERYTHROCYTE [DISTWIDTH] IN BLOOD BY AUTOMATED COUNT: 12.7 % (ref 11.5–17)
GFR SERPLBLD CREATININE-BSD FMLA CKD-EPI: >60 MLS/MIN/1.73/M2
GLUCOSE SERPL-MCNC: 234 MG/DL (ref 82–115)
GLUCOSE SERPL-MCNC: 244 MG/DL (ref 70–110)
HCO3 UR-SCNC: 23.3 MMOL/L (ref 24–28)
HCT VFR BLD AUTO: 40.8 % (ref 42–52)
HCT VFR BLD CALC: 36 %PCV (ref 36–54)
HGB BLD-MCNC: 12 G/DL
HGB BLD-MCNC: 13.1 G/DL (ref 14–18)
IMM GRANULOCYTES # BLD AUTO: 0.2 X10(3)/MCL (ref 0–0.04)
IMM GRANULOCYTES NFR BLD AUTO: 1.3 %
LYMPHOCYTES # BLD AUTO: 1.07 X10(3)/MCL (ref 0.6–4.6)
LYMPHOCYTES NFR BLD AUTO: 6.7 %
MCH RBC QN AUTO: 31.7 PG (ref 27–31)
MCHC RBC AUTO-ENTMCNC: 32.1 G/DL (ref 33–36)
MCV RBC AUTO: 98.8 FL (ref 80–94)
MONOCYTES # BLD AUTO: 2.09 X10(3)/MCL (ref 0.1–1.3)
MONOCYTES NFR BLD AUTO: 13.1 %
NEUTROPHILS # BLD AUTO: 12.54 X10(3)/MCL (ref 2.1–9.2)
NEUTROPHILS NFR BLD AUTO: 78.5 %
NRBC BLD AUTO-RTO: 0 %
PCO2 BLDA: 38.9 MMHG (ref 35–45)
PH SMN: 7.39 [PH] (ref 7.35–7.45)
PLATELET # BLD AUTO: 181 X10(3)/MCL (ref 130–400)
PMV BLD AUTO: 10.9 FL (ref 7.4–10.4)
PO2 BLDA: 356 MMHG (ref 80–100)
POC BE: -2 MMOL/L
POC IONIZED CALCIUM: 1.12 MMOL/L (ref 1.06–1.42)
POC SATURATED O2: 100 % (ref 95–100)
POC TCO2: 25 MMOL/L (ref 23–27)
POCT GLUCOSE: 181 MG/DL (ref 70–110)
POCT GLUCOSE: 230 MG/DL (ref 70–110)
POCT GLUCOSE: 248 MG/DL (ref 70–110)
POCT GLUCOSE: 262 MG/DL (ref 70–110)
POCT GLUCOSE: 274 MG/DL (ref 70–110)
POTASSIUM BLD-SCNC: 4.7 MMOL/L (ref 3.5–5.1)
POTASSIUM SERPL-SCNC: 4.8 MMOL/L (ref 3.5–5.1)
RBC # BLD AUTO: 4.13 X10(6)/MCL (ref 4.7–6.1)
SAMPLE: ABNORMAL
SODIUM BLD-SCNC: 140 MMOL/L (ref 136–145)
SODIUM SERPL-SCNC: 143 MMOL/L (ref 136–145)
UNIT NUMBER: NORMAL
UNIT NUMBER: NORMAL
WBC # SPEC AUTO: 15.97 X10(3)/MCL (ref 4.5–11.5)

## 2023-11-21 PROCEDURE — 25000003 PHARM REV CODE 250

## 2023-11-21 PROCEDURE — 21400001 HC TELEMETRY ROOM

## 2023-11-21 PROCEDURE — 99900031 HC PATIENT EDUCATION (STAT)

## 2023-11-21 PROCEDURE — 25000003 PHARM REV CODE 250: Performed by: INTERNAL MEDICINE

## 2023-11-21 PROCEDURE — 80048 BASIC METABOLIC PNL TOTAL CA: CPT

## 2023-11-21 PROCEDURE — 99900035 HC TECH TIME PER 15 MIN (STAT)

## 2023-11-21 PROCEDURE — 63600175 PHARM REV CODE 636 W HCPCS: Performed by: NEUROLOGICAL SURGERY

## 2023-11-21 PROCEDURE — 85025 COMPLETE CBC W/AUTO DIFF WBC: CPT

## 2023-11-21 PROCEDURE — 63600175 PHARM REV CODE 636 W HCPCS

## 2023-11-21 PROCEDURE — 99233 SBSQ HOSP IP/OBS HIGH 50: CPT | Mod: GT,,, | Performed by: INTERNAL MEDICINE

## 2023-11-21 PROCEDURE — 63600175 PHARM REV CODE 636 W HCPCS: Performed by: INTERNAL MEDICINE

## 2023-11-21 PROCEDURE — 97164 PT RE-EVAL EST PLAN CARE: CPT

## 2023-11-21 PROCEDURE — 97168 OT RE-EVAL EST PLAN CARE: CPT

## 2023-11-21 PROCEDURE — 25000003 PHARM REV CODE 250: Performed by: NURSE PRACTITIONER

## 2023-11-21 PROCEDURE — 25000242 PHARM REV CODE 250 ALT 637 W/ HCPCS: Performed by: INTERNAL MEDICINE

## 2023-11-21 PROCEDURE — 27000221 HC OXYGEN, UP TO 24 HOURS

## 2023-11-21 PROCEDURE — 94640 AIRWAY INHALATION TREATMENT: CPT

## 2023-11-21 RX ORDER — IPRATROPIUM BROMIDE AND ALBUTEROL SULFATE 2.5; .5 MG/3ML; MG/3ML
3 SOLUTION RESPIRATORY (INHALATION) EVERY 8 HOURS
Status: DISCONTINUED | OUTPATIENT
Start: 2023-11-21 | End: 2023-11-21

## 2023-11-21 RX ORDER — GUAIFENESIN 600 MG/1
600 TABLET, EXTENDED RELEASE ORAL 2 TIMES DAILY
Status: DISCONTINUED | OUTPATIENT
Start: 2023-11-21 | End: 2023-11-29 | Stop reason: HOSPADM

## 2023-11-21 RX ORDER — DEXAMETHASONE SODIUM PHOSPHATE 4 MG/ML
4 INJECTION, SOLUTION INTRA-ARTICULAR; INTRALESIONAL; INTRAMUSCULAR; INTRAVENOUS; SOFT TISSUE EVERY 6 HOURS
Status: DISCONTINUED | OUTPATIENT
Start: 2023-11-21 | End: 2023-11-24

## 2023-11-21 RX ORDER — OXYMETAZOLINE HCL 0.05 %
2 SPRAY, NON-AEROSOL (ML) NASAL 2 TIMES DAILY
Status: COMPLETED | OUTPATIENT
Start: 2023-11-21 | End: 2023-11-23

## 2023-11-21 RX ORDER — IPRATROPIUM BROMIDE AND ALBUTEROL SULFATE 2.5; .5 MG/3ML; MG/3ML
3 SOLUTION RESPIRATORY (INHALATION) EVERY 8 HOURS
Status: DISCONTINUED | OUTPATIENT
Start: 2023-11-21 | End: 2023-11-29 | Stop reason: HOSPADM

## 2023-11-21 RX ORDER — FLUTICASONE PROPIONATE 44 UG/1
1 AEROSOL, METERED RESPIRATORY (INHALATION) DAILY
Status: COMPLETED | OUTPATIENT
Start: 2023-11-21 | End: 2023-11-22

## 2023-11-21 RX ADMIN — DEXAMETHASONE SODIUM PHOSPHATE 4 MG: 4 INJECTION, SOLUTION INTRA-ARTICULAR; INTRALESIONAL; INTRAMUSCULAR; INTRAVENOUS; SOFT TISSUE at 03:11

## 2023-11-21 RX ADMIN — FLUTICASONE PROPIONATE 1 PUFF: 44 AEROSOL, METERED RESPIRATORY (INHALATION) at 10:11

## 2023-11-21 RX ADMIN — METHOCARBAMOL 750 MG: 750 TABLET ORAL at 09:11

## 2023-11-21 RX ADMIN — INSULIN ASPART 2 UNITS: 100 INJECTION, SOLUTION INTRAVENOUS; SUBCUTANEOUS at 10:11

## 2023-11-21 RX ADMIN — CEFAZOLIN SODIUM 2 G: 2 SOLUTION INTRAVENOUS at 03:11

## 2023-11-21 RX ADMIN — TAMSULOSIN HYDROCHLORIDE 0.4 MG: 0.4 CAPSULE ORAL at 08:11

## 2023-11-21 RX ADMIN — DOXAZOSIN 4 MG: 4 TABLET ORAL at 08:11

## 2023-11-21 RX ADMIN — CITALOPRAM HYDROBROMIDE 20 MG: 20 TABLET ORAL at 08:11

## 2023-11-21 RX ADMIN — METHOCARBAMOL 750 MG: 750 TABLET ORAL at 08:11

## 2023-11-21 RX ADMIN — MICONAZOLE NITRATE: 20 POWDER TOPICAL at 09:11

## 2023-11-21 RX ADMIN — IPRATROPIUM BROMIDE AND ALBUTEROL SULFATE 3 ML: 2.5; .5 SOLUTION RESPIRATORY (INHALATION) at 11:11

## 2023-11-21 RX ADMIN — Medication 2 SPRAY: at 10:11

## 2023-11-21 RX ADMIN — ALLOPURINOL 100 MG: 100 TABLET ORAL at 08:11

## 2023-11-21 RX ADMIN — CEFAZOLIN SODIUM 2 G: 2 SOLUTION INTRAVENOUS at 11:11

## 2023-11-21 RX ADMIN — GABAPENTIN 100 MG: 100 CAPSULE ORAL at 09:11

## 2023-11-21 RX ADMIN — DEXAMETHASONE SODIUM PHOSPHATE 4 MG: 4 INJECTION, SOLUTION INTRA-ARTICULAR; INTRALESIONAL; INTRAMUSCULAR; INTRAVENOUS; SOFT TISSUE at 08:11

## 2023-11-21 RX ADMIN — SENNOSIDES AND DOCUSATE SODIUM 2 TABLET: 8.6; 5 TABLET ORAL at 08:11

## 2023-11-21 RX ADMIN — GUAIFENESIN 600 MG: 600 TABLET, EXTENDED RELEASE ORAL at 09:11

## 2023-11-21 RX ADMIN — IPRATROPIUM BROMIDE AND ALBUTEROL SULFATE 3 ML: 2.5; .5 SOLUTION RESPIRATORY (INHALATION) at 03:11

## 2023-11-21 RX ADMIN — METHOCARBAMOL 750 MG: 750 TABLET ORAL at 03:11

## 2023-11-21 RX ADMIN — SENNOSIDES AND DOCUSATE SODIUM 2 TABLET: 8.6; 5 TABLET ORAL at 09:11

## 2023-11-21 RX ADMIN — LISINOPRIL 10 MG: 10 TABLET ORAL at 08:11

## 2023-11-21 RX ADMIN — HYDROCODONE BITARTRATE AND ACETAMINOPHEN 1 TABLET: 5; 325 TABLET ORAL at 09:11

## 2023-11-21 RX ADMIN — DEXAMETHASONE SODIUM PHOSPHATE 4 MG: 4 INJECTION, SOLUTION INTRA-ARTICULAR; INTRALESIONAL; INTRAMUSCULAR; INTRAVENOUS; SOFT TISSUE at 09:11

## 2023-11-21 RX ADMIN — INSULIN ASPART 3 UNITS: 100 INJECTION, SOLUTION INTRAVENOUS; SUBCUTANEOUS at 05:11

## 2023-11-21 RX ADMIN — MICONAZOLE NITRATE: 20 POWDER TOPICAL at 10:11

## 2023-11-21 RX ADMIN — GUAIFENESIN 600 MG: 600 TABLET, EXTENDED RELEASE ORAL at 08:11

## 2023-11-21 RX ADMIN — INSULIN ASPART 3 UNITS: 100 INJECTION, SOLUTION INTRAVENOUS; SUBCUTANEOUS at 11:11

## 2023-11-21 NOTE — PROGRESS NOTES
C/O of pain.  Moves both arms/legs at least 4/5.  Sensation preserved.  Drain Working.  Therapy/rehab.  Placement.

## 2023-11-21 NOTE — OP NOTE
[facility]                       [address] [id number]    PATIENT NAME:      AARON CRAWFORD  YOB: 1950  CSN:               [id number]  MRN:               [id number]  ADMIT DATE:        11/14/2023 13:04:00  PHYSICIAN:         Arya Hutchinson MD                          OPERATIVE REPORT      DATE OF SURGERY:    11/20/2023 12:54:06    SURGEON:  Arya Hutchinson MD    ASSISTANT:  Mian Chavis.    PREOPERATIVE DIAGNOSES:  Severe stenosis from C3 to T1 with weakness, difficulty   walking, and weakness in arms and legs.    POSTOPERATIVE DIAGNOSES:  Severe stenosis from C3 to T1 with weakness,   difficulty walking, and weakness in arms and legs.    PROCEDURE:  Posterior open decompression C3, C4, C5, C6, C7-T1, normal pressure   spinal cord foraminotomies and fusion with lateral screws at C3 bilaterally and   C4 bilaterally, C5 on the right side, C6 bilaterally, C7 on the right, and   pedicle screws bilaterally at T1.  GOVECS DBM was used incised a bite to the   ostia and DBM and screws were used.  Monitoring was used.  Microscope was used.    A drain was left in place.    INDICATION FOR SURGERY:  The patient is 73-year-old with severe stenosis,   difficulty walking, and posterior decompression fusion.  Consent was discussed.    Risks of bleeding, infection, weakness, CSF leak, reoperation,   hemorrhage, discussed in detail.  We spent some time going over everything.  He   was admitted now.  He wanted me now to proceed with surgery  in the hospital get   admitted here for decompression fusion.  We discussed surgery, how it   is done, and proceed with surgery.    OPERATIVE PROCEDURE:  Brought to the operating room, intubated.  Head put in   pins, turned prone, did precut motors, and then we made an incision on top of 3   down to T1, put retractors in, identified those levels 3-4, 4-5, 5-6, 6-7, 7-T1.    Once that was  done, we did laminectomy and foraminotomy from top of 3, 4, 5,   6, 7 going to T1, taking our time.  It was very tight in several places.    Thorough foraminotomies were done especially on the left side.  We are going to   take pressure off the spinal cord nerve root on both sides especially on the   left side.  Once done, we put a clamp and did a spin and put screws on the right   side at C3, C4, C5, C6, C7, and T1.  On the left side, C3, C4, C6, and T1.    Sides had been decorticated, packed with autograft, allograft, and [employer] DBM   and bars and caps were put on top.  Final tightening was done.  Once that was   done, a drain was left in place, secured wound was closed with 0 Vicryl, 3-0   Vicryl running subcu.  The patient was then turned supine, taken out pins.    There were no issue, no complication.        ______________________________  MD NANCY Farfan/RITA  DD:  11/20/2023  Time:  02:38PM  DT:  11/21/2023  Time:  01:50AM  Job #:  [id number]      OPERATIVE REPORT

## 2023-11-21 NOTE — PLAN OF CARE
Problem: Adult Inpatient Plan of Care  Goal: Plan of Care Review  Outcome: Ongoing, Progressing  Goal: Patient-Specific Goal (Individualized)  Outcome: Ongoing, Progressing  Goal: Absence of Hospital-Acquired Illness or Injury  Outcome: Ongoing, Progressing  Goal: Optimal Comfort and Wellbeing  Outcome: Ongoing, Progressing     Problem: Diabetes Comorbidity  Goal: Blood Glucose Level Within Targeted Range  Outcome: Ongoing, Progressing     Problem: Skin Injury Risk Increased  Goal: Skin Health and Integrity  Outcome: Ongoing, Progressing     Problem: Infection  Goal: Absence of Infection Signs and Symptoms  Outcome: Ongoing, Progressing     Problem: Fall Injury Risk  Goal: Absence of Fall and Fall-Related Injury  Outcome: Ongoing, Progressing

## 2023-11-21 NOTE — PT/OT/SLP RE-EVAL
"Occupational Therapy   Re-evaluation    Name: Stevie Cummins  MRN: [id number]  Admitting Diagnosis: discitis of thoracic region  Recent Surgery: Procedure(s) (LRB):  FUSION, SPINE, POSTERIOR SPINAL COLUMN, CERVICAL, USING COMPUTER-ASSISTED NAVIGATION (N/A) 1 Day Post-Op    Recommendations:     Discharge therapy intensity: High Intensity Therapy   Discharge Equipment Recommendations:  none  Barriers to discharge:  None    Assessment:     Stevie Cummins is a 73 y.o. male with a medical diagnosis of discitis of thoracic region, s/p post fusion C3 to T1.  He presents with some pain neck and R shoulder following sx 11/20 but good effort with all activity, good strength BUE's with L slightly weaker than R and limited by pain, able to transfer to chair with mod assist x 2 for safety with RW step to transfer. He also presents with the following performance deficits affecting function: weakness, impaired self care skills, impaired functional mobility, impaired balance, decreased lower extremity function, pain.    Rehab Prognosis: good; patient would benefit from acute skilled OT services to address these deficits and reach maximum level of function.       Plan:     Patient to be seen 5 x/week to address the above listed problems via self-care/home management, therapeutic activities, therapeutic exercises  Plan of Care Expires:    Plan of Care Reviewed with: patient    Subjective     Chief Complaint: c/o pain in neck and shoulders  Patient/Family Comments/goals: "that wasn't too bad"    Pain/Comfort:  Pain Rating 1: 10/10  Location - Side 1: Right  Location - Orientation 1: upper  Location 1:  (neck and shoulder)  Pain Addressed 1: Reposition, Distraction, Cessation of Activity    Patients cultural, spiritual, Congregation conflicts given the current situation:      Objective:     OT communicated with nsg and PT prior to session.      Patient was found supine with peripheral IV upon OT entry to room.    General Precautions: " Standard, fall  Orthopedic Precautions:    Braces:      Vital Signs: 94% RA after t/f to chair    Bed Mobility:    Sup to sit with mod assist    Functional Mobility/Transfers:  Sit to stand min assist with RW  Functional Mobility: step to transfer to Bedside chair with mod assist    Activities of Daily Living:  Socks with total assist EOB, also limited by pain    AMPAC 6 Click ADL:  AMPA Total Score:      Functional Cognition:  intact    Visual Perceptual Skills:  intact    Upper Extremity Function:  Right Upper Extremity:   R shoulder to ~45'- limited by pain, elbow and hand 4/5 strength, no sensory deficits per pt    Left Upper Extremity:  L shoulder to ~45'-limited by pain, elbow and hand 4-/5, no sensory deficits per pt    Balance:   Sitting EOB good  Standing fair+ with RW    Additional Treatment:      Patient Education:  Patient provided with verbal education education regarding OT role/goals/POC, post op precautions, fall prevention, safety awareness, and Discharge/DME recommendations.  Understanding was verbalized.     Patient left up in chair with all lines intact, call button in reach, nursing present, and jt pad in chair    GOALS:   Multidisciplinary Problems       Occupational Therapy Goals          Problem: Occupational Therapy    Goal Priority Disciplines Outcome Interventions   Occupational Therapy Goal     OT, PT/OT Ongoing, Progressing    Description: Goals to be met by: 12/17/23     Patient will increase functional independence with ADLs by performing:    LE Dressing with Modified [place].  Grooming while standing with Modified [place].  Toileting from toilet with Modified [place] for hygiene and clothing management.   Toilet transfer to toilet with Modified [place].                         History:     Past Medical History:   Diagnosis Date    BPH (benign prostatic hyperplasia)     CVA (cerebral vascular accident)     Depression     History of claustrophobia     HLD  (hyperlipidemia)     HTN (hypertension)     Personal history of colonic polyps 07/08/2020    Colonoscopy Report    Type 2 diabetes mellitus without complications          Past Surgical History:   Procedure Laterality Date    COLONOSCOPY  07/08/2020    [facility], Sha Grimaldo MD    PATENT FORAMEN OVALE CLOSURE  11/04/2022    PROSTATE SURGERY  2021    Urolift       Time Tracking:     OT Date of Treatment: 11/21/23  OT Start Time: 0835  OT Stop Time: 0857  OT Total Time (min): 22 min    Billable Minutes:Re-eval 22 min    11/21/2023

## 2023-11-21 NOTE — PROGRESS NOTES
Progress Note  Hospital Medicine    Patient Name: Stevie Cummins  YOB: 1950    Admit Date: 11/14/2023                     LOS: 7    SUBJECTIVE:     Reason for Admission:  Discitis of thoracic region  See H&P for detailed presentating history and ROS.      Interval history:  Status post surgery day 1, patient complaining of severe pain in the surgical site, as well as difficulty breathing but no wheezing, refers significant sputum production no fever  Will go ahead and add Afrin, neb treatments, and inhaled steroid, and plain Mucinex      OBJECTIVE:     Vital Signs Range (Last 24H):  Temp:  [96.8 °F (36 °C)-99.7 °F (37.6 °C)]   Pulse:  []   Resp:  [13-20]   BP: (103-134)/(45-77)   SpO2:  [88 %-99 %]   Arterial Line BP: (127-134)/(39-40) Body mass index is 32.34 kg/m².  Wt Readings from Last 3 Encounters:   11/17/23 0759 99.3 kg (219 lb)   11/14/23 1256 99.3 kg (219 lb)   09/22/23 1011 101.2 kg (223 lb)   07/22/23 1101 106.6 kg (235 lb)       I & O (Last 24H):  Intake/Output Summary (Last 24 hours) at 11/21/2023 0838  Last data filed at 11/21/2023 0448  Gross per 24 hour   Intake 2403.83 ml   Output 2960 ml   Net -556.17 ml       Physical Exam:  Patient alert in no obvious respiratory distress   HEENT neck collar in place, speech is clear facial muscles are symmetric   Heart faint murmur 2 over 60s and heart sounds   Lungs essentially clear on anterior approach  Abdomen obese soft and depressible bowel sounds are positive no tenderness   Extremities no edema    Diagnostic Results:  Lab Results   Component Value Date    WBC 15.97 (H) 11/21/2023    HGB 13.1 (L) 11/21/2023    HCT 40.8 (L) 11/21/2023    MCV 98.8 (H) 11/21/2023     11/21/2023     Recent Labs   Lab 11/21/23  0424      K 4.8   CO2 26   BUN 53.1*   CREATININE 1.19*   CALCIUM 8.8     Lab Results   Component Value Date    INR 1.0 11/16/2023    INR 1.1 11/04/2022    INR 1.1 03/29/2022    PROTIME 13.4 11/16/2023    PROTIME 17.0  (H) 08/25/2019    PROTIME 27.0 (H) 02/21/2018     Lab Results   Component Value Date    HGBA1C 5.4 09/22/2023     Recent Labs     11/19/23  1056 11/19/23  1508 11/19/23 2022 11/20/23  0618 11/20/23  1532 11/20/23  1719 11/20/23  2114 11/21/23  0649   POCTGLUCOSE 193* 213* 187* 164* 235* 248* 313* 181*       ASSESSMENT/PLAN:     Active Hospital Problems    Diagnosis  POA    *Discitis of thoracic region [M46.44]  No    Myelopathy due to spondylosis [M47.10]  Yes    Ankle pain [M25.579]  Yes    Primary hypertension [I10]  Yes    Type 2 diabetes mellitus with other specified complication, with long-term current use of insulin [E11.69, Z79.4]  Not Applicable      Resolved Hospital Problems   No resolved problems to display.        Problems Addressed Today:    Myelopathy due to spondylosis  Plan is for surgery with Dr. Hutchinson on Monday.      Ankle pain  Check uric acid level.      Primary hypertension  Chronic, uncontrolled. Latest blood pressure and vitals reviewed-     Temp:  [98.4 °F (36.9 °C)-100.3 °F (37.9 °C)]   Pulse:  []   Resp:  [16-20]   BP: (137-172)/(67-78)   SpO2:  [82 %-95 %] .   Home meds for hypertension were reviewed and noted below.   Hypertension Medications               doxazosin (CARDURA) 8 MG Tab Take 0.5 tabs bid    furosemide (LASIX) 40 MG tablet Take 1 tablet (40 mg total) by mouth once daily.    lisinopriL 10 MG tablet Take 1 tablet (10 mg total) by mouth once daily.            While in the hospital, will manage blood pressure as follows; Continue home antihypertensive regimen    Will utilize p.r.n. blood pressure medication only if patient's blood pressure greater than 160/100 and he develops symptoms such as worsening chest pain or shortness of breath.    Type 2 diabetes mellitus with other specified complication, with long-term current use of insulin  He isn't currently on his home meds.  Will add SSI prn.    Ankle pain  Uric acid level was elevated.  Pain seemed to improve with  colchicine.  Will start allopurinol.      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 Hutchinson on Monday.      Primary hypertension  BP is stable.    Type 2 diabetes mellitus with other specified complication, with long-term current use of insulin  He isn't currently on his home meds.  Resume SSI.      DISCHARGE PLANNING:   Continue IV fluids recheck being be in the morning   Plain Mucinex once a day Afrin once quit each nostril once a day nebulizer treatment and inhaled steroids repeat CBC in the morning    Signing Physician:  Robert Valdes MD

## 2023-11-21 NOTE — PT/OT/SLP EVAL
Physical Therapy Re-Evaluation    Patient Name:  Stevie Cummins   MRN:  [id number]    Recommendations:     Discharge therapy intensity: High Intensity Therapy   Discharge Equipment Recommendations: none   Barriers to discharge: Impaired mobility and Ongoing medical needs    Assessment:     Stevie Cummins is a 73 y.o. male admitted with a medical diagnosis of Discitis of thoracic region.  Pt now s/p posterior fusion C3-T1. Pt found with cervical soft collar on, educated on  cervical spinal pxn. He presents with the following impairments/functional limitations: weakness, impaired functional mobility, decreased safety awareness, impaired coordination, impaired endurance, gait instability, impaired balance, decreased lower extremity function, decreased upper extremity function. Pt tolerated session fair, limited  due to 10/10 pain in c-spine. BLE strength = and WNL, limited in UE movement/strength. Defer to OT note for full UE assessment. Pt required ModA for bed mobility, but once EOB able to sit SBA with UE support. Pt able to perform sit <> stand with Eugenia; however, required ModA for stand step t/f with RW with assist for AD management and cues for step sequencing. Increased anxiety with step t/f with poor motor planning. Left on jt pad for choi staff t/f back to bed.     Rehab Prognosis: Good; patient would benefit from acute skilled PT services to address these deficits and reach maximum level of function.    Recent Surgery: Procedure(s) (LRB):  FUSION, SPINE, POSTERIOR SPINAL COLUMN, CERVICAL, USING COMPUTER-ASSISTED NAVIGATION (N/A) 1 Day Post-Op    Plan:     During this hospitalization, patient to be seen 6 x/week to address the identified rehab impairments via gait training, therapeutic activities, therapeutic exercises and progress toward the following goals:    Plan of Care Expires:  12/16/23    Subjective     Chief Complaint: pain  Patient/Family Comments/goals: to return to PLOF  Pain/Comfort:  Pain Rating  1: 10/10  Location 1: neck    Patients cultural, spiritual, Alevism conflicts given the current situation:      Objective:     Communicated with RN prior to session.  Patient found HOB elevated with peripheral IV  upon PT entry to room.    General Precautions: Standard, fall  Orthopedic Precautions:spinal precautions   Braces: Cervical collar  Respiratory Status: Room air      Exams:  Sensation:    -       Intact  light/touch BLE  RLE Strength: WFL  LLE Strength: WFL  Skin integrity:  Waqas drain to neck insision      Functional Mobility:  Bed Mobility:     Supine to Sit: moderate assistance  Transfers:     Sit to Stand:  moderate assistance with rolling walker  Bed to Chair: moderate assistance with  rolling walker  using  Step Transfer      AM-PAC 6 CLICK MOBILITY  Total Score:12       Treatment & Education:    Patient provided with verbal education education regarding PT POC and cervical spinal pxn.  Understanding was verbalized.     Patient left up in chair with all lines intact, call button in reach, RN present, and pt on tj pad for t/f back to bed .    GOALS:   Multidisciplinary Problems       Physical Therapy Goals          Problem: Physical Therapy    Goal Priority Disciplines Outcome Goal Variances Interventions   Physical Therapy Goal     PT, PT/OT Ongoing, Progressing     Description: Goals to be met by: d/c     Patient will increase functional independence with mobility by performin. Supine to sit with Stand-by Assistance  2. Sit to supine with Stand-by Assistance  3. Sit to stand transfer with Stand-by Assistance  4. Bed to chair transfer with Stand-by Assistance using Rolling Walker  5. Gait  x 150 feet with Stand-by Assistance using Rolling Walker.                          History:     Past Medical History:   Diagnosis Date    BPH (benign prostatic hyperplasia)     CVA (cerebral vascular accident)     Depression     History of claustrophobia     HLD (hyperlipidemia)     HTN (hypertension)      Personal history of colonic polyps 07/08/2020    Colonoscopy Report    Type 2 diabetes mellitus without complications        Past Surgical History:   Procedure Laterality Date    COLONOSCOPY  07/08/2020    [facility] [place], Sha Grimaldo MD    FUSION OF POSTERIOR COLUMN OF CERVICAL SPINE USING COMPUTER AIDED NAVIGATION N/A 11/20/2023    Procedure: FUSION, SPINE, POSTERIOR SPINAL COLUMN, CERVICAL, USING COMPUTER-ASSISTED NAVIGATION;  Surgeon: Arya Hutchinson MD;  Location: [facility];  Service: Neurosurgery;  Laterality: N/A;  PCDF  C3 - T1 //  PRONE // PRONE NORMAN // DRILL // SCOPE // O-ARM // DIRECT SPINE // NDM //    REQ 1100    PATENT FORAMEN OVALE CLOSURE  11/04/2022    PROSTATE SURGERY  2021    Urolift       Time Tracking:     PT Received On: 11/21/23  PT Start Time: 0835     PT Stop Time: 0855  PT Total Time (min): 20 min     Billable Minutes: Re-eval 20      11/21/2023

## 2023-11-21 NOTE — PLAN OF CARE
Problem: Occupational Therapy  Goal: Occupational Therapy Goal  Description: Goals to be met by: 12/17/23     Patient will increase functional independence with ADLs by performing:    LE Dressing with Modified [place].  Grooming while standing with Modified [place].  Toileting from toilet with Modified [place] for hygiene and clothing management.   Toilet transfer to toilet with Modified [place].    Outcome: Ongoing, Progressing

## 2023-11-22 LAB
ANION GAP SERPL CALC-SCNC: 8 MEQ/L
BASOPHILS # BLD AUTO: 0.04 X10(3)/MCL
BASOPHILS NFR BLD AUTO: 0.3 %
BUN SERPL-MCNC: 44.5 MG/DL (ref 8.4–25.7)
CALCIUM SERPL-MCNC: 8.7 MG/DL (ref 8.8–10)
CHLORIDE SERPL-SCNC: 106 MMOL/L (ref 98–107)
CO2 SERPL-SCNC: 25 MMOL/L (ref 23–31)
CREAT SERPL-MCNC: 1.07 MG/DL (ref 0.73–1.18)
CREAT/UREA NIT SERPL: 42
EOSINOPHIL # BLD AUTO: 0 X10(3)/MCL (ref 0–0.9)
EOSINOPHIL NFR BLD AUTO: 0 %
ERYTHROCYTE [DISTWIDTH] IN BLOOD BY AUTOMATED COUNT: 12.5 % (ref 11.5–17)
GFR SERPLBLD CREATININE-BSD FMLA CKD-EPI: >60 MLS/MIN/1.73/M2
GLUCOSE SERPL-MCNC: 294 MG/DL (ref 82–115)
HCT VFR BLD AUTO: 39.7 % (ref 42–52)
HGB BLD-MCNC: 13.2 G/DL (ref 14–18)
IMM GRANULOCYTES # BLD AUTO: 0.19 X10(3)/MCL (ref 0–0.04)
IMM GRANULOCYTES NFR BLD AUTO: 1.3 %
LYMPHOCYTES # BLD AUTO: 0.78 X10(3)/MCL (ref 0.6–4.6)
LYMPHOCYTES NFR BLD AUTO: 5.2 %
MCH RBC QN AUTO: 32.3 PG (ref 27–31)
MCHC RBC AUTO-ENTMCNC: 33.2 G/DL (ref 33–36)
MCV RBC AUTO: 97.1 FL (ref 80–94)
MONOCYTES # BLD AUTO: 1.23 X10(3)/MCL (ref 0.1–1.3)
MONOCYTES NFR BLD AUTO: 8.1 %
NEUTROPHILS # BLD AUTO: 12.9 X10(3)/MCL (ref 2.1–9.2)
NEUTROPHILS NFR BLD AUTO: 85.1 %
NRBC BLD AUTO-RTO: 0 %
PLATELET # BLD AUTO: 190 X10(3)/MCL (ref 130–400)
PMV BLD AUTO: 11.3 FL (ref 7.4–10.4)
POCT GLUCOSE: 232 MG/DL (ref 70–110)
POCT GLUCOSE: 263 MG/DL (ref 70–110)
POCT GLUCOSE: 264 MG/DL (ref 70–110)
POCT GLUCOSE: 270 MG/DL (ref 70–110)
POTASSIUM SERPL-SCNC: 4.9 MMOL/L (ref 3.5–5.1)
RBC # BLD AUTO: 4.09 X10(6)/MCL (ref 4.7–6.1)
SODIUM SERPL-SCNC: 139 MMOL/L (ref 136–145)
WBC # SPEC AUTO: 15.14 X10(3)/MCL (ref 4.5–11.5)

## 2023-11-22 PROCEDURE — 25000003 PHARM REV CODE 250: Performed by: NURSE PRACTITIONER

## 2023-11-22 PROCEDURE — 21400001 HC TELEMETRY ROOM

## 2023-11-22 PROCEDURE — 94640 AIRWAY INHALATION TREATMENT: CPT

## 2023-11-22 PROCEDURE — 99900035 HC TECH TIME PER 15 MIN (STAT)

## 2023-11-22 PROCEDURE — 63600175 PHARM REV CODE 636 W HCPCS

## 2023-11-22 PROCEDURE — 80048 BASIC METABOLIC PNL TOTAL CA: CPT | Performed by: INTERNAL MEDICINE

## 2023-11-22 PROCEDURE — 85025 COMPLETE CBC W/AUTO DIFF WBC: CPT | Performed by: INTERNAL MEDICINE

## 2023-11-22 PROCEDURE — 25000003 PHARM REV CODE 250: Performed by: INTERNAL MEDICINE

## 2023-11-22 PROCEDURE — 63600175 PHARM REV CODE 636 W HCPCS: Performed by: INTERNAL MEDICINE

## 2023-11-22 PROCEDURE — 97530 THERAPEUTIC ACTIVITIES: CPT

## 2023-11-22 PROCEDURE — 94761 N-INVAS EAR/PLS OXIMETRY MLT: CPT

## 2023-11-22 PROCEDURE — 97535 SELF CARE MNGMENT TRAINING: CPT

## 2023-11-22 PROCEDURE — 63600175 PHARM REV CODE 636 W HCPCS: Performed by: NEUROLOGICAL SURGERY

## 2023-11-22 PROCEDURE — 51798 US URINE CAPACITY MEASURE: CPT

## 2023-11-22 PROCEDURE — 25000242 PHARM REV CODE 250 ALT 637 W/ HCPCS: Performed by: INTERNAL MEDICINE

## 2023-11-22 PROCEDURE — 97530 THERAPEUTIC ACTIVITIES: CPT | Mod: CQ

## 2023-11-22 PROCEDURE — 99233 SBSQ HOSP IP/OBS HIGH 50: CPT | Mod: ,,, | Performed by: INTERNAL MEDICINE

## 2023-11-22 PROCEDURE — 25000003 PHARM REV CODE 250

## 2023-11-22 PROCEDURE — 51702 INSERT TEMP BLADDER CATH: CPT

## 2023-11-22 PROCEDURE — 97116 GAIT TRAINING THERAPY: CPT | Mod: CQ

## 2023-11-22 RX ADMIN — DEXAMETHASONE SODIUM PHOSPHATE 4 MG: 4 INJECTION, SOLUTION INTRA-ARTICULAR; INTRALESIONAL; INTRAMUSCULAR; INTRAVENOUS; SOFT TISSUE at 12:11

## 2023-11-22 RX ADMIN — SENNOSIDES AND DOCUSATE SODIUM 2 TABLET: 8.6; 5 TABLET ORAL at 09:11

## 2023-11-22 RX ADMIN — METHOCARBAMOL 750 MG: 750 TABLET ORAL at 09:11

## 2023-11-22 RX ADMIN — DEXAMETHASONE SODIUM PHOSPHATE 4 MG: 4 INJECTION, SOLUTION INTRA-ARTICULAR; INTRALESIONAL; INTRAMUSCULAR; INTRAVENOUS; SOFT TISSUE at 06:11

## 2023-11-22 RX ADMIN — INSULIN ASPART 3 UNITS: 100 INJECTION, SOLUTION INTRAVENOUS; SUBCUTANEOUS at 12:11

## 2023-11-22 RX ADMIN — MICONAZOLE NITRATE: 20 POWDER TOPICAL at 09:11

## 2023-11-22 RX ADMIN — ENOXAPARIN SODIUM 40 MG: 40 INJECTION SUBCUTANEOUS at 05:11

## 2023-11-22 RX ADMIN — GUAIFENESIN 600 MG: 600 TABLET, EXTENDED RELEASE ORAL at 09:11

## 2023-11-22 RX ADMIN — INSULIN ASPART 3 UNITS: 100 INJECTION, SOLUTION INTRAVENOUS; SUBCUTANEOUS at 05:11

## 2023-11-22 RX ADMIN — DOXAZOSIN 4 MG: 4 TABLET ORAL at 09:11

## 2023-11-22 RX ADMIN — IPRATROPIUM BROMIDE AND ALBUTEROL SULFATE 3 ML: 2.5; .5 SOLUTION RESPIRATORY (INHALATION) at 01:11

## 2023-11-22 RX ADMIN — TAMSULOSIN HYDROCHLORIDE 0.4 MG: 0.4 CAPSULE ORAL at 09:11

## 2023-11-22 RX ADMIN — INSULIN ASPART 1 UNITS: 100 INJECTION, SOLUTION INTRAVENOUS; SUBCUTANEOUS at 09:11

## 2023-11-22 RX ADMIN — LISINOPRIL 10 MG: 10 TABLET ORAL at 09:11

## 2023-11-22 RX ADMIN — IPRATROPIUM BROMIDE AND ALBUTEROL SULFATE 3 ML: 2.5; .5 SOLUTION RESPIRATORY (INHALATION) at 11:11

## 2023-11-22 RX ADMIN — IPRATROPIUM BROMIDE AND ALBUTEROL SULFATE 3 ML: 2.5; .5 SOLUTION RESPIRATORY (INHALATION) at 04:11

## 2023-11-22 RX ADMIN — DEXAMETHASONE SODIUM PHOSPHATE 4 MG: 4 INJECTION, SOLUTION INTRA-ARTICULAR; INTRALESIONAL; INTRAMUSCULAR; INTRAVENOUS; SOFT TISSUE at 05:11

## 2023-11-22 RX ADMIN — Medication 2 SPRAY: at 09:11

## 2023-11-22 RX ADMIN — METHOCARBAMOL 750 MG: 750 TABLET ORAL at 03:11

## 2023-11-22 RX ADMIN — CITALOPRAM HYDROBROMIDE 20 MG: 20 TABLET ORAL at 09:11

## 2023-11-22 RX ADMIN — FLUTICASONE PROPIONATE 1 PUFF: 44 AEROSOL, METERED RESPIRATORY (INHALATION) at 09:11

## 2023-11-22 RX ADMIN — ALLOPURINOL 100 MG: 100 TABLET ORAL at 09:11

## 2023-11-22 NOTE — PROGRESS NOTES
Progress Note  Hospital Medicine    Patient Name: Stevie Cummins  YOB: 1950    Admit Date: 11/14/2023                     LOS: 8    SUBJECTIVE:     Reason for Admission:  Discitis of thoracic region  See H&P for detailed presentating history and ROS.      Interval history:  Status post surgery number 2, patient refers pain is slightly getting better   Complaining still of paresthesias of the lower extremities, apparently his upper extremities feeling slightly better   No fever no chills  Patient will qualify for inpatient rehab      OBJECTIVE:     Vital Signs Range (Last 24H):  Temp:  [97.7 °F (36.5 °C)-99 °F (37.2 °C)]   Pulse:  [80-97]   Resp:  [16-20]   BP: (127-168)/(68-81)   SpO2:  [90 %-97 %] Body mass index is 32.34 kg/m².  Wt Readings from Last 3 Encounters:   11/17/23 0759 99.3 kg (219 lb)   11/14/23 1256 99.3 kg (219 lb)   09/22/23 1011 101.2 kg (223 lb)   07/22/23 1101 106.6 kg (235 lb)       I & O (Last 24H):  Intake/Output Summary (Last 24 hours) at 11/22/2023 1418  Last data filed at 11/22/2023 0542  Gross per 24 hour   Intake 480 ml   Output 2310 ml   Net -1830 ml       Physical Exam:  Patient lying down in bed in no distress, refers had a rough night, apparently according to the patient had no assistance to get out of the bed and or sit by the bed  HEENT soft collar in place   Heart regular rhythm faint murmur 2/6   Lungs are clear bilateral no wheezing rales or rhonchi   Abdomen obese soft and depressible nontender  Extremities no edema    Diagnostic Results:  Lab Results   Component Value Date    WBC 15.14 (H) 11/22/2023    HGB 13.2 (L) 11/22/2023    HCT 39.7 (L) 11/22/2023    MCV 97.1 (H) 11/22/2023     11/22/2023     Recent Labs   Lab 11/22/23  0444      K 4.9   CO2 25   BUN 44.5*   CREATININE 1.07   CALCIUM 8.7*     Lab Results   Component Value Date    INR 1.0 11/16/2023    INR 1.1 11/04/2022    INR 1.1 03/29/2022    PROTIME 13.4 11/16/2023    PROTIME 17.0 (H)  08/25/2019    PROTIME 27.0 (H) 02/21/2018     Lab Results   Component Value Date    HGBA1C 5.4 09/22/2023     Recent Labs     11/20/23  1719 11/20/23  2114 11/21/23  0649 11/21/23  1052 11/21/23  1652 11/21/23  2156 11/22/23  0518 11/22/23  1204   POCTGLUCOSE 248* 313* 181* 274* 262* 230* 264* 263*       ASSESSMENT/PLAN:     Active Hospital Problems    Diagnosis  POA    *Discitis of thoracic region [M46.44]  No    Myelopathy due to spondylosis [M47.10]  Yes    Ankle pain [M25.579]  Yes    Primary hypertension [I10]  Yes    Type 2 diabetes mellitus with other specified complication, with long-term current use of insulin [E11.69, Z79.4]  Not Applicable      Resolved Hospital Problems   No resolved problems to display.        Problems Addressed Today:    Myelopathy due to spondylosis  Plan is for surgery with Dr. Hutchinson on Monday.      Ankle pain  Check uric acid level.      Primary hypertension  Chronic, uncontrolled. Latest blood pressure and vitals reviewed-     Temp:  [98.4 °F (36.9 °C)-100.3 °F (37.9 °C)]   Pulse:  []   Resp:  [16-20]   BP: (137-172)/(67-78)   SpO2:  [82 %-95 %] .   Home meds for hypertension were reviewed and noted below.   Hypertension Medications               doxazosin (CARDURA) 8 MG Tab Take 0.5 tabs bid    furosemide (LASIX) 40 MG tablet Take 1 tablet (40 mg total) by mouth once daily.    lisinopriL 10 MG tablet Take 1 tablet (10 mg total) by mouth once daily.            While in the hospital, will manage blood pressure as follows; Continue home antihypertensive regimen    Will utilize p.r.n. blood pressure medication only if patient's blood pressure greater than 160/100 and he develops symptoms such as worsening chest pain or shortness of breath.    Type 2 diabetes mellitus with other specified complication, with long-term current use of insulin  He isn't currently on his home meds.  Will add SSI prn.    Ankle pain  Uric acid level was elevated.  Pain seemed to improve with  colchicine.  Will start allopurinol.      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 Hutchinson on Monday.      Primary hypertension  BP is stable.    Type 2 diabetes mellitus with other specified complication, with long-term current use of insulin  He isn't currently on his home meds.  Resume SSI.      DISCHARGE PLANNING:     Continue established treatment, PT OT ST   Any further recommendation as per neurosurgeon  Case management involved for placement  Signing Physician:  Robret Valdes MD

## 2023-11-22 NOTE — PT/OT/SLP PROGRESS
Occupational Therapy   Treatment    Name: Stevie Cummins  MRN: [id number]  Admitting Diagnosis:  Discitis of thoracic region  2 Days Post-Op    Recommendations:     Recommended therapy intensity at discharge: High Intensity Therapy   Discharge Equipment Recommendations:  none  Barriers to discharge:       Assessment:     Stevie Cummins is a 73 y.o. male with a medical diagnosis of Discitis of thoracic region.  He presents with cervical collar, demonstrates good motivation and effort. Performance deficits affecting function are weakness, impaired self care skills, impaired functional mobility, impaired balance, decreased lower extremity function, pain.     Rehab Prognosis:  Good; patient would benefit from acute skilled OT services to address these deficits and reach maximum level of function.       Plan:     Patient to be seen 5 x/week to address the above listed problems via self-care/home management, therapeutic activities, therapeutic exercises  Plan of Care Expires:    Plan of Care Reviewed with: patient    Subjective     Pain/Comfort:       Objective:     Communicated with: Patient found HOB elevated with peripheral IV  upon OT entry to room.    General Precautions: Standard, fall   Orthopedic Precautions: spinal  Braces: Cervical collar  Respiratory Status: Room air     Occupational Performance:     Bed Mobility:    Patient completed Rolling/Turning to Left with  contact guard assistance  Patient completed Supine to Sit with minimum assistance     Functional Mobility/Transfers:  Patient completed Sit <> Stand Transfer with minimum assistance  with  hand-held assist   Patient completed Bed <> Chair Transfer using Step Transfer technique with minimum assistance with rolling walker  Functional Mobility: no LOB    Activities of Daily Living:  Feeding:  stand by assistance    Grooming: contact guard assistance    Upper Body Dressing: minimum assistance      Therapeutic Positioning    OT interventions performed during  the course of today's session in an effort to prevent and/or reduce acquired pressure injuries:   Education was provided on benefits of and recommendations for therapeutic positioning    Patient Education:  Patient provided with verbal education education regarding post op precautions, fall prevention, safety awareness, and pressure ulcer prevention.  Understanding was verbalized, however additional teaching warranted.      Patient left up in chair with all lines intact, call button in reach, and RN present    GOALS:   Multidisciplinary Problems       Occupational Therapy Goals          Problem: Occupational Therapy    Goal Priority Disciplines Outcome Interventions   Occupational Therapy Goal     OT, PT/OT Ongoing, Progressing    Description: Goals to be met by: 12/17/23     Patient will increase functional independence with ADLs by performing:    LE Dressing with Modified [place].  Grooming while standing with Modified [place].  Toileting from toilet with Modified [place] for hygiene and clothing management.   Toilet transfer to toilet with Modified [place].                         Time Tracking:     OT Date of Treatment: 11/22/23  OT Start Time: 1140  OT Stop Time: 1210  OT Total Time (min): 30 min    Billable Minutes:Self Care/Home Management 15  Therapeutic Activity 15          Number of MASTER visits since last OT visit: 0    11/22/2023

## 2023-11-22 NOTE — PLAN OF CARE
FOC list provided yesterday and decision made for rehab. [facility] rehab chosen, will send referral

## 2023-11-22 NOTE — ANESTHESIA POSTPROCEDURE EVALUATION
Anesthesia Post Evaluation    Patient: Stevie Cummins    Procedure(s) Performed: Procedure(s) (LRB):  FUSION, SPINE, POSTERIOR SPINAL COLUMN, CERVICAL, USING COMPUTER-ASSISTED NAVIGATION (N/A)    Final Anesthesia Type: general      Patient location during evaluation: PACU  Patient participation: Yes- Able to Participate  Level of consciousness: awake and alert  Post-procedure vital signs: reviewed and stable  Pain management: adequate  Airway patency: patent      Anesthetic complications: no      Cardiovascular status: blood pressure returned to baseline  Respiratory status: unassisted  Hydration status: euvolemic  Follow-up not needed.          Vitals Value Taken Time   /81 11/22/23 1122   Temp 36.9 °C (98.4 °F) 11/22/23 1122   Pulse 87 11/22/23 1122   Resp 18 11/22/23 0936   SpO2 90 % 11/22/23 1122         Event Time   Out of Recovery 11/20/2023 16:56:00         Pain/Gavi Score: Pain Rating Prior to Med Admin: 5 (11/21/2023  9:57 PM)  Pain Rating Post Med Admin: 3 (PT RESTING, EYES CLOSED) (11/21/2023 10:57 PM)

## 2023-11-22 NOTE — PT/OT/SLP PROGRESS
"Physical Therapy Treatment    Patient Name:  Stevie Cummins   MRN:  [id number]    Recommendations:     Discharge therapy intensity: Moderate Intensity Therapy   Discharge Equipment Recommendations: none  Barriers to discharge:  Placement    Assessment:     Stevie Cummins is a 73 y.o. male admitted with a medical diagnosis of Discitis of thoracic region.  He presents with the following impairments/functional limitations: weakness, impaired endurance, gait instability, impaired balance .    Rehab Prognosis: Good; patient would benefit from acute skilled PT services to address these deficits and reach maximum level of function.    Recent Surgery: Procedure(s) (LRB):  FUSION, SPINE, POSTERIOR SPINAL COLUMN, CERVICAL, USING COMPUTER-ASSISTED NAVIGATION (N/A) 2 Days Post-Op    Plan:     During this hospitalization, patient to be seen 6 x/week to address the identified rehab impairments via gait training, therapeutic activities, therapeutic exercises and progress toward the following goals:    Plan of Care Expires:  12/16/23    Subjective     Chief Complaint:   Patient/Family Comments/goals: return to PLOF  Pain/Comfort: Pt reported no pain today         Objective:     Communicated with RN prior to session.  Patient found HOB elevated with cervical collar upon PT entry to room.     General Precautions: Standard, fall  Orthopedic Precautions: spinal precautions  Braces: Cervical collar  Respiratory Status: Room air  Skin Integrity: Visible skin intact      Functional Mobility:  Bed Mobility:     Rolling Left:  moderate assistance  Supine to Sit: moderate assistance  Sit to Supine: moderate assistance  Transfers:     Sit to Stand:  moderate assistance with rolling walker  Bed to Chair: moderate assistance with  rolling walker  using  Squat Pivot  Gait: Pt amb 8 ft x2 with fwd flexed posture, c/o pain when standing erect. Seated rest breaks needed due to pt easily fatigued. Pt stated "feeling like legs were going to " "buckle".    Therapeutic Activities/Exercises:  Pt performed seated BLE exercises: LAQs x10, and seated march x30, needed VC to keep on task.    Education:  Patient provided with verbal education education regarding POC.  Understanding was verbalized, however additional teaching warranted.     Patient left HOB elevated with all lines intact, call button in reach, and Scds donned ..    GOALS:   Multidisciplinary Problems       Physical Therapy Goals          Problem: Physical Therapy    Goal Priority Disciplines Outcome Goal Variances Interventions   Physical Therapy Goal     PT, PT/OT Ongoing, Progressing     Description: Goals to be met by: d/c     Patient will increase functional independence with mobility by performin. Supine to sit with Stand-by Assistance  2. Sit to supine with Stand-by Assistance  3. Sit to stand transfer with Stand-by Assistance  4. Bed to chair transfer with Stand-by Assistance using Rolling Walker  5. Gait  x 150 feet with Stand-by Assistance using Rolling Walker.                          Time Tracking:     PT Received On: 23  PT Start Time: 1341     PT Stop Time: 1414  PT Total Time (min): 33 min     Billable Minutes: Gait Training 15 and Therapeutic Activity 13    Treatment Type: Treatment  PT/PTA: PTA     Number of PTA visits since last PT visit: 2023    "

## 2023-11-23 LAB
POCT GLUCOSE: 248 MG/DL (ref 70–110)
POCT GLUCOSE: 282 MG/DL (ref 70–110)
POCT GLUCOSE: 292 MG/DL (ref 70–110)
POCT GLUCOSE: 314 MG/DL (ref 70–110)

## 2023-11-23 PROCEDURE — 99233 SBSQ HOSP IP/OBS HIGH 50: CPT | Mod: ,,, | Performed by: STUDENT IN AN ORGANIZED HEALTH CARE EDUCATION/TRAINING PROGRAM

## 2023-11-23 PROCEDURE — 25000003 PHARM REV CODE 250: Performed by: INTERNAL MEDICINE

## 2023-11-23 PROCEDURE — 25000003 PHARM REV CODE 250: Performed by: NURSE PRACTITIONER

## 2023-11-23 PROCEDURE — 94640 AIRWAY INHALATION TREATMENT: CPT

## 2023-11-23 PROCEDURE — 94761 N-INVAS EAR/PLS OXIMETRY MLT: CPT

## 2023-11-23 PROCEDURE — 25000003 PHARM REV CODE 250

## 2023-11-23 PROCEDURE — 25000242 PHARM REV CODE 250 ALT 637 W/ HCPCS: Performed by: INTERNAL MEDICINE

## 2023-11-23 PROCEDURE — 63600175 PHARM REV CODE 636 W HCPCS: Performed by: NEUROLOGICAL SURGERY

## 2023-11-23 PROCEDURE — 63600175 PHARM REV CODE 636 W HCPCS: Performed by: INTERNAL MEDICINE

## 2023-11-23 PROCEDURE — 63600175 PHARM REV CODE 636 W HCPCS

## 2023-11-23 PROCEDURE — 21400001 HC TELEMETRY ROOM

## 2023-11-23 RX ADMIN — METHOCARBAMOL 750 MG: 750 TABLET ORAL at 09:11

## 2023-11-23 RX ADMIN — ALLOPURINOL 100 MG: 100 TABLET ORAL at 09:11

## 2023-11-23 RX ADMIN — IPRATROPIUM BROMIDE AND ALBUTEROL SULFATE 3 ML: 2.5; .5 SOLUTION RESPIRATORY (INHALATION) at 04:11

## 2023-11-23 RX ADMIN — SENNOSIDES AND DOCUSATE SODIUM 2 TABLET: 8.6; 5 TABLET ORAL at 08:11

## 2023-11-23 RX ADMIN — GUAIFENESIN 600 MG: 600 TABLET, EXTENDED RELEASE ORAL at 08:11

## 2023-11-23 RX ADMIN — CITALOPRAM HYDROBROMIDE 20 MG: 20 TABLET ORAL at 09:11

## 2023-11-23 RX ADMIN — GUAIFENESIN 600 MG: 600 TABLET, EXTENDED RELEASE ORAL at 09:11

## 2023-11-23 RX ADMIN — Medication 2 SPRAY: at 08:11

## 2023-11-23 RX ADMIN — MICONAZOLE NITRATE: 20 POWDER TOPICAL at 08:11

## 2023-11-23 RX ADMIN — DOXAZOSIN 4 MG: 4 TABLET ORAL at 09:11

## 2023-11-23 RX ADMIN — SENNOSIDES AND DOCUSATE SODIUM 2 TABLET: 8.6; 5 TABLET ORAL at 09:11

## 2023-11-23 RX ADMIN — BISACODYL 10 MG: 10 SUPPOSITORY RECTAL at 09:11

## 2023-11-23 RX ADMIN — LISINOPRIL 10 MG: 10 TABLET ORAL at 09:11

## 2023-11-23 RX ADMIN — INSULIN ASPART 3 UNITS: 100 INJECTION, SOLUTION INTRAVENOUS; SUBCUTANEOUS at 06:11

## 2023-11-23 RX ADMIN — HYDROCODONE BITARTRATE AND ACETAMINOPHEN 1 TABLET: 10; 325 TABLET ORAL at 01:11

## 2023-11-23 RX ADMIN — DEXAMETHASONE SODIUM PHOSPHATE 4 MG: 4 INJECTION, SOLUTION INTRA-ARTICULAR; INTRALESIONAL; INTRAMUSCULAR; INTRAVENOUS; SOFT TISSUE at 12:11

## 2023-11-23 RX ADMIN — TAMSULOSIN HYDROCHLORIDE 0.4 MG: 0.4 CAPSULE ORAL at 09:11

## 2023-11-23 RX ADMIN — INSULIN ASPART 2 UNITS: 100 INJECTION, SOLUTION INTRAVENOUS; SUBCUTANEOUS at 04:11

## 2023-11-23 RX ADMIN — INSULIN ASPART 4 UNITS: 100 INJECTION, SOLUTION INTRAVENOUS; SUBCUTANEOUS at 11:11

## 2023-11-23 RX ADMIN — DEXAMETHASONE SODIUM PHOSPHATE 4 MG: 4 INJECTION, SOLUTION INTRA-ARTICULAR; INTRALESIONAL; INTRAMUSCULAR; INTRAVENOUS; SOFT TISSUE at 01:11

## 2023-11-23 RX ADMIN — METHOCARBAMOL 750 MG: 750 TABLET ORAL at 08:11

## 2023-11-23 RX ADMIN — ENOXAPARIN SODIUM 40 MG: 40 INJECTION SUBCUTANEOUS at 04:11

## 2023-11-23 RX ADMIN — METHOCARBAMOL 750 MG: 750 TABLET ORAL at 03:11

## 2023-11-23 RX ADMIN — INSULIN ASPART 1 UNITS: 100 INJECTION, SOLUTION INTRAVENOUS; SUBCUTANEOUS at 08:11

## 2023-11-23 RX ADMIN — Medication 2 SPRAY: at 09:11

## 2023-11-23 RX ADMIN — DEXAMETHASONE SODIUM PHOSPHATE 4 MG: 4 INJECTION, SOLUTION INTRA-ARTICULAR; INTRALESIONAL; INTRAMUSCULAR; INTRAVENOUS; SOFT TISSUE at 05:11

## 2023-11-23 RX ADMIN — IPRATROPIUM BROMIDE AND ALBUTEROL SULFATE 3 ML: 2.5; .5 SOLUTION RESPIRATORY (INHALATION) at 07:11

## 2023-11-23 RX ADMIN — DEXAMETHASONE SODIUM PHOSPHATE 4 MG: 4 INJECTION, SOLUTION INTRA-ARTICULAR; INTRALESIONAL; INTRAMUSCULAR; INTRAVENOUS; SOFT TISSUE at 06:11

## 2023-11-23 NOTE — PROGRESS NOTES
[facility] - 5th Floor [facility] Medicine  Progress Note    Patient Name: Stevie Cummins  MRN: [id number]  Patient Class: IP- Inpatient   Admission Date: 11/14/2023  Length of Stay: 9 days  Attending Physician: Robert Alarcon MD  Primary Care Provider: Robert Alarcon MD        Subjective:     Principal Problem:Discitis of thoracic region        HPI:  No notes on file    Overview/Hospital Course:  No notes on file    Interval History: post op day 3 from spinal fusion. Pain si controlled. No complaints this morning.     Review of Systems   Constitutional:  Negative for chills, diaphoresis and fever.   HENT:  Negative for ear pain and rhinorrhea.    Respiratory:  Negative for cough, chest tightness and shortness of breath.    Cardiovascular:  Negative for chest pain, palpitations and leg swelling.   Gastrointestinal:  Negative for abdominal pain, constipation, diarrhea, nausea and vomiting.   Genitourinary:  Negative for difficulty urinating, dysuria and flank pain.   Musculoskeletal:  Negative for back pain and joint swelling.   Neurological:  Negative for dizziness, weakness, light-headedness, numbness and headaches.   Psychiatric/Behavioral:  Negative for confusion.      Objective:     Vital Signs (Most Recent):  Temp: 98.5 °F (36.9 °C) (11/23/23 1135)  Pulse: 88 (11/23/23 1135)  Resp: (P) 18 (11/23/23 1318)  BP: (!) 151/82 (11/23/23 1135)  SpO2: 97 % (11/23/23 1135) Vital Signs (24h Range):  Temp:  [98.2 °F (36.8 °C)-98.6 °F (37 °C)] 98.5 °F (36.9 °C)  Pulse:  [70-88] 88  Resp:  [18-19] (P) 18  SpO2:  [90 %-97 %] 97 %  BP: (137-166)/(57-82) 151/82     Weight: 99.3 kg (219 lb)  Body mass index is 32.34 kg/m².    Intake/Output Summary (Last 24 hours) at 11/23/2023 1319  Last data filed at 11/23/2023 0840  Gross per 24 hour   Intake 360 ml   Output 800 ml   Net -440 ml         Physical Exam  Constitutional:       Appearance: Normal appearance.   HENT:      Head: Normocephalic and atraumatic.    Cardiovascular:      Rate and Rhythm: Normal rate and regular rhythm.      Pulses: Normal pulses.   Pulmonary:      Effort: Pulmonary effort is normal.      Breath sounds: Normal breath sounds.   Abdominal:      General: Abdomen is flat. Bowel sounds are normal. There is no distension.      Palpations: Abdomen is soft.      Tenderness: There is no abdominal tenderness.   Musculoskeletal:         General: No tenderness. Normal range of motion.      Right lower leg: No edema.      Left lower leg: No edema.   Lymphadenopathy:      Cervical: No cervical adenopathy.   Neurological:      General: No focal deficit present.      Mental Status: He is alert and oriented to person, place, and time.             Significant Labs: All pertinent labs within the past 24 hours have been reviewed.    Significant Imaging: I have reviewed all pertinent imaging results/findings within the past 24 hours.    Assessment/Plan:      Ankle pain  Uric acid level was elevated.   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    Type 2 diabetes mellitus with other specified complication, with long-term current use of insulin  Uncontrolled due to being on dexamethasone  Continue ISS       VTE Risk Mitigation (From admission, onward)           Ordered     enoxaparin injection 40 mg  Daily         11/20/23 1242     Place sequential compression device  Until discontinued         11/20/23 1242     IP VTE HIGH RISK PATIENT  Once         11/14/23 2111     Place sequential compression device  Until discontinued         11/14/23 2111                    Discharge Planning   NAZANIN:      Code Status: Full Code   Is the patient medically ready for discharge?:     Reason for patient still in hospital (select all that apply): Treatment  Discharge Plan A: Rehab                  Maddi LOJA  MD Mike  Department of Hospital Medicine   [facility] - 5th Floor Med Surg

## 2023-11-23 NOTE — SUBJECTIVE & OBJECTIVE
Interval History: post op day 3 from spinal fusion. Pain si controlled. No complaints this morning.     Review of Systems   Constitutional:  Negative for chills, diaphoresis and fever.   HENT:  Negative for ear pain and rhinorrhea.    Respiratory:  Negative for cough, chest tightness and shortness of breath.    Cardiovascular:  Negative for chest pain, palpitations and leg swelling.   Gastrointestinal:  Negative for abdominal pain, constipation, diarrhea, nausea and vomiting.   Genitourinary:  Negative for difficulty urinating, dysuria and flank pain.   Musculoskeletal:  Negative for back pain and joint swelling.   Neurological:  Negative for dizziness, weakness, light-headedness, numbness and headaches.   Psychiatric/Behavioral:  Negative for confusion.      Objective:     Vital Signs (Most Recent):  Temp: 98.5 °F (36.9 °C) (11/23/23 1135)  Pulse: 88 (11/23/23 1135)  Resp: (P) 18 (11/23/23 1318)  BP: (!) 151/82 (11/23/23 1135)  SpO2: 97 % (11/23/23 1135) Vital Signs (24h Range):  Temp:  [98.2 °F (36.8 °C)-98.6 °F (37 °C)] 98.5 °F (36.9 °C)  Pulse:  [70-88] 88  Resp:  [18-19] (P) 18  SpO2:  [90 %-97 %] 97 %  BP: (137-166)/(57-82) 151/82     Weight: 99.3 kg (219 lb)  Body mass index is 32.34 kg/m².    Intake/Output Summary (Last 24 hours) at 11/23/2023 1319  Last data filed at 11/23/2023 0840  Gross per 24 hour   Intake 360 ml   Output 800 ml   Net -440 ml         Physical Exam  Constitutional:       Appearance: Normal appearance.   HENT:      Head: Normocephalic and atraumatic.   Cardiovascular:      Rate and Rhythm: Normal rate and regular rhythm.      Pulses: Normal pulses.   Pulmonary:      Effort: Pulmonary effort is normal.      Breath sounds: Normal breath sounds.   Abdominal:      General: Abdomen is flat. Bowel sounds are normal. There is no distension.      Palpations: Abdomen is soft.      Tenderness: There is no abdominal tenderness.   Musculoskeletal:         General: No tenderness. Normal range of  motion.      Right lower leg: No edema.      Left lower leg: No edema.   Lymphadenopathy:      Cervical: No cervical adenopathy.   Neurological:      General: No focal deficit present.      Mental Status: He is alert and oriented to person, place, and time.             Significant Labs: All pertinent labs within the past 24 hours have been reviewed.    Significant Imaging: I have reviewed all pertinent imaging results/findings within the past 24 hours.

## 2023-11-23 NOTE — ASSESSMENT & PLAN NOTE
S/p spinal fusion with Dr Hutchinson 11/20  Pain is well controlled  Awaiting inpatient rehab placement

## 2023-11-24 LAB
POCT GLUCOSE: 269 MG/DL (ref 70–110)
POCT GLUCOSE: 273 MG/DL (ref 70–110)
POCT GLUCOSE: 288 MG/DL (ref 70–110)
POCT GLUCOSE: 318 MG/DL (ref 70–110)
POCT GLUCOSE: 322 MG/DL (ref 70–110)

## 2023-11-24 PROCEDURE — 25000003 PHARM REV CODE 250: Performed by: INTERNAL MEDICINE

## 2023-11-24 PROCEDURE — 63600175 PHARM REV CODE 636 W HCPCS: Performed by: NEUROLOGICAL SURGERY

## 2023-11-24 PROCEDURE — 97530 THERAPEUTIC ACTIVITIES: CPT | Mod: CQ

## 2023-11-24 PROCEDURE — 99900035 HC TECH TIME PER 15 MIN (STAT)

## 2023-11-24 PROCEDURE — 25000242 PHARM REV CODE 250 ALT 637 W/ HCPCS: Performed by: INTERNAL MEDICINE

## 2023-11-24 PROCEDURE — 21400001 HC TELEMETRY ROOM

## 2023-11-24 PROCEDURE — 25000003 PHARM REV CODE 250

## 2023-11-24 PROCEDURE — 63600175 PHARM REV CODE 636 W HCPCS: Performed by: PHYSICIAN ASSISTANT

## 2023-11-24 PROCEDURE — 97116 GAIT TRAINING THERAPY: CPT | Mod: CQ

## 2023-11-24 PROCEDURE — 63600175 PHARM REV CODE 636 W HCPCS: Performed by: INTERNAL MEDICINE

## 2023-11-24 PROCEDURE — 27000221 HC OXYGEN, UP TO 24 HOURS

## 2023-11-24 PROCEDURE — 25000003 PHARM REV CODE 250: Performed by: NURSE PRACTITIONER

## 2023-11-24 PROCEDURE — 63600175 PHARM REV CODE 636 W HCPCS

## 2023-11-24 PROCEDURE — 94761 N-INVAS EAR/PLS OXIMETRY MLT: CPT

## 2023-11-24 PROCEDURE — 94640 AIRWAY INHALATION TREATMENT: CPT

## 2023-11-24 PROCEDURE — 99233 SBSQ HOSP IP/OBS HIGH 50: CPT | Mod: ,,, | Performed by: STUDENT IN AN ORGANIZED HEALTH CARE EDUCATION/TRAINING PROGRAM

## 2023-11-24 RX ORDER — DEXAMETHASONE 4 MG/1
4 TABLET ORAL EVERY 12 HOURS
Status: DISCONTINUED | OUTPATIENT
Start: 2023-11-24 | End: 2023-11-26

## 2023-11-24 RX ADMIN — DEXAMETHASONE SODIUM PHOSPHATE 4 MG: 4 INJECTION, SOLUTION INTRA-ARTICULAR; INTRALESIONAL; INTRAMUSCULAR; INTRAVENOUS; SOFT TISSUE at 05:11

## 2023-11-24 RX ADMIN — BISACODYL 10 MG: 10 SUPPOSITORY RECTAL at 09:11

## 2023-11-24 RX ADMIN — SENNOSIDES AND DOCUSATE SODIUM 2 TABLET: 8.6; 5 TABLET ORAL at 09:11

## 2023-11-24 RX ADMIN — GUAIFENESIN 600 MG: 600 TABLET, EXTENDED RELEASE ORAL at 08:11

## 2023-11-24 RX ADMIN — DEXAMETHASONE 4 MG: 4 TABLET ORAL at 09:11

## 2023-11-24 RX ADMIN — CITALOPRAM HYDROBROMIDE 20 MG: 20 TABLET ORAL at 09:11

## 2023-11-24 RX ADMIN — INSULIN ASPART 4 UNITS: 100 INJECTION, SOLUTION INTRAVENOUS; SUBCUTANEOUS at 11:11

## 2023-11-24 RX ADMIN — LISINOPRIL 10 MG: 10 TABLET ORAL at 09:11

## 2023-11-24 RX ADMIN — HYDROCODONE BITARTRATE AND ACETAMINOPHEN 1 TABLET: 5; 325 TABLET ORAL at 09:11

## 2023-11-24 RX ADMIN — IPRATROPIUM BROMIDE AND ALBUTEROL SULFATE 3 ML: 2.5; .5 SOLUTION RESPIRATORY (INHALATION) at 03:11

## 2023-11-24 RX ADMIN — IPRATROPIUM BROMIDE AND ALBUTEROL SULFATE 3 ML: 2.5; .5 SOLUTION RESPIRATORY (INHALATION) at 01:11

## 2023-11-24 RX ADMIN — DOXAZOSIN 4 MG: 4 TABLET ORAL at 09:11

## 2023-11-24 RX ADMIN — METHOCARBAMOL 750 MG: 750 TABLET ORAL at 08:11

## 2023-11-24 RX ADMIN — TAMSULOSIN HYDROCHLORIDE 0.4 MG: 0.4 CAPSULE ORAL at 09:11

## 2023-11-24 RX ADMIN — INSULIN ASPART 1 UNITS: 100 INJECTION, SOLUTION INTRAVENOUS; SUBCUTANEOUS at 09:11

## 2023-11-24 RX ADMIN — INSULIN ASPART 3 UNITS: 100 INJECTION, SOLUTION INTRAVENOUS; SUBCUTANEOUS at 05:11

## 2023-11-24 RX ADMIN — MICONAZOLE NITRATE: 20 POWDER TOPICAL at 09:11

## 2023-11-24 RX ADMIN — ENOXAPARIN SODIUM 40 MG: 40 INJECTION SUBCUTANEOUS at 04:11

## 2023-11-24 RX ADMIN — IPRATROPIUM BROMIDE AND ALBUTEROL SULFATE 3 ML: 2.5; .5 SOLUTION RESPIRATORY (INHALATION) at 08:11

## 2023-11-24 RX ADMIN — INSULIN ASPART 4 UNITS: 100 INJECTION, SOLUTION INTRAVENOUS; SUBCUTANEOUS at 04:11

## 2023-11-24 RX ADMIN — METHOCARBAMOL 750 MG: 750 TABLET ORAL at 04:11

## 2023-11-24 RX ADMIN — MICONAZOLE NITRATE: 20 POWDER TOPICAL at 08:11

## 2023-11-24 RX ADMIN — METHOCARBAMOL 750 MG: 750 TABLET ORAL at 09:11

## 2023-11-24 RX ADMIN — GUAIFENESIN 600 MG: 600 TABLET, EXTENDED RELEASE ORAL at 09:11

## 2023-11-24 RX ADMIN — DEXAMETHASONE 4 MG: 4 TABLET ORAL at 08:11

## 2023-11-24 RX ADMIN — DEXAMETHASONE SODIUM PHOSPHATE 4 MG: 4 INJECTION, SOLUTION INTRA-ARTICULAR; INTRALESIONAL; INTRAMUSCULAR; INTRAVENOUS; SOFT TISSUE at 12:11

## 2023-11-24 RX ADMIN — ALLOPURINOL 100 MG: 100 TABLET ORAL at 09:11

## 2023-11-24 NOTE — PT/OT/SLP PROGRESS
Physical Therapy Treatment    Patient Name:  Stevie Cummins   MRN:  [id number]    Recommendations:     Discharge therapy intensity: Moderate Intensity Therapy   Discharge Equipment Recommendations: none  Barriers to discharge: Impaired mobility    Assessment:     Stevie Cummins is a 73 y.o. male admitted with a medical diagnosis of Discitis of thoracic region.  He presents with the following impairments/functional limitations: weakness, impaired endurance, impaired self care skills, impaired functional mobility, gait instability, impaired sensation, impaired balance, decreased upper extremity function, pain .    Rehab Prognosis: Good; patient would benefit from acute skilled PT services to address these deficits and reach maximum level of function.    Recent Surgery: Procedure(s) (LRB):  FUSION, SPINE, POSTERIOR SPINAL COLUMN, CERVICAL, USING COMPUTER-ASSISTED NAVIGATION (N/A) 4 Days Post-Op    Plan:     During this hospitalization, patient to be seen 6 x/week to address the identified rehab impairments via gait training, therapeutic activities, therapeutic exercises and progress toward the following goals:    Plan of Care Expires:  12/16/23    Subjective     Chief Complaint: none  Patient/Family Comments/goals: hurting less  Pain/Comfort:  Pain Rating 1: 6/10  Location 1: neck  Pain Addressed 1: Cessation of Activity      Objective:     Communicated with pts nurse prior to session.  Patient found HOB elevated with cervical collar upon PT entry to room.     General Precautions: Standard, fall  Orthopedic Precautions: spinal precautions  Braces: Cervical collar  Respiratory Status: Room air  Blood Pressure:   Skin Integrity: Visible skin intact      Functional Mobility:  Bed Mobility:     Supine to Sit: minimum assistance and with cues to improve hand placement, strategies and increase time to perform  Transfers:     Sit to Stand:  minimum assistance and moderate assistance with rolling walker and verbal cues to improve  loading legs  Bed to Chair: minimum assistance with  rolling walker  using  Step Transfer  Toilet Transfer: minimum assistance and moderate assistance with  rolling walker  using  Step Transfer  Gait: Pt ambulated  bed to toilet with RW, Min A and from toilet to chair ~ 12 ft for trials.  Pt did not c/o weakness in LE;s  Balance: Pt required Max A for colt care, following having BM  but able to stand for ~ 90 seconds w/o difficulty using RW for support.     Therapeutic Activities/Exercises:  Pt educated on LE exercises to do on own, in recliner demonstrating all exercises    Education:  Patient provided with verbal education education regarding LE exercises, posture and bed mobility strategies..  Understanding was verbalized, however additional teaching warranted.     Patient left up in chair with all lines intact, call button in reach, and nurse notified..    GOALS:   Multidisciplinary Problems       Physical Therapy Goals          Problem: Physical Therapy    Goal Priority Disciplines Outcome Goal Variances Interventions   Physical Therapy Goal     PT, PT/OT Ongoing, Progressing     Description: Goals to be met by: d/c     Patient will increase functional independence with mobility by performin. Supine to sit with Stand-by Assistance  2. Sit to supine with Stand-by Assistance  3. Sit to stand transfer with Stand-by Assistance  4. Bed to chair transfer with Stand-by Assistance using Rolling Walker  5. Gait  x 150 feet with Stand-by Assistance using Rolling Walker.                          Time Tracking:     PT Received On: 23  PT Start Time: 1151     PT Stop Time: 1216  PT Total Time (min): 25 min     Billable Minutes: Gait Training 11 and Therapeutic Activity 14    Treatment Type: Treatment  PT/PTA: PTA     Number of PTA visits since last PT visit: 2     2023

## 2023-11-24 NOTE — SUBJECTIVE & OBJECTIVE
Interval History: No new complaints, sitting in chair this morning     Review of Systems   Constitutional:  Negative for chills, diaphoresis and fever.   HENT:  Negative for ear pain and rhinorrhea.    Respiratory:  Negative for cough, chest tightness and shortness of breath.    Cardiovascular:  Negative for chest pain, palpitations and leg swelling.   Gastrointestinal:  Negative for abdominal pain, constipation, diarrhea, nausea and vomiting.   Genitourinary:  Negative for difficulty urinating, dysuria and flank pain.   Musculoskeletal:  Negative for back pain and joint swelling.   Neurological:  Negative for dizziness, weakness, light-headedness, numbness and headaches.   Psychiatric/Behavioral:  Negative for confusion.      Objective:     Vital Signs (Most Recent):  Temp: 98.8 °F (37.1 °C) (11/24/23 1115)  Pulse: 91 (11/24/23 1115)  Resp: 18 (11/24/23 1115)  BP: (!) 145/73 (11/24/23 1115)  SpO2: (!) 93 % (11/24/23 1115) Vital Signs (24h Range):  Temp:  [97.6 °F (36.4 °C)-98.8 °F (37.1 °C)] 98.8 °F (37.1 °C)  Pulse:  [67-91] 91  Resp:  [14-18] 18  SpO2:  [90 %-98 %] 93 %  BP: (132-158)/(68-78) 145/73     Weight: 99.3 kg (219 lb)  Body mass index is 32.34 kg/m².    Intake/Output Summary (Last 24 hours) at 11/24/2023 1510  Last data filed at 11/24/2023 1412  Gross per 24 hour   Intake 600 ml   Output 2150 ml   Net -1550 ml         Physical Exam  Constitutional:       Appearance: Normal appearance.   HENT:      Head: Normocephalic and atraumatic.   Cardiovascular:      Rate and Rhythm: Normal rate and regular rhythm.      Pulses: Normal pulses.   Pulmonary:      Effort: Pulmonary effort is normal.      Breath sounds: Normal breath sounds.   Abdominal:      General: Abdomen is flat. Bowel sounds are normal. There is no distension.      Palpations: Abdomen is soft.      Tenderness: There is no abdominal tenderness.   Musculoskeletal:         General: No tenderness. Normal range of motion.      Right lower leg: No  edema.      Left lower leg: No edema.   Lymphadenopathy:      Cervical: No cervical adenopathy.   Neurological:      General: No focal deficit present.      Mental Status: He is alert and oriented to person, place, and time.             Significant Labs: All pertinent labs within the past 24 hours have been reviewed.    Significant Imaging: I have reviewed all pertinent imaging results/findings within the past 24 hours.

## 2023-11-24 NOTE — PROGRESS NOTES
POD#4 C3-T1 PCDF  He is sitting up in bed, NAD  His pain is controlled  He continues with weakness when ambulating  His LE strength is improved    AFVSS  MARYAM well, no deficits appreciated  Incision c/d/I  Soft collar in place    Plan: Continue daily dressing changes  Continue multimodal pain control  Continue PT/OT  Gabapentin TID  Will wean decadron to 4mg PO BID  SCDs and lovenox for DVT prophylaxis

## 2023-11-24 NOTE — PROGRESS NOTES
"Inpatient Nutrition Evaluation    Admit Date: 11/14/2023   Total duration of encounter: 10 days    Nutrition Recommendation/Prescription     Continue diabetic diet  If appetite decreased, consider oral supplement  Monitor need for bowel regimen   RD to monitor po intake and weight    Nutrition Assessment     Chart Review    Reason Seen: length of stay and follow-up    Malnutrition Screening Tool Results   Have you recently lost weight without trying?: No  Have you been eating poorly because of a decreased appetite?: No   MST Score: 0     Diagnosis:  Ankle pain  Myelopathy due to spondylosis  HTN    Relevant Medical History: HTN, DM 2    Nutrition-Related Medications: insulin aspart prn, dexamethasone, senna-docusate    Nutrition-Related Labs:   11/20: WBC-11.92, RBC-4.33, BUN-53.3, creat-1.28, glu-174, poct glu-235  11/22: BUN 44.5, Gluc 294      Diet Order: Diet Adult Regular Diabetic  Oral Supplement Order: none  Appetite/Oral Intake: good/% of meals  Factors Affecting Nutritional Intake: none identified  Food/Mormon/Cultural Preferences: unable to obtain  Food Allergies: no known food allergies    Skin Integrity: incision  Wound(s): none noted      Comments    11/20/23: pt in OR for fusion at time of visit. No reports of decreased appetite or GI complaints per notes. % intake documented. Per MST, no decreased appetite or weight loss prior admission. Per EMR weights, possible 7% weight loss in 4 months noted (not significant).    11/24/23: Patient with fair-good appetite, about 75% of breakfast meal consumed this morning. Patient denies nausea/vomiting/diarrhea, constipation noted.     Anthropometrics    Height: 5' 9" (175.3 cm) Height Method: Stated  Last Weight: 99.3 kg (219 lb) (11/17/23 0759) Weight Method: Bed Scale  BMI (Calculated): 32.3  BMI Classification: obese grade I (BMI 30-34.9)        Ideal Body Weight (IBW), Male: 160 lb     % Ideal Body Weight, Male (lb): 136.88 %                  "         Usual Weight Provided By: EMR weight history    Wt Readings from Last 5 Encounters:   11/17/23 99.3 kg (219 lb)   09/22/23 101.2 kg (223 lb)   07/22/23 106.6 kg (235 lb)   05/18/23 102.1 kg (225 lb)   01/12/23 102.1 kg (225 lb)     Weight Change(s) Since Admission:  Admit Weight: 99.3 kg (219 lb) (11/14/23 1256)      Patient Education    Not applicable.    Monitoring & Evaluation     Dietitian will monitor food and beverage intake, weight, and glucose/endocrine profile.  Nutrition Risk/Follow-Up: low (follow-up in 5-7 days)  Patients assigned 'low nutrition risk' status do not qualify for a full nutritional assessment but will be monitored and re-evaluated in a 5-7 day time period. Please consult if re-evaluation needed sooner.

## 2023-11-24 NOTE — PROGRESS NOTES
[facility] - 5th Floor [facility] Medicine  Progress Note    Patient Name: Stevie Cummins  MRN: [id number]  Patient Class: IP- Inpatient   Admission Date: 11/14/2023  Length of Stay: 10 days  Attending Physician: Robert Alarcon MD  Primary Care Provider: Robert Alarcon MD        Subjective:     Principal Problem:Discitis of thoracic region        HPI:  No notes on file    Overview/Hospital Course:  No notes on file    Interval History: No new complaints, sitting in chair this morning     Review of Systems   Constitutional:  Negative for chills, diaphoresis and fever.   HENT:  Negative for ear pain and rhinorrhea.    Respiratory:  Negative for cough, chest tightness and shortness of breath.    Cardiovascular:  Negative for chest pain, palpitations and leg swelling.   Gastrointestinal:  Negative for abdominal pain, constipation, diarrhea, nausea and vomiting.   Genitourinary:  Negative for difficulty urinating, dysuria and flank pain.   Musculoskeletal:  Negative for back pain and joint swelling.   Neurological:  Negative for dizziness, weakness, light-headedness, numbness and headaches.   Psychiatric/Behavioral:  Negative for confusion.      Objective:     Vital Signs (Most Recent):  Temp: 98.8 °F (37.1 °C) (11/24/23 1115)  Pulse: 91 (11/24/23 1115)  Resp: 18 (11/24/23 1115)  BP: (!) 145/73 (11/24/23 1115)  SpO2: (!) 93 % (11/24/23 1115) Vital Signs (24h Range):  Temp:  [97.6 °F (36.4 °C)-98.8 °F (37.1 °C)] 98.8 °F (37.1 °C)  Pulse:  [67-91] 91  Resp:  [14-18] 18  SpO2:  [90 %-98 %] 93 %  BP: (132-158)/(68-78) 145/73     Weight: 99.3 kg (219 lb)  Body mass index is 32.34 kg/m².    Intake/Output Summary (Last 24 hours) at 11/24/2023 1510  Last data filed at 11/24/2023 1412  Gross per 24 hour   Intake 600 ml   Output 2150 ml   Net -1550 ml         Physical Exam  Constitutional:       Appearance: Normal appearance.   HENT:      Head: Normocephalic and atraumatic.   Cardiovascular:      Rate and  Rhythm: Normal rate and regular rhythm.      Pulses: Normal pulses.   Pulmonary:      Effort: Pulmonary effort is normal.      Breath sounds: Normal breath sounds.   Abdominal:      General: Abdomen is flat. Bowel sounds are normal. There is no distension.      Palpations: Abdomen is soft.      Tenderness: There is no abdominal tenderness.   Musculoskeletal:         General: No tenderness. Normal range of motion.      Right lower leg: No edema.      Left lower leg: No edema.   Lymphadenopathy:      Cervical: No cervical adenopathy.   Neurological:      General: No focal deficit present.      Mental Status: He is alert and oriented to person, place, and time.             Significant Labs: All pertinent labs within the past 24 hours have been reviewed.    Significant Imaging: I have reviewed all pertinent imaging results/findings within the past 24 hours.    Assessment/Plan:      Ankle pain  Uric acid level was elevated.   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    Type 2 diabetes mellitus with other specified complication, with long-term current use of insulin  Uncontrolled due to being on dexamethasone  Continue ISS       VTE Risk Mitigation (From admission, onward)           Ordered     enoxaparin injection 40 mg  Daily         11/20/23 1242     Place sequential compression device  Until discontinued         11/20/23 1242     IP VTE HIGH RISK PATIENT  Once         11/14/23 2111     Place sequential compression device  Until discontinued         11/14/23 2111                    Discharge Planning   NAZANIN:      Code Status: Full Code   Is the patient medically ready for discharge?:     Reason for patient still in hospital (select all that apply): Treatment  Discharge Plan A: Rehab                  Maddi Mckeon MD  Department of  [facility] Medicine   [facility] - 5th Floor Med Surg

## 2023-11-25 LAB
ALBUMIN SERPL-MCNC: 3.2 G/DL (ref 3.4–4.8)
ALBUMIN/GLOB SERPL: 0.9 RATIO (ref 1.1–2)
ALP SERPL-CCNC: 51 UNIT/L (ref 40–150)
ALT SERPL-CCNC: 14 UNIT/L (ref 0–55)
AST SERPL-CCNC: 12 UNIT/L (ref 5–34)
BASOPHILS # BLD AUTO: 0.06 X10(3)/MCL
BASOPHILS NFR BLD AUTO: 0.3 %
BILIRUB SERPL-MCNC: 1 MG/DL
BUN SERPL-MCNC: 38.8 MG/DL (ref 8.4–25.7)
CALCIUM SERPL-MCNC: 9.2 MG/DL (ref 8.8–10)
CHLORIDE SERPL-SCNC: 102 MMOL/L (ref 98–107)
CO2 SERPL-SCNC: 25 MMOL/L (ref 23–31)
CREAT SERPL-MCNC: 0.96 MG/DL (ref 0.73–1.18)
EOSINOPHIL # BLD AUTO: 0.01 X10(3)/MCL (ref 0–0.9)
EOSINOPHIL NFR BLD AUTO: 0.1 %
ERYTHROCYTE [DISTWIDTH] IN BLOOD BY AUTOMATED COUNT: 12.2 % (ref 11.5–17)
GFR SERPLBLD CREATININE-BSD FMLA CKD-EPI: >60 MLS/MIN/1.73/M2
GLOBULIN SER-MCNC: 3.5 GM/DL (ref 2.4–3.5)
GLUCOSE SERPL-MCNC: 287 MG/DL (ref 82–115)
HCT VFR BLD AUTO: 39.6 % (ref 42–52)
HGB BLD-MCNC: 13.3 G/DL (ref 14–18)
IMM GRANULOCYTES # BLD AUTO: 0.64 X10(3)/MCL (ref 0–0.04)
IMM GRANULOCYTES NFR BLD AUTO: 3.3 %
LYMPHOCYTES # BLD AUTO: 1.82 X10(3)/MCL (ref 0.6–4.6)
LYMPHOCYTES NFR BLD AUTO: 9.2 %
MCH RBC QN AUTO: 32 PG (ref 27–31)
MCHC RBC AUTO-ENTMCNC: 33.6 G/DL (ref 33–36)
MCV RBC AUTO: 95.4 FL (ref 80–94)
MONOCYTES # BLD AUTO: 1.63 X10(3)/MCL (ref 0.1–1.3)
MONOCYTES NFR BLD AUTO: 8.3 %
NEUTROPHILS # BLD AUTO: 15.53 X10(3)/MCL (ref 2.1–9.2)
NEUTROPHILS NFR BLD AUTO: 78.8 %
NRBC BLD AUTO-RTO: 0 %
PLATELET # BLD AUTO: 250 X10(3)/MCL (ref 130–400)
PMV BLD AUTO: 11.6 FL (ref 7.4–10.4)
POCT GLUCOSE: 190 MG/DL (ref 70–110)
POCT GLUCOSE: 272 MG/DL (ref 70–110)
POCT GLUCOSE: 288 MG/DL (ref 70–110)
POCT GLUCOSE: 299 MG/DL (ref 70–110)
POTASSIUM SERPL-SCNC: 4.3 MMOL/L (ref 3.5–5.1)
PROT SERPL-MCNC: 6.7 GM/DL (ref 5.8–7.6)
RBC # BLD AUTO: 4.15 X10(6)/MCL (ref 4.7–6.1)
SODIUM SERPL-SCNC: 136 MMOL/L (ref 136–145)
WBC # SPEC AUTO: 19.69 X10(3)/MCL (ref 4.5–11.5)

## 2023-11-25 PROCEDURE — 21400001 HC TELEMETRY ROOM

## 2023-11-25 PROCEDURE — 85025 COMPLETE CBC W/AUTO DIFF WBC: CPT | Performed by: STUDENT IN AN ORGANIZED HEALTH CARE EDUCATION/TRAINING PROGRAM

## 2023-11-25 PROCEDURE — 25000242 PHARM REV CODE 250 ALT 637 W/ HCPCS: Performed by: INTERNAL MEDICINE

## 2023-11-25 PROCEDURE — 25000003 PHARM REV CODE 250: Performed by: INTERNAL MEDICINE

## 2023-11-25 PROCEDURE — 99900035 HC TECH TIME PER 15 MIN (STAT)

## 2023-11-25 PROCEDURE — 94761 N-INVAS EAR/PLS OXIMETRY MLT: CPT

## 2023-11-25 PROCEDURE — 94640 AIRWAY INHALATION TREATMENT: CPT

## 2023-11-25 PROCEDURE — 25000003 PHARM REV CODE 250

## 2023-11-25 PROCEDURE — 80053 COMPREHEN METABOLIC PANEL: CPT | Performed by: STUDENT IN AN ORGANIZED HEALTH CARE EDUCATION/TRAINING PROGRAM

## 2023-11-25 PROCEDURE — 25000003 PHARM REV CODE 250: Performed by: NURSE PRACTITIONER

## 2023-11-25 PROCEDURE — 63600175 PHARM REV CODE 636 W HCPCS: Performed by: INTERNAL MEDICINE

## 2023-11-25 PROCEDURE — 99232 SBSQ HOSP IP/OBS MODERATE 35: CPT | Mod: ,,, | Performed by: INTERNAL MEDICINE

## 2023-11-25 PROCEDURE — 63600175 PHARM REV CODE 636 W HCPCS

## 2023-11-25 PROCEDURE — 63600175 PHARM REV CODE 636 W HCPCS: Performed by: PHYSICIAN ASSISTANT

## 2023-11-25 RX ADMIN — DEXAMETHASONE 4 MG: 4 TABLET ORAL at 08:11

## 2023-11-25 RX ADMIN — METHOCARBAMOL 750 MG: 750 TABLET ORAL at 08:11

## 2023-11-25 RX ADMIN — IPRATROPIUM BROMIDE AND ALBUTEROL SULFATE 3 ML: 2.5; .5 SOLUTION RESPIRATORY (INHALATION) at 01:11

## 2023-11-25 RX ADMIN — IPRATROPIUM BROMIDE AND ALBUTEROL SULFATE 3 ML: 2.5; .5 SOLUTION RESPIRATORY (INHALATION) at 08:11

## 2023-11-25 RX ADMIN — ALLOPURINOL 100 MG: 100 TABLET ORAL at 08:11

## 2023-11-25 RX ADMIN — LISINOPRIL 10 MG: 10 TABLET ORAL at 08:11

## 2023-11-25 RX ADMIN — MICONAZOLE NITRATE: 20 POWDER TOPICAL at 09:11

## 2023-11-25 RX ADMIN — HYDROCODONE BITARTRATE AND ACETAMINOPHEN 1 TABLET: 5; 325 TABLET ORAL at 12:11

## 2023-11-25 RX ADMIN — INSULIN ASPART 1 UNITS: 100 INJECTION, SOLUTION INTRAVENOUS; SUBCUTANEOUS at 08:11

## 2023-11-25 RX ADMIN — IPRATROPIUM BROMIDE AND ALBUTEROL SULFATE 3 ML: 2.5; .5 SOLUTION RESPIRATORY (INHALATION) at 04:11

## 2023-11-25 RX ADMIN — ENOXAPARIN SODIUM 40 MG: 40 INJECTION SUBCUTANEOUS at 05:11

## 2023-11-25 RX ADMIN — MICONAZOLE NITRATE: 20 POWDER TOPICAL at 08:11

## 2023-11-25 RX ADMIN — METHOCARBAMOL 750 MG: 750 TABLET ORAL at 03:11

## 2023-11-25 RX ADMIN — CITALOPRAM HYDROBROMIDE 20 MG: 20 TABLET ORAL at 08:11

## 2023-11-25 RX ADMIN — GUAIFENESIN 600 MG: 600 TABLET, EXTENDED RELEASE ORAL at 08:11

## 2023-11-25 RX ADMIN — TAMSULOSIN HYDROCHLORIDE 0.4 MG: 0.4 CAPSULE ORAL at 08:11

## 2023-11-25 RX ADMIN — DOXAZOSIN 4 MG: 4 TABLET ORAL at 08:11

## 2023-11-25 RX ADMIN — INSULIN ASPART 3 UNITS: 100 INJECTION, SOLUTION INTRAVENOUS; SUBCUTANEOUS at 05:11

## 2023-11-25 NOTE — PROGRESS NOTES
[facility] - 5th Floor [facility] Medicine  Progress Note    Patient Name: Stevie Cummins  MRN: [id number]  Patient Class: IP- Inpatient   Admission Date: 11/14/2023  Length of Stay: 11 days  Attending Physician: Robert Alarcon MD  Primary Care Provider: Robert Alarcon MD        Subjective:     Principal Problem:Discitis of thoracic region        HPI:  No notes on file    Overview/Hospital Course:  No notes on file    Interval History:  Overall stable no overnight events   Awaiting placement    Review of Systems   Constitutional:  Negative for chills, diaphoresis and fever.   HENT:  Negative for ear pain and rhinorrhea.    Respiratory:  Negative for cough, chest tightness and shortness of breath.    Cardiovascular:  Negative for chest pain, palpitations and leg swelling.   Gastrointestinal:  Negative for abdominal pain, constipation, diarrhea, nausea and vomiting.   Genitourinary:  Negative for difficulty urinating, dysuria and flank pain.   Musculoskeletal:  Negative for back pain and joint swelling.   Neurological:  Negative for dizziness, weakness, light-headedness, numbness and headaches.   Psychiatric/Behavioral:  Negative for confusion.      Objective:     Vital Signs (Most Recent):  Temp: 98.3 °F (36.8 °C) (11/25/23 1557)  Pulse: 79 (11/25/23 1602)  Resp: 18 (11/25/23 1602)  BP: 137/68 (11/25/23 1557)  SpO2: (!) 93 % (11/25/23 1602) Vital Signs (24h Range):  Temp:  [97.6 °F (36.4 °C)-98.5 °F (36.9 °C)] 98.3 °F (36.8 °C)  Pulse:  [73-90] 79  Resp:  [16-18] 18  SpO2:  [90 %-94 %] 93 %  BP: (137-164)/(67-80) 137/68     Weight: 99.3 kg (219 lb)  Body mass index is 32.34 kg/m².    Intake/Output Summary (Last 24 hours) at 11/25/2023 1626  Last data filed at 11/25/2023 1300  Gross per 24 hour   Intake 800 ml   Output 2302 ml   Net -1502 ml           Physical Exam  Constitutional:       Appearance: Normal appearance.   HENT:      Head: Normocephalic and atraumatic.   Cardiovascular:      Rate and  Rhythm: Normal rate and regular rhythm.      Pulses: Normal pulses.   Pulmonary:      Effort: Pulmonary effort is normal.      Breath sounds: Normal breath sounds.   Abdominal:      General: Abdomen is flat. Bowel sounds are normal. There is no distension.      Palpations: Abdomen is soft.      Tenderness: There is no abdominal tenderness.   Musculoskeletal:         General: No tenderness. Normal range of motion.      Right lower leg: No edema.      Left lower leg: No edema.   Lymphadenopathy:      Cervical: No cervical adenopathy.   Neurological:      General: No focal deficit present.      Mental Status: He is alert and oriented to person, place, and time.             Significant Labs: All pertinent labs within the past 24 hours have been reviewed.    Significant Imaging: I have reviewed all pertinent imaging results/findings within the past 24 hours.    Assessment/Plan:      Ankle pain  Uric acid level was elevated.   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    Type 2 diabetes mellitus with other specified complication, with long-term current use of insulin  Uncontrolled due to being on dexamethasone  Continue ISS       VTE Risk Mitigation (From admission, onward)           Ordered     enoxaparin injection 40 mg  Daily         11/20/23 1242     Place sequential compression device  Until discontinued         11/20/23 1242     IP VTE HIGH RISK PATIENT  Once         11/14/23 2111                    Discharge Planning   NAZANIN:      Code Status: Full Code   Is the patient medically ready for discharge?:     Reason for patient still in hospital (select all that apply): Treatment  Discharge Plan A: Rehab                  YANET HSIEH MD  Department of Hospital Medicine   [facility] - 5th Floor Med Surg

## 2023-11-25 NOTE — PROGRESS NOTES
POD#5 C3-T1 PCDF      Sitting up in bed watching TV   He did ambulate to the bathroom with walker.    Reports somewhat of a decreased appetite.    States that the numbness in his hands has improved.  Minimal incisional pain to posterior neck.    Wearing soft collar   Continues with weakness and slight imbalance when ambulating.    Lower extremity strength improving.      Vital signs are stable, afebrile.  Incision is clean and dry.  Soft collar in place    Plan:     Continue daily dressing changes  Continue multimodal pain control  Continue PT/OT  Fall precautions  Gabapentin TID  Will wean decadron to 4mg PO BID  SCDs and lovenox for DVT prophylaxis      Post-Op Info:  Procedure(s) (LRB):  FUSION, SPINE, POSTERIOR SPINAL COLUMN, CERVICAL, USING COMPUTER-ASSISTED NAVIGATION (N/A)   5 Days Post-Op      Medications:  Continuous Infusions:   sodium chloride 0.9% 100 mL/hr at 11/20/23 1726     Scheduled Meds:   albuterol-ipratropium  3 mL Nebulization Q8H    allopurinoL  100 mg Oral Daily    bisacodyL  10 mg Rectal Daily    citalopram  20 mg Oral Daily    dexAMETHasone  4 mg Oral Q12H    doxazosin  4 mg Oral Daily    enoxparin  40 mg Subcutaneous Daily    guaiFENesin  600 mg Oral BID    lisinopriL  10 mg Oral Daily    methocarbamoL  750 mg Oral TID    miconazole NITRATE 2 %   Topical (Top) BID    senna-docusate 8.6-50 mg  2 tablet Oral BID    tamsulosin  1 capsule Oral Daily     PRN Meds:acetaminophen, acetaminophen, aluminum-magnesium hydroxide-simethicone, [COMPLETED] calcium gluconate IVPB **AND** calcium gluconate IVPB, dextrose 10%, dextrose 10%, glucagon (human recombinant), glucose, glucose, HYDROcodone-acetaminophen, HYDROcodone-acetaminophen, HYDROcodone-acetaminophen, insulin aspart U-100, magnesium hydroxide 400 mg/5 ml, melatonin, morphine, morphine, morphine, ondansetron, prochlorperazine, sodium chloride 0.9%     Review of Systems  Objective:     Weight: 99.3 kg (219 lb)  Body mass index is 32.34  "kg/m².  Vital Signs (Most Recent):  Temp: 98.2 °F (36.8 °C) (11/25/23 1059)  Pulse: 90 (11/25/23 1059)  Resp: 18 (11/25/23 0833)  BP: 138/71 (11/25/23 1059)  SpO2: (!) 90 % (11/25/23 1059) Vital Signs (24h Range):  Temp:  [97.6 °F (36.4 °C)-98.8 °F (37.1 °C)] 98.2 °F (36.8 °C)  Pulse:  [73-91] 90  Resp:  [16-18] 18  SpO2:  [90 %-94 %] 90 %  BP: (138-164)/(67-80) 138/71                              Urethral Catheter 11/22/23 1830 Silicone 14 Fr. (Active)   $ Gardiner Insertion Bedside Insertion Performed 11/22/23 1830   Site Assessment Clean;Intact 11/24/23 0800   Collection Container Standard drainage bag 11/24/23 0800   Securement Method secured to top of thigh w/ adhesive device 11/24/23 0800   Catheter Care Performed yes 11/24/23 0800   Reason for Continuing Urinary Catheterization Urinary retention 11/24/23 0800   CAUTI Prevention Bundle Securement Device in place with 1" slack;Intact seal between catheter & drainage tubing;Drainage bag/urimeter off the floor;Sheeting clip in use;No dependent loops or kinks;Drainage bag/urimeter not overfilled (<2/3 full);Drainage bag/urimeter below bladder 11/24/23 0800   Output (mL) 800 mL 11/24/23 0540           Significant Labs:  Recent Labs   Lab 11/25/23  0545      K 4.3   CO2 25   BUN 38.8*   CREATININE 0.96   CALCIUM 9.2     Recent Labs   Lab 11/25/23  0545   WBC 19.69*   HGB 13.3*   HCT 39.6*        No results for input(s): "LABPT", "INR", "APTT" in the last 48 hours.  Microbiology Results (last 7 days)       Procedure Component Value Units Date/Time    Blood culture #2 **CANNOT BE ORDERED STAT** [[id number]]  (Normal) Collected: 11/14/23 1924    Order Status: Completed Specimen: Blood Updated: 11/19/23 2300     CULTURE, BLOOD ([facility]) No Growth at 5 days    Blood culture #1 **CANNOT BE ORDERED STAT** [[id number]]  (Normal) Collected: 11/14/23 1925    Order Status: Completed Specimen: Blood Updated: 11/19/23 2300     CULTURE, BLOOD ([facility]) No Growth at 5 days      "         Active Diagnoses:    Diagnosis Date Noted POA    PRINCIPAL PROBLEM:  Discitis of thoracic region [M46.44] 11/15/2023 No    Myelopathy due to spondylosis [M47.10] 11/18/2023 Yes    Ankle pain [M25.579] 11/18/2023 Yes    Primary hypertension [I10] 05/18/2023 Yes    Type 2 diabetes mellitus with other specified complication, with long-term current use of insulin [E11.69, Z79.4]  Not Applicable      Problems Resolved During this Admission:       DULCE Santos-BC  Neurosurgery  [facility] - 5th Floor Med Surg

## 2023-11-25 NOTE — SUBJECTIVE & OBJECTIVE
Interval History:  Overall stable no overnight events   Awaiting placement    Review of Systems   Constitutional:  Negative for chills, diaphoresis and fever.   HENT:  Negative for ear pain and rhinorrhea.    Respiratory:  Negative for cough, chest tightness and shortness of breath.    Cardiovascular:  Negative for chest pain, palpitations and leg swelling.   Gastrointestinal:  Negative for abdominal pain, constipation, diarrhea, nausea and vomiting.   Genitourinary:  Negative for difficulty urinating, dysuria and flank pain.   Musculoskeletal:  Negative for back pain and joint swelling.   Neurological:  Negative for dizziness, weakness, light-headedness, numbness and headaches.   Psychiatric/Behavioral:  Negative for confusion.      Objective:     Vital Signs (Most Recent):  Temp: 98.3 °F (36.8 °C) (11/25/23 1557)  Pulse: 79 (11/25/23 1602)  Resp: 18 (11/25/23 1602)  BP: 137/68 (11/25/23 1557)  SpO2: (!) 93 % (11/25/23 1602) Vital Signs (24h Range):  Temp:  [97.6 °F (36.4 °C)-98.5 °F (36.9 °C)] 98.3 °F (36.8 °C)  Pulse:  [73-90] 79  Resp:  [16-18] 18  SpO2:  [90 %-94 %] 93 %  BP: (137-164)/(67-80) 137/68     Weight: 99.3 kg (219 lb)  Body mass index is 32.34 kg/m².    Intake/Output Summary (Last 24 hours) at 11/25/2023 1626  Last data filed at 11/25/2023 1300  Gross per 24 hour   Intake 800 ml   Output 2302 ml   Net -1502 ml           Physical Exam  Constitutional:       Appearance: Normal appearance.   HENT:      Head: Normocephalic and atraumatic.   Cardiovascular:      Rate and Rhythm: Normal rate and regular rhythm.      Pulses: Normal pulses.   Pulmonary:      Effort: Pulmonary effort is normal.      Breath sounds: Normal breath sounds.   Abdominal:      General: Abdomen is flat. Bowel sounds are normal. There is no distension.      Palpations: Abdomen is soft.      Tenderness: There is no abdominal tenderness.   Musculoskeletal:         General: No tenderness. Normal range of motion.      Right lower leg: No  edema.      Left lower leg: No edema.   Lymphadenopathy:      Cervical: No cervical adenopathy.   Neurological:      General: No focal deficit present.      Mental Status: He is alert and oriented to person, place, and time.             Significant Labs: All pertinent labs within the past 24 hours have been reviewed.    Significant Imaging: I have reviewed all pertinent imaging results/findings within the past 24 hours.

## 2023-11-26 LAB
ABS NEUT (OLG): 16.68 X10(3)/MCL (ref 2.1–9.2)
ALBUMIN SERPL-MCNC: 3.2 G/DL (ref 3.4–4.8)
ALBUMIN/GLOB SERPL: 1 RATIO (ref 1.1–2)
ALP SERPL-CCNC: 54 UNIT/L (ref 40–150)
ALT SERPL-CCNC: 20 UNIT/L (ref 0–55)
AST SERPL-CCNC: 12 UNIT/L (ref 5–34)
BILIRUB SERPL-MCNC: 1.2 MG/DL
BUN SERPL-MCNC: 42.2 MG/DL (ref 8.4–25.7)
CALCIUM SERPL-MCNC: 8.9 MG/DL (ref 8.8–10)
CHLORIDE SERPL-SCNC: 103 MMOL/L (ref 98–107)
CO2 SERPL-SCNC: 23 MMOL/L (ref 23–31)
CREAT SERPL-MCNC: 0.88 MG/DL (ref 0.73–1.18)
ERYTHROCYTE [DISTWIDTH] IN BLOOD BY AUTOMATED COUNT: 12.3 % (ref 11.5–17)
GFR SERPLBLD CREATININE-BSD FMLA CKD-EPI: >60 MLS/MIN/1.73/M2
GLOBULIN SER-MCNC: 3.1 GM/DL (ref 2.4–3.5)
GLUCOSE SERPL-MCNC: 308 MG/DL (ref 82–115)
HCT VFR BLD AUTO: 41.8 % (ref 42–52)
HGB BLD-MCNC: 14.3 G/DL (ref 14–18)
INSTRUMENT WBC (OLG): 18.96 X10(3)/MCL
LYMPHOCYTES NFR BLD MANUAL: 1.71 X10(3)/MCL
LYMPHOCYTES NFR BLD MANUAL: 9 %
MCH RBC QN AUTO: 32 PG (ref 27–31)
MCHC RBC AUTO-ENTMCNC: 34.2 G/DL (ref 33–36)
MCV RBC AUTO: 93.5 FL (ref 80–94)
METAMYELOCYTES NFR BLD MANUAL: 2 %
MONOCYTES NFR BLD MANUAL: 0.38 X10(3)/MCL (ref 0.1–1.3)
MONOCYTES NFR BLD MANUAL: 2 %
NEUTROPHILS NFR BLD MANUAL: 88 %
NRBC BLD AUTO-RTO: 0 %
PLATELET # BLD AUTO: 245 X10(3)/MCL (ref 130–400)
PLATELET # BLD EST: ADEQUATE 10*3/UL
PMV BLD AUTO: 11.3 FL (ref 7.4–10.4)
POCT GLUCOSE: 232 MG/DL (ref 70–110)
POCT GLUCOSE: 255 MG/DL (ref 70–110)
POCT GLUCOSE: 256 MG/DL (ref 70–110)
POCT GLUCOSE: 321 MG/DL (ref 70–110)
POTASSIUM SERPL-SCNC: 4.8 MMOL/L (ref 3.5–5.1)
PROT SERPL-MCNC: 6.3 GM/DL (ref 5.8–7.6)
RBC # BLD AUTO: 4.47 X10(6)/MCL (ref 4.7–6.1)
RBC MORPH BLD: NORMAL
SODIUM SERPL-SCNC: 136 MMOL/L (ref 136–145)
WBC # SPEC AUTO: 18.96 X10(3)/MCL (ref 4.5–11.5)

## 2023-11-26 PROCEDURE — 21400001 HC TELEMETRY ROOM

## 2023-11-26 PROCEDURE — 99232 SBSQ HOSP IP/OBS MODERATE 35: CPT | Mod: ,,, | Performed by: INTERNAL MEDICINE

## 2023-11-26 PROCEDURE — 25000003 PHARM REV CODE 250

## 2023-11-26 PROCEDURE — 80053 COMPREHEN METABOLIC PANEL: CPT | Performed by: STUDENT IN AN ORGANIZED HEALTH CARE EDUCATION/TRAINING PROGRAM

## 2023-11-26 PROCEDURE — 63600175 PHARM REV CODE 636 W HCPCS

## 2023-11-26 PROCEDURE — 25000003 PHARM REV CODE 250: Performed by: NURSE PRACTITIONER

## 2023-11-26 PROCEDURE — 94640 AIRWAY INHALATION TREATMENT: CPT

## 2023-11-26 PROCEDURE — 85027 COMPLETE CBC AUTOMATED: CPT | Performed by: STUDENT IN AN ORGANIZED HEALTH CARE EDUCATION/TRAINING PROGRAM

## 2023-11-26 PROCEDURE — 25000003 PHARM REV CODE 250: Performed by: INTERNAL MEDICINE

## 2023-11-26 PROCEDURE — 99900035 HC TECH TIME PER 15 MIN (STAT)

## 2023-11-26 PROCEDURE — 99900031 HC PATIENT EDUCATION (STAT)

## 2023-11-26 PROCEDURE — 25000242 PHARM REV CODE 250 ALT 637 W/ HCPCS: Performed by: INTERNAL MEDICINE

## 2023-11-26 PROCEDURE — 63600175 PHARM REV CODE 636 W HCPCS: Performed by: NURSE PRACTITIONER

## 2023-11-26 PROCEDURE — 63600175 PHARM REV CODE 636 W HCPCS: Performed by: INTERNAL MEDICINE

## 2023-11-26 PROCEDURE — 63600175 PHARM REV CODE 636 W HCPCS: Performed by: PHYSICIAN ASSISTANT

## 2023-11-26 RX ORDER — DEXAMETHASONE 0.5 MG/1
2 TABLET ORAL EVERY 12 HOURS
Status: DISCONTINUED | OUTPATIENT
Start: 2023-11-26 | End: 2023-11-27

## 2023-11-26 RX ADMIN — METHOCARBAMOL 750 MG: 750 TABLET ORAL at 09:11

## 2023-11-26 RX ADMIN — TAMSULOSIN HYDROCHLORIDE 0.4 MG: 0.4 CAPSULE ORAL at 09:11

## 2023-11-26 RX ADMIN — IPRATROPIUM BROMIDE AND ALBUTEROL SULFATE 3 ML: 2.5; .5 SOLUTION RESPIRATORY (INHALATION) at 03:11

## 2023-11-26 RX ADMIN — METHOCARBAMOL 750 MG: 750 TABLET ORAL at 03:11

## 2023-11-26 RX ADMIN — INSULIN ASPART 5 UNITS: 100 INJECTION, SOLUTION INTRAVENOUS; SUBCUTANEOUS at 05:11

## 2023-11-26 RX ADMIN — GUAIFENESIN 600 MG: 600 TABLET, EXTENDED RELEASE ORAL at 09:11

## 2023-11-26 RX ADMIN — MICONAZOLE NITRATE: 20 POWDER TOPICAL at 09:11

## 2023-11-26 RX ADMIN — INSULIN ASPART 1 UNITS: 100 INJECTION, SOLUTION INTRAVENOUS; SUBCUTANEOUS at 09:11

## 2023-11-26 RX ADMIN — CITALOPRAM HYDROBROMIDE 20 MG: 20 TABLET ORAL at 09:11

## 2023-11-26 RX ADMIN — SENNOSIDES AND DOCUSATE SODIUM 2 TABLET: 8.6; 5 TABLET ORAL at 09:11

## 2023-11-26 RX ADMIN — IPRATROPIUM BROMIDE AND ALBUTEROL SULFATE 3 ML: 2.5; .5 SOLUTION RESPIRATORY (INHALATION) at 09:11

## 2023-11-26 RX ADMIN — ALLOPURINOL 100 MG: 100 TABLET ORAL at 09:11

## 2023-11-26 RX ADMIN — IPRATROPIUM BROMIDE AND ALBUTEROL SULFATE 3 ML: 2.5; .5 SOLUTION RESPIRATORY (INHALATION) at 12:11

## 2023-11-26 RX ADMIN — INSULIN ASPART 3 UNITS: 100 INJECTION, SOLUTION INTRAVENOUS; SUBCUTANEOUS at 05:11

## 2023-11-26 RX ADMIN — DEXAMETHASONE 2 MG: 0.5 TABLET ORAL at 09:11

## 2023-11-26 RX ADMIN — DEXAMETHASONE 4 MG: 4 TABLET ORAL at 09:11

## 2023-11-26 RX ADMIN — LISINOPRIL 10 MG: 10 TABLET ORAL at 09:11

## 2023-11-26 RX ADMIN — ENOXAPARIN SODIUM 40 MG: 40 INJECTION SUBCUTANEOUS at 05:11

## 2023-11-26 RX ADMIN — DOXAZOSIN 4 MG: 4 TABLET ORAL at 09:11

## 2023-11-26 RX ADMIN — INSULIN ASPART 2 UNITS: 100 INJECTION, SOLUTION INTRAVENOUS; SUBCUTANEOUS at 11:11

## 2023-11-26 NOTE — SUBJECTIVE & OBJECTIVE
Interval History:  Overall stable no overnight events   Awaiting placement nothing new still soft collar   Wound clean dry and intact  Ambulating with walker  States the numbness in his hands is improved he still complains of some weakness    Review of Systems   Constitutional:  Negative for chills, diaphoresis and fever.   HENT:  Negative for ear pain and rhinorrhea.    Respiratory:  Negative for cough, chest tightness and shortness of breath.    Cardiovascular:  Negative for chest pain, palpitations and leg swelling.   Gastrointestinal:  Negative for abdominal pain, constipation, diarrhea, nausea and vomiting.   Genitourinary:  Negative for difficulty urinating, dysuria and flank pain.   Musculoskeletal:  Negative for back pain and joint swelling.   Neurological:  Negative for dizziness, weakness, light-headedness, numbness and headaches.   Psychiatric/Behavioral:  Negative for confusion.      Objective:     Vital Signs (Most Recent):  Temp: 98.4 °F (36.9 °C) (11/26/23 1100)  Pulse: 84 (11/26/23 1100)  Resp: 20 (11/26/23 1100)  BP: (!) 140/73 (11/26/23 1100)  SpO2: (!) 93 % (11/26/23 1100) Vital Signs (24h Range):  Temp:  [97.5 °F (36.4 °C)-98.4 °F (36.9 °C)] 98.4 °F (36.9 °C)  Pulse:  [78-98] 84  Resp:  [18-20] 20  SpO2:  [92 %-94 %] 93 %  BP: (126-140)/(68-85) 140/73     Weight: 99.3 kg (219 lb)  Body mass index is 32.34 kg/m².    Intake/Output Summary (Last 24 hours) at 11/26/2023 1505  Last data filed at 11/26/2023 1300  Gross per 24 hour   Intake 840 ml   Output 1150 ml   Net -310 ml           Physical Exam  Constitutional:       Appearance: Normal appearance.   HENT:      Head: Normocephalic and atraumatic.   Cardiovascular:      Rate and Rhythm: Normal rate and regular rhythm.      Pulses: Normal pulses.   Pulmonary:      Effort: Pulmonary effort is normal.      Breath sounds: Normal breath sounds.   Abdominal:      General: Abdomen is flat. Bowel sounds are normal. There is no distension.      Palpations:  Abdomen is soft.      Tenderness: There is no abdominal tenderness.   Musculoskeletal:         General: No tenderness. Normal range of motion.      Right lower leg: No edema.      Left lower leg: No edema.   Lymphadenopathy:      Cervical: No cervical adenopathy.   Neurological:      General: No focal deficit present.      Mental Status: He is alert and oriented to person, place, and time.             Significant Labs: All pertinent labs within the past 24 hours have been reviewed.    Significant Imaging: I have reviewed all pertinent imaging results/findings within the past 24 hours.

## 2023-11-26 NOTE — PLAN OF CARE
Problem: Adult Inpatient Plan of Care  Goal: Plan of Care Review  Outcome: Ongoing, Progressing  Goal: Patient-Specific Goal (Individualized)  Outcome: Ongoing, Progressing  Goal: Absence of Hospital-Acquired Illness or Injury  Outcome: Ongoing, Progressing  Intervention: Prevent Skin Injury  Flowsheets (Taken 11/25/2023 2676)  Body Position:   30 degrees   turned  Skin Protection:   adhesive use limited   tubing/devices free from skin contact  Goal: Optimal Comfort and Wellbeing  Outcome: Ongoing, Progressing  Goal: Readiness for Transition of Care  Outcome: Ongoing, Progressing     Problem: Diabetes Comorbidity  Goal: Blood Glucose Level Within Targeted Range  Outcome: Ongoing, Progressing  Intervention: Monitor and Manage Glycemia  Flowsheets (Taken 11/25/2023 8795)  Glycemic Management: blood glucose monitored     Problem: Skin Injury Risk Increased  Goal: Skin Health and Integrity  Outcome: Ongoing, Progressing     Problem: Infection  Goal: Absence of Infection Signs and Symptoms  Outcome: Ongoing, Progressing     Problem: Fall Injury Risk  Goal: Absence of Fall and Fall-Related Injury  Outcome: Ongoing, Progressing

## 2023-11-26 NOTE — PROGRESS NOTES
[facility] - 5th Floor [facility] Medicine  Progress Note    Patient Name: Setvie Cummins  MRN: [id number]  Patient Class: IP- Inpatient   Admission Date: 11/14/2023  Length of Stay: 12 days  Attending Physician: Robert Alarcon MD  Primary Care Provider: Rboert Alarcon MD        Subjective:     Principal Problem:Discitis of thoracic region        HPI:  No notes on file    Overview/Hospital Course:  No notes on file    Interval History:  Overall stable no overnight events   Awaiting placement nothing new still soft collar   Wound clean dry and intact  Ambulating with walker  States the numbness in his hands is improved he still complains of some weakness    Review of Systems   Constitutional:  Negative for chills, diaphoresis and fever.   HENT:  Negative for ear pain and rhinorrhea.    Respiratory:  Negative for cough, chest tightness and shortness of breath.    Cardiovascular:  Negative for chest pain, palpitations and leg swelling.   Gastrointestinal:  Negative for abdominal pain, constipation, diarrhea, nausea and vomiting.   Genitourinary:  Negative for difficulty urinating, dysuria and flank pain.   Musculoskeletal:  Negative for back pain and joint swelling.   Neurological:  Negative for dizziness, weakness, light-headedness, numbness and headaches.   Psychiatric/Behavioral:  Negative for confusion.      Objective:     Vital Signs (Most Recent):  Temp: 98.4 °F (36.9 °C) (11/26/23 1100)  Pulse: 84 (11/26/23 1100)  Resp: 20 (11/26/23 1100)  BP: (!) 140/73 (11/26/23 1100)  SpO2: (!) 93 % (11/26/23 1100) Vital Signs (24h Range):  Temp:  [97.5 °F (36.4 °C)-98.4 °F (36.9 °C)] 98.4 °F (36.9 °C)  Pulse:  [78-98] 84  Resp:  [18-20] 20  SpO2:  [92 %-94 %] 93 %  BP: (126-140)/(68-85) 140/73     Weight: 99.3 kg (219 lb)  Body mass index is 32.34 kg/m².    Intake/Output Summary (Last 24 hours) at 11/26/2023 1505  Last data filed at 11/26/2023 1300  Gross per 24 hour   Intake 840 ml   Output 1150 ml   Net  -310 ml           Physical Exam  Constitutional:       Appearance: Normal appearance.   HENT:      Head: Normocephalic and atraumatic.   Cardiovascular:      Rate and Rhythm: Normal rate and regular rhythm.      Pulses: Normal pulses.   Pulmonary:      Effort: Pulmonary effort is normal.      Breath sounds: Normal breath sounds.   Abdominal:      General: Abdomen is flat. Bowel sounds are normal. There is no distension.      Palpations: Abdomen is soft.      Tenderness: There is no abdominal tenderness.   Musculoskeletal:         General: No tenderness. Normal range of motion.      Right lower leg: No edema.      Left lower leg: No edema.   Lymphadenopathy:      Cervical: No cervical adenopathy.   Neurological:      General: No focal deficit present.      Mental Status: He is alert and oriented to person, place, and time.             Significant Labs: All pertinent labs within the past 24 hours have been reviewed.    Significant Imaging: I have reviewed all pertinent imaging results/findings within the past 24 hours.    Assessment/Plan:      Ankle pain  Uric acid level was elevated.   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    Type 2 diabetes mellitus with other specified complication, with long-term current use of insulin  Uncontrolled due to being on dexamethasone  Continue ISS       VTE Risk Mitigation (From admission, onward)           Ordered     enoxaparin injection 40 mg  Daily         11/20/23 1242     Place sequential compression device  Until discontinued         11/20/23 1242     IP VTE HIGH RISK PATIENT  Once         11/14/23 2111                    Discharge Planning   NAZANIN:      Code Status: Full Code   Is the patient medically ready for discharge?:     Reason for patient still in hospital (select all that apply):  Treatment  Discharge Plan A: Rehab                  YANET HSIEH MD  Department of Hospital Medicine   [facility] - 5th Floor Med Surg

## 2023-11-26 NOTE — PROGRESS NOTES
POD#6 C3-T1 PCDF    He is ambulating in the room with a walker.  Appetite is better today he is eating.  States that the numbness in his hands has improved.  Minimal incisional pain to posterior neck.    Wearing soft collar   Continues with weakness and slight imbalance when ambulating.    Lower extremity strength improving.  I personally inspected the wound and change the dressing today.  Healing nicely.  Dry and intact.      Vital signs are stable, afebrile.  Incision is clean and dry.  Soft collar in place    Plan:     Continue daily dressing changes  Continue multimodal pain control  Continue PT/OT  Fall precautions  Gabapentin TID  Continuing to wean steroids.  SCDs and lovenox for DVT prophylaxis      Post-Op Info:  Procedure(s) (LRB):  FUSION, SPINE, POSTERIOR SPINAL COLUMN, CERVICAL, USING COMPUTER-ASSISTED NAVIGATION (N/A)   6 Days Post-Op      Medications:  Continuous Infusions:   sodium chloride 0.9% 100 mL/hr at 11/20/23 1726     Scheduled Meds:   albuterol-ipratropium  3 mL Nebulization Q8H    allopurinoL  100 mg Oral Daily    bisacodyL  10 mg Rectal Daily    citalopram  20 mg Oral Daily    dexAMETHasone  4 mg Oral Q12H    doxazosin  4 mg Oral Daily    enoxparin  40 mg Subcutaneous Daily    guaiFENesin  600 mg Oral BID    lisinopriL  10 mg Oral Daily    methocarbamoL  750 mg Oral TID    miconazole NITRATE 2 %   Topical (Top) BID    senna-docusate 8.6-50 mg  2 tablet Oral BID    tamsulosin  1 capsule Oral Daily     PRN Meds:acetaminophen, acetaminophen, aluminum-magnesium hydroxide-simethicone, [COMPLETED] calcium gluconate IVPB **AND** calcium gluconate IVPB, dextrose 10%, dextrose 10%, HYDROcodone-acetaminophen, HYDROcodone-acetaminophen, HYDROcodone-acetaminophen, insulin aspart U-100, magnesium hydroxide 400 mg/5 ml, melatonin, morphine, morphine, morphine, ondansetron, prochlorperazine, sodium chloride 0.9%     Review of Systems  Objective:     Weight: 99.3 kg (219 lb)  Body mass index is 32.34  "kg/m².  Vital Signs (Most Recent):  Temp: 98.4 °F (36.9 °C) (11/26/23 1100)  Pulse: 84 (11/26/23 1100)  Resp: 20 (11/26/23 1100)  BP: (!) 140/73 (11/26/23 1100)  SpO2: (!) 93 % (11/26/23 1100) Vital Signs (24h Range):  Temp:  [97.5 °F (36.4 °C)-98.4 °F (36.9 °C)] 98.4 °F (36.9 °C)  Pulse:  [78-98] 84  Resp:  [18-20] 20  SpO2:  [92 %-94 %] 93 %  BP: (126-140)/(68-85) 140/73                              Urethral Catheter 11/22/23 1830 Silicone 14 Fr. (Active)   $ Gardiner Insertion Bedside Insertion Performed 11/22/23 1830   Site Assessment Clean;Intact 11/24/23 0800   Collection Container Standard drainage bag 11/24/23 0800   Securement Method secured to top of thigh w/ adhesive device 11/24/23 0800   Catheter Care Performed yes 11/24/23 0800   Reason for Continuing Urinary Catheterization Urinary retention 11/24/23 0800   CAUTI Prevention Bundle Securement Device in place with 1" slack;Intact seal between catheter & drainage tubing;Drainage bag/urimeter off the floor;Sheeting clip in use;No dependent loops or kinks;Drainage bag/urimeter not overfilled (<2/3 full);Drainage bag/urimeter below bladder 11/24/23 0800   Output (mL) 800 mL 11/24/23 0540           Significant Labs:  Recent Labs   Lab 11/25/23  0545 11/26/23  0450    136   K 4.3 4.8   CO2 25 23   BUN 38.8* 42.2*   CREATININE 0.96 0.88   CALCIUM 9.2 8.9       Recent Labs   Lab 11/25/23  0545 11/26/23  0450   WBC 19.69* 18.96  18.96*   HGB 13.3* 14.3   HCT 39.6* 41.8*    245       No results for input(s): "LABPT", "INR", "APTT" in the last 48 hours.  Microbiology Results (last 7 days)       Procedure Component Value Units Date/Time    Blood culture #2 **CANNOT BE ORDERED STAT** [[id number]]  (Normal) Collected: 11/14/23 1924    Order Status: Completed Specimen: Blood Updated: 11/19/23 2300     CULTURE, BLOOD ([facility]) No Growth at 5 days    Blood culture #1 **CANNOT BE ORDERED STAT** [[id number]]  (Normal) Collected: 11/14/23 1925    Order Status: " Completed Specimen: Blood Updated: 11/19/23 2300     CULTURE, BLOOD ([facility]) No Growth at 5 days              Active Diagnoses:    Diagnosis Date Noted POA    PRINCIPAL PROBLEM:  Discitis of thoracic region [M46.44] 11/15/2023 No    Myelopathy due to spondylosis [M47.10] 11/18/2023 Yes    Ankle pain [M25.579] 11/18/2023 Yes    Primary hypertension [I10] 05/18/2023 Yes    Type 2 diabetes mellitus with other specified complication, with long-term current use of insulin [E11.69, Z79.4]  Not Applicable      Problems Resolved During this Admission:       DULCE Santos-BC  Neurosurgery  [facility] - 5th Floor Med Surg    Neurosurgery Physical Exam

## 2023-11-27 LAB
ALBUMIN SERPL-MCNC: 3.3 G/DL (ref 3.4–4.8)
ALBUMIN/GLOB SERPL: 1.1 RATIO (ref 1.1–2)
ALP SERPL-CCNC: 58 UNIT/L (ref 40–150)
ALT SERPL-CCNC: 20 UNIT/L (ref 0–55)
AST SERPL-CCNC: 13 UNIT/L (ref 5–34)
BASOPHILS # BLD AUTO: 0.09 X10(3)/MCL
BASOPHILS NFR BLD AUTO: 0.4 %
BILIRUB SERPL-MCNC: 1.4 MG/DL
BUN SERPL-MCNC: 40.2 MG/DL (ref 8.4–25.7)
CALCIUM SERPL-MCNC: 9.1 MG/DL (ref 8.8–10)
CHLORIDE SERPL-SCNC: 103 MMOL/L (ref 98–107)
CO2 SERPL-SCNC: 24 MMOL/L (ref 23–31)
CREAT SERPL-MCNC: 0.86 MG/DL (ref 0.73–1.18)
EOSINOPHIL # BLD AUTO: 0.08 X10(3)/MCL (ref 0–0.9)
EOSINOPHIL NFR BLD AUTO: 0.4 %
ERYTHROCYTE [DISTWIDTH] IN BLOOD BY AUTOMATED COUNT: 12.4 % (ref 11.5–17)
GFR SERPLBLD CREATININE-BSD FMLA CKD-EPI: >60 MLS/MIN/1.73/M2
GLOBULIN SER-MCNC: 2.9 GM/DL (ref 2.4–3.5)
GLUCOSE SERPL-MCNC: 254 MG/DL (ref 82–115)
HCT VFR BLD AUTO: 41.8 % (ref 42–52)
HGB BLD-MCNC: 14.2 G/DL (ref 14–18)
IMM GRANULOCYTES # BLD AUTO: 0.92 X10(3)/MCL (ref 0–0.04)
IMM GRANULOCYTES NFR BLD AUTO: 4.6 %
LYMPHOCYTES # BLD AUTO: 2.15 X10(3)/MCL (ref 0.6–4.6)
LYMPHOCYTES NFR BLD AUTO: 10.7 %
MCH RBC QN AUTO: 32.1 PG (ref 27–31)
MCHC RBC AUTO-ENTMCNC: 34 G/DL (ref 33–36)
MCV RBC AUTO: 94.4 FL (ref 80–94)
MONOCYTES # BLD AUTO: 1.92 X10(3)/MCL (ref 0.1–1.3)
MONOCYTES NFR BLD AUTO: 9.5 %
NEUTROPHILS # BLD AUTO: 14.95 X10(3)/MCL (ref 2.1–9.2)
NEUTROPHILS NFR BLD AUTO: 74.4 %
NRBC BLD AUTO-RTO: 0 %
PLATELET # BLD AUTO: 225 X10(3)/MCL (ref 130–400)
PMV BLD AUTO: 11.3 FL (ref 7.4–10.4)
POCT GLUCOSE: 224 MG/DL (ref 70–110)
POCT GLUCOSE: 251 MG/DL (ref 70–110)
POTASSIUM SERPL-SCNC: 4.5 MMOL/L (ref 3.5–5.1)
PROT SERPL-MCNC: 6.2 GM/DL (ref 5.8–7.6)
RBC # BLD AUTO: 4.43 X10(6)/MCL (ref 4.7–6.1)
SODIUM SERPL-SCNC: 135 MMOL/L (ref 136–145)
WBC # SPEC AUTO: 20.11 X10(3)/MCL (ref 4.5–11.5)

## 2023-11-27 PROCEDURE — 63600175 PHARM REV CODE 636 W HCPCS: Performed by: NURSE PRACTITIONER

## 2023-11-27 PROCEDURE — 25000003 PHARM REV CODE 250

## 2023-11-27 PROCEDURE — 21400001 HC TELEMETRY ROOM

## 2023-11-27 PROCEDURE — 85025 COMPLETE CBC W/AUTO DIFF WBC: CPT | Performed by: STUDENT IN AN ORGANIZED HEALTH CARE EDUCATION/TRAINING PROGRAM

## 2023-11-27 PROCEDURE — 97530 THERAPEUTIC ACTIVITIES: CPT | Mod: CQ

## 2023-11-27 PROCEDURE — 94761 N-INVAS EAR/PLS OXIMETRY MLT: CPT

## 2023-11-27 PROCEDURE — 99900035 HC TECH TIME PER 15 MIN (STAT)

## 2023-11-27 PROCEDURE — 99233 SBSQ HOSP IP/OBS HIGH 50: CPT | Mod: ,,, | Performed by: INTERNAL MEDICINE

## 2023-11-27 PROCEDURE — 25000003 PHARM REV CODE 250: Performed by: INTERNAL MEDICINE

## 2023-11-27 PROCEDURE — 63600175 PHARM REV CODE 636 W HCPCS: Performed by: INTERNAL MEDICINE

## 2023-11-27 PROCEDURE — 80053 COMPREHEN METABOLIC PANEL: CPT | Performed by: STUDENT IN AN ORGANIZED HEALTH CARE EDUCATION/TRAINING PROGRAM

## 2023-11-27 PROCEDURE — 25000242 PHARM REV CODE 250 ALT 637 W/ HCPCS: Performed by: INTERNAL MEDICINE

## 2023-11-27 PROCEDURE — 25000003 PHARM REV CODE 250: Performed by: NURSE PRACTITIONER

## 2023-11-27 PROCEDURE — 97116 GAIT TRAINING THERAPY: CPT | Mod: CQ

## 2023-11-27 PROCEDURE — 94640 AIRWAY INHALATION TREATMENT: CPT

## 2023-11-27 RX ORDER — DEXAMETHASONE 0.5 MG/1
2 TABLET ORAL DAILY
Status: DISCONTINUED | OUTPATIENT
Start: 2023-11-28 | End: 2023-11-29 | Stop reason: HOSPADM

## 2023-11-27 RX ADMIN — SENNOSIDES AND DOCUSATE SODIUM 2 TABLET: 8.6; 5 TABLET ORAL at 09:11

## 2023-11-27 RX ADMIN — GUAIFENESIN 600 MG: 600 TABLET, EXTENDED RELEASE ORAL at 09:11

## 2023-11-27 RX ADMIN — MICONAZOLE NITRATE: 20 POWDER TOPICAL at 09:11

## 2023-11-27 RX ADMIN — INSULIN ASPART 2 UNITS: 100 INJECTION, SOLUTION INTRAVENOUS; SUBCUTANEOUS at 12:11

## 2023-11-27 RX ADMIN — CITALOPRAM HYDROBROMIDE 20 MG: 20 TABLET ORAL at 09:11

## 2023-11-27 RX ADMIN — ALLOPURINOL 100 MG: 100 TABLET ORAL at 09:11

## 2023-11-27 RX ADMIN — DEXAMETHASONE 2 MG: 0.5 TABLET ORAL at 09:11

## 2023-11-27 RX ADMIN — METHOCARBAMOL 750 MG: 750 TABLET ORAL at 03:11

## 2023-11-27 RX ADMIN — IPRATROPIUM BROMIDE AND ALBUTEROL SULFATE 3 ML: 2.5; .5 SOLUTION RESPIRATORY (INHALATION) at 08:11

## 2023-11-27 RX ADMIN — METHOCARBAMOL 750 MG: 750 TABLET ORAL at 09:11

## 2023-11-27 RX ADMIN — INSULIN ASPART 1 UNITS: 100 INJECTION, SOLUTION INTRAVENOUS; SUBCUTANEOUS at 09:11

## 2023-11-27 RX ADMIN — IPRATROPIUM BROMIDE AND ALBUTEROL SULFATE 3 ML: 2.5; .5 SOLUTION RESPIRATORY (INHALATION) at 04:11

## 2023-11-27 RX ADMIN — IPRATROPIUM BROMIDE AND ALBUTEROL SULFATE 3 ML: 2.5; .5 SOLUTION RESPIRATORY (INHALATION) at 12:11

## 2023-11-27 RX ADMIN — LISINOPRIL 10 MG: 10 TABLET ORAL at 09:11

## 2023-11-27 RX ADMIN — DOXAZOSIN 4 MG: 4 TABLET ORAL at 09:11

## 2023-11-27 RX ADMIN — TAMSULOSIN HYDROCHLORIDE 0.4 MG: 0.4 CAPSULE ORAL at 09:11

## 2023-11-27 RX ADMIN — BISACODYL 10 MG: 10 SUPPOSITORY RECTAL at 09:11

## 2023-11-27 NOTE — PROGRESS NOTES
POD#7 C3-T1 PCDF  He is sitting up in bed, NAD  His pain is controlled with current meds  He continues with numbness in bilateral hands  He continues with weakness when ambulating although he states it is improving  His LE strength is improved    AFVSS  Motor exam unchanged  Incision c/d/I  Soft collar in place    Plan: Continue daily dressing changes  Continue multimodal pain control  Continue PT/OT  Gabapentin TID  Will wean decadron to 2mg PO daily for 3 additional days then dc  SCDs and lovenox for DVT prophylaxis   OK to transfer to rehab when accepted  F/u outpatient with Dr. Hutchnison in 3-4 weeks  We will sign off. Please call with any questions

## 2023-11-27 NOTE — PT/OT/SLP PROGRESS
Physical Therapy Treatment    Patient Name:  Stevie Cummins   MRN:  [id number]    Recommendations:     Discharge therapy intensity: Moderate Intensity Therapy   Discharge Equipment Recommendations: none  Barriers to discharge: Impaired mobility    Assessment:     Stevie Cummins is a 73 y.o. male admitted with a medical diagnosis of Discitis of thoracic region.  He presents with the following impairments/functional limitations: weakness, impaired endurance, impaired self care skills, impaired functional mobility, gait instability, impaired sensation, impaired balance, decreased upper extremity function, pain .    Rehab Prognosis: Good; patient would benefit from acute skilled PT services to address these deficits and reach maximum level of function.    Recent Surgery: Procedure(s) (LRB):  FUSION, SPINE, POSTERIOR SPINAL COLUMN, CERVICAL, USING COMPUTER-ASSISTED NAVIGATION (N/A) 7 Days Post-Op    Plan:     During this hospitalization, patient to be seen 6 x/week to address the identified rehab impairments via gait training, therapeutic activities, therapeutic exercises and progress toward the following goals:    Plan of Care Expires:  12/16/23    Subjective     Chief Complaint: discomfort in posterior neck and hillman cath area  Patient/Family Comments/goals:   Pain/Comfort:  Pain Rating 1: 0/10      Objective:     Communicated with RN prior to session.  Patient found HOB elevated with cervical collar, hillman catheter upon PT entry to room.     General Precautions: Standard, fall  Orthopedic Precautions: spinal precautions  Braces: Cervical collar  Respiratory Status: Room air  Blood Pressure:   Skin Integrity: Visible skin intact      Functional Mobility:  Bed Mobility:     Supine to Sit: minimum assistance  Transfers:     Sit to Stand:  minimum assistance with rolling walker  Gait: Pt amb 15ft, 25ft to bathroom from choi, 8ft with RW, Min A. Cues for proper step length and to maintain upright posture. Pt stated he need to  return to room to have BM.     Therapeutic Activities/Exercises:  Sit to stand activity to perform pericare ( required max A) and bright brief near toilet with RW then pt amb 8ft to chair in room.    Education:  Patient provided with verbal education education regarding POC, safety, precautions, call bell use.  Understanding was verbalized.     Patient left up in chair with all lines intact, call button in reach, and MASTER notified to return patient back to bed.     GOALS:   Multidisciplinary Problems       Physical Therapy Goals          Problem: Physical Therapy    Goal Priority Disciplines Outcome Goal Variances Interventions   Physical Therapy Goal     PT, PT/OT Ongoing, Progressing     Description: Goals to be met by: d/c     Patient will increase functional independence with mobility by performin. Supine to sit with Stand-by Assistance  2. Sit to supine with Stand-by Assistance  3. Sit to stand transfer with Stand-by Assistance  4. Bed to chair transfer with Stand-by Assistance using Rolling Walker  5. Gait  x 150 feet with Stand-by Assistance using Rolling Walker.                          Time Tracking:     PT Received On:    PT Start Time: 1200     PT Stop Time: 1225  PT Total Time (min): 25 min     Billable Minutes: Gait Training 10 and Therapeutic Activity 15    Treatment Type: Treatment  PT/PTA: PTA     Number of PTA visits since last PT visit: 3     2023

## 2023-11-27 NOTE — PROGRESS NOTES
Progress Note  Hospital Medicine    Patient Name: Stevie Cummins  YOB: 1950    Admit Date: 11/14/2023                     LOS: 13    SUBJECTIVE:     Reason for Admission:  Discitis of thoracic region  See H&P for detailed presentating history and ROS.      Interval history:  Uneventful weekend, patient feels better, refers his hand as paresthesias, and just waiting on placement.  According to the  has been refused from rehab, will attempt to go to SNF      OBJECTIVE:     Vital Signs Range (Last 24H):  Temp:  [97.4 °F (36.3 °C)-98.3 °F (36.8 °C)]   Pulse:  [83-93]   Resp:  [18-20]   BP: (107-126)/(63-75)   SpO2:  [92 %-96 %] Body mass index is 32.34 kg/m².  Wt Readings from Last 3 Encounters:   11/17/23 0759 99.3 kg (219 lb)   11/14/23 1256 99.3 kg (219 lb)   09/22/23 1011 101.2 kg (223 lb)   07/22/23 1101 106.6 kg (235 lb)       I & O (Last 24H):  Intake/Output Summary (Last 24 hours) at 11/27/2023 1740  Last data filed at 11/27/2023 1300  Gross per 24 hour   Intake 720 ml   Output 900 ml   Net -180 ml       Physical Exam:  Patient alert oriented x3 in good spirits  HEENT soft collar neck in place, clear speech,  Heart regular rhythm   Lungs are clear bilaterally with few rhonchi on anterior approach no wheezing   Abdomen obese soft and depressible   Extremities no new changes no edema    Diagnostic Results:  Lab Results   Component Value Date    WBC 20.11 (H) 11/27/2023    HGB 14.2 11/27/2023    HCT 41.8 (L) 11/27/2023    MCV 94.4 (H) 11/27/2023     11/27/2023     Recent Labs   Lab 11/27/23  0534   *   K 4.5   CO2 24   BUN 40.2*   CREATININE 0.86   CALCIUM 9.1     Lab Results   Component Value Date    INR 1.0 11/16/2023    INR 1.1 11/04/2022    INR 1.1 03/29/2022    PROTIME 13.4 11/16/2023    PROTIME 17.0 (H) 08/25/2019    PROTIME 27.0 (H) 02/21/2018     Lab Results   Component Value Date    HGBA1C 5.4 09/22/2023     Recent Labs     11/25/23  1050 11/25/23  1513 11/25/23  2002  11/26/23  0535 11/26/23  1105 11/26/23  1618 11/26/23  2124 11/27/23  1125   POCTGLUCOSE 190* 288* 299* 255* 232* 321* 256* 224*       ASSESSMENT/PLAN:     Active Hospital Problems    Diagnosis  POA    *Discitis of thoracic region [M46.44]  No    Myelopathy due to spondylosis [M47.10]  Yes    Ankle pain [M25.579]  Yes    Primary hypertension [I10]  Yes    Type 2 diabetes mellitus with other specified complication, with long-term current use of insulin [E11.69, Z79.4]  Not Applicable      Resolved Hospital Problems   No resolved problems to display.        Problems Addressed Today:    Myelopathy due to spondylosis  Plan is for surgery with Dr. Hutchinson on Monday.      Ankle pain  Check uric acid level.      Primary hypertension  Chronic, uncontrolled. Latest blood pressure and vitals reviewed-     Temp:  [98.4 °F (36.9 °C)-100.3 °F (37.9 °C)]   Pulse:  []   Resp:  [16-20]   BP: (137-172)/(67-78)   SpO2:  [82 %-95 %] .   Home meds for hypertension were reviewed and noted below.   Hypertension Medications               doxazosin (CARDURA) 8 MG Tab Take 0.5 tabs bid    furosemide (LASIX) 40 MG tablet Take 1 tablet (40 mg total) by mouth once daily.    lisinopriL 10 MG tablet Take 1 tablet (10 mg total) by mouth once daily.            While in the hospital, will manage blood pressure as follows; Continue home antihypertensive regimen    Will utilize p.r.n. blood pressure medication only if patient's blood pressure greater than 160/100 and he develops symptoms such as worsening chest pain or shortness of breath.    Type 2 diabetes mellitus with other specified complication, with long-term current use of insulin  He isn't currently on his home meds.  Will add SSI prn.    Ankle pain  Uric acid level was elevated.  Pain seemed to improve with colchicine.  Will start allopurinol.      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 Hutchinson on Monday.      Primary hypertension  BP is stable.    Type 2 diabetes  [FreeTextEntry1] : 57 yo male with pmhx as below is here for a f/up visit\par Was under cardio care for few years now\par Last echo - MVP with MR\par Occasional cp, exertional, as well as ledbetter\par + weight gain \par Et is stable, compliant with meds and diet\par ROS is otherwise as below mellitus with other specified complication, with long-term current use of insulin  He isn't currently on his home meds.  Resume SSI.      Uncontrolled due to being on dexamethasone  Continue ISS     Primary hypertension  BP is stable.    Myelopathy due to spondylosis  S/p spinal fusion with Dr Hutchinson 11/20  Pain is well controlled  Awaiting inpatient rehab placement       Ankle pain  Uric acid level was elevated.   On allopurinol, continue  Pain is resolved       Type 2 diabetes mellitus with other specified complication, with long-term current use of insulin  Uncontrolled due to being on dexamethasone  Continue ISS     Primary hypertension  BP is stable.    Myelopathy due to spondylosis  S/p spinal fusion with Dr Hutchinson 11/20  Pain is well controlled  Awaiting inpatient rehab placement       Ankle pain  Uric acid level was elevated.   On allopurinol, continue  Pain is resolved       Myelopathy due to spondylosis  S/p spinal fusion with Dr Hutchinson 11/20  Pain is well controlled  Awaiting inpatient rehab placement         DISCHARGE PLANNING:     Continue established treatment, waiting on placement  Signing Physician:  Robert Valdes MD

## 2023-11-27 NOTE — PLAN OF CARE
It was noted by a message from the acute rehab that authorization has been denied by the pt insurance. I discussed the option of swing bed and SNF placement. FOC given. [facility] swing bed chosen by pt. Referral sent to [facility].

## 2023-11-28 LAB
POCT GLUCOSE: 245 MG/DL (ref 70–110)
POCT GLUCOSE: 254 MG/DL (ref 70–110)
POCT GLUCOSE: 270 MG/DL (ref 70–110)

## 2023-11-28 PROCEDURE — 63600175 PHARM REV CODE 636 W HCPCS: Performed by: INTERNAL MEDICINE

## 2023-11-28 PROCEDURE — 25000003 PHARM REV CODE 250: Performed by: INTERNAL MEDICINE

## 2023-11-28 PROCEDURE — 63600175 PHARM REV CODE 636 W HCPCS: Performed by: PHYSICIAN ASSISTANT

## 2023-11-28 PROCEDURE — 99233 SBSQ HOSP IP/OBS HIGH 50: CPT | Mod: ,,, | Performed by: INTERNAL MEDICINE

## 2023-11-28 PROCEDURE — 63600175 PHARM REV CODE 636 W HCPCS

## 2023-11-28 PROCEDURE — 97116 GAIT TRAINING THERAPY: CPT | Mod: CQ

## 2023-11-28 PROCEDURE — 21400001 HC TELEMETRY ROOM

## 2023-11-28 PROCEDURE — 25000003 PHARM REV CODE 250

## 2023-11-28 PROCEDURE — 97530 THERAPEUTIC ACTIVITIES: CPT | Mod: CO

## 2023-11-28 PROCEDURE — 25000242 PHARM REV CODE 250 ALT 637 W/ HCPCS: Performed by: INTERNAL MEDICINE

## 2023-11-28 PROCEDURE — 99900031 HC PATIENT EDUCATION (STAT)

## 2023-11-28 PROCEDURE — 94761 N-INVAS EAR/PLS OXIMETRY MLT: CPT

## 2023-11-28 PROCEDURE — 25000003 PHARM REV CODE 250: Performed by: NURSE PRACTITIONER

## 2023-11-28 PROCEDURE — 94640 AIRWAY INHALATION TREATMENT: CPT

## 2023-11-28 RX ADMIN — MICONAZOLE NITRATE: 20 POWDER TOPICAL at 08:11

## 2023-11-28 RX ADMIN — ALLOPURINOL 100 MG: 100 TABLET ORAL at 09:11

## 2023-11-28 RX ADMIN — INSULIN ASPART 2 UNITS: 100 INJECTION, SOLUTION INTRAVENOUS; SUBCUTANEOUS at 05:11

## 2023-11-28 RX ADMIN — TAMSULOSIN HYDROCHLORIDE 0.4 MG: 0.4 CAPSULE ORAL at 09:11

## 2023-11-28 RX ADMIN — IPRATROPIUM BROMIDE AND ALBUTEROL SULFATE 3 ML: 2.5; .5 SOLUTION RESPIRATORY (INHALATION) at 07:11

## 2023-11-28 RX ADMIN — IPRATROPIUM BROMIDE AND ALBUTEROL SULFATE 3 ML: 2.5; .5 SOLUTION RESPIRATORY (INHALATION) at 04:11

## 2023-11-28 RX ADMIN — DEXAMETHASONE 2 MG: 0.5 TABLET ORAL at 09:11

## 2023-11-28 RX ADMIN — SENNOSIDES AND DOCUSATE SODIUM 2 TABLET: 8.6; 5 TABLET ORAL at 09:11

## 2023-11-28 RX ADMIN — ENOXAPARIN SODIUM 40 MG: 40 INJECTION SUBCUTANEOUS at 05:11

## 2023-11-28 RX ADMIN — METHOCARBAMOL 750 MG: 750 TABLET ORAL at 09:11

## 2023-11-28 RX ADMIN — METHOCARBAMOL 750 MG: 750 TABLET ORAL at 08:11

## 2023-11-28 RX ADMIN — DOXAZOSIN 4 MG: 4 TABLET ORAL at 09:11

## 2023-11-28 RX ADMIN — GUAIFENESIN 600 MG: 600 TABLET, EXTENDED RELEASE ORAL at 08:11

## 2023-11-28 RX ADMIN — LISINOPRIL 10 MG: 10 TABLET ORAL at 09:11

## 2023-11-28 RX ADMIN — GUAIFENESIN 600 MG: 600 TABLET, EXTENDED RELEASE ORAL at 09:11

## 2023-11-28 RX ADMIN — INSULIN ASPART 3 UNITS: 100 INJECTION, SOLUTION INTRAVENOUS; SUBCUTANEOUS at 11:11

## 2023-11-28 RX ADMIN — IPRATROPIUM BROMIDE AND ALBUTEROL SULFATE 3 ML: 2.5; .5 SOLUTION RESPIRATORY (INHALATION) at 12:11

## 2023-11-28 RX ADMIN — CITALOPRAM HYDROBROMIDE 20 MG: 20 TABLET ORAL at 09:11

## 2023-11-28 RX ADMIN — MICONAZOLE NITRATE: 20 POWDER TOPICAL at 09:11

## 2023-11-28 NOTE — PT/OT/SLP PROGRESS
Occupational Therapy   Treatment    Name: Stevie Cummins  MRN: [id number]  Admitting Diagnosis:  Discitis of thoracic region  8 Days Post-Op    Recommendations:     Recommended therapy intensity at discharge: Moderate Intensity Therapy   Discharge Equipment Recommendations:  none  Barriers to discharge:       Assessment:     Stevie Cummins is a 73 y.o. male with a medical diagnosis of Discitis of thoracic region.  Performance deficits affecting function are weakness, impaired self care skills, impaired functional mobility, impaired balance, decreased lower extremity function, pain.     Rehab Prognosis:  Fair; patient would benefit from acute skilled OT services to address these deficits and reach maximum level of function.       Plan:     Patient to be seen 5 x/week to address the above listed problems via self-care/home management, therapeutic activities, therapeutic exercises  Plan of Care Expires:    Plan of Care Reviewed with: patient    Subjective     Pain/Comfort:       Objective:     Communicated with: RN prior to session.  Patient found HOB elevated with   upon OT entry to room.    General Precautions: Standard, fall    Orthopedic Precautions:   Braces: Cervical collar     Occupational Performance:     Bed Mobility:    Patient completed Supine to Sit with stand by assistance     Functional Mobility/Transfers:  Patient completed Sit <> Stand Transfer with minimum assistance  with  rolling walker   Patient completed Bed <> Chair Transfer using Step Transfer technique with minimum assistance with rolling walker  Functional Mobility: no LOB    Activities of Daily Living:  Lower Body Dressing: total assistance patient able to achieve figure-4 position however declined donning/doffing B socks. Patient requiring total A donning B socks    Patient Education:  Patient provided with verbal education education regarding OT role/goals/POC.  Understanding was verbalized.      Patient left HOB elevated with all lines intact  and call button in reach    GOALS:   Multidisciplinary Problems       Occupational Therapy Goals          Problem: Occupational Therapy    Goal Priority Disciplines Outcome Interventions   Occupational Therapy Goal     OT, PT/OT Ongoing, Progressing    Description: Goals to be met by: 12/17/23     Patient will increase functional independence with ADLs by performing:    LE Dressing with Modified [place].  Grooming while standing with Modified [place].  Toileting from toilet with Modified [place] for hygiene and clothing management.   Toilet transfer to toilet with Modified [place].                         Time Tracking:     OT Date of Treatment: 11/28/23  OT Start Time: 1105  OT Stop Time: 1115  OT Total Time (min): 10 min    Billable Minutes:Therapeutic Activity 1    OT/MASTER: MASTER     Number of MASTER visits since last OT visit: 1    11/28/2023

## 2023-11-28 NOTE — PROGRESS NOTES
"Progress Note  Hospital Medicine    Patient Name: Stevie Cummins  YOB: 1950    Admit Date: 11/14/2023                     LOS: 14    SUBJECTIVE:     Reason for Admission:  Discitis of thoracic region  See H&P for detailed presentating history and ROS.      Interval history:  Progressing smoothly, with no complications, was able to ambulate down the whole, no constipation   Feeling stronger, and the sensations coming back on his hands as well   Pending on placement      OBJECTIVE:     Vital Signs Range (Last 24H):  Temp:  [97.5 °F (36.4 °C)-98.2 °F (36.8 °C)]   Pulse:  [90-98]   Resp:  [10-20]   BP: ()/(62-67)   SpO2:  [90 %-95 %] Body mass index is 32.34 kg/m².  Wt Readings from Last 3 Encounters:   11/17/23 0759 99.3 kg (219 lb)   11/14/23 1256 99.3 kg (219 lb)   09/22/23 1011 101.2 kg (223 lb)   07/22/23 1101 106.6 kg (235 lb)       I & O (Last 24H):  Intake/Output Summary (Last 24 hours) at 11/28/2023 1302  Last data filed at 11/28/2023 0928  Gross per 24 hour   Intake 0 ml   Output --   Net 0 ml       Physical Exam:  Alert oriented x3   HEENT soft collar in place   Heart regular rhythm faint murmur 2/6   Lungs are clear bilateral no wheezing rales or rhonchi   Abdomen obese soft and depressible bowel sounds positive   Extremities no edema    Diagnostic Results:  Lab Results   Component Value Date    WBC 20.11 (H) 11/27/2023    HGB 14.2 11/27/2023    HCT 41.8 (L) 11/27/2023    MCV 94.4 (H) 11/27/2023     11/27/2023     No results for input(s): "GLU", "NA", "K", "CL", "CO2", "BUN", "CREATININE", "CALCIUM", "MG" in the last 24 hours.  Lab Results   Component Value Date    INR 1.0 11/16/2023    INR 1.1 11/04/2022    INR 1.1 03/29/2022    PROTIME 13.4 11/16/2023    PROTIME 17.0 (H) 08/25/2019    PROTIME 27.0 (H) 02/21/2018     Lab Results   Component Value Date    HGBA1C 5.4 09/22/2023     Recent Labs     11/26/23  0535 11/26/23  1105 11/26/23  1618 11/26/23  2124 11/27/23  1125 " 11/27/23 2125 11/28/23  0519 11/28/23  1100   POCTGLUCOSE 255* 232* 321* 256* 224* 251* 245* 254*       ASSESSMENT/PLAN:     Active Hospital Problems    Diagnosis  POA    *Discitis of thoracic region [M46.44]  No    Myelopathy due to spondylosis [M47.10]  Yes    Ankle pain [M25.579]  Yes    Primary hypertension [I10]  Yes    Type 2 diabetes mellitus with other specified complication, with long-term current use of insulin [E11.69, Z79.4]  Not Applicable      Resolved Hospital Problems   No resolved problems to display.        Problems Addressed Today:    Myelopathy due to spondylosis  Plan is for surgery with Dr. Hutchinson on Monday.      Ankle pain  Check uric acid level.      Primary hypertension  Chronic, uncontrolled. Latest blood pressure and vitals reviewed-     Temp:  [98.4 °F (36.9 °C)-100.3 °F (37.9 °C)]   Pulse:  []   Resp:  [16-20]   BP: (137-172)/(67-78)   SpO2:  [82 %-95 %] .   Home meds for hypertension were reviewed and noted below.   Hypertension Medications               doxazosin (CARDURA) 8 MG Tab Take 0.5 tabs bid    furosemide (LASIX) 40 MG tablet Take 1 tablet (40 mg total) by mouth once daily.    lisinopriL 10 MG tablet Take 1 tablet (10 mg total) by mouth once daily.            While in the hospital, will manage blood pressure as follows; Continue home antihypertensive regimen    Will utilize p.r.n. blood pressure medication only if patient's blood pressure greater than 160/100 and he develops symptoms such as worsening chest pain or shortness of breath.    Type 2 diabetes mellitus with other specified complication, with long-term current use of insulin  He isn't currently on his home meds.  Will add SSI prn.    Ankle pain  Uric acid level was elevated.  Pain seemed to improve with colchicine.  Will start allopurinol.      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 Hutchinson on Monday.      Primary hypertension  BP is stable.    Type 2 diabetes mellitus with other specified  complication, with long-term current use of insulin  He isn't currently on his home meds.  Resume SSI.      Uncontrolled due to being on dexamethasone  Continue ISS     Primary hypertension  BP is stable.    Myelopathy due to spondylosis  S/p spinal fusion with Dr Hutchinson 11/20  Pain is well controlled  Awaiting inpatient rehab placement       Ankle pain  Uric acid level was elevated.   On allopurinol, continue  Pain is resolved       Type 2 diabetes mellitus with other specified complication, with long-term current use of insulin  Uncontrolled due to being on dexamethasone  Continue ISS     Primary hypertension  BP is stable.    Myelopathy due to spondylosis  S/p spinal fusion with Dr Hutchinson 11/20  Pain is well controlled  Awaiting inpatient rehab placement       Ankle pain  Uric acid level was elevated.   On allopurinol, continue  Pain is resolved       Myelopathy due to spondylosis  S/p spinal fusion with Dr Hutchinson 11/20  Pain is well controlled  Awaiting inpatient rehab placement         DISCHARGE PLANNING:     Waiting on placement to skilled nurse facility by case management otherwise continue same medications patient improving  Signing Physician:  Robert Valdes MD

## 2023-11-28 NOTE — PT/OT/SLP PROGRESS
Physical Therapy Treatment    Patient Name:  Stevie Cummins   MRN:  [id number]    Recommendations:     Discharge therapy intensity: Moderate Intensity Therapy   Discharge Equipment Recommendations: none  Barriers to discharge: Impaired mobility    Assessment:     Stevie Cummins is a 73 y.o. male admitted with a medical diagnosis of Discitis of thoracic region.  He presents with the following impairments/functional limitations: weakness, impaired endurance, impaired self care skills, impaired functional mobility, gait instability, impaired sensation, impaired balance, decreased upper extremity function, pain.     Rehab Prognosis: Good; patient would benefit from acute skilled PT services to address these deficits and reach maximum level of function.    Recent Surgery: Procedure(s) (LRB):  FUSION, SPINE, POSTERIOR SPINAL COLUMN, CERVICAL, USING COMPUTER-ASSISTED NAVIGATION (N/A) 8 Days Post-Op    Plan:     During this hospitalization, patient to be seen 6 x/week to address the identified rehab impairments via gait training, therapeutic activities, therapeutic exercises and progress toward the following goals:    Plan of Care Expires:  12/16/23    Subjective     Chief Complaint: no major complaints  Patient/Family Comments/goals:   Pain/Comfort:  Pain Rating 1: 0/10      Objective:     Communicated with RN prior to session.  Patient found up in chair with cervical collar upon PT entry to room.     General Precautions: Standard, fall  Orthopedic Precautions: spinal precautions  Braces: Cervical collar  Respiratory Status: Room air  Blood Pressure:   Skin Integrity: Visible skin intact      Functional Mobility:  Bed Mobility:     Bridging: stand by assistance  Sit to Supine: minimum assistance  Transfers:     Sit to Stand:  minimum assistance with rolling walker  Step transfer from BSC to bed: contact guard assistance with rolling walker  Gait: Pt amb 20ft x 2 trials RW, Min A. Required one seated r/b and cues for proper  step length and safety to remain within RW. Pt stated he need to return to room to have BM. Sat on BSC and reported that it was only gas. Step t/f to bed with RW, CGA.       Education:  Patient provided with verbal education education regarding POC, safety, precautions, call bell use. Further educated pt [facility] and that the therapy well be more intense than acute care. Additional teaching is warranted.     Patient left HOB elevated with all lines intact and call button in reach.     GOALS:   Multidisciplinary Problems       Physical Therapy Goals          Problem: Physical Therapy    Goal Priority Disciplines Outcome Goal Variances Interventions   Physical Therapy Goal     PT, PT/OT Ongoing, Progressing     Description: Goals to be met by: d/c     Patient will increase functional independence with mobility by performin. Supine to sit with Stand-by Assistance  2. Sit to supine with Stand-by Assistance  3. Sit to stand transfer with Stand-by Assistance  4. Bed to chair transfer with Stand-by Assistance using Rolling Walker  5. Gait  x 150 feet with Stand-by Assistance using Rolling Walker.                          Time Tracking:     PT Received On:    PT Start Time: 1200     PT Stop Time: 1215  PT Total Time (min): 15 min     Billable Minutes: Gait Training 15    Treatment Type: Treatment  PT/PTA: PTA     Number of PTA visits since last PT visit: 4     2023

## 2023-11-29 ENCOUNTER — HOSPITAL ENCOUNTER (INPATIENT)
Facility: HOSPITAL | Age: 73
LOS: 14 days | Discharge: HOME HEALTH | DRG: 949 | End: 2023-12-13
Attending: INTERNAL MEDICINE | Admitting: INTERNAL MEDICINE
Payer: MEDICARE

## 2023-11-29 VITALS
BODY MASS INDEX: 32.44 KG/M2 | WEIGHT: 219 LBS | HEIGHT: 69 IN | HEART RATE: 87 BPM | SYSTOLIC BLOOD PRESSURE: 109 MMHG | TEMPERATURE: 98 F | DIASTOLIC BLOOD PRESSURE: 67 MMHG | OXYGEN SATURATION: 95 % | RESPIRATION RATE: 19 BRPM

## 2023-11-29 DIAGNOSIS — J44.9 CHRONIC OBSTRUCTIVE PULMONARY DISEASE, UNSPECIFIED COPD TYPE: ICD-10-CM

## 2023-11-29 DIAGNOSIS — I50.42 CHRONIC COMBINED SYSTOLIC AND DIASTOLIC HEART FAILURE: Primary | ICD-10-CM

## 2023-11-29 DIAGNOSIS — R53.1 WEAKNESS: ICD-10-CM

## 2023-11-29 PROBLEM — M48.02 CERVICAL STENOSIS OF SPINAL CANAL: Status: ACTIVE | Noted: 2023-11-29

## 2023-11-29 LAB
APPEARANCE UR: ABNORMAL
BACTERIA #/AREA URNS AUTO: ABNORMAL /HPF
BILIRUB UR QL STRIP.AUTO: NEGATIVE
COLOR UR AUTO: YELLOW
GLUCOSE SERPL-MCNC: 205 MG/DL (ref 70–110)
GLUCOSE UR QL STRIP.AUTO: NEGATIVE
KETONES UR QL STRIP.AUTO: NEGATIVE
LEUKOCYTE ESTERASE UR QL STRIP.AUTO: ABNORMAL
NITRITE UR QL STRIP.AUTO: NEGATIVE
PH UR STRIP.AUTO: 5 [PH]
POCT GLUCOSE: 240 MG/DL (ref 70–110)
POCT GLUCOSE: 274 MG/DL (ref 70–110)
POCT GLUCOSE: 282 MG/DL (ref 70–110)
PROT UR QL STRIP.AUTO: 100
RBC #/AREA URNS AUTO: ABNORMAL /HPF
RBC UR QL AUTO: ABNORMAL
SP GR UR STRIP.AUTO: 1.02 (ref 1–1.03)
SQUAMOUS #/AREA URNS AUTO: ABNORMAL /HPF
UROBILINOGEN UR STRIP-ACNC: 0.2
WBC #/AREA URNS AUTO: ABNORMAL /HPF

## 2023-11-29 PROCEDURE — 11000004 HC SNF PRIVATE

## 2023-11-29 PROCEDURE — 97116 GAIT TRAINING THERAPY: CPT | Mod: CQ

## 2023-11-29 PROCEDURE — 99900031 HC PATIENT EDUCATION (STAT)

## 2023-11-29 PROCEDURE — 25000003 PHARM REV CODE 250: Performed by: INTERNAL MEDICINE

## 2023-11-29 PROCEDURE — 99233 SBSQ HOSP IP/OBS HIGH 50: CPT | Mod: ,,, | Performed by: INTERNAL MEDICINE

## 2023-11-29 PROCEDURE — 25000003 PHARM REV CODE 250: Performed by: NURSE PRACTITIONER

## 2023-11-29 PROCEDURE — 97535 SELF CARE MNGMENT TRAINING: CPT | Mod: CO

## 2023-11-29 PROCEDURE — 99900035 HC TECH TIME PER 15 MIN (STAT)

## 2023-11-29 PROCEDURE — 87186 SC STD MICRODIL/AGAR DIL: CPT | Performed by: INTERNAL MEDICINE

## 2023-11-29 PROCEDURE — 94761 N-INVAS EAR/PLS OXIMETRY MLT: CPT

## 2023-11-29 PROCEDURE — 94640 AIRWAY INHALATION TREATMENT: CPT

## 2023-11-29 PROCEDURE — 51798 US URINE CAPACITY MEASURE: CPT

## 2023-11-29 PROCEDURE — 25000242 PHARM REV CODE 250 ALT 637 W/ HCPCS: Performed by: INTERNAL MEDICINE

## 2023-11-29 PROCEDURE — 81001 URINALYSIS AUTO W/SCOPE: CPT | Performed by: INTERNAL MEDICINE

## 2023-11-29 PROCEDURE — 25000003 PHARM REV CODE 250

## 2023-11-29 PROCEDURE — 63600175 PHARM REV CODE 636 W HCPCS: Performed by: INTERNAL MEDICINE

## 2023-11-29 PROCEDURE — 97110 THERAPEUTIC EXERCISES: CPT | Mod: CQ

## 2023-11-29 PROCEDURE — 63600175 PHARM REV CODE 636 W HCPCS: Performed by: PHYSICIAN ASSISTANT

## 2023-11-29 RX ORDER — BISACODYL 10 MG/1
10 SUPPOSITORY RECTAL DAILY
Qty: 12 SUPPOSITORY | Refills: 0 | Status: SHIPPED | OUTPATIENT
Start: 2023-11-30 | End: 2024-01-23 | Stop reason: SDUPTHER

## 2023-11-29 RX ORDER — ACETAMINOPHEN 325 MG/1
650 TABLET ORAL EVERY 6 HOURS PRN
Status: DISCONTINUED | OUTPATIENT
Start: 2023-11-29 | End: 2023-12-13 | Stop reason: HOSPADM

## 2023-11-29 RX ORDER — ALLOPURINOL 100 MG/1
100 TABLET ORAL DAILY
Status: DISCONTINUED | OUTPATIENT
Start: 2023-11-30 | End: 2023-12-13 | Stop reason: HOSPADM

## 2023-11-29 RX ORDER — INSULIN ASPART 100 [IU]/ML
0-5 INJECTION, SOLUTION INTRAVENOUS; SUBCUTANEOUS
Status: DISCONTINUED | OUTPATIENT
Start: 2023-11-29 | End: 2023-11-29

## 2023-11-29 RX ORDER — ENOXAPARIN SODIUM 100 MG/ML
40 INJECTION SUBCUTANEOUS EVERY 12 HOURS
Qty: 12 EACH | Refills: 0 | Status: ON HOLD | OUTPATIENT
Start: 2023-11-29 | End: 2023-12-13 | Stop reason: HOSPADM

## 2023-11-29 RX ORDER — GUAIFENESIN 600 MG/1
600 TABLET, EXTENDED RELEASE ORAL 2 TIMES DAILY
Status: DISCONTINUED | OUTPATIENT
Start: 2023-11-29 | End: 2023-12-07

## 2023-11-29 RX ORDER — ENOXAPARIN SODIUM 100 MG/ML
40 INJECTION SUBCUTANEOUS EVERY 24 HOURS
Status: DISCONTINUED | OUTPATIENT
Start: 2023-11-30 | End: 2023-12-13 | Stop reason: HOSPADM

## 2023-11-29 RX ORDER — DEXAMETHASONE 2 MG/1
2 TABLET ORAL DAILY
Qty: 10 TABLET | Refills: 0 | Status: ON HOLD | OUTPATIENT
Start: 2023-11-30 | End: 2023-12-13 | Stop reason: HOSPADM

## 2023-11-29 RX ORDER — HYDROCODONE BITARTRATE AND ACETAMINOPHEN 5; 325 MG/1; MG/1
1 TABLET ORAL EVERY 4 HOURS PRN
Status: DISCONTINUED | OUTPATIENT
Start: 2023-11-29 | End: 2023-12-13 | Stop reason: HOSPADM

## 2023-11-29 RX ORDER — MICONAZOLE NITRATE 2 %
POWDER (GRAM) TOPICAL 2 TIMES DAILY
Status: DISCONTINUED | OUTPATIENT
Start: 2023-11-29 | End: 2023-12-13 | Stop reason: HOSPADM

## 2023-11-29 RX ORDER — DOXAZOSIN 4 MG/1
4 TABLET ORAL DAILY
Status: DISCONTINUED | OUTPATIENT
Start: 2023-11-30 | End: 2023-12-13 | Stop reason: HOSPADM

## 2023-11-29 RX ORDER — IPRATROPIUM BROMIDE AND ALBUTEROL SULFATE 2.5; .5 MG/3ML; MG/3ML
3 SOLUTION RESPIRATORY (INHALATION)
Status: DISCONTINUED | OUTPATIENT
Start: 2023-11-30 | End: 2023-11-30

## 2023-11-29 RX ORDER — DEXAMETHASONE 0.5 MG/1
2 TABLET ORAL DAILY
Status: COMPLETED | OUTPATIENT
Start: 2023-11-30 | End: 2023-11-30

## 2023-11-29 RX ORDER — ALLOPURINOL 100 MG/1
100 TABLET ORAL DAILY
Qty: 30 TABLET | Refills: 0 | Status: SHIPPED | OUTPATIENT
Start: 2023-11-30 | End: 2024-01-23 | Stop reason: SDUPTHER

## 2023-11-29 RX ORDER — INSULIN ASPART 100 [IU]/ML
1-10 INJECTION, SOLUTION INTRAVENOUS; SUBCUTANEOUS
Status: DISCONTINUED | OUTPATIENT
Start: 2023-11-29 | End: 2023-12-13 | Stop reason: HOSPADM

## 2023-11-29 RX ORDER — CITALOPRAM 20 MG/1
20 TABLET, FILM COATED ORAL DAILY
Status: DISCONTINUED | OUTPATIENT
Start: 2023-11-30 | End: 2023-12-13 | Stop reason: HOSPADM

## 2023-11-29 RX ORDER — MAG HYDROX/ALUMINUM HYD/SIMETH 200-200-20
30 SUSPENSION, ORAL (FINAL DOSE FORM) ORAL EVERY 4 HOURS PRN
Status: DISCONTINUED | OUTPATIENT
Start: 2023-11-29 | End: 2023-12-13 | Stop reason: HOSPADM

## 2023-11-29 RX ORDER — AMOXICILLIN 250 MG
2 CAPSULE ORAL 2 TIMES DAILY
Status: DISCONTINUED | OUTPATIENT
Start: 2023-11-29 | End: 2023-12-13 | Stop reason: HOSPADM

## 2023-11-29 RX ORDER — ONDANSETRON 2 MG/ML
4 INJECTION INTRAMUSCULAR; INTRAVENOUS EVERY 8 HOURS PRN
Status: DISCONTINUED | OUTPATIENT
Start: 2023-11-29 | End: 2023-12-13 | Stop reason: HOSPADM

## 2023-11-29 RX ORDER — METHOCARBAMOL 750 MG/1
750 TABLET, FILM COATED ORAL 3 TIMES DAILY
Status: DISCONTINUED | OUTPATIENT
Start: 2023-11-29 | End: 2023-12-13 | Stop reason: HOSPADM

## 2023-11-29 RX ORDER — PROCHLORPERAZINE EDISYLATE 5 MG/ML
10 INJECTION INTRAMUSCULAR; INTRAVENOUS EVERY 6 HOURS PRN
Status: DISCONTINUED | OUTPATIENT
Start: 2023-11-29 | End: 2023-12-13 | Stop reason: HOSPADM

## 2023-11-29 RX ORDER — TAMSULOSIN HYDROCHLORIDE 0.4 MG/1
1 CAPSULE ORAL DAILY
Status: DISCONTINUED | OUTPATIENT
Start: 2023-11-30 | End: 2023-12-13 | Stop reason: HOSPADM

## 2023-11-29 RX ORDER — ADHESIVE BANDAGE
30 BANDAGE TOPICAL DAILY PRN
Status: DISCONTINUED | OUTPATIENT
Start: 2023-11-29 | End: 2023-12-13 | Stop reason: HOSPADM

## 2023-11-29 RX ORDER — SODIUM CHLORIDE 0.9 % (FLUSH) 0.9 %
10 SYRINGE (ML) INJECTION
Status: DISCONTINUED | OUTPATIENT
Start: 2023-11-29 | End: 2023-12-13 | Stop reason: HOSPADM

## 2023-11-29 RX ORDER — ACETAMINOPHEN 325 MG/1
650 TABLET ORAL EVERY 6 HOURS PRN
Qty: 30 TABLET | Refills: 0 | Status: SHIPPED | OUTPATIENT
Start: 2023-11-29

## 2023-11-29 RX ORDER — HYDROCODONE BITARTRATE AND ACETAMINOPHEN 10; 325 MG/1; MG/1
1 TABLET ORAL EVERY 4 HOURS PRN
Status: DISCONTINUED | OUTPATIENT
Start: 2023-11-29 | End: 2023-12-13 | Stop reason: HOSPADM

## 2023-11-29 RX ORDER — TALC
6 POWDER (GRAM) TOPICAL NIGHTLY PRN
Status: DISCONTINUED | OUTPATIENT
Start: 2023-11-29 | End: 2023-12-13 | Stop reason: HOSPADM

## 2023-11-29 RX ORDER — ACETAMINOPHEN 325 MG/1
650 TABLET ORAL EVERY 4 HOURS PRN
Status: DISCONTINUED | OUTPATIENT
Start: 2023-11-29 | End: 2023-12-13 | Stop reason: HOSPADM

## 2023-11-29 RX ORDER — LISINOPRIL 10 MG/1
10 TABLET ORAL DAILY
Status: DISCONTINUED | OUTPATIENT
Start: 2023-11-30 | End: 2023-12-01

## 2023-11-29 RX ADMIN — ALLOPURINOL 100 MG: 100 TABLET ORAL at 09:11

## 2023-11-29 RX ADMIN — HYDROCODONE BITARTRATE AND ACETAMINOPHEN 1 TABLET: 10; 325 TABLET ORAL at 06:11

## 2023-11-29 RX ADMIN — MICONAZOLE NITRATE: 20 POWDER TOPICAL at 09:11

## 2023-11-29 RX ADMIN — METHOCARBAMOL 750 MG: 750 TABLET ORAL at 09:11

## 2023-11-29 RX ADMIN — CITALOPRAM HYDROBROMIDE 20 MG: 20 TABLET ORAL at 09:11

## 2023-11-29 RX ADMIN — IPRATROPIUM BROMIDE AND ALBUTEROL SULFATE 3 ML: 2.5; .5 SOLUTION RESPIRATORY (INHALATION) at 12:11

## 2023-11-29 RX ADMIN — LISINOPRIL 10 MG: 10 TABLET ORAL at 09:11

## 2023-11-29 RX ADMIN — INSULIN ASPART 3 UNITS: 100 INJECTION, SOLUTION INTRAVENOUS; SUBCUTANEOUS at 11:11

## 2023-11-29 RX ADMIN — DEXAMETHASONE 2 MG: 0.5 TABLET ORAL at 09:11

## 2023-11-29 RX ADMIN — MICONAZOLE NITRATE 2 % TOPICAL POWDER: at 08:11

## 2023-11-29 RX ADMIN — METHOCARBAMOL 750 MG: 750 TABLET ORAL at 02:11

## 2023-11-29 RX ADMIN — SENNOSIDES AND DOCUSATE SODIUM 2 TABLET: 8.6; 5 TABLET ORAL at 09:11

## 2023-11-29 RX ADMIN — BISACODYL 10 MG: 10 SUPPOSITORY RECTAL at 09:11

## 2023-11-29 RX ADMIN — METHOCARBAMOL 750 MG: 750 TABLET ORAL at 08:11

## 2023-11-29 RX ADMIN — TAMSULOSIN HYDROCHLORIDE 0.4 MG: 0.4 CAPSULE ORAL at 09:11

## 2023-11-29 RX ADMIN — INSULIN ASPART 2 UNITS: 100 INJECTION, SOLUTION INTRAVENOUS; SUBCUTANEOUS at 05:11

## 2023-11-29 RX ADMIN — IPRATROPIUM BROMIDE AND ALBUTEROL SULFATE 3 ML: 2.5; .5 SOLUTION RESPIRATORY (INHALATION) at 08:11

## 2023-11-29 RX ADMIN — GUAIFENESIN 600 MG: 600 TABLET, EXTENDED RELEASE ORAL at 09:11

## 2023-11-29 RX ADMIN — DOXAZOSIN 4 MG: 4 TABLET ORAL at 09:11

## 2023-11-29 RX ADMIN — GUAIFENESIN 600 MG: 600 TABLET, EXTENDED RELEASE ORAL at 08:11

## 2023-11-29 NOTE — NURSING
Transfer to [facility] be [facility] with IV and hillman catheter

## 2023-11-29 NOTE — DISCHARGE SUMMARY
"Discharge summary   Hospital Medicine    Patient Name: Stevie Cummins  YOB: 1950    Admit Date: 11/14/2023                     LOS: 15    SUBJECTIVE:     Reason for Admission:  Discitis of thoracic region  See H&P for detailed presentating history and ROS.      Interval history:  Patient progressing mostly, ambulating more feeling stronger, with still pending on placement hopefully tomorrow or today he will go to a skilled nurse facility in [place]      OBJECTIVE:     Vital Signs Range (Last 24H):  Temp:  [97.2 °F (36.2 °C)-98.4 °F (36.9 °C)]   Pulse:  []   Resp:  [14-20]   BP: ()/(51-69)   SpO2:  [90 %-96 %] Body mass index is 32.34 kg/m².  Wt Readings from Last 3 Encounters:   11/17/23 0759 99.3 kg (219 lb)   11/14/23 1256 99.3 kg (219 lb)   09/22/23 1011 101.2 kg (223 lb)   07/22/23 1101 106.6 kg (235 lb)       I & O (Last 24H):  Intake/Output Summary (Last 24 hours) at 11/29/2023 1454  Last data filed at 11/29/2023 1447  Gross per 24 hour   Intake 1200 ml   Output 1050 ml   Net 150 ml       Physical Exam:  Alert oriented x3 wearing neck collar   HEENT within normal limits   Heart regular rhythm faint murmur    Lungs are clear bilateral no wheezing rales or rhonchi  Abdomen obese soft and depressible nontender   Extremities no changes but no clubbing cyanosis or edema    Diagnostic Results:  Lab Results   Component Value Date    WBC 20.11 (H) 11/27/2023    HGB 14.2 11/27/2023    HCT 41.8 (L) 11/27/2023    MCV 94.4 (H) 11/27/2023     11/27/2023     No results for input(s): "GLU", "NA", "K", "CL", "CO2", "BUN", "CREATININE", "CALCIUM", "MG" in the last 24 hours.  Lab Results   Component Value Date    INR 1.0 11/16/2023    INR 1.1 11/04/2022    INR 1.1 03/29/2022    PROTIME 13.4 11/16/2023    PROTIME 17.0 (H) 08/25/2019    PROTIME 27.0 (H) 02/21/2018     Lab Results   Component Value Date    HGBA1C 5.4 09/22/2023     Recent Labs     11/26/23 2124 11/27/23  1125 11/27/23 2125 " 11/28/23  0519 11/28/23  1100 11/28/23  1908 11/29/23  0430 11/29/23  1112   POCTGLUCOSE 256* 224* 251* 245* 254* 270* 240* 274*       ASSESSMENT/PLAN:     Active Hospital Problems    Diagnosis  POA    *Discitis of thoracic region [M46.44]  No    Myelopathy due to spondylosis [M47.10]  Yes    Ankle pain [M25.579]  Yes    Primary hypertension [I10]  Yes    Type 2 diabetes mellitus with other specified complication, with long-term current use of insulin [E11.69, Z79.4]  Not Applicable      Resolved Hospital Problems   No resolved problems to display.        Problems Addressed Today:    Myelopathy due to spondylosis  Plan is for surgery with Dr. Hutchinson on Monday.      Ankle pain  Check uric acid level.      Primary hypertension  Chronic, uncontrolled. Latest blood pressure and vitals reviewed-     Temp:  [98.4 °F (36.9 °C)-100.3 °F (37.9 °C)]   Pulse:  []   Resp:  [16-20]   BP: (137-172)/(67-78)   SpO2:  [82 %-95 %] .   Home meds for hypertension were reviewed and noted below.   Hypertension Medications               doxazosin (CARDURA) 8 MG Tab Take 0.5 tabs bid    furosemide (LASIX) 40 MG tablet Take 1 tablet (40 mg total) by mouth once daily.    lisinopriL 10 MG tablet Take 1 tablet (10 mg total) by mouth once daily.            While in the hospital, will manage blood pressure as follows; Continue home antihypertensive regimen    Will utilize p.r.n. blood pressure medication only if patient's blood pressure greater than 160/100 and he develops symptoms such as worsening chest pain or shortness of breath.    Type 2 diabetes mellitus with other specified complication, with long-term current use of insulin  He isn't currently on his home meds.  Will add SSI prn.    Ankle pain  Uric acid level was elevated.  Pain seemed to improve with colchicine.  Will start allopurinol.      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 Hutchinson on Monday.      Primary hypertension  BP is stable.    Type 2 diabetes  mellitus with other specified complication, with long-term current use of insulin  He isn't currently on his home meds.  Resume SSI.      Uncontrolled due to being on dexamethasone  Continue ISS     Primary hypertension  BP is stable.    Myelopathy due to spondylosis  S/p spinal fusion with Dr Hutchinson 11/20  Pain is well controlled  Awaiting inpatient rehab placement       Ankle pain  Uric acid level was elevated.   On allopurinol, continue  Pain is resolved       Type 2 diabetes mellitus with other specified complication, with long-term current use of insulin  Uncontrolled due to being on dexamethasone  Continue ISS     Primary hypertension  BP is stable.    Myelopathy due to spondylosis  S/p spinal fusion with Dr Hutchinson 11/20  Pain is well controlled  Awaiting inpatient rehab placement       Ankle pain  Uric acid level was elevated.   On allopurinol, continue  Pain is resolved       Myelopathy due to spondylosis  S/p spinal fusion with Dr Hutchinson 11/20  Pain is well controlled  Awaiting inpatient rehab placement         DISCHARGE PLANNING:   Continue same medications, hopefully patient will be transferred to a skilled nurse facility that rehab in the morning    Signing Physician:  Robert Valdes MD

## 2023-11-29 NOTE — PT/OT/SLP PROGRESS
Occupational Therapy   Treatment    Name: Stevie Cummins  MRN: [id number]  Admitting Diagnosis:  Cervical stenosis of spinal canal  9 Days Post-Op    Recommendations:     Recommended therapy intensity at discharge: Moderate Intensity Therapy   Discharge Equipment Recommendations:  none  Barriers to discharge:       Assessment:     Stevie Cummins is a 73 y.o. male with a medical diagnosis of Cervical stenosis of spinal canal.  Performance deficits affecting function are weakness, impaired self care skills, impaired functional mobility, impaired balance, decreased lower extremity function, pain.     Rehab Prognosis:  Fair; patient would benefit from acute skilled OT services to address these deficits and reach maximum level of function.       Plan:     Patient to be seen 5 x/week to address the above listed problems via self-care/home management, therapeutic activities, therapeutic exercises  Plan of Care Expires:    Plan of Care Reviewed with: patient    Subjective     Pain/Comfort:       Objective:     Communicated with: RN prior to session.  Patient found HOB elevated with   upon OT entry to room.    General Precautions: Standard, fall    Orthopedic Precautions:   Braces: Cervical collar     Occupational Performance:     Bed Mobility:    Patient completed Supine to Sit with moderate assistance     Functional Mobility/Transfers:  Patient completed Sit <> Stand Transfer with minimum assistance  with  rolling walker   Functional Mobility: no LOB    Activities of Daily Living:  Grooming: contact guard assistance standing at sink washing hands using B forearms for balance    Patient Education:  Patient provided with verbal education education regarding OT role/goals/POC.  Understanding was verbalized.      Patient left up in chair with all lines intact and call button in reach    GOALS:   Multidisciplinary Problems       Occupational Therapy Goals          Problem: Occupational Therapy    Goal Priority Disciplines Outcome  Interventions   Occupational Therapy Goal     OT, PT/OT Ongoing, Progressing    Description: Goals to be met by: 12/17/23     Patient will increase functional independence with ADLs by performing:    LE Dressing with Modified [place].  Grooming while standing with Modified [place].  Toileting from toilet with Modified [place] for hygiene and clothing management.   Toilet transfer to toilet with Modified [place].                         Time Tracking:     OT Date of Treatment: 11/29/23  OT Start Time: 1408  OT Stop Time: 1418  OT Total Time (min): 10 min    Billable Minutes:Self Care/Home Management 1    OT/MASTER: MASTER     Number of MASTER visits since last OT visit: 2    11/29/2023

## 2023-11-29 NOTE — PLAN OF CARE
11/29/23 1511   Final Note   Assessment Type Final Discharge Note   Anticipated Discharge Disposition Swing Bed   Post-Acute Status   Discharge Delays None known at this time     Dc to [facility]

## 2023-11-29 NOTE — PLAN OF CARE
Problem: Adult Inpatient Plan of Care  Goal: Patient-Specific Goal (Individualized)  Outcome: Ongoing, Progressing  Goal: Absence of Hospital-Acquired Illness or Injury  Outcome: Ongoing, Progressing  Goal: Optimal Comfort and Wellbeing  Outcome: Ongoing, Progressing  Goal: Readiness for Transition of Care  Outcome: Ongoing, Progressing     Problem: Diabetes Comorbidity  Goal: Blood Glucose Level Within Targeted Range  Outcome: Ongoing, Progressing     Problem: Skin Injury Risk Increased  Goal: Skin Health and Integrity  Outcome: Ongoing, Progressing     Problem: Infection  Goal: Absence of Infection Signs and Symptoms  Outcome: Ongoing, Progressing     Problem: Fall Injury Risk  Goal: Absence of Fall and Fall-Related Injury  Outcome: Ongoing, Progressing

## 2023-11-29 NOTE — PT/OT/SLP PROGRESS
"Physical Therapy Treatment    Patient Name:  Stevie Cummins   MRN:  [id number]    Recommendations:     Discharge therapy intensity: Moderate Intensity Therapy   Discharge Equipment Recommendations: none  Barriers to discharge: Impaired mobility    Assessment:     Stevie Cummins is a 73 y.o. male admitted with a medical diagnosis of Cervical stenosis of spinal canal.  He presents with the following impairments/functional limitations: weakness, impaired endurance, impaired self care skills, impaired functional mobility, gait instability, impaired sensation, impaired balance, decreased upper extremity function, pain.     Rehab Prognosis: Good; patient would benefit from acute skilled PT services to address these deficits and reach maximum level of function.    Recent Surgery: Procedure(s) (LRB):  FUSION, SPINE, POSTERIOR SPINAL COLUMN, CERVICAL, USING COMPUTER-ASSISTED NAVIGATION (N/A) 9 Days Post-Op    Plan:     During this hospitalization, patient to be seen 6 x/week to address the identified rehab impairments via gait training, therapeutic activities, therapeutic exercises and progress toward the following goals:    Plan of Care Expires:  12/16/23    Subjective     Chief Complaint: minor neck pain  Patient/Family Comments/goals: "wore out"  Pain/Comfort:  Pain Rating 1:  ("back of neck" pt did not rate pain)  Pain Addressed 1: Reposition      Objective:     Communicated with RN prior to session.  Patient found up in chair with cervical collar upon PT entry to room.     General Precautions: Standard, fall  Orthopedic Precautions: spinal precautions  Braces: Cervical collar  Respiratory Status: Room air  Blood Pressure:   Skin Integrity: Visible skin intact      Functional Mobility:  Bed Mobility:     Sit to Supine: minimum assistance; required verbal cues for reiteration of spinal precautions and safety during bed mobility  Transfers:     Sit to Stand:  CGA-MIN A with rolling walker  Gait: Pt amb 12ft RW, CGA. Demo'd " varying terrie with shortened step length. Encouraged further ambulation however pt reported being too tired and fatigued. Requested to return back to bed.     Performed semi supine exercises: BLE SLR, heel slides, abd/add, ankle pumps x15 reps    Education:  Patient provided with verbal education education regarding POC, safety, precautions, call bell use. Additional teaching is warranted.     Patient left HOB elevated with all lines intact and call button in reach.     GOALS:   Multidisciplinary Problems       Physical Therapy Goals          Problem: Physical Therapy    Goal Priority Disciplines Outcome Goal Variances Interventions   Physical Therapy Goal     PT, PT/OT Ongoing, Progressing     Description: Goals to be met by: d/c     Patient will increase functional independence with mobility by performin. Supine to sit with Stand-by Assistance  2. Sit to supine with Stand-by Assistance  3. Sit to stand transfer with Stand-by Assistance  4. Bed to chair transfer with Stand-by Assistance using Rolling Walker  5. Gait  x 150 feet with Stand-by Assistance using Rolling Walker.                          Time Tracking:     PT Received On:    PT Start Time: 1510     PT Stop Time: 1530  PT Total Time (min): 20 min     Billable Minutes: Gait Training 20    Treatment Type: Treatment  PT/PTA: PTA     Number of PTA visits since last PT visit: 5     2023

## 2023-11-29 NOTE — PLAN OF CARE
11/29/23 1515   Medicare Message   Important Message from Medicare regarding Discharge Appeal Rights Explained to patient/caregiver

## 2023-11-30 LAB
ALBUMIN SERPL-MCNC: 3 G/DL (ref 3.4–4.8)
ALBUMIN/GLOB SERPL: 1 RATIO (ref 1.1–2)
ALP SERPL-CCNC: 66 UNIT/L (ref 40–150)
ALT SERPL-CCNC: 19 UNIT/L (ref 0–55)
AST SERPL-CCNC: 11 UNIT/L (ref 5–34)
BASOPHILS # BLD AUTO: 0.06 X10(3)/MCL
BASOPHILS NFR BLD AUTO: 0.3 %
BILIRUB SERPL-MCNC: 1.2 MG/DL
BUN SERPL-MCNC: 68.4 MG/DL (ref 8.4–25.7)
CALCIUM SERPL-MCNC: 8.7 MG/DL (ref 8.8–10)
CHLORIDE SERPL-SCNC: 102 MMOL/L (ref 98–107)
CO2 SERPL-SCNC: 22 MMOL/L (ref 23–31)
CREAT SERPL-MCNC: 1.26 MG/DL (ref 0.73–1.18)
EOSINOPHIL # BLD AUTO: 0.2 X10(3)/MCL (ref 0–0.9)
EOSINOPHIL NFR BLD AUTO: 1 %
ERYTHROCYTE [DISTWIDTH] IN BLOOD BY AUTOMATED COUNT: 12.5 % (ref 11.5–17)
GFR SERPLBLD CREATININE-BSD FMLA CKD-EPI: 59 MLS/MIN/1.73/M2
GLOBULIN SER-MCNC: 3 GM/DL (ref 2.4–3.5)
GLUCOSE SERPL-MCNC: 230 MG/DL (ref 82–115)
HCT VFR BLD AUTO: 41.2 % (ref 42–52)
HGB BLD-MCNC: 13.4 G/DL (ref 14–18)
IMM GRANULOCYTES # BLD AUTO: 0.92 X10(3)/MCL (ref 0–0.04)
IMM GRANULOCYTES NFR BLD AUTO: 4.6 %
LYMPHOCYTES # BLD AUTO: 1.53 X10(3)/MCL (ref 0.6–4.6)
LYMPHOCYTES NFR BLD AUTO: 7.7 %
MCH RBC QN AUTO: 31.8 PG (ref 27–31)
MCHC RBC AUTO-ENTMCNC: 32.5 G/DL (ref 33–36)
MCV RBC AUTO: 97.9 FL (ref 80–94)
MONOCYTES # BLD AUTO: 2 X10(3)/MCL (ref 0.1–1.3)
MONOCYTES NFR BLD AUTO: 10 %
NEUTROPHILS # BLD AUTO: 15.24 X10(3)/MCL (ref 2.1–9.2)
NEUTROPHILS NFR BLD AUTO: 76.4 %
PLATELET # BLD AUTO: 207 X10(3)/MCL (ref 130–400)
PMV BLD AUTO: 11.6 FL (ref 7.4–10.4)
POCT GLUCOSE: 205 MG/DL (ref 70–110)
POCT GLUCOSE: 235 MG/DL (ref 70–110)
POCT GLUCOSE: 257 MG/DL (ref 70–110)
POCT GLUCOSE: 271 MG/DL (ref 70–110)
POTASSIUM SERPL-SCNC: 4.7 MMOL/L (ref 3.5–5.1)
PROT SERPL-MCNC: 6 GM/DL (ref 5.8–7.6)
RBC # BLD AUTO: 4.21 X10(6)/MCL (ref 4.7–6.1)
SODIUM SERPL-SCNC: 134 MMOL/L (ref 136–145)
WBC # SPEC AUTO: 19.95 X10(3)/MCL (ref 4.5–11.5)

## 2023-11-30 PROCEDURE — 25000003 PHARM REV CODE 250: Performed by: PHYSICIAN ASSISTANT

## 2023-11-30 PROCEDURE — 63600175 PHARM REV CODE 636 W HCPCS: Performed by: PHYSICIAN ASSISTANT

## 2023-11-30 PROCEDURE — 97530 THERAPEUTIC ACTIVITIES: CPT

## 2023-11-30 PROCEDURE — 51798 US URINE CAPACITY MEASURE: CPT

## 2023-11-30 PROCEDURE — 11000004 HC SNF PRIVATE

## 2023-11-30 PROCEDURE — 97162 PT EVAL MOD COMPLEX 30 MIN: CPT

## 2023-11-30 PROCEDURE — 97165 OT EVAL LOW COMPLEX 30 MIN: CPT

## 2023-11-30 PROCEDURE — 85025 COMPLETE CBC W/AUTO DIFF WBC: CPT | Performed by: INTERNAL MEDICINE

## 2023-11-30 PROCEDURE — 80053 COMPREHEN METABOLIC PANEL: CPT | Performed by: INTERNAL MEDICINE

## 2023-11-30 PROCEDURE — 25000242 PHARM REV CODE 250 ALT 637 W/ HCPCS: Performed by: PHYSICIAN ASSISTANT

## 2023-11-30 PROCEDURE — 94640 AIRWAY INHALATION TREATMENT: CPT

## 2023-11-30 PROCEDURE — 27000221 HC OXYGEN, UP TO 24 HOURS

## 2023-11-30 PROCEDURE — 94760 N-INVAS EAR/PLS OXIMETRY 1: CPT

## 2023-11-30 RX ORDER — MONTELUKAST SODIUM 10 MG/1
10 TABLET ORAL NIGHTLY
Status: DISCONTINUED | OUTPATIENT
Start: 2023-11-30 | End: 2023-12-13 | Stop reason: HOSPADM

## 2023-11-30 RX ORDER — ATORVASTATIN CALCIUM 10 MG/1
20 TABLET, FILM COATED ORAL
Status: DISCONTINUED | OUTPATIENT
Start: 2023-12-01 | End: 2023-12-13 | Stop reason: HOSPADM

## 2023-11-30 RX ORDER — ASPIRIN 81 MG/1
81 TABLET ORAL DAILY
Status: DISCONTINUED | OUTPATIENT
Start: 2023-11-30 | End: 2023-12-13 | Stop reason: HOSPADM

## 2023-11-30 RX ORDER — IPRATROPIUM BROMIDE AND ALBUTEROL SULFATE 2.5; .5 MG/3ML; MG/3ML
3 SOLUTION RESPIRATORY (INHALATION) DAILY
Status: DISCONTINUED | OUTPATIENT
Start: 2023-12-01 | End: 2023-12-04

## 2023-11-30 RX ORDER — ASPIRIN 81 MG/1
81 TABLET ORAL DAILY
Status: DISCONTINUED | OUTPATIENT
Start: 2023-11-30 | End: 2023-11-30

## 2023-11-30 RX ORDER — GABAPENTIN 300 MG/1
300 CAPSULE ORAL 3 TIMES DAILY
Status: DISCONTINUED | OUTPATIENT
Start: 2023-11-30 | End: 2023-12-13 | Stop reason: HOSPADM

## 2023-11-30 RX ADMIN — TAMSULOSIN HYDROCHLORIDE 0.4 MG: 0.4 CAPSULE ORAL at 08:11

## 2023-11-30 RX ADMIN — INSULIN DETEMIR 17 UNITS: 100 INJECTION, SOLUTION SUBCUTANEOUS at 08:11

## 2023-11-30 RX ADMIN — INSULIN DETEMIR 17 UNITS: 100 INJECTION, SOLUTION SUBCUTANEOUS at 09:11

## 2023-11-30 RX ADMIN — MICONAZOLE NITRATE 2 % TOPICAL POWDER: at 09:11

## 2023-11-30 RX ADMIN — CITALOPRAM HYDROBROMIDE 20 MG: 20 TABLET ORAL at 08:11

## 2023-11-30 RX ADMIN — DOXAZOSIN 4 MG: 4 TABLET ORAL at 09:11

## 2023-11-30 RX ADMIN — ENOXAPARIN SODIUM 40 MG: 40 INJECTION SUBCUTANEOUS at 05:11

## 2023-11-30 RX ADMIN — GABAPENTIN 300 MG: 300 CAPSULE ORAL at 02:11

## 2023-11-30 RX ADMIN — METHOCARBAMOL 750 MG: 750 TABLET ORAL at 08:11

## 2023-11-30 RX ADMIN — GABAPENTIN 300 MG: 300 CAPSULE ORAL at 08:11

## 2023-11-30 RX ADMIN — IPRATROPIUM BROMIDE AND ALBUTEROL SULFATE 3 ML: .5; 3 SOLUTION RESPIRATORY (INHALATION) at 07:11

## 2023-11-30 RX ADMIN — INSULIN ASPART 6 UNITS: 100 INJECTION, SOLUTION INTRAVENOUS; SUBCUTANEOUS at 11:11

## 2023-11-30 RX ADMIN — MULTIPLE VITAMINS W/ MINERALS TAB 1 TABLET: TAB at 08:11

## 2023-11-30 RX ADMIN — ALLOPURINOL 100 MG: 100 TABLET ORAL at 08:11

## 2023-11-30 RX ADMIN — CEFTRIAXONE SODIUM 1 G: 1 INJECTION, POWDER, FOR SOLUTION INTRAMUSCULAR; INTRAVENOUS at 08:11

## 2023-11-30 RX ADMIN — MONTELUKAST SODIUM 10 MG: 10 TABLET, COATED ORAL at 08:11

## 2023-11-30 RX ADMIN — METHOCARBAMOL 750 MG: 750 TABLET ORAL at 02:11

## 2023-11-30 RX ADMIN — GUAIFENESIN 600 MG: 600 TABLET, EXTENDED RELEASE ORAL at 08:11

## 2023-11-30 RX ADMIN — DEXAMETHASONE 2 MG: 0.5 TABLET ORAL at 09:11

## 2023-11-30 RX ADMIN — INSULIN ASPART 4 UNITS: 100 INJECTION, SOLUTION INTRAVENOUS; SUBCUTANEOUS at 05:11

## 2023-11-30 RX ADMIN — ASPIRIN 81 MG: 81 TABLET, COATED ORAL at 11:11

## 2023-11-30 RX ADMIN — LISINOPRIL 10 MG: 10 TABLET ORAL at 08:11

## 2023-11-30 NOTE — PROGRESS NOTES
Unable to treat pt at this time d/t pt c/o of neck and stomach pain and rating his pain a 4/10. Nursing notified. Nursing stated she would provide him a hot pack for his stomach and give him a muscle relaxer at 1400. Will f/u after 1400.

## 2023-11-30 NOTE — PT/OT/SLP PROGRESS
Physical Therapy Treatment Note           Name: Stevie Cummins    : 1950 (73 y.o.)  MRN: [id number]           TREATMENT SUMMARY AND RECOMMENDATIONS:    PT Received On: 23  PT Start Time: 1431     PT Stop Time: 1449  PT Total Time (min): 18 min     Subjective Assessment:   No complaints x Lethargic    Awake, alert, cooperative  Uncooperative    Agitated x c/o pain    Appropriate x c/o fatigue    Confused x Treated at bedside     Emotionally labile  Treated in gym/dept.   x Impulsive  Other:    Flat affect       Therapy Precautions:    Cognitive deficits x Spinal precautions    Collar - hard  Sternal precautions   x Collar - soft   TLSO   x Fall risk  LSO    Hip precautions - posterior  Knee immobilizer    Hip precautions - anterior  WBAT    Impaired communication  Partial weightbearing    Oxygen  TTWB    PEG tube  NWB    Visual deficits  Other:    Hearing deficits          Treatment Objectives:     Mobility Training:   Assist level Comments    Bed mobility MIN A Rolling side to side and supine<>sit   Transfer     Gait     Sit to stand transitions MIN A x 2 From EOB to RW   Sitting balance CGA    Standing balance  MIN A x 2 Poor tolerance and dizzy   Wheelchair mobility     Car transfer     Other:          Therapeutic Exercise:   Exercise Sets Reps Comments                               Additional Comments:  Pt sits and lays down without warning, very impulsive and unsafe. Gloria with staff if needed    Assessment: Patient tolerated session fair.    PT Plan: continue per POC  Revisions made to plan of care: Yes, sit to stand goal added.     GOALS:   Multidisciplinary Problems       Physical Therapy Goals          Problem: Physical Therapy    Goal Priority Disciplines Outcome Goal Variances Interventions   Physical Therapy Goal     PT, PT/OT Ongoing, Progressing     Description: Goals to be met by: Discharge      Patient will increase functional independence with mobility by performin. Supine  to sit with Stand-by Assistance  2. Sit to supine with Stand-by Assistance  3. Sitting at edge of bed x 15 minutes with Stand-by Assistance without dizziness or low BP  4. Increased functional strength to 5/5 for B hips  5. Sit to stands to be completed at SBA using RW  6. bed<>chair and gait to be assessed with goals set when able/appropriate.                          Skilled PT Minutes Provided: 15   Communication with Treatment Team:     Equipment recommendations:       At end of treatment, patient remained:   Up in chair     Up in wheelchair in room   x In bed   x With alarm activated   x Bed rails up   x Call bell in reach     Family/friends present    Restraints secured properly    In bathroom with CNA/RN notified   x Nurse aware    In gym with therapist/tech    Other:

## 2023-11-30 NOTE — PLAN OF CARE
[facility]  Initial Discharge Assessment       Primary Care Provider: Robert Alarcon MD    Admission Diagnosis: Spinal Stenosis    Admission Date: 11/29/2023  Expected Discharge Date:     Transition of Care Barriers: None    Payor: [employer] MANAGED MEDICARE / Plan: [employer] MEDICARE PLAN PPO / Product Type: Medicare Advantage /     Extended Emergency Contact Information  Primary Emergency Contact: DESI MARTINEZ  Mobile Phone: [phone]  Relation: Significant other  Preferred language: English   needed? No  Secondary Emergency Contact: Saira Quinn  Mobile Phone: [phone]  Relation: Friend    Discharge Plan A: Home with family, Home Health         [facility] - [place], [place] - [address]  [address]  [address]  [place] [address]  Phone: [phone] Fax: [phone]    [id number] at [facility] - [place], [place] - [address]  [address]  [address]  [place] [address]  Phone: [phone] Fax: [phone]    [facility] - [place], [place] - [address]  [address]  [place] [address]  Phone: [phone] Fax: [phone]    [facility] [place] - [facility]  [place] [address]  Phone: [phone] Fax: [phone]      Initial Assessment (most recent)       Adult Discharge Assessment - 11/30/23 0728          Discharge Assessment    Assessment Type Discharge Planning Assessment     Confirmed/corrected address, phone number and insurance Yes     Confirmed Demographics Correct on Facesheet     Source of Information patient     If unable to respond/provide information was family/caregiver contacted? Yes     Contact Name/Number Desi Martinez friend [phone]     Communicated NAZANIN with patient/caregiver Date not available/Unable to determine     People in Home significant other      [place] [facility] [place]     Do you expect to return to your current living situation? Yes     Do you have help at home or someone to help you manage your care at home? Yes     Who are your caregiver(s) and their phone number(s)? Yesy vela [phone]     Prior to hospitilization cognitive status: Alert/Oriented     Current cognitive status: Alert/Oriented     Walking or Climbing Stairs ambulation difficulty, requires equipment     Mobility Management walker     Home Accessibility wheelchair accessible     Number of Stairs, Main Entrance none     Stair Railings, Main Entrance none     Home Layout Able to live on 1st floor     Equipment Currently Used at Home walker, standard     Readmission within 30 days? No     Patient currently being followed by outpatient case management? No     Do you currently have service(s) that help you manage your care at home? No     Do you take prescription medications? Yes     Do you have prescription coverage? Yes     Coverage [employer]     Do you have any problems affording any of your prescribed medications? TBD     Is the patient taking medications as prescribed? yes     Who is going to help you get home at discharge? Yesy Martinez friend [phone]     How do you get to doctors appointments? family or friend will provide     Are you on dialysis? No     Do you take coumadin? No     DME Needed Upon Discharge  wheelchair     Discharge Plan discussed with: Friend     Name(s) and Number(s) Yesy Martinez friend [phone]     Transition of Care Barriers None     Discharge Plan A Home with family;Home Health        Physical Activity    On average, how many days per week do you engage in moderate to strenuous exercise (like a brisk walk)? 0 days     On average, how many minutes do you engage in exercise at this level? 0 min        Financial Resource Strain    How hard is it for you to pay for the very basics like food, housing, medical care, and heating? Not hard at all         Housing Stability    In the last 12 months, was there a time when you were not able to pay the mortgage or rent on time? No     In the last 12 months, how many places have you lived? 1     In the last 12 months, was there a time when you did not have a steady place to sleep or slept in a shelter (including now)? No        Transportation Needs    In the past 12 months, has lack of transportation kept you from medical appointments or from getting medications? No     In the past 12 months, has lack of transportation kept you from meetings, work, or from getting things needed for daily living? No        Food Insecurity    Within the past 12 months, you worried that your food would run out before you got the money to buy more. Never true     Within the past 12 months, the food you bought just didn't last and you didn't have money to get more. Never true        Stress    Do you feel stress - tense, restless, nervous, or anxious, or unable to sleep at night because your mind is troubled all the time - these days? Only a little        Social Connections    In a typical week, how many times do you talk on the phone with family, friends, or neighbors? More than three times a week     How often do you get together with friends or relatives? More than three times a week     How often do you attend Sabianist or Mu-ism services? More than 4 times per year     Do you belong to any clubs or organizations such as Sabianist groups, unions, fraternal or athletic groups, or school groups? No     How often do you attend meetings of the clubs or organizations you belong to? 1 to 4 times per year     Are you , , , , never , or living with a partner? Living with partner        Alcohol Use    Q1: How often do you have a drink containing alcohol? Never     Q2: How many drinks containing alcohol do you have on a typical day when you are drinking? Patient does not drink     Q3: How often do you have six or  more drinks on one occasion? Never        OTHER    Name(s) of People in Home Yesy Martinez friend [phone]

## 2023-11-30 NOTE — PLAN OF CARE
Problem: Adult Inpatient Plan of Care  Goal: Plan of Care Review  Outcome: Ongoing, Progressing  Flowsheets (Taken 11/30/2023 1153)  Plan of Care Reviewed With: patient  Goal: Patient-Specific Goal (Individualized)  Outcome: Ongoing, Progressing  Flowsheets (Taken 11/30/2023 1153)  Anxieties, Fears or Concerns: feeling better  Individualized Care Needs: promote calming environment, assist with ADLs, and motivate patient to set and work towards short term goals (participating with therapy)  Goal: Absence of Hospital-Acquired Illness or Injury  Outcome: Ongoing, Progressing  Intervention: Identify and Manage Fall Risk  Flowsheets (Taken 11/30/2023 1153)  Safety Promotion/Fall Prevention:   nonskid shoes/socks when out of bed   lighting adjusted   medications reviewed   high risk medications identified   Fall Risk reviewed with patient/family   room near unit station   supervised activity   side rails raised x 3  Intervention: Prevent Skin Injury  Flowsheets (Taken 11/30/2023 1153)  Body Position: sitting up in bed  Skin Protection:   adhesive use limited   incontinence pads utilized  Intervention: Prevent and Manage VTE (Venous Thromboembolism) Risk  Flowsheets (Taken 11/30/2023 1153)  Activity Management:   Arm raise - L1   Straight leg raise - L1  VTE Prevention/Management: ambulation promoted  Range of Motion: active ROM (range of motion) encouraged  Intervention: Prevent Infection  Flowsheets (Taken 11/30/2023 1153)  Infection Prevention:   cohorting utilized   hand hygiene promoted   rest/sleep promoted   single patient room provided   equipment surfaces disinfected  Goal: Optimal Comfort and Wellbeing  Outcome: Ongoing, Progressing  Intervention: Monitor Pain and Promote Comfort  Flowsheets (Taken 11/30/2023 1153)  Pain Management Interventions:   care clustered   quiet environment facilitated   position adjusted  Intervention: Provide Person-Centered Care  Flowsheets (Taken 11/30/2023 1153)  Trust  Relationship/Rapport:   care explained   questions encouraged   reassurance provided   choices provided   empathic listening provided   questions answered     Problem: Diabetes Comorbidity  Goal: Blood Glucose Level Within Targeted Range  Outcome: Ongoing, Progressing  Intervention: Monitor and Manage Glycemia  Flowsheets (Taken 11/30/2023 1153)  Glycemic Management:   blood glucose monitored   supplemental insulin given     Problem: Skin Injury Risk Increased  Goal: Skin Health and Integrity  Outcome: Ongoing, Progressing  Intervention: Optimize Skin Protection  Flowsheets (Taken 11/30/2023 1153)  Pressure Reduction Techniques:   frequent weight shift encouraged   pressure points protected   weight shift assistance provided  Skin Protection:   adhesive use limited   incontinence pads utilized  Head of Bed (HOB) Positioning: HOB at 30-45 degrees  Intervention: Promote and Optimize Oral Intake  Flowsheets (Taken 11/30/2023 1153)  Oral Nutrition Promotion:   rest periods promoted   social interaction promoted   physical activity promoted

## 2023-11-30 NOTE — PLAN OF CARE
Problem: Adult Inpatient Plan of Care  Goal: Plan of Care Review  Outcome: Ongoing, Progressing  Goal: Patient-Specific Goal (Individualized)  Outcome: Ongoing, Progressing  Flowsheets (Taken 11/29/2023 2113)  Anxieties, Fears or Concerns: none expressed at this time  Individualized Care Needs: monitor for urinary retention, bladder scan q6 PRN, monitor blood sugar, collect UA  Goal: Absence of Hospital-Acquired Illness or Injury  Outcome: Ongoing, Progressing  Intervention: Prevent Skin Injury  Flowsheets (Taken 11/29/2023 2000)  Body Position:   position maintained   sitting up in bed  Goal: Optimal Comfort and Wellbeing  Outcome: Ongoing, Progressing  Goal: Readiness for Transition of Care  Outcome: Ongoing, Progressing  Intervention: Mutually Develop Transition Plan  Flowsheets (Taken 11/29/2023 2113)  Communicated NAZANIN with patient/caregiver: Date not available/Unable to determine  Do you expect to return to your current living situation?: Yes  Do you have help at home or someone to help you manage your care at home?: Yes  Readmission within 30 days?: No  Do you currently have service(s) that help you manage your care at home?: No     Problem: Diabetes Comorbidity  Goal: Blood Glucose Level Within Targeted Range  Outcome: Ongoing, Progressing     Problem: Infection  Goal: Absence of Infection Signs and Symptoms  Outcome: Ongoing, Progressing  Intervention: Prevent or Manage Infection  Flowsheets (Taken 11/29/2023 2000)  Infection Management:   aseptic technique maintained   cultures obtained and sent to lab  Isolation Precautions: precautions maintained     Problem: Skin Injury Risk Increased  Goal: Skin Health and Integrity  Outcome: Ongoing, Progressing  Intervention: Optimize Skin Protection  Flowsheets (Taken 11/29/2023 2000)  Head of Bed (HOB) Positioning: HOB at 45 degrees  Intervention: Promote and Optimize Oral Intake  Flowsheets (Taken 11/29/2023 2000)  Oral Nutrition Promotion:   calorie-dense foods  provided   calorie-dense liquids provided   safe use of adaptive equipment encouraged   rest periods promoted   physical activity promoted

## 2023-11-30 NOTE — PLAN OF CARE
Problem: Physical Therapy  Goal: Physical Therapy Goal  Description: Goals to be met by: Discharge      Patient will increase functional independence with mobility by performin. Supine to sit with Stand-by Assistance  2. Sit to supine with Stand-by Assistance  3. Sitting at edge of bed x 15 minutes with Stand-by Assistance without dizziness or low BP  4. Increased functional strength to 5/5 for B hips  5. Sit to stands, bed<>chair and gait to be assessed with goals set when able/appropriate.     Outcome: Ongoing, Progressing

## 2023-11-30 NOTE — PLAN OF CARE
Problem: Occupational Therapy  Goal: Occupational Therapy Goal  Description: Goals to be met by: Discharge     Patient will increase functional independence with ADLs by performing:    UE Dressing with Stand-by Assistance.  LE Dressing with Moderate Assistance.  Grooming while standing at sink with Contact Guard Assistance.  Toileting from bedside commode with Moderate Assistance for hygiene and clothing management.   Bathing from  shower chair/bench with Moderate Assistance.  Toilet transfer to bedside commode with Moderate Assistance.  Increased functional strength to WFL for ADLs.    Outcome: Ongoing, Progressing

## 2023-11-30 NOTE — PLAN OF CARE
Problem: Adult Inpatient Plan of Care  Goal: Plan of Care Review  Flowsheets (Taken 11/29/2023 1903)  Plan of Care Reviewed With: patient  Goal: Patient-Specific Goal (Individualized)  Flowsheets (Taken 11/29/2023 1903)  Anxieties, Fears or Concerns: no concerns now  Individualized Care Needs: monitor for urinary retention, cervical collar at all times, monitor blood sugars  Goal: Absence of Hospital-Acquired Illness or Injury  Intervention: Identify and Manage Fall Risk  Flowsheets (Taken 11/29/2023 1903)  Safety Promotion/Fall Prevention:   assistive device/personal item within reach   bed alarm set   instructed to call staff for mobility   medications reviewed  Intervention: Prevent Skin Injury  Flowsheets (Taken 11/29/2023 1903)  Body Position:   log-rolled   sitting up in bed   weight shifting  Skin Protection: adhesive use limited  Intervention: Prevent and Manage VTE (Venous Thromboembolism) Risk  Flowsheets (Taken 11/29/2023 1903)  Activity Management:   Rolling - L1   Arm raise - L1  VTE Prevention/Management:   ambulation promoted   bleeding risk assessed   bleeding precations maintained  Range of Motion: active ROM (range of motion) encouraged  Intervention: Prevent Infection  Flowsheets (Taken 11/29/2023 1903)  Infection Prevention:   single patient room provided   hand hygiene promoted  Goal: Optimal Comfort and Wellbeing  Intervention: Monitor Pain and Promote Comfort  Flowsheets (Taken 11/29/2023 1903)  Pain Management Interventions:   care clustered   relaxation techniques promoted   quiet environment facilitated   prescribed exercises encouraged  Intervention: Provide Person-Centered Care  Flowsheets (Taken 11/29/2023 1903)  Trust Relationship/Rapport:   care explained   choices provided   questions encouraged   reassurance provided     Problem: Diabetes Comorbidity  Goal: Blood Glucose Level Within Targeted Range  Intervention: Monitor and Manage Glycemia  Flowsheets (Taken 11/29/2023  1903)  Glycemic Management: blood glucose monitored

## 2023-11-30 NOTE — H&P
[facility] - [facility] Surgical [facility]  [facility] [place] MEDICINE - H&P ADMISSION NOTE      Patient Name: Stevie Cummins  MRN: [id number]  Patient Class: IP- Swing   Admission Date: 11/29/2023   Admitting Physician: FRANCISCO Service   Attending Physician: TAYLOR Serrano  Primary Care Provider: Robert Alarcon MD  Face-to-Face encounter date: 11/30/2023      CHIEF COMPLAINT   No chief complaint on file.    HISTORY OF PRESENTING ILLNESS     Patient is a 73-year-old  male with a past medical history HTN, HLD, CAD, CVA, DM, PFO s/p closure in 2022, aortic stenosis, and PVD s/p lower extremity stenting who presented to the ER at [facility] on 11/14/2023 for extremity weakness with recurrent falls and unsteadiness.  MRI cervical spine without contrast on 11/14/2023 showed chronic multilevel central canal stenosis with effacement of the CSF, most severe at C3-C4 with mild chronic cord compression.  MRI thoracic spine without contrast on 11/14/2023 showed slightly increased signal seen in the disc at T11-T12 possibly representing early discitis, no epidural abscess, no osteomyelitis.  MRI lumbar spine without contrast on 11/14/2023 showed unchanged chronic degenerative changes with central canal and neuroforaminal stenosis.  Patient was evaluated by Infectious Disease on 11/15/2023 who found no evidence of clinical infectious syndrome and ordered blood cultures which resulted as negative.  Neurosurgery was consulted on 11/15/2023 and patient subsequently underwent posterior spinal fusion on 11/20/2023. Patient was admitted to [facility] swing unit on 11/29/2023 for PT/OT. Labs today are significant for leukocytosis, CORKY, and UTI. Patient was started on Rocephin today and urine culture is pending. Patient's glucose has remained elevated since initial admission. Levemir 17u BID started today and continue moderate dose insulin sliding scale, adjusting as needed. Last dose of Decadron will be given today per neurosurgery note on  11/27/2023, and is to follow up with Dr. Hutchinson in 3-4 weeks. Patient continue to wear soft collar.     PAST MEDICAL HISTORY     Past Medical History:   Diagnosis Date    BPH (benign prostatic hyperplasia)     CVA (cerebral vascular accident)     Depression     History of claustrophobia     HLD (hyperlipidemia)     HTN (hypertension)     Personal history of colonic polyps 07/08/2020    Colonoscopy Report    Type 2 diabetes mellitus without complications        PAST SURGICAL HISTORY     Past Surgical History:   Procedure Laterality Date    COLONOSCOPY  07/08/2020    [facility] [place], Sha Grimaldo MD    FUSION OF POSTERIOR COLUMN OF CERVICAL SPINE USING COMPUTER AIDED NAVIGATION N/A 11/20/2023    Procedure: FUSION, SPINE, POSTERIOR SPINAL COLUMN, CERVICAL, USING COMPUTER-ASSISTED NAVIGATION;  Surgeon: Arya Hutchinson MD;  Location: [facility];  Service: Neurosurgery;  Laterality: N/A;  PCDF  C3 - T1 //  PRONE // PRONE NORMAN // DRILL // SCOPE // O-ARM // DIRECT SPINE // NDM //    REQ 1100    PATENT FORAMEN OVALE CLOSURE  11/04/2022    PROSTATE SURGERY  2021    Urolift       FAMILY HISTORY   Reviewed and noncontributory to this case    SOCIAL HISTORY     Social History     Socioeconomic History    Marital status:    Tobacco Use    Smoking status: Former    Smokeless tobacco: Never   Substance and Sexual Activity    Alcohol use: Not Currently    Drug use: Never    Sexual activity: Not Currently     Social Determinants of Health     Financial Resource Strain: Low Risk  (11/30/2023)    Overall Financial Resource Strain (CARDIA)     Difficulty of Paying Living Expenses: Not hard at all   Food Insecurity: No Food Insecurity (11/30/2023)    Hunger Vital Sign     Worried About Running Out of Food in the Last Year: Never true     Ran Out of Food in the Last Year: Never true   Transportation Needs: No Transportation Needs (11/30/2023)    PRAPARE - Transportation     Lack of Transportation (Medical): No     Lack  of Transportation (Non-Medical): No   Physical Activity: Inactive (11/30/2023)    Exercise Vital Sign     Days of Exercise per Week: 0 days     Minutes of Exercise per Session: 0 min   Stress: No Stress Concern Present (11/30/2023)    [employer] [employer] - Occupational Stress Questionnaire     Feeling of Stress : Only a little   Social Connections: Socially Integrated (11/30/2023)    Social Connection and Isolation Panel [NHANES]     Frequency of Communication with Friends and Family: More than three times a week     Frequency of Social Gatherings with Friends and Family: More than three times a week     Attends Gnosticism Services: More than 4 times per year     Active Member of Clubs or Organizations: No     Attends Club or Organization Meetings: 1 to 4 times per year     Marital Status: Living with partner   Housing Stability: Low Risk  (11/30/2023)    Housing Stability Vital Sign     Unable to Pay for Housing in the Last Year: No     Number of Places Lived in the Last Year: 1     Unstable Housing in the Last Year: No       HOME MEDICATIONS     Prior to Admission medications    Medication Sig Start Date End Date Taking? Authorizing Provider   albuterol (PROVENTIL) 2.5 mg /3 mL (0.083 %) nebulizer solution Take by nebulization. 6/27/22  Yes Provider, Historical   allopurinoL (ZYLOPRIM) 100 MG tablet Take 1 tablet (100 mg total) by mouth once daily. 11/30/23  Yes Robert Alarcon MD   aspirin (ECOTRIN) 81 MG EC tablet Take 81 mg by mouth once daily.   Yes Provider, Historical   bisacodyL (DULCOLAX) 10 mg Supp Place 1 suppository (10 mg total) rectally once daily. 11/30/23  Yes Robert Alarcon MD   citalopram (CELEXA) 20 MG tablet Take 1 tablet (20 mg total) by mouth once daily. 9/28/22  Yes Robert Alarcon MD   dexAMETHasone (DECADRON) 2 MG tablet Take 1 tablet (2 mg total) by mouth once daily. for 10 days 11/30/23 12/10/23 Yes Robert Alarcon MD   diclofenac (CATAFLAM) 50 MG tablet Take 1 tablet (50  mg total) by mouth Daily. 10/30/23  Yes Robert Alarcon MD   doxazosin (CARDURA) 8 MG Tab Take 0.5 tabs bid 10/5/23  Yes Robert Alarcon MD   enoxaparin (LOVENOX) 40 mg/0.4 mL Syrg Inject 0.4 mLs (40 mg total) into the skin every 12 (twelve) hours. 11/29/23  Yes Robert Alarcon MD   flash glucose sensor (FREESTYLE MITCH 14 DAY SENSOR) Kit 1 each by Misc.(Non-Drug; Combo Route) route 2 hours after meals and at bedtime. 7/28/22  Yes Robert Alarcon MD   flash glucose sensor (FREESTYLE MITCH 2 SENSOR) Kit 2 each by Misc.(Non-Drug; Combo Route) route 2 hours after meals and at bedtime. 7/28/22  Yes Robert Alarcon MD   fluticasone-umeclidin-vilanter (TRELEGY ELLIPTA) 100-62.5-25 mcg DsDv Inhale 1 puff into the lungs nightly.   Yes Provider, Historical   furosemide (LASIX) 40 MG tablet Take 1 tablet (40 mg total) by mouth once daily. 9/28/22  Yes Robert Alarcon MD   gabapentin (NEURONTIN) 300 MG capsule Take 1 capsule (300 mg total) by mouth 3 (three) times daily. 9/22/23 9/21/24 Yes Robert Alarcon MD   glipizide-metformin (METAGLIP) 2.5-250 mg per tablet Take 1 tablet by mouth once daily. 9/28/22  Yes Robert Alarcon MD   insulin lispro 100 unit/mL pen Inject 20 Units into the skin 3 (three) times daily. 9/28/23 9/27/24 Yes Robert Alarcon MD   lisinopriL 10 MG tablet Take 1 tablet (10 mg total) by mouth once daily. 9/28/22  Yes Robert Alarcon MD   methocarbamoL (ROBAXIN) 500 MG Tab Take 1 tablet (500 mg total) by mouth every evening. 9/22/23  Yes Robert Alarcon MD   montelukast (SINGULAIR) 10 mg tablet Take 1 tablet (10 mg total) by mouth every evening. 10/30/23  Yes Robert Alarcon MD   multivitamin (THERAGRAN) per tablet Take 1 tablet by mouth every morning.   Yes Provider, Historical   simvastatin (ZOCOR) 40 MG tablet TAKE 1 TABLET THREE TIMES  WEEKLY MONDAY, WEDNESDAY,  AND FRIDAY 10/2/23  Yes Robert Alarcon MD   tamsulosin (FLOMAX) 0.4 mg Cap Take 1 capsule by mouth once daily. 8/23/23  Yes Provider, Historical   TOUJEO  SOLOSTAR U-300 INSULIN 300 unit/mL (1.5 mL) InPn pen Inject 30 Units into the skin every evening. 1/23/23  Yes Robert Alarcon MD   acetaminophen (TYLENOL) 325 MG tablet Take 2 tablets (650 mg total) by mouth every 6 (six) hours as needed for Temperature greater than (or equal to 101 degree F). 11/29/23   Robert Alarcon MD   KLOR-CON M20 20 mEq tablet Take 1 tablet (20 mEq total) by mouth once daily. 9/22/23   Robert Alarcon MD   meclizine (ANTIVERT) 12.5 mg tablet TAKE 1 TABLET BY MOUTH TWICE DAILY FOR DIZZINESS 6/12/23   Robert Alarcon MD   MYRBETRIQ 50 mg Tb24 Take 1 tablet by mouth once daily. 5/3/23   Provider, Historical       ALLERGIES   Nitrofurantoin and Iodinated contrast media    REVIEW OF SYSTEMS   Except as documented above, all other systems reviewed and negative    PHYSICAL EXAM     Vitals:    11/30/23 0703   BP:    Pulse: 92   Resp: 18   Temp:       General:  In no acute distress, resting comfortably  Head and neck:  Atraumatic, normocephalic, moist mucous membranes, soft neck collar in place  Chest:  Clear to auscultation bilaterally  Heart:  S1, S2, soft murmur  Abdomen:  Soft, nontender, BS +  MSK:  Warm, no lower extremity edema, no clubbing or cyanosis  Neuro:  Alert and oriented, moving all extremities   Integumentary:  No obvious skin rash  Psychiatry:  Appropriate mood and affect    ASSESSMENT AND PLAN     Spinal stenosis   S/p posterior cervical spinal fusion on 11/20/2023  Last dose of Decadron 2mg given today   Continue soft collar  PT/OT   Pain control as needed    UTI   Rocephin started today  Urine culture pending     Leukocytosis   UTI vs steroid induced  Monitor     CORKY   Encourage oral hydration  Last dose of Decadron given today    DM  Hyperglycemia  Start Levemir 17u BID   Continue moderate dose insulin sliding scale     Hx of HLD, CAD, CVA, PFO s/p closure in 2022, aortic stenosis, and PVD s/p lower extremity stenting  Continue home Lisinopril, Atorvastatin   Clarifying if patient  can resume Aspirin     Hx of BPH, gout, anxiety/depression   Continue home medication    DVT prophylaxis: Lovenox   __________________________________________________________________  LABS/MICRO/MEDS/DIAGNOSTICS       LABS  Recent Labs     11/30/23  0234   *   K 4.7   CHLORIDE 102   CO2 22*   BUN 68.4*   CREATININE 1.26*   GLUCOSE 230*   CALCIUM 8.7*   ALKPHOS 66   AST 11   ALT 19   ALBUMIN 3.0*     Recent Labs     11/30/23  0234   WBC 19.95*   RBC 4.21*   HCT 41.2*   MCV 97.9*          MICROBIOLOGY  Microbiology Results (last 7 days)       Procedure Component Value Units Date/Time    Urine culture [[id number]] Collected: 11/29/23 2245    Order Status: Sent Specimen: Urine Updated: 11/29/23 2310            MEDICATIONS   [START ON 12/1/2023] albuterol-ipratropium  3 mL Nebulization Daily    allopurinoL  100 mg Oral Daily    [START ON 12/1/2023] atorvastatin  20 mg Oral Every Mon, Wed, Fri    cefTRIAXone (ROCEPHIN) IVPB  1 g Intravenous Q24H    citalopram  20 mg Oral Daily    doxazosin  4 mg Oral Daily    enoxparin  40 mg Subcutaneous Q24H (prophylaxis, 1700)    gabapentin  300 mg Oral TID    guaiFENesin  600 mg Oral BID    insulin detemir U-100  17 Units Subcutaneous BID    lisinopriL  10 mg Oral Daily    methocarbamoL  750 mg Oral TID    miconazole NITRATE 2 %   Topical (Top) BID    montelukast  10 mg Oral QHS    multivitamin  1 tablet Oral Daily    senna-docusate 8.6-50 mg  2 tablet Oral BID    tamsulosin  1 capsule Oral Daily      INFUSIONS      DIAGNOSTIC TESTS  No orders to display          Patient information was obtained from patient, patient's family, past medical records and ER records.   All diagnosis and differential diagnosis have been reviewed; assessment and plan has been documented. I have personally reviewed the labs and test results that are presently available; I have reviewed the patients medication list. I have reviewed the consulting providers response and recommendations. I have  reviewed or attempted to review medical records based upon their availability.  All of the patient's questions have been addressed and answered. Patient's is agreeable to the above stated plan. I will continue to monitor closely and make adjustments to medical management as needed.  This note was created using Sapient voice recognition software that occasionally misinterpreted phrases or words.  Please contact me if any questions may rise regarding documentation to clarify verbiage.        TAYLOR Serrano   Internal Medicine  Department of Hospital Medicine [facility] - [facility] Surgical Unit

## 2023-11-30 NOTE — PROGRESS NOTES
Inpatient Nutrition Evaluation    Admit Date: 11/29/2023   Total duration of encounter: 1 day    Nutrition Recommendation/Prescription     Continue diabetic diet. 2. Add boost glucose control daily, decrease intake, per patient request.     Nutrition Assessment     Chart Review    Reason Seen: continuous nutrition monitoring and length of stay    Malnutrition Screening Tool Results   Have you recently lost weight without trying?: No  Have you been eating poorly because of a decreased appetite?: Yes   MST Score: 1     Diagnosis:  Cervical stenosis of spinal canal     Relevant Medical History: Personal history of colonic polyps   Date Unknown  BPH (benign prostatic hyperplasia)  Date Unknown  CVA (cerebral vascular accident)  Date Unknown  Depression  Date Unknown  History of claustrophobia  Date Unknown  HLD (hyperlipidemia)  Date Unknown  HTN (hypertension)  Date Unknown  Type 2 diabetes mellitus without complications    Nutrition-Related Medications: allopurinol, ceftriaxone, insulin aspart, insulin detemir, magnesium hydroxide, multivitamin, senna-docusate, atorvastatin,     Nutrition-Related Labs:   Latest Reference Range & Units 11/30/23 02:34   WBC 4.50 - 11.50 x10(3)/mcL 19.95 (H)   RBC 4.70 - 6.10 x10(6)/mcL 4.21 (L)   Hemoglobin 14.0 - 18.0 g/dL 13.4 (L)   Hematocrit 42.0 - 52.0 % 41.2 (L)   MCV 80.0 - 94.0 fL 97.9 (H)   MCH 27.0 - 31.0 pg 31.8 (H)   MCHC 33.0 - 36.0 g/dL 32.5 (L)   RDW 11.5 - 17.0 % 12.5   Platelet Count 130 - 400 x10(3)/mcL 207   MPV 7.4 - 10.4 fL 11.6 (H)   Neut % % 76.4   LYMPH % % 7.7   Mono % % 10.0   Eosinophil % % 1.0   Basophil % % 0.3   Immature Granulocytes % 4.6   Neut # 2.1 - 9.2 x10(3)/mcL 15.24 (H)   Lymph # 0.6 - 4.6 x10(3)/mcL 1.53   Mono # 0.1 - 1.3 x10(3)/mcL 2.00 (H)   Eos # 0 - 0.9 x10(3)/mcL 0.20   Baso # <=0.2 x10(3)/mcL 0.06   Immature Grans (Abs) 0 - 0.04 x10(3)/mcL 0.92 (H)   Sodium 136 - 145 mmol/L 134 (L)   Potassium 3.5 - 5.1 mmol/L 4.7   Chloride 98 - 107 mmol/L  "102   CO2 23 - 31 mmol/L 22 (L)   BUN 8.4 - 25.7 mg/dL 68.4 (H)   Creatinine 0.73 - 1.18 mg/dL 1.26 (H)   eGFR mls/min/1.73/m2 59   Glucose 82 - 115 mg/dL 230 (H)   Calcium 8.8 - 10.0 mg/dL 8.7 (L)   ALP 40 - 150 unit/L 66   PROTEIN TOTAL 5.8 - 7.6 gm/dL 6.0   Albumin 3.4 - 4.8 g/dL 3.0 (L)   Albumin/Globulin Ratio 1.1 - 2.0 ratio 1.0 (L)   BILIRUBIN TOTAL <=1.5 mg/dL 1.2   AST 5 - 34 unit/L 11   ALT 0 - 55 unit/L 19   Globulin, Total 2.4 - 3.5 gm/dL 3.0   (H): Data is abnormally high  (L): Data is abnormally low    Diet Order: Diet Adult Regular Diabetic  Oral Supplement Order: none  Appetite/Oral Intake: poor/0-25% of meals  Factors Affecting Nutritional Intake: decreased appetite  Food/Muslim/Cultural Preferences:  boost glucose control chocolate  Food Allergies:  Iodine contrast media    Skin Integrity: excoriation, incision  Wound(s):       Comments    Pt reports decrease intake. Pt reports only taking few bites of breakfast. Discussed food preferences and nutrition supplement to improved intake. Pt states would like to try chocolate flavor of boost glucose control.     Anthropometrics    Height: 5' 9" (175.3 cm) Height Method: Stated  Last Weight: 86.3 kg (190 lb 4.1 oz) (11/29/23 1725) Weight Method: Bed Scale  BMI (Calculated): 28.1  BMI Classification: overweight (BMI 25-29.9)        Ideal Body Weight (IBW), Male: 160 lb     % Ideal Body Weight, Male (lb): 118.91 %                          Usual Weight Provided By: unable to obtain usual weight    Wt Readings from Last 5 Encounters:   11/29/23 86.3 kg (190 lb 4.1 oz)   11/17/23 99.3 kg (219 lb)   09/22/23 101.2 kg (223 lb)   07/22/23 106.6 kg (235 lb)   05/18/23 102.1 kg (225 lb)     Weight Change(s) Since Admission:  Admit Weight: 86.3 kg (190 lb 4.1 oz) (11/29/23 9836)      Patient Education    Not applicable.    Monitoring & Evaluation     Dietitian will monitor food and beverage intake, weight, weight change, electrolyte/renal panel, " glucose/endocrine profile, and gastrointestinal profile.  Nutrition Risk/Follow-Up: low (follow-up in 5-7 days)  Patients assigned 'low nutrition risk' status do not qualify for a full nutritional assessment but will be monitored and re-evaluated in a 5-7 day time period. Please consult if re-evaluation needed sooner.

## 2023-11-30 NOTE — PT/OT/SLP PROGRESS
Occupational Therapy  Treatment    Name: Stevie Cummins    : 1950 (73 y.o.)  MRN: [id number]           TREATMENT SUMMARY AND RECOMMENDATIONS:      OT Date of Treatment: 23  OT Start Time: 1432  OT Stop Time: 1455  OT Total Time (min): 23 min      Subjective Assessment:   No complaints  Lethargic   x Awake, alert, cooperative  Impulsive    Uncooperative   Flat affect    Agitated x c/o pain    Appropriate  c/o fatigue    Confused x Treated at bedside     Emotionally labile  Treated in gym/dept.      Other:        Therapy Precautions:    Cognitive deficits x Spinal precautions    Collar - hard  Sternal precautions   x Collar - soft   TLSO   x Fall risk  LSO    Hip precautions - posterior  Knee immobilizer    Hip precautions - anterior  WBAT    Impaired communication  Partial weightbearing    Oxygen  TTWB    PEG tube  NWB    Visual deficits      Hearing deficits  x Other: BP        Treatment Objectives:     Mobility Training:    Mobility task Assist level Comments    Bed mobility Min A   Max Ax2 Supine<>sitting EOB using log rolling technique    Scoot pt up in bed using draw sheets    Transfer     Sit to stands transitions Min A-CGA EOB<>standing using a RW and gait belt    Functional mobility     Sitting balance     Standing balance      Other:        ADL Training:    ADL Assist level Comments    Feeding     Grooming/hygiene     Bathing     Upper body dressing     Lower body dressing     Toileting     Toilet transfer     Adaptive equipment training     Other:           Therapeutic Exercise:   Exercise Sets Reps Comments                               Additional Comments:  Co-tx with PT d/t pt's current functional status and poor tolerance. Pt c/o of dizziness when sitting EOB and with standing. Pt abruptly sat down and laid down in bed d/t feeling dizziness increasing his risk for falls.    Assessment: Patient tolerated session fair-poor.    GOALS:   Multidisciplinary Problems       Occupational Therapy Goals           Problem: Occupational Therapy    Goal Priority Disciplines Outcome Interventions   Occupational Therapy Goal     OT, PT/OT Ongoing, Progressing    Description: Goals to be met by: Discharge     Patient will increase functional independence with ADLs by performing:    UE Dressing with Stand-by Assistance.  LE Dressing with Moderate Assistance.  Grooming while standing at sink with Contact Guard Assistance.  Toileting from bedside commode with Moderate Assistance for hygiene and clothing management.   Bathing from  shower chair/bench with Moderate Assistance.  Toilet transfer to bedside commode with Moderate Assistance.  Increased functional strength to WFL for ADLs.                         Recommendations:     Discharge Recommendations: TBD based on progress  Discharge Equipment Recommendations:  wheelchair  Barriers to discharge:  Other (Comment) (Severity of deficits)     Plan:     Patient to be seen 6 x/week to address the above listed problems via self-care/home management, therapeutic activities, therapeutic exercises, neuromuscular re-education  Plan of Care Expires: 12/21/23  Plan of Care Reviewed with: patient  Revisions made to plan of care: Yes      Skilled OT Minutes Provided: 23  Communication with Treatment Team:     Equipment recommendations:       At end of treatment, patient remained:   Up in chair     Up in wheelchair in room   x In bed   x With alarm activated   x Bed rails up   x Call bell in reach     Family/friends present    Restraints secured properly    In bathroom with CNA/RN notified    In gym with PT/PTA/tech   x Nurse aware    Other:

## 2023-11-30 NOTE — PLAN OF CARE
Problem: Occupational Therapy  Goal: Occupational Therapy Goal  Description: Goals to be met by: Discharge     Patient will increase functional independence with ADLs by performing:    UE Dressing with Stand-by Assistance.  LE Dressing with Moderate Assistance.  Grooming while standing at sink with Contact Guard Assistance.  Toileting from bedside commode with Moderate Assistance for hygiene and clothing management.   Bathing from  shower chair/bench with Moderate Assistance.  Toilet transfer to bedside commode with Stand-by Assistance.  Increased functional strength to WFL for ADLs.    11/30/2023 1517 by Marisol Fung, OT  Outcome: Ongoing, Progressing  11/30/2023 1137 by Marisol Fung, OT  Outcome: Ongoing, Progressing

## 2023-11-30 NOTE — PT/OT/SLP EVAL
Occupational Therapy Evaluation      Name: Stevie Cummins  MRN: [id number]  Admitting Diagnosis: Cervical stenosis of spinal canal    Recent Surgery: * No surgery found *      Recommendations:     Discharge Recommendations:    Level of Assistance Recommended:  TBD  Discharge Equipment Recommendations: wheelchair  Barriers to discharge: Other (Comment) (Severity of deficits)    Assessment:   Stevie Cummins is a 73 y.o. male with a medical diagnosis of Cervical stenosis of spinal canal. He presents with performance deficits affecting function including weakness, impaired self care skills, impaired functional mobility, decreased lower extremity function, pain, orthopedic precautions, impaired endurance, impaired cardiopulmonary response to activity, decreased safety awareness. Patient tolerated session fair d/t c/o of dizziness and pain with transitioning to sitting EOB. Pt's BP dropped to 95/60 sitting EOB.     Rehab Prognosis: Good; patient would benefit from acute skilled OT services to address these deficits and reach maximum level of function.    Plan:     Patient to be seen 6 x/week to address the above listed problems via self-care/home management, therapeutic activities, therapeutic exercises, neuromuscular re-education  Plan of Care Expires: 12/21/23  Plan of Care Reviewed with: patient    Subjective     Chief Complaint: pain all over  Patient Comments/Goals: Return home at [facility]  Pain/Comfort:  Pain Rating 1: 4/10  Location - Side 1: Bilateral  Location 1: neck  Pain Addressed 1: Pre-medicate for activity, Nurse notified, Reposition, Cessation of Activity    Patients cultural, spiritual, Congregation conflicts given the current situation: no    Social History:  Living Environment: Patient lives with their significant other in a 2-story house, can live on first level, walk-in shower.  Prior Level of Function: Prior to admission, patient required assistance with ADLs including donning/doffing socks and pants . Pt uses  a rolling walker for functional mobility in his home.   Roles and Routines: boyfriend  Equipment Used at Home:FLOW(5344:LAST:1:1)@  DME owned (not currently used): rolling walker, bedside commode, and shower chair  Upon discharge, patient will have assistance from significant other, who is available to assist 24/7.    Objective:     Communicated with PT and NSG prior to session. Patient found HOB elevated with bed alarm, blood pressure cuff, peripheral IV upon OT entry to room.    General Precautions: Standard, fall   Orthopedic Precautions:spinal precautions   Braces: Cervical collar   Respiratory Status: Room air    Occupational Performance    Bed Mobility:  Rolling/Turning to Left with minimum assistance  Scooting anteriorly to EOB to have both feet planted on floor: contact guard assistance  Supine to sit from left side of bed with minimum assistance    Functional Mobility/Transfers:  Unable to perform d/t pt becoming symptomatic and having low BP, 95/60.     Activities of Daily Living:  Unable to assess pt performing self-care d/t low BP. Pt states he requires significant assistance for bathing and dressing and a some assistance for toileting. Will assess as able once BP is within safe parameters.     Cognitive/Visual Perceptual:  Cognitive/Psychosocial Skills:     -       Oriented to: Person, Place, Time, and Situation   -       Follows Commands/attention:Follows multistep  commands    Physical Exam:  Sensation:    -       Intact  Dominant hand: Right  Upper Extremity Range of Motion:     -       Right Upper Extremity: WFL within cervical spinal pxns, 90 degrees of shoulder flexion and abduction, full range with elbow flexion/extension  -       Left Upper Extremity: WFL within cervical spinal pxns, 90 degrees of shoulder flexion and abduction, full range with elbow flexion/extension   Upper Extremity Strength:    -       Right Upper Extremity: 4/5  -       Left Upper Extremity: 4-/5  Fine Motor Coordination:     -       Intact  Gross motor coordination:   WFL    AMPAC 6 Click ADL:  AMPAC Total Score: 16    Treatment & Education:  Educated on the Importance of mobility to maximize recovery.  Educated about Spinal precautions including no bending, lifting or twisting. Patient recalled 3/3 spinal precautions  Will continue to educate as needed      Unable to assess pt's functional mobility d/t low BP, 95/60. Will reassess as able.    Patient left HOB elevated with all lines intact, call button in reach, RN notified, and bed alarm on.    GOALS:   Multidisciplinary Problems       Occupational Therapy Goals          Problem: Occupational Therapy    Goal Priority Disciplines Outcome Interventions   Occupational Therapy Goal     OT, PT/OT Ongoing, Progressing    Description: Goals to be met by: Discharge     Patient will increase functional independence with ADLs by performing:    UE Dressing with Stand-by Assistance.  LE Dressing with Moderate Assistance.  Grooming while standing at sink with Contact Guard Assistance.  Toileting from bedside commode with Moderate Assistance for hygiene and clothing management.   Bathing from  shower chair/bench with Moderate Assistance.  Toilet transfer to bedside commode with Moderate Assistance.  Increased functional strength to WFL for ADLs.                         History:     Past Medical History:   Diagnosis Date    BPH (benign prostatic hyperplasia)     CVA (cerebral vascular accident)     Depression     History of claustrophobia     HLD (hyperlipidemia)     HTN (hypertension)     Personal history of colonic polyps 07/08/2020    Colonoscopy Report    Type 2 diabetes mellitus without complications          Past Surgical History:   Procedure Laterality Date    COLONOSCOPY  07/08/2020    [facility], Sha Grimaldo MD    FUSION OF POSTERIOR COLUMN OF CERVICAL SPINE USING COMPUTER AIDED NAVIGATION N/A 11/20/2023    Procedure: FUSION, SPINE, POSTERIOR SPINAL COLUMN, CERVICAL,  USING COMPUTER-ASSISTED NAVIGATION;  Surgeon: Arya Hutchinson MD;  [facility];  Service: Neurosurgery;  Laterality: N/A;  PCDF  C3 - T1 //  PRONE // PRONE NORMAN // DRILL // SCOPE // O-ARM // DIRECT SPINE // NDM //    REQ 1100    PATENT FORAMEN OVALE CLOSURE  11/04/2022    PROSTATE SURGERY  2021    Urolift       Time Tracking:     OT Date of Treatment: 11/30/23  OT Start Time: 1100  OT Stop Time: 1128  OT Total Time (min): 28 min    Billable Minutes: Evaluation 28 11/30/2023

## 2023-11-30 NOTE — PT/OT/SLP EVAL
Physical Therapy Evaluation and Treatment    Patient Name: Stevie Cummins   MRN: [id number]  Recent Surgery: C3-T1 fusion  Recommendations:     Discharge Recommendations: High Intensity Therapy   Discharge Equipment Recommendations: wheelchair   Barriers to discharge: Increased level of assist and Ongoing medical treatment    Assessment:     Stevie Cummins is a 73 y.o. male admitted with a medical diagnosis of Cervical stenosis of spinal canal. New UTI.  He presents with the following impairments/functional limitations: weakness, impaired endurance, impaired functional mobility, pain, decreased safety awareness, impaired cardiopulmonary response to activity, orthopedic precautions. Spinal precautions . New UTI    Rehab Prognosis: Fair; patient would benefit from acute PT services to address these deficits and reach maximum level of function.    Plan:     During this hospitalization, patient to be seen 6 x/week to address the above listed problems via gait training, therapeutic activities, therapeutic exercises    Plan of Care Expires:  12/30/2023    Subjective     Chief Complaint: Pain, dizziness  Patient Comments/Goals: overall get better.   Pain/Comfort:  Pain Rating 1: 4/10  Location - Side 1: Bilateral  Location 1: neck  Pain Addressed 1: Cessation of Activity, Nurse notified, Pre-medicate for activity, Reposition  Pain Rating Post-Intervention 1: 4/10    Social History:  Living Environment: Patient lives with their significant other in a single story home with walk-in shower  Prior Level of Function: Prior to admission, patient was modified independent  Equipment Used at Home: walker, rolling, bedside commode, shower chair  DME owned (not currently used): single point cane  Assistance Upon Discharge: significant other    Objective:     Communicated with Nursing and OT prior to session. Patient found HOB elevated with peripheral IV upon PT entry to room.    General Precautions: Standard,     Orthopedic Precautions:  "spinal precautions   Braces: Cervical collar    Respiratory Status: Room air    Exams:  RLE ROM: WNL  RLE Strength: Deficits: 3-/5 at hip  LLE ROM: WNL  LLE Strength: Deficits: 3-/5 at hip  Cognitive: Patient is oriented to Person, Place, Time, Situation      Functional Mobility:  Gait belt applied - No  Bed Mobility  Rolling Left: minimum assistance  Rolling Right: minimum assistance  Supine to Sit: moderate assistance for LE management, trunk management, and log rolling technique  Sit to Supine: moderate assistance for LE management, trunk management, and log rolling technique  Transfers  Unable to complete/tolerate   Gait  Unable to attempt   Balance  Sitting: Poor+ to FAIR: Cannot move trunk without losing balance, very dizzy with BP at 95/60, pt laying down on his own while yelling "down down down"  Standin: N/A  Further testing to be completed once symptoms are better.     Therapeutic Activities and Exercises:  Patient educated on role of acute care PT and PT POC, safety while in hospital including calling nurse for mobility, and call light usage.  Educated on spinal precautions including avoidance of bending, lifting, and twisting. Patient expressed understanding. Educated on log roll technique, performed to left.  Nursing educated on pt's functions and symptoms. Only jt at this time.     AM-PAC 6 CLICK MOBILITY  Total Score:9    Patient left with bed in chair position with all lines intact, call button in reach, RN notified, and chair alarm on.    GOALS:   Multidisciplinary Problems       Physical Therapy Goals          Problem: Physical Therapy    Goal Priority Disciplines Outcome Goal Variances Interventions   Physical Therapy Goal     PT, PT/OT Ongoing, Progressing     Description: Goals to be met by: Discharge      Patient will increase functional independence with mobility by performin. Supine to sit with Stand-by Assistance  2. Sit to supine with Stand-by Assistance  3. Sitting at edge of " bed x 15 minutes with Stand-by Assistance without dizziness or low BP  4. Increased functional strength to 5/5 for B hips  5. Sit to stands, bed<>chair and gait to be assessed with goals set when able/appropriate.                          History:     Past Medical History:   Diagnosis Date    BPH (benign prostatic hyperplasia)     CVA (cerebral vascular accident)     Depression     History of claustrophobia     HLD (hyperlipidemia)     HTN (hypertension)     Personal history of colonic polyps 07/08/2020    Colonoscopy Report    Type 2 diabetes mellitus without complications        Past Surgical History:   Procedure Laterality Date    COLONOSCOPY  07/08/2020    [facility], Sha Grimaldo MD    FUSION OF POSTERIOR COLUMN OF CERVICAL SPINE USING COMPUTER AIDED NAVIGATION N/A 11/20/2023    Procedure: FUSION, SPINE, POSTERIOR SPINAL COLUMN, CERVICAL, USING COMPUTER-ASSISTED NAVIGATION;  Surgeon: Arya Hutchinson MD;  Location: [facility];  Service: Neurosurgery;  Laterality: N/A;  PCDF  C3 - T1 //  PRONE // PRONE NORMAN // DRILL // SCOPE // O-ARM // DIRECT SPINE // NDM //    REQ 1100    PATENT FORAMEN OVALE CLOSURE  11/04/2022    PROSTATE SURGERY  2021    Urolift       Time Tracking:     PT Received On: 11/30/23  PT Start Time: 1100  PT Stop Time: 1120  PT Total Time (min): 20 min     Billable Minutes: Evaluation moderate complexity     11/30/2023

## 2023-12-01 LAB
ABS NEUT CALC (OHS): 39.51 X10(3)/MCL (ref 2.1–9.2)
ALBUMIN SERPL-MCNC: 2.3 G/DL (ref 3.4–4.8)
ALBUMIN SERPL-MCNC: 3.1 G/DL (ref 3.4–4.8)
ALBUMIN/GLOB SERPL: 0.7 RATIO (ref 1.1–2)
ALBUMIN/GLOB SERPL: 1.1 RATIO (ref 1.1–2)
ALP SERPL-CCNC: 67 UNIT/L (ref 40–150)
ALP SERPL-CCNC: 76 UNIT/L (ref 40–150)
ALT SERPL-CCNC: 13 UNIT/L (ref 0–55)
ALT SERPL-CCNC: 18 UNIT/L (ref 0–55)
AST SERPL-CCNC: 8 UNIT/L (ref 5–34)
AST SERPL-CCNC: 9 UNIT/L (ref 5–34)
BILIRUB SERPL-MCNC: 0.5 MG/DL
BILIRUB SERPL-MCNC: 1.3 MG/DL
BUN SERPL-MCNC: 85.1 MG/DL (ref 8.4–25.7)
BUN SERPL-MCNC: 99.5 MG/DL (ref 8.4–25.7)
CALCIUM SERPL-MCNC: 8.3 MG/DL (ref 8.8–10)
CALCIUM SERPL-MCNC: 8.7 MG/DL (ref 8.8–10)
CHLORIDE SERPL-SCNC: 102 MMOL/L (ref 98–107)
CHLORIDE SERPL-SCNC: 102 MMOL/L (ref 98–107)
CO2 SERPL-SCNC: 17 MMOL/L (ref 23–31)
CO2 SERPL-SCNC: 20 MMOL/L (ref 23–31)
CREAT SERPL-MCNC: 2.17 MG/DL (ref 0.73–1.18)
CREAT SERPL-MCNC: 2.35 MG/DL (ref 0.73–1.18)
ERYTHROCYTE [DISTWIDTH] IN BLOOD BY AUTOMATED COUNT: 12.7 % (ref 11.5–17)
GFR SERPLBLD CREATININE-BSD FMLA CKD-EPI: 28 MLS/MIN/1.73/M2
GFR SERPLBLD CREATININE-BSD FMLA CKD-EPI: 31 MLS/MIN/1.73/M2
GLOBULIN SER-MCNC: 2.9 GM/DL (ref 2.4–3.5)
GLOBULIN SER-MCNC: 3.4 GM/DL (ref 2.4–3.5)
GLUCOSE SERPL-MCNC: 202 MG/DL (ref 82–115)
GLUCOSE SERPL-MCNC: 233 MG/DL (ref 82–115)
HCT VFR BLD AUTO: 41.8 % (ref 42–52)
HGB BLD-MCNC: 13.7 G/DL (ref 14–18)
LYMPHOCYTES NFR BLD MANUAL: 1.36 X10(3)/MCL
LYMPHOCYTES NFR BLD MANUAL: 3 % (ref 13–40)
MCH RBC QN AUTO: 32.5 PG (ref 27–31)
MCHC RBC AUTO-ENTMCNC: 32.8 G/DL (ref 33–36)
MCV RBC AUTO: 99.3 FL (ref 80–94)
MONOCYTES NFR BLD MANUAL: 10 % (ref 2–11)
MONOCYTES NFR BLD MANUAL: 4.54 X10(3)/MCL (ref 0.1–1.3)
NEUTROPHILS NFR BLD MANUAL: 87 % (ref 47–80)
PLATELET # BLD AUTO: 225 X10(3)/MCL (ref 130–400)
PLATELET # BLD EST: NORMAL 10*3/UL
PMV BLD AUTO: 11.7 FL (ref 7.4–10.4)
POCT GLUCOSE: 170 MG/DL (ref 70–110)
POCT GLUCOSE: 202 MG/DL (ref 70–110)
POTASSIUM SERPL-SCNC: 4.1 MMOL/L (ref 3.5–5.1)
POTASSIUM SERPL-SCNC: 4.7 MMOL/L (ref 3.5–5.1)
PROT SERPL-MCNC: 5.7 GM/DL (ref 5.8–7.6)
PROT SERPL-MCNC: 6 GM/DL (ref 5.8–7.6)
RBC # BLD AUTO: 4.21 X10(6)/MCL (ref 4.7–6.1)
RBC MORPH BLD: NORMAL
SODIUM SERPL-SCNC: 132 MMOL/L (ref 136–145)
SODIUM SERPL-SCNC: 134 MMOL/L (ref 136–145)
WBC # SPEC AUTO: 45.41 X10(3)/MCL (ref 4.5–11.5)

## 2023-12-01 PROCEDURE — 97535 SELF CARE MNGMENT TRAINING: CPT

## 2023-12-01 PROCEDURE — 11000004 HC SNF PRIVATE

## 2023-12-01 PROCEDURE — 80053 COMPREHEN METABOLIC PANEL: CPT | Performed by: INTERNAL MEDICINE

## 2023-12-01 PROCEDURE — 25000003 PHARM REV CODE 250: Performed by: PHYSICIAN ASSISTANT

## 2023-12-01 PROCEDURE — 25000003 PHARM REV CODE 250: Performed by: INTERNAL MEDICINE

## 2023-12-01 PROCEDURE — 51798 US URINE CAPACITY MEASURE: CPT

## 2023-12-01 PROCEDURE — 94640 AIRWAY INHALATION TREATMENT: CPT

## 2023-12-01 PROCEDURE — 25000242 PHARM REV CODE 250 ALT 637 W/ HCPCS: Performed by: INTERNAL MEDICINE

## 2023-12-01 PROCEDURE — 85027 COMPLETE CBC AUTOMATED: CPT | Performed by: PHYSICIAN ASSISTANT

## 2023-12-01 PROCEDURE — 97530 THERAPEUTIC ACTIVITIES: CPT

## 2023-12-01 PROCEDURE — 97110 THERAPEUTIC EXERCISES: CPT

## 2023-12-01 PROCEDURE — 80053 COMPREHEN METABOLIC PANEL: CPT | Performed by: PHYSICIAN ASSISTANT

## 2023-12-01 PROCEDURE — 63600175 PHARM REV CODE 636 W HCPCS: Performed by: PHYSICIAN ASSISTANT

## 2023-12-01 PROCEDURE — 94760 N-INVAS EAR/PLS OXIMETRY 1: CPT

## 2023-12-01 RX ORDER — MIDODRINE HYDROCHLORIDE 5 MG/1
5 TABLET ORAL
Status: DISCONTINUED | OUTPATIENT
Start: 2023-12-02 | End: 2023-12-10

## 2023-12-01 RX ORDER — SODIUM CHLORIDE 9 MG/ML
INJECTION, SOLUTION INTRAVENOUS CONTINUOUS
Status: DISCONTINUED | OUTPATIENT
Start: 2023-12-01 | End: 2023-12-03

## 2023-12-01 RX ORDER — SODIUM BICARBONATE 650 MG/1
650 TABLET ORAL ONCE
Status: COMPLETED | OUTPATIENT
Start: 2023-12-01 | End: 2023-12-01

## 2023-12-01 RX ADMIN — METHOCARBAMOL 750 MG: 750 TABLET ORAL at 02:12

## 2023-12-01 RX ADMIN — SENNOSIDES AND DOCUSATE SODIUM 2 TABLET: 8.6; 5 TABLET ORAL at 08:12

## 2023-12-01 RX ADMIN — METHOCARBAMOL 750 MG: 750 TABLET ORAL at 08:12

## 2023-12-01 RX ADMIN — SODIUM CHLORIDE: 9 INJECTION, SOLUTION INTRAVENOUS at 08:12

## 2023-12-01 RX ADMIN — GABAPENTIN 300 MG: 300 CAPSULE ORAL at 02:12

## 2023-12-01 RX ADMIN — DOXAZOSIN 4 MG: 4 TABLET ORAL at 08:12

## 2023-12-01 RX ADMIN — TAMSULOSIN HYDROCHLORIDE 0.4 MG: 0.4 CAPSULE ORAL at 08:12

## 2023-12-01 RX ADMIN — ALLOPURINOL 100 MG: 100 TABLET ORAL at 08:12

## 2023-12-01 RX ADMIN — ATORVASTATIN CALCIUM 20 MG: 10 TABLET, FILM COATED ORAL at 08:12

## 2023-12-01 RX ADMIN — GUAIFENESIN 600 MG: 600 TABLET, EXTENDED RELEASE ORAL at 08:12

## 2023-12-01 RX ADMIN — MICONAZOLE NITRATE 2 % TOPICAL POWDER: at 08:12

## 2023-12-01 RX ADMIN — GABAPENTIN 300 MG: 300 CAPSULE ORAL at 08:12

## 2023-12-01 RX ADMIN — MULTIPLE VITAMINS W/ MINERALS TAB 1 TABLET: TAB at 08:12

## 2023-12-01 RX ADMIN — SODIUM BICARBONATE 650 MG TABLET 650 MG: at 08:12

## 2023-12-01 RX ADMIN — ASPIRIN 81 MG: 81 TABLET, COATED ORAL at 08:12

## 2023-12-01 RX ADMIN — MONTELUKAST SODIUM 10 MG: 10 TABLET, COATED ORAL at 08:12

## 2023-12-01 RX ADMIN — INSULIN DETEMIR 17 UNITS: 100 INJECTION, SOLUTION SUBCUTANEOUS at 08:12

## 2023-12-01 RX ADMIN — LISINOPRIL 10 MG: 10 TABLET ORAL at 08:12

## 2023-12-01 RX ADMIN — IPRATROPIUM BROMIDE AND ALBUTEROL SULFATE 3 ML: .5; 3 SOLUTION RESPIRATORY (INHALATION) at 09:12

## 2023-12-01 RX ADMIN — INSULIN ASPART 2 UNITS: 100 INJECTION, SOLUTION INTRAVENOUS; SUBCUTANEOUS at 08:12

## 2023-12-01 RX ADMIN — CEFTRIAXONE SODIUM 1 G: 1 INJECTION, POWDER, FOR SOLUTION INTRAMUSCULAR; INTRAVENOUS at 08:12

## 2023-12-01 RX ADMIN — ENOXAPARIN SODIUM 40 MG: 40 INJECTION SUBCUTANEOUS at 05:12

## 2023-12-01 RX ADMIN — CITALOPRAM HYDROBROMIDE 20 MG: 20 TABLET ORAL at 08:12

## 2023-12-01 NOTE — PLAN OF CARE
Problem: Adult Inpatient Plan of Care  Goal: Plan of Care Review  Outcome: Ongoing, Progressing  Goal: Patient-Specific Goal (Individualized)  Outcome: Ongoing, Progressing  Flowsheets (Taken 12/01/2023 1500)  Anxieties, Fears or Concerns: none expressed at this time  Individualized Care Needs: monitor for urinary retention, monitor CBGs, pain management, PT/OT, keep soft collar until F/U appointment  Goal: Absence of Hospital-Acquired Illness or Injury  Outcome: Ongoing, Progressing  Intervention: Prevent Skin Injury  Flowsheets (Taken 12/01/2023 1500)  Body Position: position maintained  Goal: Optimal Comfort and Wellbeing  Outcome: Ongoing, Progressing  Intervention: Monitor Pain and Promote Comfort  Flowsheets (Taken 12/01/2023 1500)  Pain Management Interventions:   care clustered   relaxation techniques promoted   quiet environment facilitated   pillow support provided  Goal: Readiness for Transition of Care  Outcome: Ongoing, Progressing     Problem: Diabetes Comorbidity  Goal: Blood Glucose Level Within Targeted Range  Outcome: Ongoing, Progressing     Problem: Infection  Goal: Absence of Infection Signs and Symptoms  Outcome: Ongoing, Progressing  Intervention: Prevent or Manage Infection  Flowsheets (Taken 12/01/2023 1500)  Fever Reduction/Comfort Measures:   lightweight bedding   lightweight clothing  Infection Management: aseptic technique maintained     Problem: Skin Injury Risk Increased  Goal: Skin Health and Integrity  Outcome: Ongoing, Progressing  Intervention: Optimize Skin Protection  Flowsheets (Taken 12/01/2023 1500)  Head of Bed (HOB) Positioning: HOB at 30 degrees     Problem: Urinary Retention  Goal: Effective Urinary Elimination  Outcome: Ongoing, Progressing  Intervention: Promote Effective Urine Elimination  Flowsheets (Taken 12/01/2023 1500)  Bowel Function Promotion:   straining discouraged   prompt response to urge promoted  Urinary Elimination Promotion:   absorbent pad/diaper use  encouraged   bladder volume assessed by ultrasound   frequent voiding encouraged   toileting device within reach

## 2023-12-01 NOTE — PROGRESS NOTES
[facility] MEDICINE ~ PROGRESS NOTE        CHIEF COMPLAINT   Hospital follow up    HOSPITAL COURSE     Patient is a 73-year-old  male with a past medical history HTN, HLD, CAD, CVA, DM, PFO s/p closure in 2022, aortic stenosis, and PVD s/p lower extremity stenting who presented to the ER at [facility] on 11/14/2023 for extremity weakness with recurrent falls and unsteadiness.  MRI cervical spine without contrast on 11/14/2023 showed chronic multilevel central canal stenosis with effacement of the CSF, most severe at C3-C4 with mild chronic cord compression.  MRI thoracic spine without contrast on 11/14/2023 showed slightly increased signal seen in the disc at T11-T12 possibly representing early discitis, no epidural abscess, no osteomyelitis.  MRI lumbar spine without contrast on 11/14/2023 showed unchanged chronic degenerative changes with central canal and neuroforaminal stenosis.  Patient was evaluated by Infectious Disease on 11/15/2023 who found no evidence of clinical infectious syndrome and ordered blood cultures which resulted as negative.  Neurosurgery was consulted on 11/15/2023 and patient subsequently underwent posterior spinal fusion on 11/20/2023. Patient was admitted to [facility] swing unit on 11/29/2023 for PT/OT. Labs today are significant for leukocytosis, CORKY, and UTI. Patient was started on Rocephin today and urine culture is pending. Patient's glucose has remained elevated since initial admission. Levemir 17u BID started today and continue moderate dose insulin sliding scale, adjusting as needed. Last dose of Decadron will be given today per neurosurgery note on 11/27/2023, and is to follow up with Dr. Hutchinson in 3-4 weeks. Patient continue to wear soft collar.     Today  WBC increased to 45k, BUN increased to 85.1, Cr increased to 2.17. Bladder scans continue to show 400-600cc of urine despite patient urinating. Placing hillman catheter today, suspected  post renal CORKY. Urine culture growing gram negative rods.     OBJECTIVE/PHYSICAL EXAM     VITAL SIGNS (MOST RECENT):  Temp: 98.7 °F (37.1 °C) (12/01/23 0749)  Pulse: 80 (12/01/23 0920)  Resp: 18 (12/01/23 0920)  BP: (!) 93/56 (12/01/23 0749)  SpO2: 97 % (12/01/23 0920) VITAL SIGNS (24 HOUR RANGE):  Temp:  [98.7 °F (37.1 °C)-99.7 °F (37.6 °C)] 98.7 °F (37.1 °C)  Pulse:  [80-93] 80  Resp:  [18] 18  SpO2:  [90 %-97 %] 97 %  BP: (90-93)/(52-56) 93/56   GENERAL: In no acute distress, afebrile  HEENT: Atraumatic, normocephalic, moist mucous membranes, soft neck collar in place   CHEST: Clear to auscultation bilaterally  HEART: S1, S2, no appreciable murmur  ABDOMEN: Soft, nontender, BS +  MSK: Warm, no lower extremity edema, no clubbing or cyanosis  NEUROLOGIC: Alert and oriented, moving all extremities   INTEGUMENTARY: No obvious skin rashes   PSYCHIATRY: Appropriate mood and affect    ASSESSMENT/PLAN     Spinal stenosis   S/p posterior cervical spinal fusion on 11/20/2023  Completed Decadron 11/30/2023  Follow up with Dr. Hutchinson is 12/19/2023 and 11:30am   Continue soft collar  PT/OT   Pain control as needed     UTI   Rocephin started today  Urine culture showing gram negative rods      Leukocytosis   UTI vs steroid induced  Significantly increased today  Decadron completed 11/30/2023  Monitor     CORKY   Urinary retention   BUN significantly increased today, suspected post renal CORKY  Gardiner placement today, bladder scans continue to show 400-600cc of urine despite patient urinating  Start NS at 75cc/hr   Sodium bicarb PO today   Decadron completed 11/30/2023     DM  Hyperglycemia  Continue Levemir 17u BID   Continue moderate dose insulin sliding scale      Hx of HLD, CAD, CVA, PFO s/p closure in 2022, aortic stenosis, and PVD s/p lower extremity stenting  Continue home Lisinopril, Atorvastatin, Aspirin      Hx of BPH, gout, anxiety/depression   Continue home medication     DVT prophylaxis: Lovenox     Anticipated  "discharge and disposition: Continue PT/OT   __________________________________________________________________________    LABS/MICRO/MEDS/DIAGNOSTICS       LABS  Recent Labs     12/01/23  0357   *   K 4.7   CHLORIDE 102   CO2 17*   BUN 85.1*   CREATININE 2.17*   GLUCOSE 202*   CALCIUM 8.7*   ALKPHOS 76   AST 9   ALT 18   ALBUMIN 3.1*     Recent Labs     12/01/23  0357   WBC 45.41*   RBC 4.21*   HCT 41.8*   MCV 99.3*          MICROBIOLOGY  Microbiology Results (last 7 days)       Procedure Component Value Units Date/Time    Urine culture [[id number]]  (Abnormal) Collected: 11/29/23 2245    Order Status: Completed Specimen: Urine Updated: 12/01/23 0709     Urine Culture >/= 100,000 colonies/ml Gram-negative Rods               MEDICATIONS   albuterol-ipratropium  3 mL Nebulization Daily    allopurinoL  100 mg Oral Daily    aspirin  81 mg Oral Daily    atorvastatin  20 mg Oral Every Mon, Wed, Fri    cefTRIAXone (ROCEPHIN) IVPB  1 g Intravenous Q24H    citalopram  20 mg Oral Daily    doxazosin  4 mg Oral Daily    enoxparin  40 mg Subcutaneous Q24H (prophylaxis, 1700)    gabapentin  300 mg Oral TID    guaiFENesin  600 mg Oral BID    insulin detemir U-100  17 Units Subcutaneous BID    lisinopriL  10 mg Oral Daily    methocarbamoL  750 mg Oral TID    miconazole NITRATE 2 %   Topical (Top) BID    montelukast  10 mg Oral QHS    multivitamin  1 tablet Oral Daily    senna-docusate 8.6-50 mg  2 tablet Oral BID    tamsulosin  1 capsule Oral Daily         INFUSIONS   sodium chloride 0.9% 75 mL/hr at 12/01/23 0837          DIAGNOSTIC TESTS  No orders to display        No results found for: "EF"       NUTRITION STATUS  Patient meets ASPEN criteria for   malnutrition of   per RD assessment as evidenced by:                       A minimum of two characteristics is recommended for diagnosis of either severe or non-severe malnutrition.       Case related differential diagnoses have been reviewed; assessment and plan has " been documented. I have personally reviewed the labs and test results that are currently available; I have reviewed the patients medication list. I have reviewed the consulting providers recommendations. I have reviewed or attempted to review medical records based upon their availability.  All of the patient's and/or family's questions have been addressed and answered to the best of my ability.  I will continue to monitor closely and make adjustments to medical management as needed.  This document was created using M*Modal Fluency Direct.  Transcription errors may have been made.  Please contact me if any questions may rise regarding documentation to clarify transcription.        TAYLOR Serrano   Internal Medicine  Department of Hospital Medicine  [facility]

## 2023-12-01 NOTE — PT/OT/SLP PROGRESS
Physical Therapy Treatment Note           Name: Stevie Cummins    : 1950 (73 y.o.)  MRN: [id number]           TREATMENT SUMMARY AND RECOMMENDATIONS:    PT Received On: 23  PT Start Time: 1305     PT Stop Time: 1328  PT Total Time (min): 23 min     Subjective Assessment:   No complaints x Lethargic    Awake, alert, cooperative  Uncooperative    Agitated x c/o pain    Appropriate x c/o fatigue    Confused x Treated at bedside     Emotionally labile  Treated in gym/dept.   x Impulsive  Other:    Flat affect       Therapy Precautions:    Cognitive deficits x Spinal precautions    Collar - hard  Sternal precautions   x Collar - soft   TLSO   x Fall risk  LSO    Hip precautions - posterior  Knee immobilizer    Hip precautions - anterior  WBAT    Impaired communication  Partial weightbearing    Oxygen  TTWB    PEG tube  NWB    Visual deficits  Other:    Hearing deficits          Treatment Objectives:     Mobility Training:   Assist level Comments    Bed mobility SBA/CGA Sit>supine and scooting up in bed (PT holding feet while pt was in a hook lying so he could push up in bed with his legs)   Transfer MIN A x 2 Chair>Bed using squat pivot, going towards R and pt holding onto HOB bed rail.    Gait     Sit to stand transitions     Sitting balance     Standing balance      Wheelchair mobility     Car transfer     Other:          Therapeutic Exercise:   Exercise Sets Reps Comments   Supine Sciatic N glides, PROM SLR and PROM KTC  10 In bed                         Additional Comments:  Pt with improved tolerance with squat pivot transfer. PT to progress as able. Pt felt better after PROM tasks.      Assessment: Patient tolerated session fair.    PT Plan: continue per POC  Revisions made to plan of care: No.     GOALS:   Multidisciplinary Problems       Physical Therapy Goals          Problem: Physical Therapy    Goal Priority Disciplines Outcome Goal Variances Interventions   Physical Therapy Goal     PT,  PT/OT Ongoing, Progressing     Description: Goals to be met by: Discharge      Patient will increase functional independence with mobility by performin. Supine to sit with Stand-by Assistance  2. Sit to supine with Stand-by Assistance  3. Sitting at edge of bed x 15 minutes with Stand-by Assistance without dizziness or low BP  4. Increased functional strength to 5/5 for B hips  5. Sit to stands to be completed at SBA using RW  6. bed<>chair transfers to be completed at CGA using RW.   7. gait to be assessed with goals set when able/appropriate.                          Skilled PT Minutes Provided: 23   Communication with Treatment Team:     Equipment recommendations:       At end of treatment, patient remained:   Up in chair     Up in wheelchair in room   x In bed   x With alarm activated   x Bed rails up   x Call bell in reach     Family/friends present    Restraints secured properly    In bathroom with CNA/RN notified   x Nurse aware    In gym with therapist/tech    Other:

## 2023-12-01 NOTE — PT/OT/SLP PROGRESS
Occupational Therapy  Treatment    Name: Stevie Cummins    : 1950 (73 y.o.)  MRN: [id number]           TREATMENT SUMMARY AND RECOMMENDATIONS:      OT Date of Treatment: 23  OT Start Time: 1340  OT Stop Time: 1405  OT Total Time (min): 25 min      Subjective Assessment:   No complaints x Lethargic   x Awake, alert, cooperative  Impulsive    Uncooperative   Flat affect    Agitated  c/o pain   x Appropriate x c/o fatigue    Confused x Treated at bedside     Emotionally labile  Treated in gym/dept.      Other:        Therapy Precautions:    Cognitive deficits x Spinal precautions    Collar - hard  Sternal precautions   x Collar - soft   TLSO   x Fall risk  LSO    Hip precautions - posterior  Knee immobilizer    Hip precautions - anterior  WBAT    Impaired communication  Partial weightbearing    Oxygen  TTWB    PEG tube  NWB    Visual deficits      Hearing deficits   Other:        Treatment Objectives:     Mobility Training:    Mobility task Assist level Comments    Bed mobility Min-mod A Supine<>sidelying<>sitting EOB using log rolling technique and use of bed rail   Transfer     Sit to stands transitions     Functional mobility     Sitting balance CGA/ FAIR Dynamic sitting balance activity addressing core strengthening, reaching outside of ANGELO, and crossing midline to increase independence with dressing, bathing, and toileting. Pt noted with no LOB.    Standing balance      Other:        ADL Training:    ADL Assist level Comments    Feeding     Grooming/hygiene     Bathing     Upper body dressing     Lower body dressing     Toileting     Toilet transfer     Adaptive equipment training SBA Training in use of reacher to doff socks; pt able to return demo    Other:           Therapeutic Exercise:   Exercise Sets Reps Comments                               Additional Comments:      Assessment: Patient tolerated session fair-well. Pt is progressing towards his goals. Pt made progress with learning how to use AE to  increase independence with LB dressing. Pt continues to demonstrate poor endurance and fatigue. Pt would benefit from continued OT services to increase independence with occupational performance, decrease risk for falls, and decrease caregiver burden.     GOALS:   Multidisciplinary Problems       Occupational Therapy Goals          Problem: Occupational Therapy    Goal Priority Disciplines Outcome Interventions   Occupational Therapy Goal     OT, PT/OT Ongoing, Progressing    Description: Goals to be met by: Discharge     Patient will increase functional independence with ADLs by performing:    UE Dressing with Stand-by Assistance.  LE Dressing with Moderate Assistance.  Grooming while standing at sink with Contact Guard Assistance.  Toileting from bedside commode with Moderate Assistance for hygiene and clothing management.   Bathing from  shower chair/bench with Moderate Assistance.  Toilet transfer to bedside commode with Stand-by Assistance.  Increased functional strength to WFL for ADLs.                         Recommendations:     Discharge Recommendations:   Discharge Equipment Recommendations:  wheelchair  Barriers to discharge:  Other (Comment) (Severity of deficits)     Plan:     Patient to be seen 6 x/week to address the above listed problems via self-care/home management, therapeutic activities, therapeutic exercises, neuromuscular re-education  Plan of Care Expires: 12/21/23  Plan of Care Reviewed with: patient  Revisions made to plan of care: No      Skilled OT Minutes Provided: 25  Communication with Treatment Team:     Equipment recommendations:       At end of treatment, patient remained:   Up in chair     Up in wheelchair in room   x In bed   x With alarm activated   x Bed rails up   x Call bell in reach     Family/friends present    Restraints secured properly    In bathroom with CNA/RN notified    In gym with PT/PTA/tech   x Nurse aware    Other:

## 2023-12-01 NOTE — PT/OT/SLP PROGRESS
Occupational Therapy  Treatment    Name: Stevie Cummins    : 1950 (73 y.o.)  MRN: [id number]           TREATMENT SUMMARY AND RECOMMENDATIONS:      OT Date of Treatment: 23  OT Start Time: 1140  OT Stop Time: 1200  OT Total Time (min): 20 min      Subjective Assessment:   No complaints  Lethargic   x Awake, alert, cooperative  Impulsive    Uncooperative   Flat affect    Agitated  c/o pain   x Appropriate  c/o fatigue    Confused x Treated at bedside     Emotionally labile  Treated in gym/dept.      Other:        Therapy Precautions:    Cognitive deficits x Spinal precautions    Collar - hard  Sternal precautions   x Collar - soft   TLSO   x Fall risk  LSO    Hip precautions - posterior  Knee immobilizer    Hip precautions - anterior  WBAT    Impaired communication  Partial weightbearing    Oxygen  TTWB    PEG tube  NWB    Visual deficits      Hearing deficits   Other:        Treatment Objectives:     Mobility Training:    Mobility task Assist level Comments    Bed mobility     Transfer     Sit to stands transitions     Functional mobility     Sitting balance     Standing balance      Other:        ADL Training:    ADL Assist level Comments    Feeding Min-CGA Training in self-feeding to increase independence; pt requires assistance to bring food and drink to his mouth d/t UE weakness, decreased coordination, fatigue, and pain in neck with slight flexion.    Grooming/hygiene     Bathing     Upper body dressing     Lower body dressing     Toileting     Toilet transfer     Adaptive equipment training     Other:           Therapeutic Exercise:   Exercise Sets Reps Comments                               Additional Comments:  Communicated with nursing for pt to be 1:1 for feeding. CNA notified.      Assessment: Patient tolerated session well. Pt would benefit from continued OT services to increase independence with self-care, decrease risk for falls, and decrease caregiver burden.     GOALS:   Multidisciplinary  Problems       Occupational Therapy Goals          Problem: Occupational Therapy    Goal Priority Disciplines Outcome Interventions   Occupational Therapy Goal     OT, PT/OT Ongoing, Progressing    Description: Goals to be met by: Discharge     Patient will increase functional independence with ADLs by performing:    UE Dressing with Stand-by Assistance.  LE Dressing with Moderate Assistance.  Grooming while standing at sink with Contact Guard Assistance.  Toileting from bedside commode with Moderate Assistance for hygiene and clothing management.   Bathing from  shower chair/bench with Moderate Assistance.  Toilet transfer to bedside commode with Stand-by Assistance.  Increased functional strength to WFL for ADLs.                         Recommendations:     Discharge Recommendations:   Discharge Equipment Recommendations:  wheelchair  Barriers to discharge:  Other (Comment) (Severity of deficits)     Plan:     Patient to be seen 6 x/week to address the above listed problems via self-care/home management, therapeutic activities, therapeutic exercises, neuromuscular re-education  Plan of Care Expires: 12/21/23  Plan of Care Reviewed with: patient  Revisions made to plan of care: No      Skilled OT Minutes Provided: 20  Communication with Treatment Team:     Equipment recommendations:       At end of treatment, patient remained:  x Up in chair     Up in wheelchair in room    In bed   x With alarm activated    Bed rails up   x Call bell in reach     Family/friends present    Restraints secured properly    In bathroom with CNA/RN notified    In gym with PT/PTA/tech    Nurse aware    Other:

## 2023-12-01 NOTE — PT/OT/SLP PROGRESS
Physical Therapy Treatment Note           Name: Stevie Cummins    : 1950 (73 y.o.)  MRN: [id number]           TREATMENT SUMMARY AND RECOMMENDATIONS:    PT Received On: 23  PT Start Time: 1115     PT Stop Time: 1138  PT Total Time (min): 23 min     Subjective Assessment:   No complaints x Lethargic    Awake, alert, cooperative  Uncooperative    Agitated x c/o pain    Appropriate x c/o fatigue    Confused x Treated at bedside     Emotionally labile  Treated in gym/dept.   x Impulsive  Other:    Flat affect       Therapy Precautions:    Cognitive deficits x Spinal precautions    Collar - hard  Sternal precautions   x Collar - soft   TLSO   x Fall risk  LSO    Hip precautions - posterior  Knee immobilizer    Hip precautions - anterior  WBAT    Impaired communication  Partial weightbearing    Oxygen  TTWB    PEG tube  NWB    Visual deficits  Other:    Hearing deficits          Treatment Objectives:     Mobility Training:   Assist level Comments    Bed mobility MIN A Rolling side to side and supine<>sit   Transfer MOD A x 2 Bed>Chair using RW   Gait     Sit to stand transitions MIN/MOD A x 2 From EOB to RW   Sitting balance CGA 3 Posterior losses in balance during 5 min sit EOB   Standing balance  MIN A x 2 Poor  at B UE   Wheelchair mobility     Car transfer     Other:          Therapeutic Exercise:   Exercise Sets Reps Comments   B LE seated exercises   20 Hip flexion, knee ext, ankle PF/DF                         Additional Comments:  CNA assisting with transfer, Pt had poor movement initiation in standing, leading to increased assistance needed for completion.   Assessment: Patient tolerated session fair.    PT Plan: continue per POC  Revisions made to plan of care: Yes, bed<>chair transfer added.     GOALS:   Multidisciplinary Problems       Physical Therapy Goals          Problem: Physical Therapy    Goal Priority Disciplines Outcome Goal Variances Interventions   Physical Therapy Goal     PT,  PT/OT Ongoing, Progressing     Description: Goals to be met by: Discharge      Patient will increase functional independence with mobility by performin. Supine to sit with Stand-by Assistance  2. Sit to supine with Stand-by Assistance  3. Sitting at edge of bed x 15 minutes with Stand-by Assistance without dizziness or low BP  4. Increased functional strength to 5/5 for B hips  5. Sit to stands to be completed at SBA using RW  6. bed<>chair transfers to be completed at CGA using RW.   7. gait to be assessed with goals set when able/appropriate.                          Skilled PT Minutes Provided: 23   Communication with Treatment Team:     Equipment recommendations:       At end of treatment, patient remained:  x Up in chair     Up in wheelchair in room    In bed    With alarm activated    Bed rails up   x Call bell in reach     Family/friends present    Restraints secured properly    In bathroom with CNA/RN notified   x Nurse aware    In gym with therapist/tech    Other:

## 2023-12-01 NOTE — PROGRESS NOTES
Inpatient Nutrition Evaluation    Admit Date: 11/29/2023   Total duration of encounter: 2 days    Nutrition Recommendation/Prescription     Continue diabetic diet. 2. Add boost glucose control TID 3. Considered appetite stimulant if medically appropriate.     Nutrition Assessment     Chart Review    Reason Seen: continuous nutrition monitoring, length of stay, and follow-up    Malnutrition Screening Tool Results   Have you recently lost weight without trying?: No  Have you been eating poorly because of a decreased appetite?: Yes   MST Score: 1     Diagnosis:  Spinal Stenosis, s/p posterior cervical spinal fusion, UTI, leukocytosis, CORKY, Urinary retention, DM, hyperglycemia, Hx of HLD, CAD, CVA, s/p closure 20022, aortic stenosis, PVD s/p lower extremity  stentin, Hx BPH, gout , anxiety/depression , DVT prophylaxis    Relevant Medical History: HTN, HLD, CAD, CVA, DM, PFO s/p closure in 2022, aortic stenosis, and PVD s/p lower extremity stenting     Nutrition-Related Medications: 0.9% NaCl infusion 1,000 mL IV at 75 mL/hr, allopurinol, atorvastatin, ceftriaxone, insulin aspart, insulin detemir, magnesium hydroxide, multivitamin, senna-docusate,     Nutrition-Related Labs:   Latest Reference Range & Units 12/01/23 03:57   WBC 4.50 - 11.50 x10(3)/mcL 45.41 (H)   RBC 4.70 - 6.10 x10(6)/mcL 4.21 (L)   Hemoglobin 14.0 - 18.0 g/dL 13.7 (L)   Hematocrit 42.0 - 52.0 % 41.8 (L)   MCV 80.0 - 94.0 fL 99.3 (H)   MCH 27.0 - 31.0 pg 32.5 (H)   MCHC 33.0 - 36.0 g/dL 32.8 (L)   RDW 11.5 - 17.0 % 12.7   Platelet Count 130 - 400 x10(3)/mcL 225   MPV 7.4 - 10.4 fL 11.7 (H)   Platelet Estimate Normal, Adequate  Normal   Neutrophils Relative 47 - 80 % 87 (H)   Lymphs % 13 - 40 % 3 (L)   Mono % 2 - 11 % 10   Neutrophils Abs Calc 2.1 - 9.2 x10(3)/mcL 39.5067 (H)   Lymph # 0.6 - 4.6 x10(3)/mcL 1.3623   Mono # 0.1 - 1.3 x10(3)/mcL 4.541 (H)   RBC Morph Normal  Normal   Sodium 136 - 145 mmol/L 134 (L)   Potassium 3.5 - 5.1 mmol/L 4.7  "  Chloride 98 - 107 mmol/L 102   CO2 23 - 31 mmol/L 17 (L)   BUN 8.4 - 25.7 mg/dL 85.1 (H)   Creatinine 0.73 - 1.18 mg/dL 2.17 (H)   eGFR mls/min/1.73/m2 31   Glucose 82 - 115 mg/dL 202 (H)   Calcium 8.8 - 10.0 mg/dL 8.7 (L)   ALP 40 - 150 unit/L 76   PROTEIN TOTAL 5.8 - 7.6 gm/dL 6.0   Albumin 3.4 - 4.8 g/dL 3.1 (L)   Albumin/Globulin Ratio 1.1 - 2.0 ratio 1.1   BILIRUBIN TOTAL <=1.5 mg/dL 1.3   AST 5 - 34 unit/L 9   ALT 0 - 55 unit/L 18   Globulin, Total 2.4 - 3.5 gm/dL 2.9   (H): Data is abnormally high  (L): Data is abnormally low    Diet Order: Diet Adult Regular Diabetic  Oral Supplement Order: none  Appetite/Oral Intake: poor/0-25% of meals  Factors Affecting Nutritional Intake: decreased appetite  Food/Anabaptism/Cultural Preferences:  boost glucose control chocolate  Food Allergies:     Skin Integrity: incision  Wound(s):       Comments    Made rounds at breakfast time. Pt intake decrease. Pt ate 0% of breakfast. Encourage good PO intake. Offered nutrition supplement boost glucose control. Spoke with nursing and nursing aid. Nursing aid even tried assisting patient with meals. Intake still decrease. Food preferences has been discussed as well. Will notified physician assistant. Provide recs.     Anthropometrics    Height: 5' 9" (175.3 cm) Height Method: Stated  Last Weight: 86.3 kg (190 lb 4.1 oz) (11/29/23 1725) Weight Method: Bed Scale  BMI (Calculated): 28.1  BMI Classification: overweight (BMI 25-29.9)        Ideal Body Weight (IBW), Male: 160 lb     % Ideal Body Weight, Male (lb): 118.91 %                          Usual Weight Provided By: unable to obtain usual weight    Wt Readings from Last 5 Encounters:   11/29/23 86.3 kg (190 lb 4.1 oz)   11/17/23 99.3 kg (219 lb)   09/22/23 101.2 kg (223 lb)   07/22/23 106.6 kg (235 lb)   05/18/23 102.1 kg (225 lb)     Weight Change(s) Since Admission:  Admit Weight: 86.3 kg (190 lb 4.1 oz) (11/29/23 1725)  No new wt documented.     Patient Education    Not " applicable.    Monitoring & Evaluation     Dietitian will monitor food and beverage intake, weight, weight change, electrolyte/renal panel, glucose/endocrine profile, and gastrointestinal profile.  Nutrition Risk/Follow-Up: low (follow-up in 5-7 days)  Patients assigned 'low nutrition risk' status do not qualify for a full nutritional assessment but will be monitored and re-evaluated in a 5-7 day time period. Please consult if re-evaluation needed sooner.

## 2023-12-02 LAB
ALBUMIN SERPL-MCNC: 2.4 G/DL (ref 3.4–4.8)
ALBUMIN/GLOB SERPL: 0.8 RATIO (ref 1.1–2)
ALP SERPL-CCNC: 68 UNIT/L (ref 40–150)
ALT SERPL-CCNC: 13 UNIT/L (ref 0–55)
ANION GAP SERPL CALC-SCNC: 8 MEQ/L
AST SERPL-CCNC: 8 UNIT/L (ref 5–34)
BACTERIA UR CULT: ABNORMAL
BASOPHILS # BLD AUTO: 0.08 X10(3)/MCL
BASOPHILS NFR BLD AUTO: 0.3 %
BILIRUB SERPL-MCNC: 0.5 MG/DL
BUN SERPL-MCNC: 66.1 MG/DL (ref 8.4–25.7)
BUN SERPL-MCNC: 94.9 MG/DL (ref 8.4–25.7)
CALCIUM SERPL-MCNC: 8 MG/DL (ref 8.8–10)
CALCIUM SERPL-MCNC: 8.6 MG/DL (ref 8.8–10)
CHLORIDE SERPL-SCNC: 106 MMOL/L (ref 98–107)
CHLORIDE SERPL-SCNC: 108 MMOL/L (ref 98–107)
CO2 SERPL-SCNC: 20 MMOL/L (ref 23–31)
CO2 SERPL-SCNC: 21 MMOL/L (ref 23–31)
CREAT SERPL-MCNC: 1.07 MG/DL (ref 0.73–1.18)
CREAT SERPL-MCNC: 1.7 MG/DL (ref 0.73–1.18)
CREAT/UREA NIT SERPL: 62
EOSINOPHIL # BLD AUTO: 0.16 X10(3)/MCL (ref 0–0.9)
EOSINOPHIL NFR BLD AUTO: 0.6 %
ERYTHROCYTE [DISTWIDTH] IN BLOOD BY AUTOMATED COUNT: 12.8 % (ref 11.5–17)
GFR SERPLBLD CREATININE-BSD FMLA CKD-EPI: 42 MLS/MIN/1.73/M2
GFR SERPLBLD CREATININE-BSD FMLA CKD-EPI: >60 MLS/MIN/1.73/M2
GLOBULIN SER-MCNC: 2.9 GM/DL (ref 2.4–3.5)
GLUCOSE SERPL-MCNC: 152 MG/DL (ref 82–115)
GLUCOSE SERPL-MCNC: 152 MG/DL (ref 82–115)
HCT VFR BLD AUTO: 37.2 % (ref 42–52)
HGB BLD-MCNC: 11.5 G/DL (ref 14–18)
IMM GRANULOCYTES # BLD AUTO: 0.59 X10(3)/MCL (ref 0–0.04)
IMM GRANULOCYTES NFR BLD AUTO: 2.2 %
LYMPHOCYTES # BLD AUTO: 1.3 X10(3)/MCL (ref 0.6–4.6)
LYMPHOCYTES NFR BLD AUTO: 4.8 %
MCH RBC QN AUTO: 31.1 PG (ref 27–31)
MCHC RBC AUTO-ENTMCNC: 30.9 G/DL (ref 33–36)
MCV RBC AUTO: 100.5 FL (ref 80–94)
MONOCYTES # BLD AUTO: 2.05 X10(3)/MCL (ref 0.1–1.3)
MONOCYTES NFR BLD AUTO: 7.6 %
NEUTROPHILS # BLD AUTO: 22.82 X10(3)/MCL (ref 2.1–9.2)
NEUTROPHILS NFR BLD AUTO: 84.5 %
PLATELET # BLD AUTO: 201 X10(3)/MCL (ref 130–400)
PMV BLD AUTO: 11.9 FL (ref 7.4–10.4)
POCT GLUCOSE: 154 MG/DL (ref 70–110)
POCT GLUCOSE: 155 MG/DL (ref 70–110)
POTASSIUM SERPL-SCNC: 4 MMOL/L (ref 3.5–5.1)
POTASSIUM SERPL-SCNC: 4 MMOL/L (ref 3.5–5.1)
PROT SERPL-MCNC: 5.3 GM/DL (ref 5.8–7.6)
RBC # BLD AUTO: 3.7 X10(6)/MCL (ref 4.7–6.1)
SODIUM SERPL-SCNC: 135 MMOL/L (ref 136–145)
SODIUM SERPL-SCNC: 137 MMOL/L (ref 136–145)
WBC # SPEC AUTO: 27 X10(3)/MCL (ref 4.5–11.5)

## 2023-12-02 PROCEDURE — 94760 N-INVAS EAR/PLS OXIMETRY 1: CPT

## 2023-12-02 PROCEDURE — 97110 THERAPEUTIC EXERCISES: CPT

## 2023-12-02 PROCEDURE — 97530 THERAPEUTIC ACTIVITIES: CPT

## 2023-12-02 PROCEDURE — 25000003 PHARM REV CODE 250: Performed by: INTERNAL MEDICINE

## 2023-12-02 PROCEDURE — 25000003 PHARM REV CODE 250: Performed by: PHYSICIAN ASSISTANT

## 2023-12-02 PROCEDURE — 51798 US URINE CAPACITY MEASURE: CPT

## 2023-12-02 PROCEDURE — 80053 COMPREHEN METABOLIC PANEL: CPT | Performed by: PHYSICIAN ASSISTANT

## 2023-12-02 PROCEDURE — 94640 AIRWAY INHALATION TREATMENT: CPT

## 2023-12-02 PROCEDURE — 25000242 PHARM REV CODE 250 ALT 637 W/ HCPCS: Performed by: INTERNAL MEDICINE

## 2023-12-02 PROCEDURE — 63600175 PHARM REV CODE 636 W HCPCS: Performed by: PHYSICIAN ASSISTANT

## 2023-12-02 PROCEDURE — 11000004 HC SNF PRIVATE

## 2023-12-02 PROCEDURE — 85025 COMPLETE CBC W/AUTO DIFF WBC: CPT | Performed by: PHYSICIAN ASSISTANT

## 2023-12-02 RX ADMIN — GUAIFENESIN 600 MG: 600 TABLET, EXTENDED RELEASE ORAL at 08:12

## 2023-12-02 RX ADMIN — ALLOPURINOL 100 MG: 100 TABLET ORAL at 09:12

## 2023-12-02 RX ADMIN — SODIUM CHLORIDE: 9 INJECTION, SOLUTION INTRAVENOUS at 04:12

## 2023-12-02 RX ADMIN — SENNOSIDES AND DOCUSATE SODIUM 2 TABLET: 8.6; 5 TABLET ORAL at 08:12

## 2023-12-02 RX ADMIN — ENOXAPARIN SODIUM 40 MG: 40 INJECTION SUBCUTANEOUS at 05:12

## 2023-12-02 RX ADMIN — METHOCARBAMOL 750 MG: 750 TABLET ORAL at 09:12

## 2023-12-02 RX ADMIN — GUAIFENESIN 600 MG: 600 TABLET, EXTENDED RELEASE ORAL at 09:12

## 2023-12-02 RX ADMIN — METHOCARBAMOL 750 MG: 750 TABLET ORAL at 02:12

## 2023-12-02 RX ADMIN — DOXAZOSIN 4 MG: 4 TABLET ORAL at 09:12

## 2023-12-02 RX ADMIN — MIDODRINE HYDROCHLORIDE 5 MG: 5 TABLET ORAL at 05:12

## 2023-12-02 RX ADMIN — IPRATROPIUM BROMIDE AND ALBUTEROL SULFATE 3 ML: .5; 3 SOLUTION RESPIRATORY (INHALATION) at 08:12

## 2023-12-02 RX ADMIN — MULTIPLE VITAMINS W/ MINERALS TAB 1 TABLET: TAB at 09:12

## 2023-12-02 RX ADMIN — MICONAZOLE NITRATE 2 % TOPICAL POWDER: at 08:12

## 2023-12-02 RX ADMIN — CEFTRIAXONE SODIUM 1 G: 1 INJECTION, POWDER, FOR SOLUTION INTRAMUSCULAR; INTRAVENOUS at 09:12

## 2023-12-02 RX ADMIN — HYDROCODONE BITARTRATE AND ACETAMINOPHEN 1 TABLET: 5; 325 TABLET ORAL at 08:12

## 2023-12-02 RX ADMIN — MONTELUKAST SODIUM 10 MG: 10 TABLET, COATED ORAL at 08:12

## 2023-12-02 RX ADMIN — METHOCARBAMOL 750 MG: 750 TABLET ORAL at 08:12

## 2023-12-02 RX ADMIN — MICONAZOLE NITRATE 2 % TOPICAL POWDER: at 09:12

## 2023-12-02 RX ADMIN — MIDODRINE HYDROCHLORIDE 5 MG: 5 TABLET ORAL at 09:12

## 2023-12-02 RX ADMIN — INSULIN DETEMIR 17 UNITS: 100 INJECTION, SOLUTION SUBCUTANEOUS at 09:12

## 2023-12-02 RX ADMIN — HYDROCODONE BITARTRATE AND ACETAMINOPHEN 1 TABLET: 5; 325 TABLET ORAL at 12:12

## 2023-12-02 RX ADMIN — CITALOPRAM HYDROBROMIDE 20 MG: 20 TABLET ORAL at 09:12

## 2023-12-02 RX ADMIN — ASPIRIN 81 MG: 81 TABLET, COATED ORAL at 09:12

## 2023-12-02 RX ADMIN — SODIUM CHLORIDE: 9 INJECTION, SOLUTION INTRAVENOUS at 05:12

## 2023-12-02 RX ADMIN — INSULIN DETEMIR 17 UNITS: 100 INJECTION, SOLUTION SUBCUTANEOUS at 08:12

## 2023-12-02 RX ADMIN — MIDODRINE HYDROCHLORIDE 5 MG: 5 TABLET ORAL at 12:12

## 2023-12-02 RX ADMIN — GABAPENTIN 300 MG: 300 CAPSULE ORAL at 02:12

## 2023-12-02 RX ADMIN — GABAPENTIN 300 MG: 300 CAPSULE ORAL at 09:12

## 2023-12-02 RX ADMIN — TAMSULOSIN HYDROCHLORIDE 0.4 MG: 0.4 CAPSULE ORAL at 09:12

## 2023-12-02 RX ADMIN — SENNOSIDES AND DOCUSATE SODIUM 2 TABLET: 8.6; 5 TABLET ORAL at 09:12

## 2023-12-02 RX ADMIN — GABAPENTIN 300 MG: 300 CAPSULE ORAL at 08:12

## 2023-12-02 RX ADMIN — INSULIN ASPART 1 UNITS: 100 INJECTION, SOLUTION INTRAVENOUS; SUBCUTANEOUS at 08:12

## 2023-12-02 NOTE — PLAN OF CARE
Problem: Adult Inpatient Plan of Care  Goal: Plan of Care Review  Outcome: Ongoing, Progressing  Flowsheets (Taken 12/1/2023 1948)  Plan of Care Reviewed With: patient  Goal: Patient-Specific Goal (Individualized)  Outcome: Ongoing, Progressing  Flowsheets (Taken 12/1/2023 1948)  Anxieties, Fears or Concerns: None expressed at this time  Individualized Care Needs: Monitor for urinary retention, hillman care, pain management, monitor CBGs, monitor labs, PT/OT, soft collar at all times until F/U appointment, IV fluids  Goal: Absence of Hospital-Acquired Illness or Injury  Outcome: Ongoing, Progressing  Intervention: Identify and Manage Fall Risk  Flowsheets (Taken 12/1/2023 1948)  Safety Promotion/Fall Prevention:   assistive device/personal item within reach   bed alarm set   Fall Risk reviewed with patient/family   gait belt with ambulation   lighting adjusted   medications reviewed   side rails raised x 3   supervised activity   room near unit station   instructed to call staff for mobility   toileting scheduled   nonskid shoes/socks when out of bed   diversional activities provided   high risk medications identified  Intervention: Prevent Skin Injury  Flowsheets (Taken 12/1/2023 1948)  Body Position:   weight shifting   sitting up in bed  Skin Protection:   adhesive use limited   incontinence pads utilized   transparent dressing maintained   tubing/devices free from skin contact  Intervention: Prevent and Manage VTE (Venous Thromboembolism) Risk  Flowsheets (Taken 12/1/2023 1948)  Activity Management: Rolling - L1  VTE Prevention/Management:   ambulation promoted   fluids promoted   intravenous hydration  Range of Motion: active ROM (range of motion) encouraged  Intervention: Prevent Infection  Flowsheets (Taken 12/1/2023 1948)  Infection Prevention:   cohorting utilized   environmental surveillance performed   equipment surfaces disinfected   rest/sleep promoted   hand hygiene promoted   single patient room  provided     Problem: Diabetes Comorbidity  Goal: Blood Glucose Level Within Targeted Range  Outcome: Ongoing, Progressing  Intervention: Monitor and Manage Glycemia  Flowsheets (Taken 12/1/2023 1948)  Glycemic Management:   blood glucose monitored   supplemental insulin given     Problem: Infection  Goal: Absence of Infection Signs and Symptoms  Outcome: Ongoing, Progressing  Intervention: Prevent or Manage Infection  Flowsheets (Taken 12/1/2023 1948)  Fever Reduction/Comfort Measures:   lightweight bedding   lightweight clothing  Infection Management: aseptic technique maintained  Isolation Precautions:   protective   precautions maintained     Problem: Urinary Retention  Goal: Effective Urinary Elimination  Outcome: Ongoing, Progressing  Intervention: Promote Effective Urine Elimination  Flowsheets (Taken 12/1/2023 1948)  Bowel Function Promotion:   straining discouraged   prompt response to urge promoted   toileting schedule established  Urinary Elimination Promotion: absorbent pad/diaper use encouraged

## 2023-12-02 NOTE — PT/OT/SLP PROGRESS
"Occupational Therapy  Treatment    Name: Stevie Cummins    : 1950 (73 y.o.)  MRN: [id number]           TREATMENT SUMMARY AND RECOMMENDATIONS:      OT Date of Treatment: 23  OT Start Time: 1000  OT Stop Time: 1015  OT Total Time (min): 15 min      Subjective Assessment:   No complaints  Lethargic   X Awake, alert, cooperative  Impulsive    Uncooperative   Flat affect    Agitated X c/o pain   X Appropriate  c/o fatigue    Confused X Treated at bedside     Emotionally labile  Treated in gym/dept.      Other:        Therapy Precautions:    Cognitive deficits x Spinal precautions    Collar - hard  Sternal precautions   X Collar - soft   TLSO   X Fall risk  LSO    Hip precautions - posterior  Knee immobilizer    Hip precautions - anterior  WBAT    Impaired communication  Partial weightbearing    Oxygen  TTWB    PEG tube  NWB    Visual deficits      Hearing deficits   Other:        Treatment Objectives:     Mobility Training:    Mobility task Assist level Comments    Bed mobility     Transfer Min A Transferred from chair back to bed, reported discomfort   Sit to stands transitions     Functional mobility     Sitting balance     Standing balance      Other:        ADL Training:    ADL Assist level Comments    Feeding     Grooming/hygiene     Bathing     Upper body dressing     Lower body dressing     Toileting     Toilet transfer     Adaptive equipment training     Other:           Therapeutic Exercise:   Exercise Sets Reps Comments                               Additional Comments:  Pt reported he has been working on holding his head up without relying on soft collar and he could feel his "muscles working".    Assessment: Patient tolerated session well, reported he was glad to be back in bed and was tired.    GOALS:   Multidisciplinary Problems       Occupational Therapy Goals          Problem: Occupational Therapy    Goal Priority Disciplines Outcome Interventions   Occupational Therapy Goal     OT, PT/OT " Ongoing, Progressing    Description: Goals to be met by: Discharge     Patient will increase functional independence with ADLs by performing:    UE Dressing with Stand-by Assistance.  LE Dressing with Moderate Assistance.  Grooming while standing at sink with Contact Guard Assistance.  Toileting from bedside commode with Moderate Assistance for hygiene and clothing management.   Bathing from  shower chair/bench with Moderate Assistance.  Toilet transfer to bedside commode with Stand-by Assistance.  Increased functional strength to WFL for ADLs.                         Recommendations:       Discharge Equipment Recommendations:  wheelchair  Barriers to discharge:  severity of deficits      Plan:     Patient to be seen 6 x/week to address the above listed problems via self-care/home management, therapeutic activities, therapeutic exercises, neuromuscular re-education  Plan of Care Expires: 12/21/23  Plan of Care Reviewed with: patient  Revisions made to plan of care: No      Skilled OT Minutes Provided: 15  Communication with Treatment Team:     Equipment recommendations:       At end of treatment, patient remained:   Up in chair     Up in wheelchair in room   x In bed   x With alarm activated    Bed rails up   x Call bell in reach     Family/friends present    Restraints secured properly    In bathroom with CNA/RN notified    In gym with PT/PTA/tech    Nurse aware    Other:

## 2023-12-02 NOTE — PLAN OF CARE
[facility] - [facility]  Discharge Reassessment    Primary Care Provider: Robert Alarcon MD    Expected Discharge Date:     Reassessment (most recent)       Discharge Reassessment - 12/02/23 1628          Discharge Reassessment    Assessment Type Discharge Planning Reassessment     Did the patient's condition or plan change since previous assessment? No     Discharge Plan discussed with: Friend     Name(s) and Number(s) Yesy Martinez friend      Communicated NAZANIN with patient/caregiver Date not available/Unable to determine     Discharge Plan A Home Health;Home with family     DME Needed Upon Discharge  walker, standard     Transition of Care Barriers None     Why the patient remains in the hospital Requires continued medical care        Post-Acute Status    Post-Acute Authorization Home Health     Home Health Status Pending medical clearance/testing     Hospital Resources/Appts/Education Provided Provided patient/caregiver with written discharge plan information     Discharge Delays None known at this time

## 2023-12-02 NOTE — PLAN OF CARE
Problem: Adult Inpatient Plan of Care  Goal: Plan of Care Review  Outcome: Ongoing, Progressing  Goal: Patient-Specific Goal (Individualized)  Outcome: Ongoing, Progressing  Flowsheets (Taken 12/02/2023 1030)  Anxieties, Fears or Concerns: none expressed at this time  Individualized Care Needs: monitor for urinary retention, monitor CBGs, pain management, PT/OT, keep soft collar until F/U appointment  Goal: Absence of Hospital-Acquired Illness or Injury  Outcome: Ongoing, Progressing  Intervention: Prevent Skin Injury  Flowsheets (Taken 12/02/2023 1030)  Body Position: position maintained  Goal: Optimal Comfort and Wellbeing  Outcome: Ongoing, Progressing  Intervention: Monitor Pain and Promote Comfort  Flowsheets (Taken 12/02/2023 1030)  Pain Management Interventions:   care clustered   relaxation techniques promoted   quiet environment facilitated   pillow support provided  Goal: Readiness for Transition of Care  Outcome: Ongoing, Progressing     Problem: Diabetes Comorbidity  Goal: Blood Glucose Level Within Targeted Range  Outcome: Ongoing, Progressing     Problem: Infection  Goal: Absence of Infection Signs and Symptoms  Outcome: Ongoing, Progressing  Intervention: Prevent or Manage Infection  Flowsheets (Taken 12/02/2023 1030)  Fever Reduction/Comfort Measures:   lightweight bedding   lightweight clothing  Infection Management: aseptic technique maintained     Problem: Skin Injury Risk Increased  Goal: Skin Health and Integrity  Outcome: Ongoing, Progressing  Intervention: Optimize Skin Protection  Flowsheets (Taken 12/02/2023 1030)  Head of Bed (HOB) Positioning: HOB at 30 degrees     Problem: Urinary Retention  Goal: Effective Urinary Elimination  Outcome: Ongoing, Progressing  Intervention: Promote Effective Urine Elimination  Flowsheets (Taken 12/02/2023 1030)  Bowel Function Promotion:   straining discouraged   prompt response to urge promoted  Urinary Elimination Promotion:   absorbent pad/diaper use  encouraged   bladder volume assessed by ultrasound   frequent voiding encouraged   toileting device within reach

## 2023-12-02 NOTE — PROGRESS NOTES
[facility] MEDICINE ~ PROGRESS NOTE        CHIEF COMPLAINT   Hospital follow up    HOSPITAL COURSE     Patient is a 73-year-old  male with a past medical history HTN, HLD, CAD, CVA, DM, PFO s/p closure in 2022, aortic stenosis, and PVD s/p lower extremity stenting who presented to the ER at [facility] on 11/14/2023 for extremity weakness with recurrent falls and unsteadiness.  MRI cervical spine without contrast on 11/14/2023 showed chronic multilevel central canal stenosis with effacement of the CSF, most severe at C3-C4 with mild chronic cord compression.  MRI thoracic spine without contrast on 11/14/2023 showed slightly increased signal seen in the disc at T11-T12 possibly representing early discitis, no epidural abscess, no osteomyelitis.  MRI lumbar spine without contrast on 11/14/2023 showed unchanged chronic degenerative changes with central canal and neuroforaminal stenosis.  Patient was evaluated by Infectious Disease on 11/15/2023 who found no evidence of clinical infectious syndrome and ordered blood cultures which resulted as negative.  Neurosurgery was consulted on 11/15/2023 and patient subsequently underwent posterior spinal fusion on 11/20/2023. Patient was admitted to [facility] swing unit on 11/29/2023 for PT/OT. Labs today are significant for leukocytosis, CORKY, and UTI. Patient was started on Rocephin today and urine culture is pending. Patient's glucose has remained elevated since initial admission. Levemir 17u BID started today and continue moderate dose insulin sliding scale, adjusting as needed. Last dose of Decadron will be given today per neurosurgery note on 11/27/2023, and is to follow up with Dr. Hutchinson in 3-4 weeks. Patient continue to wear soft collar.     Today  Patient doing well no complaints.  WBCs decreased now of 27 1000.  I do have a peripheral smear pending.  His creatinine has improved to 1.7 from 2.35.  We will continue IV fluids his BUN  is 94 was 99 yesterday afternoon.  We are checking a FOBT.  He is now off steroids.  We will have to start bladder training him.  OBJECTIVE/PHYSICAL EXAM     VITAL SIGNS (MOST RECENT):  Temp: 97.7 °F (36.5 °C) (12/02/23 0734)  Pulse: 76 (12/02/23 0734)  Resp: 19 (12/01/23 2032)  BP: 100/62 (12/02/23 0734)  SpO2: 95 % (12/02/23 0734) VITAL SIGNS (24 HOUR RANGE):  Temp:  [97.7 °F (36.5 °C)-98.4 °F (36.9 °C)] 97.7 °F (36.5 °C)  Pulse:  [69-80] 76  Resp:  [18-19] 19  SpO2:  [93 %-97 %] 95 %  BP: ()/(51-62) 100/62   GENERAL: In no acute distress, afebrile  HEENT: Atraumatic, normocephalic, moist mucous membranes, soft neck collar in place   CHEST: Clear to auscultation bilaterally  HEART: S1, S2, no appreciable murmur  ABDOMEN: Soft, nontender, BS +  MSK: Warm, no lower extremity edema, no clubbing or cyanosis  NEUROLOGIC: Alert and oriented, moving all extremities   INTEGUMENTARY: No obvious skin rashes   PSYCHIATRY: Appropriate mood and affect    ASSESSMENT/PLAN     Spinal stenosis   S/p posterior cervical spinal fusion on 11/20/2023  Completed Decadron 11/30/2023  Follow up with Dr. Hutchinson is 12/19/2023 and 11:30am   Continue soft collar  PT/OT   Pain control as needed     UTI   Rocephin started today  Urine culture showing gram negative rods      Leukocytosis   UTI vs steroid induced  Significantly increased today  Decadron completed 11/30/2023  Monitor     CORKY   Urinary retention   BUN significantly increased today, suspected post renal CORKY  Gardiner placement today, bladder scans continue to show 400-600cc of urine despite patient urinating  IV fluids were given a 150 on our for several hours and then decrease to 75.  Sodium bicarb PO 12/1  Decadron completed 11/30/2023  We will try to start bladder training when renal function starts to improve.  I am checking a FOB 12/2  Renal [id number] 12/2     DM  Hyperglycemia  Continue Levemir 17u BID   Continue moderate dose insulin sliding scale      Hx of HLD, CAD,  "CVA, PFO s/p closure in 2022, aortic stenosis, and PVD s/p lower extremity stenting  Continue home Lisinopril, Atorvastatin, Aspirin      Hx of BPH, gout, anxiety/depression   Continue home medication     DVT prophylaxis: Lovenox     Anticipated discharge and disposition: Continue PT/OT   __________________________________________________________________________    LABS/MICRO/MEDS/DIAGNOSTICS       LABS  Recent Labs     12/02/23  0326   *   K 4.0   CHLORIDE 106   CO2 20*   BUN 94.9*   CREATININE 1.70*   GLUCOSE 152*   CALCIUM 8.0*   ALKPHOS 68   AST 8   ALT 13   ALBUMIN 2.4*     Recent Labs     12/02/23  0326   WBC 27.00*   RBC 3.70*   HCT 37.2*   .5*          MICROBIOLOGY  Microbiology Results (last 7 days)       Procedure Component Value Units Date/Time    Urine culture [[id number]]  (Abnormal) Collected: 11/29/23 2245    Order Status: Completed Specimen: Urine Updated: 12/01/23 0709     Urine Culture >/= 100,000 colonies/ml Gram-negative Rods               MEDICATIONS   albuterol-ipratropium  3 mL Nebulization Daily    allopurinoL  100 mg Oral Daily    aspirin  81 mg Oral Daily    atorvastatin  20 mg Oral Every Mon, Wed, Fri    cefTRIAXone (ROCEPHIN) IVPB  1 g Intravenous Q24H    citalopram  20 mg Oral Daily    doxazosin  4 mg Oral Daily    enoxparin  40 mg Subcutaneous Q24H (prophylaxis, 1700)    gabapentin  300 mg Oral TID    guaiFENesin  600 mg Oral BID    insulin detemir U-100  17 Units Subcutaneous BID    methocarbamoL  750 mg Oral TID    miconazole NITRATE 2 %   Topical (Top) BID    midodrine  5 mg Oral TID WM    montelukast  10 mg Oral QHS    multivitamin  1 tablet Oral Daily    senna-docusate 8.6-50 mg  2 tablet Oral BID    tamsulosin  1 capsule Oral Daily         INFUSIONS   sodium chloride 0.9% 75 mL/hr at 12/02/23 0550          DIAGNOSTIC TESTS  US Retroperitoneal Complete              No results found for: "EF"       NUTRITION STATUS  Patient meets ASPEN criteria for   malnutrition " of   per RD assessment as evidenced by:                       A minimum of two characteristics is recommended for diagnosis of either severe or non-severe malnutrition.       Case related differential diagnoses have been reviewed; assessment and plan has been documented. I have personally reviewed the labs and test results that are currently available; I have reviewed the patients medication list. I have reviewed the consulting providers recommendations. I have reviewed or attempted to review medical records based upon their availability.  All of the patient's and/or family's questions have been addressed and answered to the best of my ability.  I will continue to monitor closely and make adjustments to medical management as needed.  This document was created using [employer] Fluency Direct.  Transcription errors may have been made.  Please contact me if any questions may rise regarding documentation to clarify transcription.        Tegan Figueroa MD   Internal Medicine  Department of Hospital Medicine [facility]

## 2023-12-02 NOTE — PT/OT/SLP PROGRESS
Physical Therapy Treatment Note           Name: Stevie Cummins    : 1950 (73 y.o.)  MRN: [id number]           TREATMENT SUMMARY AND RECOMMENDATIONS:    PT Received On: 23  PT Start Time: 915     PT Stop Time: 945  PT Total Time (min): 30 min     Subjective Assessment:   No complaints  Lethargic    Awake, alert, cooperative  Uncooperative    Agitated x c/o pain    Appropriate x c/o fatigue    Confused x Treated at bedside     Emotionally labile  Treated in gym/dept.   x Impulsive  Other:    Flat affect       Therapy Precautions:    Cognitive deficits x Spinal precautions    Collar - hard  Sternal precautions   x Collar - soft   TLSO   x Fall risk  LSO    Hip precautions - posterior  Knee immobilizer    Hip precautions - anterior  WBAT    Impaired communication  Partial weightbearing    Oxygen  TTWB    PEG tube  NWB    Visual deficits  Other:    Hearing deficits          Treatment Objectives:     Mobility Training:   Assist level Comments    Bed mobility Min A to CGA Supine > sit on left side CGA, on right side min A, sit > supine SBA   Transfer CGA Bed > chair using RW 3 steps   Gait     Sit to stand transitions CGA X 6 reps from elevated EOB with static standing supported with RW x 10 secs with each stand   Sitting balance     Standing balance      Wheelchair mobility     Car transfer     Other:          Therapeutic Exercise:   Exercise Sets Reps Comments   supine  10 SLR, heel slides, hip abd / add, bridges   sitting  10 Hip flexion, LAQ with DF, hip abd / add   standing  10 marching             Additional Comments:      Assessment: Patient tolerated session well eager to amb in short distance in room however then was too fatigued to amb. Pt had to return to supine after sit to stands prior to transferring to chair due to fatigue and dizziness. Pt sat up for about 30 minutes prior to returning to bed with OT. Instructed pt to perform sitting and supine LE throughout the day     PT Plan:  continue per POC  Revisions made to plan of care: No.     GOALS:   Multidisciplinary Problems       Physical Therapy Goals          Problem: Physical Therapy    Goal Priority Disciplines Outcome Goal Variances Interventions   Physical Therapy Goal     PT, PT/OT Ongoing, Progressing     Description: Goals to be met by: Discharge      Patient will increase functional independence with mobility by performin. Supine to sit with Stand-by Assistance  2. Sit to supine with Stand-by Assistance  3. Sitting at edge of bed x 15 minutes with Stand-by Assistance without dizziness or low BP  4. Increased functional strength to 5/5 for B hips  5. Sit to stands to be completed at SBA using RW  6. bed<>chair transfers to be completed at CGA using RW.   7. gait to be assessed with goals set when able/appropriate.                          Skilled PT Minutes Provided: 30   Communication with Treatment Team:     Equipment recommendations:       At end of treatment, patient remained:  x Up in chair     Up in wheelchair in room    In bed   x With alarm activated    Bed rails up   x Call bell in reach     Family/friends present    Restraints secured properly    In bathroom with CNA/RN notified   x Nurse aware    In gym with therapist/tech    Other:

## 2023-12-03 LAB
ALBUMIN SERPL-MCNC: 2.2 G/DL (ref 3.4–4.8)
ALBUMIN/GLOB SERPL: 0.8 RATIO (ref 1.1–2)
ALP SERPL-CCNC: 68 UNIT/L (ref 40–150)
ALT SERPL-CCNC: 13 UNIT/L (ref 0–55)
AST SERPL-CCNC: 9 UNIT/L (ref 5–34)
BASOPHILS # BLD AUTO: 0.06 X10(3)/MCL
BASOPHILS NFR BLD AUTO: 0.4 %
BILIRUB SERPL-MCNC: 0.4 MG/DL
BUN SERPL-MCNC: 52.3 MG/DL (ref 8.4–25.7)
CALCIUM SERPL-MCNC: 8.3 MG/DL (ref 8.8–10)
CHLORIDE SERPL-SCNC: 112 MMOL/L (ref 98–107)
CO2 SERPL-SCNC: 22 MMOL/L (ref 23–31)
CREAT SERPL-MCNC: 0.81 MG/DL (ref 0.73–1.18)
EOSINOPHIL # BLD AUTO: 0.26 X10(3)/MCL (ref 0–0.9)
EOSINOPHIL NFR BLD AUTO: 1.6 %
ERYTHROCYTE [DISTWIDTH] IN BLOOD BY AUTOMATED COUNT: 12.8 % (ref 11.5–17)
GFR SERPLBLD CREATININE-BSD FMLA CKD-EPI: >60 MLS/MIN/1.73/M2
GLOBULIN SER-MCNC: 2.8 GM/DL (ref 2.4–3.5)
GLUCOSE SERPL-MCNC: 75 MG/DL (ref 82–115)
HCT VFR BLD AUTO: 35.6 % (ref 42–52)
HEMATOLOGIST REVIEW: NORMAL
HGB BLD-MCNC: 11.3 G/DL (ref 14–18)
IMM GRANULOCYTES # BLD AUTO: 0.44 X10(3)/MCL (ref 0–0.04)
IMM GRANULOCYTES NFR BLD AUTO: 2.8 %
LYMPHOCYTES # BLD AUTO: 1.18 X10(3)/MCL (ref 0.6–4.6)
LYMPHOCYTES NFR BLD AUTO: 7.4 %
MAGNESIUM SERPL-MCNC: 2.1 MG/DL (ref 1.6–2.6)
MCH RBC QN AUTO: 32 PG (ref 27–31)
MCHC RBC AUTO-ENTMCNC: 31.7 G/DL (ref 33–36)
MCV RBC AUTO: 100.8 FL (ref 80–94)
MONOCYTES # BLD AUTO: 1.49 X10(3)/MCL (ref 0.1–1.3)
MONOCYTES NFR BLD AUTO: 9.4 %
NEUTROPHILS # BLD AUTO: 12.49 X10(3)/MCL (ref 2.1–9.2)
NEUTROPHILS NFR BLD AUTO: 78.4 %
PHOSPHATE SERPL-MCNC: 3.4 MG/DL (ref 2.3–4.7)
PLATELET # BLD AUTO: 204 X10(3)/MCL (ref 130–400)
PMV BLD AUTO: 11.9 FL (ref 7.4–10.4)
POCT GLUCOSE: 142 MG/DL (ref 70–110)
POCT GLUCOSE: 204 MG/DL (ref 70–110)
POCT GLUCOSE: 72 MG/DL (ref 70–110)
POTASSIUM SERPL-SCNC: 4.1 MMOL/L (ref 3.5–5.1)
PROT SERPL-MCNC: 5 GM/DL (ref 5.8–7.6)
RBC # BLD AUTO: 3.53 X10(6)/MCL (ref 4.7–6.1)
SODIUM SERPL-SCNC: 140 MMOL/L (ref 136–145)
WBC # SPEC AUTO: 15.92 X10(3)/MCL (ref 4.5–11.5)

## 2023-12-03 PROCEDURE — 25000003 PHARM REV CODE 250: Performed by: INTERNAL MEDICINE

## 2023-12-03 PROCEDURE — 25000003 PHARM REV CODE 250: Performed by: PHYSICIAN ASSISTANT

## 2023-12-03 PROCEDURE — 25000242 PHARM REV CODE 250 ALT 637 W/ HCPCS: Performed by: INTERNAL MEDICINE

## 2023-12-03 PROCEDURE — 85025 COMPLETE CBC W/AUTO DIFF WBC: CPT | Performed by: INTERNAL MEDICINE

## 2023-12-03 PROCEDURE — 63600175 PHARM REV CODE 636 W HCPCS: Performed by: PHYSICIAN ASSISTANT

## 2023-12-03 PROCEDURE — 11000004 HC SNF PRIVATE

## 2023-12-03 PROCEDURE — 83735 ASSAY OF MAGNESIUM: CPT | Performed by: INTERNAL MEDICINE

## 2023-12-03 PROCEDURE — 84100 ASSAY OF PHOSPHORUS: CPT | Performed by: INTERNAL MEDICINE

## 2023-12-03 PROCEDURE — 94760 N-INVAS EAR/PLS OXIMETRY 1: CPT

## 2023-12-03 PROCEDURE — A4216 STERILE WATER/SALINE, 10 ML: HCPCS | Performed by: PHYSICIAN ASSISTANT

## 2023-12-03 PROCEDURE — 80053 COMPREHEN METABOLIC PANEL: CPT | Performed by: INTERNAL MEDICINE

## 2023-12-03 PROCEDURE — 94640 AIRWAY INHALATION TREATMENT: CPT

## 2023-12-03 RX ADMIN — DOXAZOSIN 4 MG: 4 TABLET ORAL at 08:12

## 2023-12-03 RX ADMIN — CITALOPRAM HYDROBROMIDE 20 MG: 20 TABLET ORAL at 08:12

## 2023-12-03 RX ADMIN — INSULIN DETEMIR 17 UNITS: 100 INJECTION, SOLUTION SUBCUTANEOUS at 08:12

## 2023-12-03 RX ADMIN — Medication 10 ML: at 08:12

## 2023-12-03 RX ADMIN — MICONAZOLE NITRATE 2 % TOPICAL POWDER: at 10:12

## 2023-12-03 RX ADMIN — GABAPENTIN 300 MG: 300 CAPSULE ORAL at 08:12

## 2023-12-03 RX ADMIN — MONTELUKAST SODIUM 10 MG: 10 TABLET, COATED ORAL at 08:12

## 2023-12-03 RX ADMIN — GABAPENTIN 300 MG: 300 CAPSULE ORAL at 03:12

## 2023-12-03 RX ADMIN — MIDODRINE HYDROCHLORIDE 5 MG: 5 TABLET ORAL at 05:12

## 2023-12-03 RX ADMIN — ALLOPURINOL 100 MG: 100 TABLET ORAL at 08:12

## 2023-12-03 RX ADMIN — ASPIRIN 81 MG: 81 TABLET, COATED ORAL at 08:12

## 2023-12-03 RX ADMIN — MULTIPLE VITAMINS W/ MINERALS TAB 1 TABLET: TAB at 08:12

## 2023-12-03 RX ADMIN — MIDODRINE HYDROCHLORIDE 5 MG: 5 TABLET ORAL at 08:12

## 2023-12-03 RX ADMIN — SODIUM CHLORIDE: 9 INJECTION, SOLUTION INTRAVENOUS at 05:12

## 2023-12-03 RX ADMIN — METHOCARBAMOL 750 MG: 750 TABLET ORAL at 03:12

## 2023-12-03 RX ADMIN — MIDODRINE HYDROCHLORIDE 5 MG: 5 TABLET ORAL at 12:12

## 2023-12-03 RX ADMIN — GUAIFENESIN 600 MG: 600 TABLET, EXTENDED RELEASE ORAL at 08:12

## 2023-12-03 RX ADMIN — IPRATROPIUM BROMIDE AND ALBUTEROL SULFATE 3 ML: .5; 3 SOLUTION RESPIRATORY (INHALATION) at 08:12

## 2023-12-03 RX ADMIN — ENOXAPARIN SODIUM 40 MG: 40 INJECTION SUBCUTANEOUS at 05:12

## 2023-12-03 RX ADMIN — METHOCARBAMOL 750 MG: 750 TABLET ORAL at 08:12

## 2023-12-03 RX ADMIN — TAMSULOSIN HYDROCHLORIDE 0.4 MG: 0.4 CAPSULE ORAL at 08:12

## 2023-12-03 RX ADMIN — SENNOSIDES AND DOCUSATE SODIUM 2 TABLET: 8.6; 5 TABLET ORAL at 08:12

## 2023-12-03 RX ADMIN — MICONAZOLE NITRATE 2 % TOPICAL POWDER: at 08:12

## 2023-12-03 NOTE — PLAN OF CARE
Problem: Adult Inpatient Plan of Care  Goal: Plan of Care Review  Outcome: Ongoing, Progressing  Flowsheets (Taken 12/2/2023 1905)  Plan of Care Reviewed With: patient  Goal: Patient-Specific Goal (Individualized)  Outcome: Ongoing, Progressing  Flowsheets (Taken 12/2/2023 1905)  Anxieties, Fears or Concerns: None expressed at this time  Individualized Care Needs: Monitor for urinary retention, hillman care, pain management, monitor CBGs, monitor labs, PT/OT, soft collar at all times until F/U appointment, IV fluids  Goal: Absence of Hospital-Acquired Illness or Injury  Outcome: Ongoing, Progressing  Intervention: Identify and Manage Fall Risk  Flowsheets (Taken 12/2/2023 1905)  Safety Promotion/Fall Prevention:   assistive device/personal item within reach   bed alarm set   Fall Risk reviewed with patient/family   gait belt with ambulation   lighting adjusted   medications reviewed   side rails raised x 3   supervised activity   room near unit station   instructed to call staff for mobility   toileting scheduled   nonskid shoes/socks when out of bed   diversional activities provided   high risk medications identified  Intervention: Prevent Skin Injury  Flowsheets (Taken 12/2/2023 1905)  Body Position:   weight shifting   sitting up in bed  Skin Protection:   adhesive use limited   incontinence pads utilized   transparent dressing maintained   tubing/devices free from skin contact  Intervention: Prevent and Manage VTE (Venous Thromboembolism) Risk  Flowsheets (Taken 12/2/2023 1905)  Activity Management: Rolling - L1  VTE Prevention/Management:   ambulation promoted   fluids promoted   intravenous hydration  Range of Motion: active ROM (range of motion) encouraged  Intervention: Prevent Infection  Flowsheets (Taken 12/2/2023 1905)  Infection Prevention:   cohorting utilized   environmental surveillance performed   equipment surfaces disinfected   rest/sleep promoted   hand hygiene promoted   single patient room  provided     Problem: Diabetes Comorbidity  Goal: Blood Glucose Level Within Targeted Range  Outcome: Ongoing, Progressing  Intervention: Monitor and Manage Glycemia  Flowsheets (Taken 12/2/2023 1905)  Glycemic Management:   blood glucose monitored   supplemental insulin given     Problem: Infection  Goal: Absence of Infection Signs and Symptoms  Outcome: Ongoing, Progressing  Intervention: Prevent or Manage Infection  Flowsheets (Taken 12/2/2023 1905)  Fever Reduction/Comfort Measures:   lightweight bedding   lightweight clothing  Infection Management: aseptic technique maintained  Isolation Precautions:   protective   precautions maintained     Problem: Urinary Retention  Goal: Effective Urinary Elimination  Outcome: Ongoing, Progressing  Intervention: Promote Effective Urine Elimination  Flowsheets (Taken 12/2/2023 1905)  Bowel Function Promotion:   straining discouraged   prompt response to urge promoted   toileting schedule established  Urinary Elimination Promotion: absorbent pad/diaper use encouraged

## 2023-12-03 NOTE — PROGRESS NOTES
[facility] MEDICINE ~ PROGRESS NOTE        CHIEF COMPLAINT   Hospital follow up    HOSPITAL COURSE     Patient is a 73-year-old  male with a past medical history HTN, HLD, CAD, CVA, DM, PFO s/p closure in 2022, aortic stenosis, and PVD s/p lower extremity stenting who presented to the ER at [facility] on 11/14/2023 for extremity weakness with recurrent falls and unsteadiness.  MRI cervical spine without contrast on 11/14/2023 showed chronic multilevel central canal stenosis with effacement of the CSF, most severe at C3-C4 with mild chronic cord compression.  MRI thoracic spine without contrast on 11/14/2023 showed slightly increased signal seen in the disc at T11-T12 possibly representing early discitis, no epidural abscess, no osteomyelitis.  MRI lumbar spine without contrast on 11/14/2023 showed unchanged chronic degenerative changes with central canal and neuroforaminal stenosis.  Patient was evaluated by Infectious Disease on 11/15/2023 who found no evidence of clinical infectious syndrome and ordered blood cultures which resulted as negative.  Neurosurgery was consulted on 11/15/2023 and patient subsequently underwent posterior spinal fusion on 11/20/2023. Patient was admitted to [facility] swing unit on 11/29/2023 for PT/OT. Labs today are significant for leukocytosis, CORKY, and UTI. Patient was started on Rocephin today and urine culture is pending. Patient's glucose has remained elevated since initial admission. Levemir 17u BID started today and continue moderate dose insulin sliding scale, adjusting as needed. Last dose of Decadron will be given today per neurosurgery note on 11/27/2023, and is to follow up with Dr. Hutchinson in 3-4 weeks. Patient continue to wear soft collar.     Today  Cr. Improving, Bladder training today.   OBJECTIVE/PHYSICAL EXAM     VITAL SIGNS (MOST RECENT):  Temp: 98.1 °F (36.7 °C) (12/03/23 0718)  Pulse: 72 (12/03/23 0718)  Resp: 16 (12/02/23  2036)  BP: 132/61 (12/03/23 0718)  SpO2: (!) 93 % (12/03/23 0718) VITAL SIGNS (24 HOUR RANGE):  Temp:  [98 °F (36.7 °C)-98.2 °F (36.8 °C)] 98.1 °F (36.7 °C)  Pulse:  [72-81] 72  Resp:  [16-20] 16  SpO2:  [93 %-95 %] 93 %  BP: (106-149)/(61-69) 132/61   GENERAL: In no acute distress, afebrile  HEENT: Atraumatic, normocephalic, moist mucous membranes, soft neck collar in place   CHEST: Clear to auscultation bilaterally  HEART: S1, S2, no appreciable murmur  ABDOMEN: Soft, nontender, BS +  MSK: Warm, no lower extremity edema, no clubbing or cyanosis  NEUROLOGIC: Alert and oriented, moving all extremities   INTEGUMENTARY: No obvious skin rashes   PSYCHIATRY: Appropriate mood and affect    ASSESSMENT/PLAN     Spinal stenosis   S/p posterior cervical spinal fusion on 11/20/2023  Completed Decadron 11/30/2023  Follow up with Dr. Hutchinson is 12/19/2023 and 11:30am   Continue soft collar  PT/OT   Pain control as needed     UTI   Rocephin completed  Urine culture showing gram negative rods / e coli sens to rocephin     Leukocytosis   UTI vs steroid induced  Significantly increased today  Decadron completed 11/30/2023  Monitor     CORKY   Urinary retention   BUN significantly increased today, suspected post renal CORKY  Gardiner placement today, bladder scans continue to show 400-600cc of urine despite patient urinating  IV fluids were given a 150 on our for several hours and then decrease to 75.  Sodium bicarb PO 12/1  Decadron completed 11/30/2023  We will try to start bladder training when renal function starts to improve.  I am checking a FOB 12/2  Renal [id number] 12/2  12/3 RF improved, FRANSISCO normal     DM  Hyperglycemia  Continue Levemir 17u BID   Continue moderate dose insulin sliding scale      Hx of HLD, CAD, CVA, PFO s/p closure in 2022, aortic stenosis, and PVD s/p lower extremity stenting  Continue home Lisinopril, Atorvastatin, Aspirin      Hx of BPH, gout, anxiety/depression   Continue home medication     DVT  "prophylaxis: Lovenox     Anticipated discharge and disposition: Continue PT/OT   __________________________________________________________________________    LABS/MICRO/MEDS/DIAGNOSTICS       LABS  Recent Labs     12/03/23  0426      K 4.1   CHLORIDE 112*   CO2 22*   BUN 52.3*   CREATININE 0.81   GLUCOSE 75*   CALCIUM 8.3*   ALKPHOS 68   AST 9   ALT 13   ALBUMIN 2.2*     Recent Labs     12/03/23  0426   WBC 15.92*   RBC 3.53*   HCT 35.6*   .8*          MICROBIOLOGY  Microbiology Results (last 7 days)       Procedure Component Value Units Date/Time    Urine culture [[id number]]  (Abnormal)  (Susceptibility) Collected: 11/29/23 2245    Order Status: Completed Specimen: Urine Updated: 12/02/23 1025     Urine Culture >/= 100,000 colonies/ml Escherichia coli               MEDICATIONS   albuterol-ipratropium  3 mL Nebulization Daily    allopurinoL  100 mg Oral Daily    aspirin  81 mg Oral Daily    atorvastatin  20 mg Oral Every Mon, Wed, Fri    citalopram  20 mg Oral Daily    doxazosin  4 mg Oral Daily    enoxparin  40 mg Subcutaneous Q24H (prophylaxis, 1700)    gabapentin  300 mg Oral TID    guaiFENesin  600 mg Oral BID    insulin detemir U-100  17 Units Subcutaneous BID    methocarbamoL  750 mg Oral TID    miconazole NITRATE 2 %   Topical (Top) BID    midodrine  5 mg Oral TID WM    montelukast  10 mg Oral QHS    multivitamin  1 tablet Oral Daily    senna-docusate 8.6-50 mg  2 tablet Oral BID    tamsulosin  1 capsule Oral Daily         INFUSIONS           DIAGNOSTIC TESTS  US Retroperitoneal Complete   Final Result      Normal sonographic appearance of the kidneys.         Electronically signed by: Stevie Ochoa MD   Date:    12/02/2023   Time:    11:03           No results found for: "EF"       NUTRITION STATUS  Patient meets ASPEN criteria for   malnutrition of   per RD assessment as evidenced by:                       A minimum of two characteristics is recommended for diagnosis of either severe or " non-severe malnutrition.       Case related differential diagnoses have been reviewed; assessment and plan has been documented. I have personally reviewed the labs and test results that are currently available; I have reviewed the patients medication list. I have reviewed the consulting providers recommendations. I have reviewed or attempted to review medical records based upon their availability.  All of the patient's and/or family's questions have been addressed and answered to the best of my ability.  I will continue to monitor closely and make adjustments to medical management as needed.  This document was created using [employer] Fluency Direct.  Transcription errors may have been made.  Please contact me if any questions may rise regarding documentation to clarify transcription.        Tegan Figueroa MD   Internal Medicine  Department of Hospital Medicine [facility]

## 2023-12-03 NOTE — PLAN OF CARE
Problem: Adult Inpatient Plan of Care  Goal: Plan of Care Review  Outcome: Ongoing, Progressing  Goal: Patient-Specific Goal (Individualized)  Outcome: Ongoing, Progressing  Flowsheets (Taken 12/03/2023 0915)  Anxieties, Fears or Concerns: none expressed at this time  Individualized Care Needs: monitor for urinary retention, monitor CBGs, pain management, PT/OT, keep soft collar until F/U appointment  Goal: Absence of Hospital-Acquired Illness or Injury  Outcome: Ongoing, Progressing  Intervention: Prevent Skin Injury  Flowsheets (Taken 12/03/2023 0916)  Body Position: position maintained  Goal: Optimal Comfort and Wellbeing  Outcome: Ongoing, Progressing  Intervention: Monitor Pain and Promote Comfort  Flowsheets (Taken 12/03/2023 0916)  Pain Management Interventions:   care clustered   relaxation techniques promoted   quiet environment facilitated   pillow support provided  Goal: Readiness for Transition of Care  Outcome: Ongoing, Progressing     Problem: Diabetes Comorbidity  Goal: Blood Glucose Level Within Targeted Range  Outcome: Ongoing, Progressing     Problem: Infection  Goal: Absence of Infection Signs and Symptoms  Outcome: Ongoing, Progressing  Intervention: Prevent or Manage Infection  Flowsheets (Taken 12/03/2023 0916)  Fever Reduction/Comfort Measures:   lightweight bedding   lightweight clothing  Infection Management: aseptic technique maintained     Problem: Skin Injury Risk Increased  Goal: Skin Health and Integrity  Outcome: Ongoing, Progressing  Intervention: Optimize Skin Protection  Flowsheets (Taken 12/03/2023 0916)  Head of Bed (HOB) Positioning: HOB at 30 degrees     Problem: Urinary Retention  Goal: Effective Urinary Elimination  Outcome: Ongoing, Progressing  Intervention: Promote Effective Urine Elimination  Flowsheets (Taken 12/03/2023 0916)  Bowel Function Promotion:   straining discouraged   prompt response to urge promoted  Urinary Elimination Promotion:   absorbent pad/diaper use  encouraged   bladder volume assessed by ultrasound   frequent voiding encouraged   toileting device within reach

## 2023-12-03 NOTE — NURSING
Bladder training    12/03/2023 clamped at 0900    1200 Clamp did not hold and 500 ml in hillman bag. Urine measured and discarded. Hillman line reclamped.     1300 Clamp did not hold, 350 in hillman bag.  Urine measured and discarded. Hillman line reclamped. Urine clear and yellow    1600 Patient reported the urge to urinate.  Unclamped and drained 500 cc. Clear and yellow. 1630 reclamped

## 2023-12-04 LAB
ALBUMIN SERPL-MCNC: 2.4 G/DL (ref 3.4–4.8)
ALBUMIN/GLOB SERPL: 0.8 RATIO (ref 1.1–2)
ALP SERPL-CCNC: 72 UNIT/L (ref 40–150)
ALT SERPL-CCNC: 14 UNIT/L (ref 0–55)
AST SERPL-CCNC: 12 UNIT/L (ref 5–34)
BASOPHILS # BLD AUTO: 0.07 X10(3)/MCL
BASOPHILS NFR BLD AUTO: 0.5 %
BILIRUB SERPL-MCNC: 0.6 MG/DL
BUN SERPL-MCNC: 31 MG/DL (ref 8.4–25.7)
CALCIUM SERPL-MCNC: 8.7 MG/DL (ref 8.8–10)
CHLORIDE SERPL-SCNC: 107 MMOL/L (ref 98–107)
CO2 SERPL-SCNC: 24 MMOL/L (ref 23–31)
CREAT SERPL-MCNC: 0.74 MG/DL (ref 0.73–1.18)
EOSINOPHIL # BLD AUTO: 0.25 X10(3)/MCL (ref 0–0.9)
EOSINOPHIL NFR BLD AUTO: 1.8 %
ERYTHROCYTE [DISTWIDTH] IN BLOOD BY AUTOMATED COUNT: 12.8 % (ref 11.5–17)
GFR SERPLBLD CREATININE-BSD FMLA CKD-EPI: >60 MLS/MIN/1.73/M2
GLOBULIN SER-MCNC: 3 GM/DL (ref 2.4–3.5)
GLUCOSE SERPL-MCNC: 109 MG/DL (ref 82–115)
HCT VFR BLD AUTO: 38.1 % (ref 42–52)
HEMOCCULT SP1 STL QL: NEGATIVE
HGB BLD-MCNC: 12.2 G/DL (ref 14–18)
IMM GRANULOCYTES # BLD AUTO: 0.59 X10(3)/MCL (ref 0–0.04)
IMM GRANULOCYTES NFR BLD AUTO: 4.2 %
LYMPHOCYTES # BLD AUTO: 1.44 X10(3)/MCL (ref 0.6–4.6)
LYMPHOCYTES NFR BLD AUTO: 10.3 %
MAGNESIUM SERPL-MCNC: 1.8 MG/DL (ref 1.6–2.6)
MCH RBC QN AUTO: 31.8 PG (ref 27–31)
MCHC RBC AUTO-ENTMCNC: 32 G/DL (ref 33–36)
MCV RBC AUTO: 99.2 FL (ref 80–94)
MONOCYTES # BLD AUTO: 1.65 X10(3)/MCL (ref 0.1–1.3)
MONOCYTES NFR BLD AUTO: 11.8 %
NEUTROPHILS # BLD AUTO: 10.03 X10(3)/MCL (ref 2.1–9.2)
NEUTROPHILS NFR BLD AUTO: 71.4 %
PHOSPHATE SERPL-MCNC: 3.3 MG/DL (ref 2.3–4.7)
PLATELET # BLD AUTO: 237 X10(3)/MCL (ref 130–400)
PMV BLD AUTO: 11.3 FL (ref 7.4–10.4)
POCT GLUCOSE: 136 MG/DL (ref 70–110)
POCT GLUCOSE: 177 MG/DL (ref 70–110)
POTASSIUM SERPL-SCNC: 4.4 MMOL/L (ref 3.5–5.1)
PROT SERPL-MCNC: 5.4 GM/DL (ref 5.8–7.6)
RBC # BLD AUTO: 3.84 X10(6)/MCL (ref 4.7–6.1)
SODIUM SERPL-SCNC: 138 MMOL/L (ref 136–145)
WBC # SPEC AUTO: 14.03 X10(3)/MCL (ref 4.5–11.5)

## 2023-12-04 PROCEDURE — 25000003 PHARM REV CODE 250: Performed by: PHYSICIAN ASSISTANT

## 2023-12-04 PROCEDURE — 63600175 PHARM REV CODE 636 W HCPCS: Performed by: PHYSICIAN ASSISTANT

## 2023-12-04 PROCEDURE — 97530 THERAPEUTIC ACTIVITIES: CPT

## 2023-12-04 PROCEDURE — 51798 US URINE CAPACITY MEASURE: CPT

## 2023-12-04 PROCEDURE — 94760 N-INVAS EAR/PLS OXIMETRY 1: CPT

## 2023-12-04 PROCEDURE — 97116 GAIT TRAINING THERAPY: CPT

## 2023-12-04 PROCEDURE — 97110 THERAPEUTIC EXERCISES: CPT

## 2023-12-04 PROCEDURE — 84100 ASSAY OF PHOSPHORUS: CPT | Performed by: INTERNAL MEDICINE

## 2023-12-04 PROCEDURE — 85025 COMPLETE CBC W/AUTO DIFF WBC: CPT | Performed by: INTERNAL MEDICINE

## 2023-12-04 PROCEDURE — 25000242 PHARM REV CODE 250 ALT 637 W/ HCPCS: Performed by: INTERNAL MEDICINE

## 2023-12-04 PROCEDURE — 11000004 HC SNF PRIVATE

## 2023-12-04 PROCEDURE — 94640 AIRWAY INHALATION TREATMENT: CPT

## 2023-12-04 PROCEDURE — 27000221 HC OXYGEN, UP TO 24 HOURS

## 2023-12-04 PROCEDURE — 97535 SELF CARE MNGMENT TRAINING: CPT

## 2023-12-04 PROCEDURE — 83735 ASSAY OF MAGNESIUM: CPT | Performed by: INTERNAL MEDICINE

## 2023-12-04 PROCEDURE — 25000003 PHARM REV CODE 250: Performed by: INTERNAL MEDICINE

## 2023-12-04 PROCEDURE — 82272 OCCULT BLD FECES 1-3 TESTS: CPT | Performed by: INTERNAL MEDICINE

## 2023-12-04 PROCEDURE — 80053 COMPREHEN METABOLIC PANEL: CPT | Performed by: INTERNAL MEDICINE

## 2023-12-04 RX ORDER — POLYETHYLENE GLYCOL 3350 17 G/17G
17 POWDER, FOR SOLUTION ORAL DAILY
Status: DISCONTINUED | OUTPATIENT
Start: 2023-12-04 | End: 2023-12-13 | Stop reason: HOSPADM

## 2023-12-04 RX ORDER — IPRATROPIUM BROMIDE AND ALBUTEROL SULFATE 2.5; .5 MG/3ML; MG/3ML
3 SOLUTION RESPIRATORY (INHALATION) EVERY 24 HOURS
Status: DISCONTINUED | OUTPATIENT
Start: 2023-12-05 | End: 2023-12-07

## 2023-12-04 RX ORDER — BISACODYL 5 MG
5 TABLET, DELAYED RELEASE (ENTERIC COATED) ORAL DAILY PRN
Status: DISCONTINUED | OUTPATIENT
Start: 2023-12-04 | End: 2023-12-13 | Stop reason: HOSPADM

## 2023-12-04 RX ADMIN — METHOCARBAMOL 750 MG: 750 TABLET ORAL at 08:12

## 2023-12-04 RX ADMIN — BISACODYL 5 MG: 5 TABLET, COATED ORAL at 09:12

## 2023-12-04 RX ADMIN — POLYETHYLENE GLYCOL 3350 17 G: 17 POWDER, FOR SOLUTION ORAL at 09:12

## 2023-12-04 RX ADMIN — SENNOSIDES AND DOCUSATE SODIUM 2 TABLET: 8.6; 5 TABLET ORAL at 08:12

## 2023-12-04 RX ADMIN — INSULIN DETEMIR 17 UNITS: 100 INJECTION, SOLUTION SUBCUTANEOUS at 08:12

## 2023-12-04 RX ADMIN — ENOXAPARIN SODIUM 40 MG: 40 INJECTION SUBCUTANEOUS at 05:12

## 2023-12-04 RX ADMIN — METHOCARBAMOL 750 MG: 750 TABLET ORAL at 03:12

## 2023-12-04 RX ADMIN — DOXAZOSIN 4 MG: 4 TABLET ORAL at 08:12

## 2023-12-04 RX ADMIN — MONTELUKAST SODIUM 10 MG: 10 TABLET, COATED ORAL at 08:12

## 2023-12-04 RX ADMIN — MIDODRINE HYDROCHLORIDE 5 MG: 5 TABLET ORAL at 11:12

## 2023-12-04 RX ADMIN — MULTIPLE VITAMINS W/ MINERALS TAB 1 TABLET: TAB at 08:12

## 2023-12-04 RX ADMIN — MIDODRINE HYDROCHLORIDE 5 MG: 5 TABLET ORAL at 08:12

## 2023-12-04 RX ADMIN — MICONAZOLE NITRATE 2 % TOPICAL POWDER: at 08:12

## 2023-12-04 RX ADMIN — IPRATROPIUM BROMIDE AND ALBUTEROL SULFATE 3 ML: .5; 3 SOLUTION RESPIRATORY (INHALATION) at 07:12

## 2023-12-04 RX ADMIN — ASPIRIN 81 MG: 81 TABLET, COATED ORAL at 08:12

## 2023-12-04 RX ADMIN — MIDODRINE HYDROCHLORIDE 5 MG: 5 TABLET ORAL at 05:12

## 2023-12-04 RX ADMIN — GABAPENTIN 300 MG: 300 CAPSULE ORAL at 08:12

## 2023-12-04 RX ADMIN — GABAPENTIN 300 MG: 300 CAPSULE ORAL at 02:12

## 2023-12-04 RX ADMIN — CITALOPRAM HYDROBROMIDE 20 MG: 20 TABLET ORAL at 08:12

## 2023-12-04 RX ADMIN — HYDROCODONE BITARTRATE AND ACETAMINOPHEN 1 TABLET: 5; 325 TABLET ORAL at 08:12

## 2023-12-04 RX ADMIN — GUAIFENESIN 600 MG: 600 TABLET, EXTENDED RELEASE ORAL at 08:12

## 2023-12-04 RX ADMIN — ALLOPURINOL 100 MG: 100 TABLET ORAL at 08:12

## 2023-12-04 RX ADMIN — ATORVASTATIN CALCIUM 20 MG: 10 TABLET, FILM COATED ORAL at 08:12

## 2023-12-04 RX ADMIN — TAMSULOSIN HYDROCHLORIDE 0.4 MG: 0.4 CAPSULE ORAL at 08:12

## 2023-12-04 NOTE — PT/OT/SLP PROGRESS
Physical Therapy Treatment Note           Name: Stevie Cummins    : 1950 (73 y.o.)  MRN: [id number]           TREATMENT SUMMARY AND RECOMMENDATIONS:    PT Received On: 23  PT Start Time: 1458     PT Stop Time: 1521  PT Total Time (min): 23 min     Subjective Assessment:   No complaints  Lethargic    Awake, alert, cooperative  Uncooperative    Agitated  c/o pain    Appropriate x c/o fatigue    Confused  Treated at bedside     Emotionally labile x Treated in gym/dept.    Impulsive  Other:    Flat affect       Therapy Precautions:    Cognitive deficits x Spinal precautions    Collar - hard  Sternal precautions   x Collar - soft   TLSO   x Fall risk  LSO    Hip precautions - posterior  Knee immobilizer    Hip precautions - anterior  WBAT    Impaired communication  Partial weightbearing    Oxygen  TTWB    PEG tube  NWB    Visual deficits  Other:    Hearing deficits          Treatment Objectives:     Mobility Training:   Assist level Comments    Bed mobility SBA/CGA Sit>supine    Transfer MIN A Stand pivot with RW   Gait MIN A  X 20 feet using RW, WC following   Sit to stand transitions MIN A  From WC   Sitting balance     Standing balance      Wheelchair mobility     Car transfer     Other:          Therapeutic Exercise:   Exercise Sets Reps Comments   B LE bike in sitting using UBE  10' 5'F/5'B   Seated B LE knee ext with 2 second hold  10 ea                    Additional Comments:  Pt with improved tolerance allowing for gym treatment     Assessment: Patient tolerated session well.    PT Plan: continue per POC  Revisions made to plan of care: No.     GOALS:   Multidisciplinary Problems       Physical Therapy Goals          Problem: Physical Therapy    Goal Priority Disciplines Outcome Goal Variances Interventions   Physical Therapy Goal     PT, PT/OT Ongoing, Progressing     Description: Goals to be met by: Discharge      Patient will increase functional independence with mobility by  performin. Supine to sit with Stand-by Assistance  2. Sit to supine with Stand-by Assistance  3. Sitting at edge of bed x 15 minutes with Stand-by Assistance without dizziness or low BP  4. Increased functional strength to 5/5 for B hips  5. Sit to stands to be completed at SBA using RW  6. bed<>chair transfers to be completed at CGA using RW.   7. gait to be assessed with goals set when able/appropriate.                          Skilled PT Minutes Provided: 23   Communication with Treatment Team:     Equipment recommendations:       At end of treatment, patient remained:   Up in chair     Up in wheelchair in room   x In bed   x With alarm activated   x Bed rails up   x Call bell in reach     Family/friends present    Restraints secured properly    In bathroom with CNA/RN notified    Nurse aware    In gym with therapist/tech    Other:

## 2023-12-04 NOTE — PT/OT/SLP PROGRESS
Occupational Therapy  Treatment    Name: Stevie Cummins    : 1950 (73 y.o.)  MRN: [id number]           TREATMENT SUMMARY AND RECOMMENDATIONS:      OT Date of Treatment: 23  OT Start Time: 1430  OT Stop Time: 1455  OT Total Time (min): 25 min      Subjective Assessment:   No complaints  Lethargic   x Awake, alert, cooperative  Impulsive    Uncooperative   Flat affect    Agitated  c/o pain   x Appropriate  c/o fatigue    Confused  Treated at bedside     Emotionally labile x Treated in gym/dept.      Other:        Therapy Precautions:    Cognitive deficits x Spinal precautions    Collar - hard  Sternal precautions   x Collar - soft   TLSO   x Fall risk  LSO    Hip precautions - posterior  Knee immobilizer    Hip precautions - anterior  WBAT    Impaired communication  Partial weightbearing    Oxygen  TTWB    PEG tube  NWB    Visual deficits      Hearing deficits   Other:        Treatment Objectives:     Mobility Training:    Mobility task Assist level Comments    Bed mobility Min A Supine<sidelying<sitting EOB using bed rail and log rolling technique    Transfer     Sit to stands transitions CGA EOB<standing using a RW and gait belt    Functional mobility CGA FM in his room from his bed to the door using a RW and gait belt; following with his WC   Sitting balance     Standing balance      Other:        ADL Training:    ADL Assist level Comments    Feeding     Grooming/hygiene     Bathing     Upper body dressing     Lower body dressing     Toileting     Toilet transfer     Adaptive equipment training MI     Min A Good carryover from previous session using a reacher to doff socks   Training in use of a sock-aid to don socks; pt has prior experience using a sock-aid; assistance needed for getting socks on sock-aid d/t it being painful for him to look down to complete task   Other:           Therapeutic Exercise:   Exercise Sets Reps Comments   BUE strengthening exercises 2 10 2# weighted dowel completing bicep  curls, chest press, straight arm raises to shoulder height, and forward/backward rows                         Additional Comments:      Assessment: Patient tolerated session well. Pt shows progression with LB dressing using AE. Pt would benefit from continued OT services to increase independence with self-care using AE, decrease risk for falls, and decrease caregiver burden.     GOALS:   Multidisciplinary Problems       Occupational Therapy Goals          Problem: Occupational Therapy    Goal Priority Disciplines Outcome Interventions   Occupational Therapy Goal     OT, PT/OT Ongoing, Progressing    Description: Goals to be met by: Discharge     Patient will increase functional independence with ADLs by performing:    UE Dressing with Stand-by Assistance.  LE Dressing with Moderate Assistance.  Grooming while standing at sink with Contact Guard Assistance.  Toileting from bedside commode with Moderate Assistance for hygiene and clothing management.   Bathing from  shower chair/bench with Moderate Assistance.  Toilet transfer to bedside commode with Stand-by Assistance.  Increased functional strength to WFL for ADLs.                         Recommendations:     Discharge Recommendations: Intermittent assistance  Discharge Equipment Recommendations:  wheelchair  Barriers to discharge:  Other (Comment) (Severity of deficits)     Plan:     Patient to be seen 6 x/week to address the above listed problems via self-care/home management, therapeutic activities, therapeutic exercises, neuromuscular re-education  Plan of Care Expires: 12/21/23  Plan of Care Reviewed with: patient  Revisions made to plan of care: No      Skilled OT Minutes Provided: 25  Communication with Treatment Team:     Equipment recommendations:       At end of treatment, patient remained:   Up in chair     Up in wheelchair in room    In bed    With alarm activated    Bed rails up    Call bell in reach     Family/friends present    Restraints secured  properly    In bathroom with CNA/RN notified   x In gym with PT/PTA/tech    Nurse aware    Other:

## 2023-12-04 NOTE — PLAN OF CARE
Problem: Adult Inpatient Plan of Care  Goal: Plan of Care Review  Outcome: Ongoing, Progressing  Goal: Patient-Specific Goal (Individualized)  Outcome: Ongoing, Progressing  Flowsheets (Taken 12/04/2023 0915)  Anxieties, Fears or Concerns: none expressed at this time  Individualized Care Needs: monitor for urinary retention, monitor CBGs, pain management, PT/OT, keep soft collar until F/U appointment  Goal: Absence of Hospital-Acquired Illness or Injury  Outcome: Ongoing, Progressing  Intervention: Prevent Skin Injury  Flowsheets (Taken 12/04/2023 0916)  Body Position: position maintained  Goal: Optimal Comfort and Wellbeing  Outcome: Ongoing, Progressing  Intervention: Monitor Pain and Promote Comfort  Flowsheets (Taken 12/04/2023 0916)  Pain Management Interventions:   care clustered   relaxation techniques promoted   quiet environment facilitated   pillow support provided  Goal: Readiness for Transition of Care  Outcome: Ongoing, Progressing     Problem: Diabetes Comorbidity  Goal: Blood Glucose Level Within Targeted Range  Outcome: Ongoing, Progressing     Problem: Infection  Goal: Absence of Infection Signs and Symptoms  Outcome: Ongoing, Progressing  Intervention: Prevent or Manage Infection  Flowsheets (Taken 12/03/2023 0916)  Fever Reduction/Comfort Measures:   lightweight bedding   lightweight clothing  Infection Management: aseptic technique maintained     Problem: Skin Injury Risk Increased  Goal: Skin Health and Integrity  Outcome: Ongoing, Progressing  Intervention: Optimize Skin Protection  Flowsheets (Taken 12/04/2023 0916)  Head of Bed (HOB) Positioning: HOB at 30 degrees     Problem: Urinary Retention  Goal: Effective Urinary Elimination  Outcome: Ongoing, Progressing  Intervention: Promote Effective Urine Elimination  Flowsheets (Taken 12/04/2023 0916)  Bowel Function Promotion:   straining discouraged   prompt response to urge promoted  Urinary Elimination Promotion:   absorbent pad/diaper use  encouraged   bladder volume assessed by ultrasound   frequent voiding encouraged   toileting device within reach

## 2023-12-04 NOTE — PLAN OF CARE
Problem: Adult Inpatient Plan of Care  Goal: Plan of Care Review  Outcome: Ongoing, Progressing  Goal: Patient-Specific Goal (Individualized)  Outcome: Ongoing, Progressing  Flowsheets (Taken 12/3/2023 2030)  Anxieties, Fears or Concerns: His girlfriend cannot come to visit him. Doesn't have a ride and she doesn't drive.  Individualized Care Needs:   Safety with mobility yo prevent injury   Monitor for s/s of neurological changes   Monitor for s/s of infection   Monitor blood glucose levels   Bladder training for indwelling catheter removal asap   Gardiner care with CHG wipes to prevent CAUTI.  Goal: Absence of Hospital-Acquired Illness or Injury  Outcome: Ongoing, Progressing  Intervention: Prevent Skin Injury  Flowsheets (Taken 12/3/2023 2030)  Body Position:   position changed independently   weight shifting   heels elevated  Skin Protection:   incontinence pads utilized   tubing/devices free from skin contact  Intervention: Prevent and Manage VTE (Venous Thromboembolism) Risk  Flowsheets (Taken 12/3/2023 2030)  Activity Management:   Rolling - L1   Walk with assistive devise and /or staff member - L3  Goal: Optimal Comfort and Wellbeing  Outcome: Ongoing, Progressing     Problem: Diabetes Comorbidity  Goal: Blood Glucose Level Within Targeted Range  Outcome: Ongoing, Progressing     Problem: Infection  Goal: Absence of Infection Signs and Symptoms  Outcome: Ongoing, Progressing     Problem: Skin Injury Risk Increased  Goal: Skin Health and Integrity  Outcome: Ongoing, Progressing  Intervention: Optimize Skin Protection  Flowsheets (Taken 12/3/2023 2030)  Skin Protection:   incontinence pads utilized   tubing/devices free from skin contact  Head of Bed (HOB) Positioning: HOB at 30-45 degrees     Problem: Urinary Retention  Goal: Effective Urinary Elimination  Outcome: Ongoing, Progressing     Problem: Fall Injury Risk  Goal: Absence of Fall and Fall-Related Injury  Outcome: Ongoing, Progressing  Intervention:  Identify and Manage Contributors  Flowsheets (Taken 12/3/2023 2030)  Self-Care Promotion:   independence encouraged   BADL personal objects within reach   BADL personal routines maintained

## 2023-12-04 NOTE — CONSULTS
Inpatient Nutrition Evaluation    Admit Date: 11/29/2023   Total duration of encounter: 5 days    Nutrition Recommendation/Prescription     Continue diabetic diet. 2. Continue boost glucose control all meals. 3. Consider appetite stimulant, megace, if medically appropriate. 4. Continue calorie count as ordered.   Nutrition Assessment     Chart Review    Reason Seen: continuous nutrition monitoring, length of stay, and follow-up    Malnutrition Screening Tool Results   Have you recently lost weight without trying?: No  Have you been eating poorly because of a decreased appetite?: Yes   MST Score: 1     Diagnosis:  Cervical stenosis of spinal canal     Relevant Medical History: Personal history of colonic polyps   Date Unknown  BPH (benign prostatic hyperplasia)  Date Unknown  CVA (cerebral vascular accident)  Date Unknown  Depression  Date Unknown  History of claustrophobia  Date Unknown  HLD (hyperlipidemia)  Date Unknown  HTN (hypertension)  Date Unknown  Type 2 diabetes mellitus without complications    Nutrition-Related Medications: allopurinol, atorvastatin, multivitamin, insulin aspart, insulin detemir, magnesium hydroxide, polyethylene glycol, senna-docusate    Nutrition-Related Labs:   Latest Reference Range & Units 12/04/23 02:55   WBC 4.50 - 11.50 x10(3)/mcL 14.03 (H)   RBC 4.70 - 6.10 x10(6)/mcL 3.84 (L)   Hemoglobin 14.0 - 18.0 g/dL 12.2 (L)   Hematocrit 42.0 - 52.0 % 38.1 (L)   MCV 80.0 - 94.0 fL 99.2 (H)   MCH 27.0 - 31.0 pg 31.8 (H)   MCHC 33.0 - 36.0 g/dL 32.0 (L)   RDW 11.5 - 17.0 % 12.8   Platelet Count 130 - 400 x10(3)/mcL 237   MPV 7.4 - 10.4 fL 11.3 (H)   Neut % % 71.4   LYMPH % % 10.3   Mono % % 11.8   Eosinophil % % 1.8   Basophil % % 0.5   Immature Granulocytes % 4.2   Neut # 2.1 - 9.2 x10(3)/mcL 10.03 (H)   Lymph # 0.6 - 4.6 x10(3)/mcL 1.44   Mono # 0.1 - 1.3 x10(3)/mcL 1.65 (H)   Eos # 0 - 0.9 x10(3)/mcL 0.25   Baso # <=0.2 x10(3)/mcL 0.07   Immature Grans (Abs) 0 - 0.04 x10(3)/mcL 0.59 (H)  "  Sodium 136 - 145 mmol/L 138   Potassium 3.5 - 5.1 mmol/L 4.4   Chloride 98 - 107 mmol/L 107   CO2 23 - 31 mmol/L 24   BUN 8.4 - 25.7 mg/dL 31.0 (H)   Creatinine 0.73 - 1.18 mg/dL 0.74   eGFR mls/min/1.73/m2 >60   Glucose 82 - 115 mg/dL 109   Calcium 8.8 - 10.0 mg/dL 8.7 (L)   Phosphorus Level 2.3 - 4.7 mg/dL 3.3   Magnesium  1.60 - 2.60 mg/dL 1.80   ALP 40 - 150 unit/L 72   PROTEIN TOTAL 5.8 - 7.6 gm/dL 5.4 (L)   Albumin 3.4 - 4.8 g/dL 2.4 (L)   Albumin/Globulin Ratio 1.1 - 2.0 ratio 0.8 (L)   BILIRUBIN TOTAL <=1.5 mg/dL 0.6   AST 5 - 34 unit/L 12   ALT 0 - 55 unit/L 14   Globulin, Total 2.4 - 3.5 gm/dL 3.0   (H): Data is abnormally high  (L): Data is abnormally low    Diet Order: Diet Adult Regular Diabetic  Oral Supplement Order: Boost Glucose Control  Appetite/Oral Intake: poor/25-50% of meals  Factors Affecting Nutritional Intake: decreased appetite  Food/Confucianism/Cultural Preferences:  eggs with cheese  Food Allergies: none reported    Skin Integrity: incision  Wound(s):       Comments    Pt eating lunch during rounds. Pt request cheese with eggs. Pt needs some assistance with meals. Pt in 1:1 feeds. Pt consumed 25% of meals. Pt drank boost glucose control, 100%. Calorie count ordered to assess nutrition needs. Will follow up tomorrow. Discussed food preferences, marked menus to improved intake.     Anthropometrics    Height: 5' 9" (175.3 cm) Height Method: Stated  Last Weight: 87.3 kg (192 lb 7.4 oz) (12/04/23 0500) Weight Method: Bed Scale  BMI (Calculated): 28.4  BMI Classification: overweight (BMI 25-29.9)        Ideal Body Weight (IBW), Male: 160 lb     % Ideal Body Weight, Male (lb): 118.91 %                          Usual Weight Provided By: unable to obtain usual weight    Wt Readings from Last 5 Encounters:   12/04/23 87.3 kg (192 lb 7.4 oz)   11/17/23 99.3 kg (219 lb)   09/22/23 101.2 kg (223 lb)   07/22/23 106.6 kg (235 lb)   05/18/23 102.1 kg (225 lb)     Weight Change(s) Since Admission:  Admit " Weight: 86.3 kg (190 lb 4.1 oz) (11/29/23 1725)  Wt stable.     Patient Education    Not applicable.    Monitoring & Evaluation     Dietitian will monitor food and beverage intake, weight, weight change, electrolyte/renal panel, glucose/endocrine profile, and gastrointestinal profile.  Nutrition Risk/Follow-Up: low (follow-up in 5-7 days)  Patients assigned 'low nutrition risk' status do not qualify for a full nutritional assessment but will be monitored and re-evaluated in a 5-7 day time period. Please consult if re-evaluation needed sooner.

## 2023-12-04 NOTE — PT/OT/SLP PROGRESS
Physical Therapy Treatment Note           Name: Stevie Cummins    : 1950 (73 y.o.)  MRN: [id number]           TREATMENT SUMMARY AND RECOMMENDATIONS:    PT Received On: 23  PT Start Time: 1100     PT Stop Time: 1125  PT Total Time (min): 25 min     Subjective Assessment:   No complaints  Lethargic    Awake, alert, cooperative  Uncooperative    Agitated x c/o pain    Appropriate x c/o fatigue    Confused x Treated at bedside     Emotionally labile  Treated in gym/dept.   x Impulsive  Other:    Flat affect       Therapy Precautions:    Cognitive deficits x Spinal precautions    Collar - hard  Sternal precautions   x Collar - soft   TLSO   x Fall risk  LSO    Hip precautions - posterior  Knee immobilizer    Hip precautions - anterior  WBAT    Impaired communication  Partial weightbearing    Oxygen  TTWB    PEG tube  NWB    Visual deficits  Other:    Hearing deficits          Treatment Objectives:     Mobility Training:   Assist level Comments    Bed mobility SBA/CGA Supine>sit using HOB elevated and bed rail   Transfer MIN A x 2 Stand pivot with RW   Gait MIN A x 2 X 10 feet and x 30 feet using RW, WC following   Sit to stand transitions MIN A x 2 From EOB, recliner and BSC   Sitting balance     Standing balance      Wheelchair mobility     Car transfer     Other:          Therapeutic Exercise:   Exercise Sets Reps Comments                               Additional Comments:  Pt with improved tolerance allowing for room and choi ambulation.   Pt no longer needs co treatment  Board updated, pt ok to get up with staff.      Assessment: Patient tolerated session better.    PT Plan: continue per POC  Revisions made to plan of care: No.     GOALS:   Multidisciplinary Problems       Physical Therapy Goals          Problem: Physical Therapy    Goal Priority Disciplines Outcome Goal Variances Interventions   Physical Therapy Goal     PT, PT/OT Ongoing, Progressing     Description: Goals to be met by: Discharge       Patient will increase functional independence with mobility by performin. Supine to sit with Stand-by Assistance  2. Sit to supine with Stand-by Assistance  3. Sitting at edge of bed x 15 minutes with Stand-by Assistance without dizziness or low BP  4. Increased functional strength to 5/5 for B hips  5. Sit to stands to be completed at SBA using RW  6. bed<>chair transfers to be completed at CGA using RW.   7. gait to be assessed with goals set when able/appropriate.                          Skilled PT Minutes Provided: 23   Communication with Treatment Team:     Equipment recommendations:       At end of treatment, patient remained:  x Up in chair     Up in wheelchair in room    In bed   x With alarm activated    Bed rails up   x Call bell in reach     Family/friends present    Restraints secured properly    In bathroom with CNA/RN notified   x Nurse aware    In gym with therapist/tech    Other:

## 2023-12-04 NOTE — PROGRESS NOTES
[facility] MEDICINE ~ PROGRESS NOTE        CHIEF COMPLAINT   Hospital follow up    HOSPITAL COURSE     Patient is a 73-year-old  male with a past medical history HTN, HLD, CAD, CVA, DM, PFO s/p closure in 2022, aortic stenosis, and PVD s/p lower extremity stenting who presented to the ER at [facility] on 11/14/2023 for extremity weakness with recurrent falls and unsteadiness.  MRI cervical spine without contrast on 11/14/2023 showed chronic multilevel central canal stenosis with effacement of the CSF, most severe at C3-C4 with mild chronic cord compression.  MRI thoracic spine without contrast on 11/14/2023 showed slightly increased signal seen in the disc at T11-T12 possibly representing early discitis, no epidural abscess, no osteomyelitis.  MRI lumbar spine without contrast on 11/14/2023 showed unchanged chronic degenerative changes with central canal and neuroforaminal stenosis.  Patient was evaluated by Infectious Disease on 11/15/2023 who found no evidence of clinical infectious syndrome and ordered blood cultures which resulted as negative.  Neurosurgery was consulted on 11/15/2023 and patient subsequently underwent posterior spinal fusion on 11/20/2023. Patient was admitted to [facility] swing unit on 11/29/2023 for PT/OT. Labs today are significant for leukocytosis, CORKY, and UTI. Patient was started on Rocephin today and urine culture is pending. Patient's glucose has remained elevated since initial admission. Levemir 17u BID started today and continue moderate dose insulin sliding scale, adjusting as needed. Last dose of Decadron will be given today per neurosurgery note on 11/27/2023, and is to follow up with Dr. Hutchinson in 3-4 weeks. Patient continue to wear soft collar.     Today  Patient complains of neck pain that is somewhat relieved with Gabapentin and Methocarbamol. Renal function continues to improve. Patient reports long history of urinary incontinence. FOB  pending due to no BM.    OBJECTIVE/PHYSICAL EXAM     VITAL SIGNS (MOST RECENT):  Temp: 98.2 °F (36.8 °C) (12/04/23 0815)  Pulse: 87 (12/04/23 0815)  Resp: 20 (12/04/23 0835)  BP: (!) 148/74 (12/04/23 0815)  SpO2: (!) 93 % (12/04/23 0815) VITAL SIGNS (24 HOUR RANGE):  Temp:  [98.2 °F (36.8 °C)-99.5 °F (37.5 °C)] 98.2 °F (36.8 °C)  Pulse:  [79-87] 87  Resp:  [18-20] 20  SpO2:  [92 %-93 %] 93 %  BP: (146-148)/(71-74) 148/74   GENERAL: In no acute distress, afebrile  HEENT: Atraumatic, normocephalic, moist mucous membranes, soft neck collar in place   CHEST: Clear to auscultation bilaterally  HEART: S1, S2, no appreciable murmur  ABDOMEN: Soft, nontender, BS +  MSK: Warm, no lower extremity edema, no clubbing or cyanosis  NEUROLOGIC: Alert and oriented, moving all extremities   INTEGUMENTARY: No obvious skin rashes   PSYCHIATRY: Appropriate mood and affect    ASSESSMENT/PLAN     Spinal stenosis   S/p posterior cervical spinal fusion on 11/20/2023  Completed Decadron 11/30/2023  Follow up with Dr. Hutchinson is 12/19/2023 and 11:30am   Continue soft collar  PT/OT   Pain control as needed     UTI   Rocephin completed  Urine culture showing e coli sens to rocephin     Leukocytosis   UTI vs steroid induced  Improving   Decadron completed 11/30/2023  Monitor     CORKY   Urinary retention   Urinary incontinence   BUN significantly increased today, suspected post renal CORKY  Hillman placement 12/01/2023, bladder scans continue to show 400-600cc of urine despite patient urinating  IV fluids were given a 150 on our for several hours and then decrease to 75.  Sodium bicarb PO 12/01/2023  Decadron completed 11/30/2023  BUN/Cr continues to improve  BUN remains elevated - FOB ordered  Renal ultrasound 12/02/2023 normal  Remove hillman, condom cath      DM  Hyperglycemia  Continue Levemir 17u BID  and moderate dose SSI     Hx of HLD, CAD, CVA, PFO s/p closure in 2022, aortic stenosis, and PVD s/p lower extremity stenting  Continue home  "Lisinopril, Atorvastatin, Aspirin      Hx of BPH, gout, anxiety/depression   Continue home medication     DVT prophylaxis: Lovenox     Anticipated discharge and disposition: Continue PT/OT   __________________________________________________________________________    LABS/MICRO/MEDS/DIAGNOSTICS       LABS  Recent Labs     12/04/23  0255      K 4.4   CHLORIDE 107   CO2 24   BUN 31.0*   CREATININE 0.74   GLUCOSE 109   CALCIUM 8.7*   ALKPHOS 72   AST 12   ALT 14   ALBUMIN 2.4*     Recent Labs     12/04/23  0255   WBC 14.03*   RBC 3.84*   HCT 38.1*   MCV 99.2*          MICROBIOLOGY  Microbiology Results (last 7 days)       Procedure Component Value Units Date/Time    Urine culture [[id number]]  (Abnormal)  (Susceptibility) Collected: 11/29/23 2245    Order Status: Completed Specimen: Urine Updated: 12/02/23 1025     Urine Culture >/= 100,000 colonies/ml Escherichia coli               MEDICATIONS   [START ON 12/5/2023] albuterol-ipratropium  3 mL Nebulization Q24H    allopurinoL  100 mg Oral Daily    aspirin  81 mg Oral Daily    atorvastatin  20 mg Oral Every Mon, Wed, Fri    citalopram  20 mg Oral Daily    doxazosin  4 mg Oral Daily    enoxparin  40 mg Subcutaneous Q24H (prophylaxis, 1700)    gabapentin  300 mg Oral TID    guaiFENesin  600 mg Oral BID    insulin detemir U-100  17 Units Subcutaneous BID    methocarbamoL  750 mg Oral TID    miconazole NITRATE 2 %   Topical (Top) BID    midodrine  5 mg Oral TID WM    montelukast  10 mg Oral QHS    multivitamin  1 tablet Oral Daily    senna-docusate 8.6-50 mg  2 tablet Oral BID    tamsulosin  1 capsule Oral Daily         INFUSIONS           DIAGNOSTIC TESTS  US Retroperitoneal Complete   Final Result      Normal sonographic appearance of the kidneys.         Electronically signed by: Stevie Ochoa MD   Date:    12/02/2023   Time:    11:03           No results found for: "EF"       NUTRITION STATUS  Patient meets ASPEN criteria for   malnutrition of   per RD " assessment as evidenced by:                       A minimum of two characteristics is recommended for diagnosis of either severe or non-severe malnutrition.       Case related differential diagnoses have been reviewed; assessment and plan has been documented. I have personally reviewed the labs and test results that are currently available; I have reviewed the patients medication list. I have reviewed the consulting providers recommendations. I have reviewed or attempted to review medical records based upon their availability.  All of the patient's and/or family's questions have been addressed and answered to the best of my ability.  I will continue to monitor closely and make adjustments to medical management as needed.  This document was created using [employer].  Transcription errors may have been made.  Please contact me if any questions may rise regarding documentation to clarify transcription.        TAYLOR Serrano   Internal Medicine  Department of Hospital Medicine [facility] Surgical Unit

## 2023-12-04 NOTE — PT/OT/SLP PROGRESS
Occupational Therapy  Treatment    Name: Stevie Cummins    : 1950 (73 y.o.)  MRN: [id number]           TREATMENT SUMMARY AND RECOMMENDATIONS:      OT Date of Treatment: 23  OT Start Time: 1100  OT Stop Time: 1130  OT Total Time (min): 30 min      Subjective Assessment:   No complaints  Lethargic   x Awake, alert, cooperative  Impulsive    Uncooperative   Flat affect    Agitated  c/o pain   x Appropriate  c/o fatigue    Confused x Treated at bedside     Emotionally labile x Treated in gym/dept.      Other:        Therapy Precautions:    Cognitive deficits x Spinal precautions    Collar - hard  Sternal precautions   x Collar - soft   TLSO   x Fall risk  LSO    Hip precautions - posterior  Knee immobilizer    Hip precautions - anterior  WBAT    Impaired communication  Partial weightbearing    Oxygen  TTWB    PEG tube  NWB    Visual deficits      Hearing deficits   Other:        Treatment Objectives:     Mobility Training:    Mobility task Assist level Comments    Bed mobility Min A Supine<sidelying<EOB using the log rolling technique and bed rail for support   Transfer     Sit to stands transitions CGA EOB<standing using a RW and gait belt   BSChair<>standing    Functional mobility CGA 40ft using a RW and gait belt; following with a BSChair   Sitting balance     Standing balance      Other:        ADL Training:    ADL Assist level Comments    Feeding     Grooming/hygiene     Bathing     Upper body dressing     Lower body dressing Min A Don brief; assist to thread BLE using a reacher; use of RW for support in standing    Toileting Min A -BM; able to reach back and attempt to wipe bottom, but needs help to wipe it fully   Toilet transfer CGA BSC<>standing using a RW and gait belt    Adaptive equipment training     Other:           Therapeutic Exercise:   Exercise Sets Reps Comments                               Additional Comments:  Co-tx with PT.     Assessment: Patient tolerated session well. Pt demonstrates  improved STS transfers and functional mobility. Pt is progressing well using AE for LB dressing. Pt would benefit from continued OT services to increase independence with occupational performance, decrease risk for falls, and decrease caregiver burden.     GOALS:   Multidisciplinary Problems       Occupational Therapy Goals          Problem: Occupational Therapy    Goal Priority Disciplines Outcome Interventions   Occupational Therapy Goal     OT, PT/OT Ongoing, Progressing    Description: Goals to be met by: Discharge     Patient will increase functional independence with ADLs by performing:    UE Dressing with Stand-by Assistance.  LE Dressing with Moderate Assistance.  Grooming while standing at sink with Contact Guard Assistance.  Toileting from bedside commode with Moderate Assistance for hygiene and clothing management.   Bathing from  shower chair/bench with Moderate Assistance.  Toilet transfer to bedside commode with Stand-by Assistance.  Increased functional strength to WFL for ADLs.                         Recommendations:     Discharge Recommendations: Intermittent assistance   Discharge Equipment Recommendations:  wheelchair  Barriers to discharge:  Other (Comment) (Severity of deficits)     Plan:     Patient to be seen 6 x/week to address the above listed problems via self-care/home management, therapeutic activities, therapeutic exercises, neuromuscular re-education  Plan of Care Expires: 12/21/23  Plan of Care Reviewed with: patient  Revisions made to plan of care: No      Skilled OT Minutes Provided: 30  Communication with Treatment Team:     Equipment recommendations:       At end of treatment, patient remained:  x Up in chair     Up in wheelchair in room    In bed   x With alarm activated    Bed rails up   x Call bell in reach     Family/friends present    Restraints secured properly    In bathroom with CNA/RN notified    In gym with PT/PTA/tech   x Nurse aware    Other:

## 2023-12-05 LAB
HEMOCCULT SP2 STL QL: NEGATIVE
POCT GLUCOSE: 128 MG/DL (ref 70–110)
POCT GLUCOSE: 163 MG/DL (ref 70–110)
POCT GLUCOSE: 166 MG/DL (ref 70–110)
POCT GLUCOSE: 203 MG/DL (ref 70–110)

## 2023-12-05 PROCEDURE — 51701 INSERT BLADDER CATHETER: CPT

## 2023-12-05 PROCEDURE — 97110 THERAPEUTIC EXERCISES: CPT

## 2023-12-05 PROCEDURE — 25000242 PHARM REV CODE 250 ALT 637 W/ HCPCS: Performed by: INTERNAL MEDICINE

## 2023-12-05 PROCEDURE — 82272 OCCULT BLD FECES 1-3 TESTS: CPT | Performed by: INTERNAL MEDICINE

## 2023-12-05 PROCEDURE — 97535 SELF CARE MNGMENT TRAINING: CPT

## 2023-12-05 PROCEDURE — 94760 N-INVAS EAR/PLS OXIMETRY 1: CPT

## 2023-12-05 PROCEDURE — 25000003 PHARM REV CODE 250: Performed by: PHYSICIAN ASSISTANT

## 2023-12-05 PROCEDURE — 97116 GAIT TRAINING THERAPY: CPT

## 2023-12-05 PROCEDURE — 11000004 HC SNF PRIVATE

## 2023-12-05 PROCEDURE — 63600175 PHARM REV CODE 636 W HCPCS: Performed by: PHYSICIAN ASSISTANT

## 2023-12-05 PROCEDURE — 51702 INSERT TEMP BLADDER CATH: CPT

## 2023-12-05 PROCEDURE — 94640 AIRWAY INHALATION TREATMENT: CPT

## 2023-12-05 PROCEDURE — 97530 THERAPEUTIC ACTIVITIES: CPT

## 2023-12-05 PROCEDURE — 51798 US URINE CAPACITY MEASURE: CPT

## 2023-12-05 RX ORDER — MEGESTROL ACETATE 20 MG/1
40 TABLET ORAL DAILY
Status: DISCONTINUED | OUTPATIENT
Start: 2023-12-05 | End: 2023-12-11

## 2023-12-05 RX ADMIN — MIDODRINE HYDROCHLORIDE 5 MG: 5 TABLET ORAL at 11:12

## 2023-12-05 RX ADMIN — MIDODRINE HYDROCHLORIDE 5 MG: 5 TABLET ORAL at 05:12

## 2023-12-05 RX ADMIN — MICONAZOLE NITRATE 2 % TOPICAL POWDER: at 09:12

## 2023-12-05 RX ADMIN — GABAPENTIN 300 MG: 300 CAPSULE ORAL at 08:12

## 2023-12-05 RX ADMIN — INSULIN DETEMIR 17 UNITS: 100 INJECTION, SOLUTION SUBCUTANEOUS at 08:12

## 2023-12-05 RX ADMIN — CITALOPRAM HYDROBROMIDE 20 MG: 20 TABLET ORAL at 09:12

## 2023-12-05 RX ADMIN — GUAIFENESIN 600 MG: 600 TABLET, EXTENDED RELEASE ORAL at 08:12

## 2023-12-05 RX ADMIN — METHOCARBAMOL 750 MG: 750 TABLET ORAL at 04:12

## 2023-12-05 RX ADMIN — ENOXAPARIN SODIUM 40 MG: 40 INJECTION SUBCUTANEOUS at 04:12

## 2023-12-05 RX ADMIN — INSULIN ASPART 2 UNITS: 100 INJECTION, SOLUTION INTRAVENOUS; SUBCUTANEOUS at 12:12

## 2023-12-05 RX ADMIN — MONTELUKAST SODIUM 10 MG: 10 TABLET, COATED ORAL at 08:12

## 2023-12-05 RX ADMIN — SENNOSIDES AND DOCUSATE SODIUM 2 TABLET: 8.6; 5 TABLET ORAL at 09:12

## 2023-12-05 RX ADMIN — INSULIN ASPART 2 UNITS: 100 INJECTION, SOLUTION INTRAVENOUS; SUBCUTANEOUS at 08:12

## 2023-12-05 RX ADMIN — MEGESTROL ACETATE 40 MG: 20 TABLET ORAL at 12:12

## 2023-12-05 RX ADMIN — HYDROCODONE BITARTRATE AND ACETAMINOPHEN 1 TABLET: 10; 325 TABLET ORAL at 09:12

## 2023-12-05 RX ADMIN — TAMSULOSIN HYDROCHLORIDE 0.4 MG: 0.4 CAPSULE ORAL at 09:12

## 2023-12-05 RX ADMIN — DOXAZOSIN 4 MG: 4 TABLET ORAL at 09:12

## 2023-12-05 RX ADMIN — POLYETHYLENE GLYCOL 3350 17 G: 17 POWDER, FOR SOLUTION ORAL at 09:12

## 2023-12-05 RX ADMIN — GABAPENTIN 300 MG: 300 CAPSULE ORAL at 09:12

## 2023-12-05 RX ADMIN — METHOCARBAMOL 750 MG: 750 TABLET ORAL at 08:12

## 2023-12-05 RX ADMIN — ASPIRIN 81 MG: 81 TABLET, COATED ORAL at 09:12

## 2023-12-05 RX ADMIN — GABAPENTIN 300 MG: 300 CAPSULE ORAL at 04:12

## 2023-12-05 RX ADMIN — GUAIFENESIN 600 MG: 600 TABLET, EXTENDED RELEASE ORAL at 09:12

## 2023-12-05 RX ADMIN — SENNOSIDES AND DOCUSATE SODIUM 2 TABLET: 8.6; 5 TABLET ORAL at 08:12

## 2023-12-05 RX ADMIN — INSULIN ASPART 2 UNITS: 100 INJECTION, SOLUTION INTRAVENOUS; SUBCUTANEOUS at 04:12

## 2023-12-05 RX ADMIN — METHOCARBAMOL 750 MG: 750 TABLET ORAL at 09:12

## 2023-12-05 RX ADMIN — MIDODRINE HYDROCHLORIDE 5 MG: 5 TABLET ORAL at 09:12

## 2023-12-05 RX ADMIN — MULTIPLE VITAMINS W/ MINERALS TAB 1 TABLET: TAB at 09:12

## 2023-12-05 RX ADMIN — INSULIN DETEMIR 17 UNITS: 100 INJECTION, SOLUTION SUBCUTANEOUS at 09:12

## 2023-12-05 RX ADMIN — IPRATROPIUM BROMIDE AND ALBUTEROL SULFATE 3 ML: .5; 3 SOLUTION RESPIRATORY (INHALATION) at 07:12

## 2023-12-05 RX ADMIN — ALLOPURINOL 100 MG: 100 TABLET ORAL at 09:12

## 2023-12-05 RX ADMIN — MICONAZOLE NITRATE 2 % TOPICAL POWDER: at 08:12

## 2023-12-05 NOTE — PROGRESS NOTES
Inpatient Nutrition Assessment    Admit Date: 11/29/2023   Total duration of encounter: 6 days   Patient Age: 73 y.o.    Nutrition Recommendation/Prescription     Continue diabetic diet. 2.Continue boost glucose control all meals. 3. Considered appetite stimulant if medically appropriate. 4. Continue calorie count as ordered. 5. Continue 1:1 feeds as ordered.     Communication of Recommendations: reviewed with provider and reviewed with patient    Nutrition Assessment     Malnutrition Assessment/Nutrition-Focused Physical Exam                                                                A minimum of two characteristics is recommended for diagnosis of either severe or non-severe malnutrition.    Chart Review    Reason Seen: continuous nutrition monitoring, length of stay, and follow-up    Malnutrition Screening Tool Results   Have you recently lost weight without trying?: No  Have you been eating poorly because of a decreased appetite?: Yes   MST Score: 1   Diagnosis:  Cervical stenosis of spinal canal      Relevant Medical History: Personal history of colonic polyps   Date Unknown  BPH (benign prostatic hyperplasia)  Date Unknown  CVA (cerebral vascular accident)  Date Unknown  Depression  Date Unknown  History of claustrophobia  Date Unknown  HLD (hyperlipidemia)  Date Unknown  HTN (hypertension)  Date Unknown  Type 2 diabetes mellitus without complications            Nutrition-Related Medications: allopurinol, atorvastatin, multivitamin, insulin aspart, insulin detemir, magnesium hydroxide, polyethylene glycol, senna-docusate     Calorie Containing IV Medications: no significant kcals from medications at this time    Nutrition-Related Labs:     Latest Reference Range & Units 12/04/23 02:55   WBC 4.50 - 11.50 x10(3)/mcL 14.03 (H)   RBC 4.70 - 6.10 x10(6)/mcL 3.84 (L)   Hemoglobin 14.0 - 18.0 g/dL 12.2 (L)   Hematocrit 42.0 - 52.0 % 38.1 (L)   MCV 80.0 - 94.0 fL 99.2 (H)   MCH 27.0 - 31.0 pg 31.8 (H)   MCHC 33.0 -  36.0 g/dL 32.0 (L)   RDW 11.5 - 17.0 % 12.8   Platelet Count 130 - 400 x10(3)/mcL 237   MPV 7.4 - 10.4 fL 11.3 (H)   Neut % % 71.4   LYMPH % % 10.3   Mono % % 11.8   Eosinophil % % 1.8   Basophil % % 0.5   Immature Granulocytes % 4.2   Neut # 2.1 - 9.2 x10(3)/mcL 10.03 (H)   Lymph # 0.6 - 4.6 x10(3)/mcL 1.44   Mono # 0.1 - 1.3 x10(3)/mcL 1.65 (H)   Eos # 0 - 0.9 x10(3)/mcL 0.25   Baso # <=0.2 x10(3)/mcL 0.07   Immature Grans (Abs) 0 - 0.04 x10(3)/mcL 0.59 (H)   Sodium 136 - 145 mmol/L 138   Potassium 3.5 - 5.1 mmol/L 4.4   Chloride 98 - 107 mmol/L 107   CO2 23 - 31 mmol/L 24   BUN 8.4 - 25.7 mg/dL 31.0 (H)   Creatinine 0.73 - 1.18 mg/dL 0.74   eGFR mls/min/1.73/m2 >60   Glucose 82 - 115 mg/dL 109   Calcium 8.8 - 10.0 mg/dL 8.7 (L)   Phosphorus Level 2.3 - 4.7 mg/dL 3.3   Magnesium  1.60 - 2.60 mg/dL 1.80   ALP 40 - 150 unit/L 72   PROTEIN TOTAL 5.8 - 7.6 gm/dL 5.4 (L)   Albumin 3.4 - 4.8 g/dL 2.4 (L)   Albumin/Globulin Ratio 1.1 - 2.0 ratio 0.8 (L)   BILIRUBIN TOTAL <=1.5 mg/dL 0.6   AST 5 - 34 unit/L 12   ALT 0 - 55 unit/L 14   Globulin, Total 2.4 - 3.5 gm/dL 3.0   (H): Data is abnormally high  (L): Data is abnormally low  Nutrition Orders:   Diet Adult Regular Diabetic  Dietary nutrition supplements Boost Glucose Control - Chocolate; All Meals    Appetite/Oral Intake: fair/    Factors Affecting Nutritional Intake: decreased appetite, needs assistance with meals.     Food/Restorationism/Cultural Preferences:  eggs with cheese    Food Allergies: none reported    Wound(s):       Last Bowel Movement: 12/05/23    Comments    Pt consumed 25% of items on his tray. Pt had assistance with meal with nursing aid. Discussed food preferences and pt also drinking boost glucose control, which most of his nutrition is coming form. Reviewed calorie count on 12/4/23 provides 1,125 kcal and 86 gm of protein, meeting 59% kcal needs and 82% protein needs. Wt stable. Notified provider of calorie count findings. Encourage good PO intake.  "Reviewed labs, see above Protein levels and alb levels low.     Anthropometrics    Height: 5' 9" (175.3 cm) Height Method: Stated  Last Weight: 87.3 kg (192 lb 7.4 oz) (23 0500) Weight Method: Bed Scale  BMI (Calculated): 28.4  BMI Classification: overweight (BMI 25-29.9)        Ideal Body Weight (IBW), Male: 160 lb     % Ideal Body Weight, Male (lb): 118.91 %                          Usual Weight Provided By: unable to obtain usual weight    Wt Readings from Last 5 Encounters:   23 87.3 kg (192 lb 7.4 oz)   23 99.3 kg (219 lb)   23 101.2 kg (223 lb)   23 106.6 kg (235 lb)   23 102.1 kg (225 lb)     Weight Change(s) Since Admission:  wt stable   Wt Readings from Last 1 Encounters:   23 0500 87.3 kg (192 lb 7.4 oz)   23 1725 86.3 kg (190 lb 4.1 oz)   Admit Weight: 86.3 kg (190 lb 4.1 oz) (23 1725), Weight Method: Bed Scale    Estimated Needs     Kcal Needs: 1918 kcal ([place]-St Jeor x 1.2 stress factor/CBW)      Protein Needs: 104.98 gm (1.2 gm/kg/CBW)     Fluid Needs: 1918 mL (1 mL/kcal)           Temp (24hrs), Av.6 °F (37 °C), Min:98.5 °F (36.9 °C), Max:98.6 °F (37 °C)       Enteral Nutrition    Patient not receiving enteral nutrition at this time.    Parenteral Nutrition    Patient not receiving parenteral nutrition support at this time.    Evaluation of Received Nutrient Intake    Calories: not meeting estimated needs  Protein: meeting estimated needs    Patient Education    Not applicable.    Nutrition Diagnosis     PES: Inadequate oral intake related to inability to consume sufficient nutrients as evidenced by Needs assistance with meals and patient reports when in pain, pt doesn't feel like eating. Calorie count on 23-58% kcal needs and 82% protein needs. (new)    Interventions/Goals     Intervention(s): general/healthful diet, commercial beverage, and collaboration with other providers    Goal: Meet greater than 75% of nutritional needs by " follow-up. (new)    Monitoring & Evaluation     Dietitian will monitor food and beverage intake, weight, weight change, glucose/endocrine profile, and gastrointestinal profile.    Nutrition Risk/Follow-Up: moderate (follow-up in 3-5 days)   Please consult if re-assessment needed sooner.

## 2023-12-05 NOTE — PLAN OF CARE
Problem: Adult Inpatient Plan of Care  Goal: Plan of Care Review  Outcome: Ongoing, Progressing  Flowsheets (Taken 12/4/2023 1948)  Plan of Care Reviewed With: patient  Goal: Patient-Specific Goal (Individualized)  Outcome: Ongoing, Progressing  Flowsheets (Taken 12/4/2023 1948)  Anxieties, Fears or Concerns: None expressed at this time  Individualized Care Needs: Monitor for urinary retention, encourage fluids, pain management, monitor CBGs, monitor labs, PT/OT, soft collar at all times until F/U appointment  Goal: Absence of Hospital-Acquired Illness or Injury  Outcome: Ongoing, Progressing  Intervention: Identify and Manage Fall Risk  Flowsheets (Taken 12/4/2023 1948)  Safety Promotion/Fall Prevention:   assistive device/personal item within reach   bed alarm set   Fall Risk reviewed with patient/family   gait belt with ambulation   lighting adjusted   medications reviewed   side rails raised x 3   supervised activity   room near unit station   instructed to call staff for mobility   toileting scheduled   nonskid shoes/socks when out of bed   diversional activities provided   high risk medications identified  Intervention: Prevent Skin Injury  Flowsheets (Taken 12/4/2023 1948)  Body Position:   weight shifting   sitting up in bed  Skin Protection:   adhesive use limited   incontinence pads utilized   transparent dressing maintained   tubing/devices free from skin contact  Intervention: Prevent and Manage VTE (Venous Thromboembolism) Risk  Flowsheets (Taken 12/4/2023 1948)  Activity Management: Rolling - L1  VTE Prevention/Management:   ambulation promoted   fluids promoted   intravenous hydration  Range of Motion: active ROM (range of motion) encouraged  Intervention: Prevent Infection  Flowsheets (Taken 12/4/2023 1948)  Infection Prevention:   cohorting utilized   environmental surveillance performed   equipment surfaces disinfected   rest/sleep promoted   hand hygiene promoted   single patient room provided      Problem: Diabetes Comorbidity  Goal: Blood Glucose Level Within Targeted Range  Outcome: Ongoing, Progressing  Intervention: Monitor and Manage Glycemia  Flowsheets (Taken 12/4/2023 1948)  Glycemic Management:   blood glucose monitored   supplemental insulin given     Problem: Infection  Goal: Absence of Infection Signs and Symptoms  Outcome: Ongoing, Progressing  Intervention: Prevent or Manage Infection  Flowsheets (Taken 12/4/2023 1948)  Fever Reduction/Comfort Measures:   lightweight bedding   lightweight clothing  Infection Management: aseptic technique maintained  Isolation Precautions:   protective   precautions maintained     Problem: Urinary Retention  Goal: Effective Urinary Elimination  Outcome: Ongoing, Progressing  Intervention: Promote Effective Urine Elimination  Flowsheets (Taken 12/4/2023 1948)  Bowel Function Promotion:   straining discouraged   prompt response to urge promoted   toileting schedule established  Urinary Elimination Promotion: absorbent pad/diaper use encouraged

## 2023-12-05 NOTE — PT/OT/SLP PROGRESS
Physical Therapy Treatment Note           Name: Stevie Cummins    : 1950 (73 y.o.)  MRN: [id number]           TREATMENT SUMMARY AND RECOMMENDATIONS:    PT Received On: 23  PT Start Time: 1104     PT Stop Time: 1129  PT Total Time (min): 25 min     Subjective Assessment:   No complaints  Lethargic    Awake, alert, cooperative  Uncooperative    Agitated  c/o pain    Appropriate x c/o fatigue    Confused  Treated at bedside     Emotionally labile x Treated in gym/dept.    Impulsive  Other:    Flat affect       Therapy Precautions:    Cognitive deficits x Spinal precautions    Collar - hard  Sternal precautions   x Collar - soft   TLSO   x Fall risk  LSO    Hip precautions - posterior  Knee immobilizer    Hip precautions - anterior  WBAT    Impaired communication  Partial weightbearing    Oxygen  TTWB    PEG tube  NWB    Visual deficits  Other:    Hearing deficits          Treatment Objectives:     Mobility Training:   Assist level Comments    Bed mobility SBA/CGA Supine>Sit   Transfer MIN/CGA Stand pivot with RW   Gait MIN A  2 X 20 feet using RW, WC following   Sit to stand transitions MIN/CGA  From EOB x 4 reps and from WC x 2 reps   Sitting balance     Standing balance      Wheelchair mobility     Car transfer     Other:          Therapeutic Exercise:   Exercise Sets Reps Comments   B LE bike in sitting using UBE  5' Forward                         Additional Comments:  Pt with improved tolerance allowing for increased I and gait distances completed.      Assessment: Patient tolerated session well.    PT Plan: continue per POC  Revisions made to plan of care: No.     GOALS:   Multidisciplinary Problems       Physical Therapy Goals          Problem: Physical Therapy    Goal Priority Disciplines Outcome Goal Variances Interventions   Physical Therapy Goal     PT, PT/OT Ongoing, Progressing     Description: Goals to be met by: Discharge      Patient will increase functional independence with mobility  by performin. Supine to sit with Stand-by Assistance  2. Sit to supine with Stand-by Assistance  3. Sitting at edge of bed x 15 minutes with Stand-by Assistance without dizziness or low BP  4. Increased functional strength to 5/5 for B hips  5. Sit to stands to be completed at SBA using RW  6. bed<>chair transfers to be completed at CGA using RW.   7. gait to be assessed with goals set when able/appropriate.                          Skilled PT Minutes Provided: 23   Communication with Treatment Team:     Equipment recommendations:       At end of treatment, patient remained:  x Up in chair     Up in wheelchair in room    In bed    With alarm activated    Bed rails up    Call bell in reach     Family/friends present    Restraints secured properly    In bathroom with CNA/RN notified    Nurse aware   x In gym with therapist/tech    Other:

## 2023-12-05 NOTE — PT/OT/SLP PROGRESS
Occupational Therapy  Treatment    Name: Stevie Cummins    : 1950 (73 y.o.)  MRN: [id number]           TREATMENT SUMMARY AND RECOMMENDATIONS:      OT Date of Treatment: 23  OT Start Time: 1130  OT Stop Time: 1200  OT Total Time (min): 30 min      Subjective Assessment:   No complaints  Lethargic   x Awake, alert, cooperative  Impulsive    Uncooperative   Flat affect    Agitated  c/o pain   x Appropriate  c/o fatigue    Confused x Treated at bedside     Emotionally labile x Treated in gym/dept.      Other:        Therapy Precautions:    Cognitive deficits x Spinal precautions    Collar - hard  Sternal precautions   x Collar - soft   TLSO   x Fall risk  LSO    Hip precautions - posterior  Knee immobilizer    Hip precautions - anterior  WBAT    Impaired communication  Partial weightbearing    Oxygen  TTWB    PEG tube  NWB    Visual deficits      Hearing deficits   Other:        Treatment Objectives:     Mobility Training:    Mobility task Assist level Comments    Bed mobility Min A EOB<supine; assist with bringing BLE up into bed   Transfer CGA Stand pivot t/f from WC<EOB using a RW and gait belt    Sit to stands transitions Min A WC<standing using a RW and gait belt    Functional mobility     Sitting balance     Standing balance      Other:        ADL Training:    ADL Assist level Comments    Feeding     Grooming/hygiene     Bathing     Upper body dressing     Lower body dressing     Toileting     Toilet transfer     Adaptive equipment training     Other:           Therapeutic Exercise:   Exercise Sets Reps Comments   BUE strengthening exercises 3 10 2# weighted dowel performing bicep curls and chest press ([id number]); terminated d/t right shoulder pain                          Therapeutic activity: Pt participated in a therapeutic activity addressing tripod grasp, intrinsic strength in hand musculation, and FMC to increase independence with self-feeding.       Assessment: Patient tolerated session well. Pt  would benefit from continued OT services to increase independence with self-care, decrease risk for falls, and decrease caregiver burden.     GOALS:   Multidisciplinary Problems       Occupational Therapy Goals          Problem: Occupational Therapy    Goal Priority Disciplines Outcome Interventions   Occupational Therapy Goal     OT, PT/OT Ongoing, Progressing    Description: Goals to be met by: Discharge     Patient will increase functional independence with ADLs by performing:    UE Dressing with Stand-by Assistance.  LE Dressing with Moderate Assistance.  Grooming while standing at sink with Contact Guard Assistance.  Toileting from bedside commode with Moderate Assistance for hygiene and clothing management.   Bathing from  shower chair/bench with Moderate Assistance.  Toilet transfer to bedside commode with Stand-by Assistance.  Increased functional strength to WFL for ADLs.                         Recommendations:     Discharge Recommendations: Intermittent supervision   Discharge Equipment Recommendations:  wheelchair  Barriers to discharge:  Other (Comment) (Severity of deficits)     Plan:     Patient to be seen 6 x/week to address the above listed problems via self-care/home management, therapeutic activities, therapeutic exercises, neuromuscular re-education  Plan of Care Expires: 12/21/23  Plan of Care Reviewed with: patient  Revisions made to plan of care: No      Skilled OT Minutes Provided: 30  Communication with Treatment Team:     Equipment recommendations:       At end of treatment, patient remained:   Up in chair     Up in wheelchair in room   x In bed   x With alarm activated   x Bed rails up   x Call bell in reach     Family/friends present    Restraints secured properly    In bathroom with CNA/RN notified    In gym with PT/PTA/tech    Nurse aware    Other:

## 2023-12-05 NOTE — PLAN OF CARE
Problem: Adult Inpatient Plan of Care  Goal: Plan of Care Review  Outcome: Ongoing, Progressing  Flowsheets (Taken 12/5/2023 1133)  Plan of Care Reviewed With: patient  Goal: Patient-Specific Goal (Individualized)  Outcome: Ongoing, Progressing  Flowsheets (Taken 12/5/2023 1133)  Anxieties, Fears or Concerns: pt stated he advised girlfriend to get a license.  Individualized Care Needs: safety, monitor CBG, monitor pain  Goal: Absence of Hospital-Acquired Illness or Injury  Outcome: Ongoing, Progressing  Intervention: Identify and Manage Fall Risk  Flowsheets (Taken 12/5/2023 1133)  Safety Promotion/Fall Prevention:   assistive device/personal item within reach   nonskid shoes/socks when out of bed   side rails raised x 3  Intervention: Prevent Skin Injury  Flowsheets (Taken 12/5/2023 1133)  Body Position: position maintained  Skin Protection: incontinence pads utilized  Intervention: Prevent and Manage VTE (Venous Thromboembolism) Risk  Flowsheets (Taken 12/5/2023 1133)  Activity Management: Rolling - L1  VTE Prevention/Management:   ambulation promoted   bleeding precations maintained   dorsiflexion/plantar flexion performed   fluids promoted  Range of Motion: active ROM (range of motion) encouraged  Intervention: Prevent Infection  Flowsheets (Taken 12/5/2023 1133)  Infection Prevention:   cohorting utilized   single patient room provided   personal protective equipment utilized  Goal: Optimal Comfort and Wellbeing  Outcome: Ongoing, Progressing  Intervention: Monitor Pain and Promote Comfort  Flowsheets (Taken 12/5/2023 1133)  Pain Management Interventions:   pain management plan reviewed with patient/caregiver   care clustered   position adjusted   pillow support provided  Intervention: Provide Person-Centered Care  Flowsheets (Taken 12/5/2023 1133)  Trust Relationship/Rapport:   care explained   choices provided   emotional support provided   empathic listening provided  Goal: Readiness for Transition of  Care  Outcome: Ongoing, Progressing  Intervention: Mutually Develop Transition Plan  Flowsheets (Taken 12/5/2023 1133)  Equipment Currently Used at Home:   walker, rolling   bedside commode  Transportation Anticipated: family or friend will provide  Who are your caregiver(s) and their phone number(s)?: Yesy Martinez [phone] friend  Communicated NAZANIN with patient/caregiver: Date not available/Unable to determine  Do you expect to return to your current living situation?: Yes  Do you have help at home or someone to help you manage your care at home?: Yes  Readmission within 30 days?: No  Do you currently have service(s) that help you manage your care at home?: No  Is the pt/caregiver preference to resume services with current agency: No     Problem: Diabetes Comorbidity  Goal: Blood Glucose Level Within Targeted Range  Outcome: Ongoing, Progressing  Intervention: Monitor and Manage Glycemia  Flowsheets (Taken 12/5/2023 1133)  Glycemic Management:   blood glucose monitored   supplemental insulin given     Problem: Infection  Goal: Absence of Infection Signs and Symptoms  Outcome: Ongoing, Progressing  Intervention: Prevent or Manage Infection  Flowsheets (Taken 12/5/2023 1133)  Fever Reduction/Comfort Measures:   lightweight bedding   lightweight clothing  Infection Management: aseptic technique maintained  Isolation Precautions: precautions maintained     Problem: Skin Injury Risk Increased  Goal: Skin Health and Integrity  Outcome: Ongoing, Progressing  Intervention: Optimize Skin Protection  Flowsheets (Taken 12/5/2023 1133)  Pressure Reduction Techniques: frequent weight shift encouraged  Pressure Reduction Devices: heel offloading device utilized  Skin Protection: incontinence pads utilized  Head of Bed (HOB) Positioning: HOB at 30-45 degrees  Intervention: Promote and Optimize Oral Intake  Flowsheets (Taken 12/5/2023 1133)  Oral Nutrition Promotion:   calorie-dense foods provided   calorie-dense liquids  provided   safe use of adaptive equipment encouraged     Problem: Urinary Retention  Goal: Effective Urinary Elimination  Outcome: Ongoing, Progressing  Intervention: Promote Effective Urine Elimination  Flowsheets (Taken 12/5/2023 1133)  Bowel Function Promotion:   straining discouraged   prompt response to urge promoted  Urinary Elimination Promotion: frequent voiding encouraged     Problem: Fall Injury Risk  Goal: Absence of Fall and Fall-Related Injury  Outcome: Ongoing, Progressing  Intervention: Identify and Manage Contributors  Flowsheets (Taken 12/5/2023 1133)  Self-Care Promotion:   independence encouraged   BADL personal objects within reach   BADL personal routines maintained   meal set-up provided  Medication Review/Management: medications reviewed  Intervention: Promote Injury-Free Environment  Flowsheets (Taken 12/5/2023 1133)  Safety Promotion/Fall Prevention:   assistive device/personal item within reach   nonskid shoes/socks when out of bed   side rails raised x 3

## 2023-12-05 NOTE — PT/OT/SLP PROGRESS
Physical Therapy Treatment Note           Name: Stevie Cummins    : 1950 (73 y.o.)  MRN: [id number]           TREATMENT SUMMARY AND RECOMMENDATIONS:    PT Received On: 23  PT Start Time: 1330     PT Stop Time: 1355  PT Total Time (min): 25 min     Subjective Assessment:   No complaints  Lethargic    Awake, alert, cooperative  Uncooperative    Agitated  c/o pain    Appropriate x c/o fatigue    Confused  Treated at bedside     Emotionally labile x Treated in gym/dept.    Impulsive  Other:    Flat affect       Therapy Precautions:    Cognitive deficits x Spinal precautions    Collar - hard  Sternal precautions   x Collar - soft   TLSO   x Fall risk  LSO    Hip precautions - posterior  Knee immobilizer    Hip precautions - anterior  WBAT    Impaired communication  Partial weightbearing    Oxygen  TTWB    PEG tube  NWB    Visual deficits  Other:    Hearing deficits          Treatment Objectives:     Mobility Training:   Assist level Comments    Bed mobility MIN A Supine>Sit   Transfer MIN/CGA Stand pivot with RW   Gait MIN A  X 15 feet using RW, WC following   Sit to stand transitions MIN/CGA  From EOB x 2 reps and from WC x 2 reps   Sitting balance     Standing balance      Wheelchair mobility     Car transfer     Other:          Therapeutic Exercise:   Exercise Sets Reps Comments   B LE bike in sitting using UBE  10' 5' Forward/ 5' Backward                         Additional Comments:  Pt with increased R shoulder and joint pain, impacting bed mobility and gait.      Assessment: Patient tolerated session fair.    PT Plan: continue per POC  Revisions made to plan of care: No.     GOALS:   Multidisciplinary Problems       Physical Therapy Goals          Problem: Physical Therapy    Goal Priority Disciplines Outcome Goal Variances Interventions   Physical Therapy Goal     PT, PT/OT Ongoing, Progressing     Description: Goals to be met by: Discharge      Patient will increase functional independence with  mobility by performin. Supine to sit with Stand-by Assistance  2. Sit to supine with Stand-by Assistance  3. Sitting at edge of bed x 15 minutes with Stand-by Assistance without dizziness or low BP  4. Increased functional strength to 5/5 for B hips  5. Sit to stands to be completed at SBA using RW  6. bed<>chair transfers to be completed at CGA using RW.   7. gait to be assessed with goals set when able/appropriate.                          Skilled PT Minutes Provided: 25   Communication with Treatment Team:     Equipment recommendations:       At end of treatment, patient remained:  x Up in chair     Up in wheelchair in room    In bed    With alarm activated    Bed rails up    Call bell in reach     Family/friends present    Restraints secured properly    In bathroom with CNA/RN notified    Nurse aware   x In gym with therapist/tech    Other:

## 2023-12-05 NOTE — PROGRESS NOTES
Unable to treat pt at this time d/t nursing placing catheter in pt. Will f/u later this afternoon.

## 2023-12-05 NOTE — PLAN OF CARE
[facility]  Discharge Reassessment    Primary Care Provider: Robert Alarcon MD    Expected Discharge Date:     Reassessment (most recent)       Discharge Reassessment - 12/05/23 1527          Discharge Reassessment    Assessment Type Discharge Planning Reassessment     Did the patient's condition or plan change since previous assessment? No     Discharge Plan discussed with: Spouse/sig other     Name(s) and Number(s) Yesy Martinez [phone]     Communicated NAZANIN with patient/caregiver Date not available/Unable to determine     Discharge Plan A Home with family;Home Health     DME Needed Upon Discharge  none     Transition of Care Barriers None     Why the patient remains in the hospital Requires continued medical care        Post-Acute Status    Post-Acute Authorization Home Health     Home Health Status Pending medical clearance/testing     Hospital Resources/Appts/Education Provided Provided patient/caregiver with written discharge plan information     Patient choice form signed by patient/caregiver List with quality metrics by geographic area provided     Discharge Delays None known at this time

## 2023-12-05 NOTE — PT/OT/SLP PROGRESS
Occupational Therapy  Treatment    Name: Stevie Cummins    : 1950 (73 y.o.)  MRN: [id number]           TREATMENT SUMMARY AND RECOMMENDATIONS:      OT Date of Treatment: 23  OT Start Time: 1400  OT Stop Time: 1425  OT Total Time (min): 25 min      Subjective Assessment:   No complaints  Lethargic   x Awake, alert, cooperative  Impulsive    Uncooperative   Flat affect    Agitated x c/o pain   x Appropriate  c/o fatigue    Confused x Treated at bedside     Emotionally labile x Treated in gym/dept.      Other:        Therapy Precautions:    Cognitive deficits x Spinal precautions    Collar - hard  Sternal precautions   x Collar - soft   TLSO   x Fall risk  LSO    Hip precautions - posterior  Knee immobilizer    Hip precautions - anterior  WBAT    Impaired communication  Partial weightbearing    Oxygen  TTWB    PEG tube  NWB    Visual deficits      Hearing deficits   Other:        Treatment Objectives:     Mobility Training:    Mobility task Assist level Comments    Bed mobility Min A EOB<supine, assist with lifting BLE into bed   Transfer CGA Stand pivot t/f from WC<EOB using a RW and gait belt    Sit to stands transitions Min A WC<standing using a RW and gait belt    Functional mobility     Sitting balance     Standing balance      Other:        ADL Training:    ADL Assist level Comments    Feeding     Grooming/hygiene     Bathing     Upper body dressing     Lower body dressing Min A     SBA Don slip on shoes d/t having ankle pain making it difficult for pt to point toes   Doff slip on shoes sitting EOB    Toileting     Toilet transfer     Adaptive equipment training     Other:           Therapeutic Exercise:   Exercise Sets Reps Comments                               Therapeutic activity: Pt engaged in therapeutic activity addressing crossing midline, shoulder flexion to 90 degrees, grasp, and GMC to increase independence with dressing and self-feeding. Shoulder arc used for activity. Pt only performed  activity with LUE d/t having pain in right shoulder. Pt noted with being able to bring rings 1/2 way across the arc only.       Assessment: Patient tolerated session fair. Pt's session limited d/t pain in right shoulder and ankles. Pt would benefit from continued OT services to address performance deficits on decreased UE, activity tolerance, decreased coordination, and spinal precautions to increase independence with self-care and decrease risk for falls.     GOALS:   Multidisciplinary Problems       Occupational Therapy Goals          Problem: Occupational Therapy    Goal Priority Disciplines Outcome Interventions   Occupational Therapy Goal     OT, PT/OT Ongoing, Progressing    Description: Goals to be met by: Discharge     Patient will increase functional independence with ADLs by performing:    UE Dressing with Stand-by Assistance.  LE Dressing with Moderate Assistance.  Grooming while standing at sink with Contact Guard Assistance.  Toileting from bedside commode with Moderate Assistance for hygiene and clothing management.   Bathing from  shower chair/bench with Moderate Assistance.  Toilet transfer to bedside commode with Stand-by Assistance.  Increased functional strength to WFL for ADLs.                         Recommendations:     Discharge Recommendations: Intermittent assistance   Discharge Equipment Recommendations:  wheelchair  Barriers to discharge:  Other (Comment) (Severity of deficits)     Plan:     Patient to be seen 6 x/week to address the above listed problems via self-care/home management, therapeutic activities, therapeutic exercises, neuromuscular re-education  Plan of Care Expires: 12/21/23  Plan of Care Reviewed with: patient  Revisions made to plan of care: Yes      Skilled OT Minutes Provided: 25  Communication with Treatment Team:     Equipment recommendations:       At end of treatment, patient remained:   Up in chair     Up in wheelchair in room   x In bed   x With alarm activated   x  Bed rails up   x Call bell in reach     Family/friends present    Restraints secured properly    In bathroom with CNA/RN notified    In gym with PT/PTA/tech   x Nurse aware    Other:

## 2023-12-05 NOTE — PROGRESS NOTES
[facility] MEDICINE ~ PROGRESS NOTE        CHIEF COMPLAINT   Hospital follow up    HOSPITAL COURSE     Patient is a 73-year-old  male with a past medical history HTN, HLD, CAD, CVA, DM, PFO s/p closure in 2022, aortic stenosis, and PVD s/p lower extremity stenting who presented to the ER at [facility] on 11/14/2023 for extremity weakness with recurrent falls and unsteadiness.  MRI cervical spine without contrast on 11/14/2023 showed chronic multilevel central canal stenosis with effacement of the CSF, most severe at C3-C4 with mild chronic cord compression.  MRI thoracic spine without contrast on 11/14/2023 showed slightly increased signal seen in the disc at T11-T12 possibly representing early discitis, no epidural abscess, no osteomyelitis.  MRI lumbar spine without contrast on 11/14/2023 showed unchanged chronic degenerative changes with central canal and neuroforaminal stenosis.  Patient was evaluated by Infectious Disease on 11/15/2023 who found no evidence of clinical infectious syndrome and ordered blood cultures which resulted as negative.  Neurosurgery was consulted on 11/15/2023 and patient subsequently underwent posterior spinal fusion on 11/20/2023. Patient was admitted to [facility] swing unit on 11/29/2023 for PT/OT. Labs today are significant for leukocytosis, CORKY, and UTI. Patient was started on Rocephin today and urine culture is pending. Patient's glucose has remained elevated since initial admission. Levemir 17u BID started today and continue moderate dose insulin sliding scale, adjusting as needed. Last dose of Decadron will be given today per neurosurgery note on 11/27/2023, and is to follow up with Dr. Hutchinson in 3-4 weeks. Patient continue to wear soft collar.     Today  Overnight bladder scan showed 795mL, in and out cath drained 800mL. Continue bladder scans, if next bladder scan remains elevated then will need hillman placement. Shows improvement with  therapy.    OBJECTIVE/PHYSICAL EXAM     VITAL SIGNS (MOST RECENT):  Temp: 98.5 °F (36.9 °C) (12/05/23 0751)  Pulse: 81 (12/05/23 0751)  Resp: (P) 18 (12/05/23 0915)  BP: 133/73 (12/05/23 0751)  SpO2: (!) 89 % (12/05/23 0751) VITAL SIGNS (24 HOUR RANGE):  Temp:  [98.5 °F (36.9 °C)-98.6 °F (37 °C)] 98.5 °F (36.9 °C)  Pulse:  [78-83] 81  Resp:  [18] (P) 18  SpO2:  [89 %-95 %] 89 %  BP: (133-145)/(70-73) 133/73   GENERAL: In no acute distress, afebrile  HEENT: Atraumatic, normocephalic, moist mucous membranes, soft neck collar in place   CHEST: Clear to auscultation bilaterally  HEART: S1, S2, no appreciable murmur  ABDOMEN: Soft, nontender, BS +  MSK: Warm, no lower extremity edema, no clubbing or cyanosis  NEUROLOGIC: Alert and oriented, moving all extremities   INTEGUMENTARY: No obvious skin rashes   PSYCHIATRY: Appropriate mood and affect    ASSESSMENT/PLAN     Spinal stenosis   S/p posterior cervical spinal fusion on 11/20/2023  Completed Decadron 11/30/2023  Follow up with Dr. Hutchinson is 12/19/2023 at 11:30am   Continue soft collar  PT/OT   Pain control as needed     UTI   Rocephin completed  Urine culture showing e coli sens to rocephin     Leukocytosis   UTI vs steroid induced  Improving   Decadron completed 11/30/2023  Monitor     CORKY   Urinary retention   Urinary incontinence   Decadron completed 11/30/2023  Sodium bicarb PO 12/01/2023 x1   Hillman placement 12/01/2023 due to bladder scans continue to show 400-600cc of urine despite patient urinating  Hillman removed 12/04/2023, condom cath placed   Bladder scan at noon, if elevated will need to re-insert another hillman   BUN remains elevated - FOB negative  Renal ultrasound 12/02/2023 normal     DM  Hyperglycemia  Continue Levemir 17u BID  and moderate dose SSI     Hx of HLD, CAD, CVA, PFO s/p closure in 2022, aortic stenosis, and PVD s/p lower extremity stenting  Continue home Lisinopril, Atorvastatin, Aspirin      Hx of BPH, gout, anxiety/depression   Continue  "home medication     DVT prophylaxis: Lovenox     Anticipated discharge and disposition: Continue PT/OT   __________________________________________________________________________    LABS/MICRO/MEDS/DIAGNOSTICS       LABS  Recent Labs     12/04/23  0255      K 4.4   CHLORIDE 107   CO2 24   BUN 31.0*   CREATININE 0.74   GLUCOSE 109   CALCIUM 8.7*   ALKPHOS 72   AST 12   ALT 14   ALBUMIN 2.4*     Recent Labs     12/04/23  0255   WBC 14.03*   RBC 3.84*   HCT 38.1*   MCV 99.2*          MICROBIOLOGY  Microbiology Results (last 7 days)       Procedure Component Value Units Date/Time    Urine culture [[id number]]  (Abnormal)  (Susceptibility) Collected: 11/29/23 2245    Order Status: Completed Specimen: Urine Updated: 12/02/23 1025     Urine Culture >/= 100,000 colonies/ml Escherichia coli               MEDICATIONS   albuterol-ipratropium  3 mL Nebulization Q24H    allopurinoL  100 mg Oral Daily    aspirin  81 mg Oral Daily    atorvastatin  20 mg Oral Every Mon, Wed, Fri    citalopram  20 mg Oral Daily    doxazosin  4 mg Oral Daily    enoxparin  40 mg Subcutaneous Q24H (prophylaxis, 1700)    gabapentin  300 mg Oral TID    guaiFENesin  600 mg Oral BID    insulin detemir U-100  17 Units Subcutaneous BID    methocarbamoL  750 mg Oral TID    miconazole NITRATE 2 %   Topical (Top) BID    midodrine  5 mg Oral TID WM    montelukast  10 mg Oral QHS    multivitamin  1 tablet Oral Daily    polyethylene glycol  17 g Oral Daily    senna-docusate 8.6-50 mg  2 tablet Oral BID    tamsulosin  1 capsule Oral Daily         INFUSIONS           DIAGNOSTIC TESTS  US Retroperitoneal Complete   Final Result      Normal sonographic appearance of the kidneys.         Electronically signed by: Stevie Ochoa MD   Date:    12/02/2023   Time:    11:03           No results found for: "EF"       NUTRITION STATUS  Patient meets ASPEN criteria for   malnutrition of   per RD assessment as evidenced by:                       A minimum of two " characteristics is recommended for diagnosis of either severe or non-severe malnutrition.       Case related differential diagnoses have been reviewed; assessment and plan has been documented. I have personally reviewed the labs and test results that are currently available; I have reviewed the patients medication list. I have reviewed the consulting providers recommendations. I have reviewed or attempted to review medical records based upon their availability.  All of the patient's and/or family's questions have been addressed and answered to the best of my ability.  I will continue to monitor closely and make adjustments to medical management as needed.  This document was created using M*Modal Fluency Direct.  Transcription errors may have been made.  Please contact me if any questions may rise regarding documentation to clarify transcription.        TAYLOR Serrano   Internal Medicine  Department of Hospital Medicine [facility]

## 2023-12-05 NOTE — PLAN OF CARE
Problem: Occupational Therapy  Goal: Occupational Therapy Goal  Description: Goals to be met by: Discharge     Patient will increase functional independence with ADLs by performing:    UE Dressing with Stand-by Assistance. -ongoing,progressing; Min A  LE Dressing with Moderate Assistance. Goal Met 12/4/23  Grooming while standing at sink with Contact Guard Assistance. Ongoing progressing  Toileting from bedside commode with Moderate Assistance for hygiene and clothing management. -ongoing, progressing  Bathing from  shower chair/bench with Moderate Assistance. Ongoing, progressing   Toilet transfer to bedside commode with Stand-by Assistance. -ongoing, progressing; CGA-min A  Increased functional strength to WFL for ADLs.     New goals added 12/5/23:   Pt will perform self-feeding requiring modified independence.    Pt will perform LE dressing including donning/doffing footwear and pants using AE requiring CGA.     Outcome: Ongoing, Progressing

## 2023-12-06 LAB
ALBUMIN SERPL-MCNC: 2.4 G/DL (ref 3.4–4.8)
ALBUMIN/GLOB SERPL: 0.8 RATIO (ref 1.1–2)
ALP SERPL-CCNC: 74 UNIT/L (ref 40–150)
ALT SERPL-CCNC: 15 UNIT/L (ref 0–55)
AST SERPL-CCNC: 11 UNIT/L (ref 5–34)
BASOPHILS # BLD AUTO: 0.1 X10(3)/MCL
BASOPHILS NFR BLD AUTO: 0.6 %
BILIRUB SERPL-MCNC: 0.7 MG/DL
BUN SERPL-MCNC: 20.4 MG/DL (ref 8.4–25.7)
CALCIUM SERPL-MCNC: 9.1 MG/DL (ref 8.8–10)
CHLORIDE SERPL-SCNC: 101 MMOL/L (ref 98–107)
CO2 SERPL-SCNC: 26 MMOL/L (ref 23–31)
CREAT SERPL-MCNC: 0.73 MG/DL (ref 0.73–1.18)
EOSINOPHIL # BLD AUTO: 0.32 X10(3)/MCL (ref 0–0.9)
EOSINOPHIL NFR BLD AUTO: 2 %
ERYTHROCYTE [DISTWIDTH] IN BLOOD BY AUTOMATED COUNT: 12.5 % (ref 11.5–17)
GFR SERPLBLD CREATININE-BSD FMLA CKD-EPI: >60 MLS/MIN/1.73/M2
GLOBULIN SER-MCNC: 3.1 GM/DL (ref 2.4–3.5)
GLUCOSE SERPL-MCNC: 155 MG/DL (ref 82–115)
HCT VFR BLD AUTO: 39.5 % (ref 42–52)
HGB BLD-MCNC: 12.7 G/DL (ref 14–18)
IMM GRANULOCYTES # BLD AUTO: 0.77 X10(3)/MCL (ref 0–0.04)
IMM GRANULOCYTES NFR BLD AUTO: 4.9 %
LYMPHOCYTES # BLD AUTO: 1.93 X10(3)/MCL (ref 0.6–4.6)
LYMPHOCYTES NFR BLD AUTO: 12.3 %
MCH RBC QN AUTO: 31.8 PG (ref 27–31)
MCHC RBC AUTO-ENTMCNC: 32.2 G/DL (ref 33–36)
MCV RBC AUTO: 98.8 FL (ref 80–94)
MONOCYTES # BLD AUTO: 1.71 X10(3)/MCL (ref 0.1–1.3)
MONOCYTES NFR BLD AUTO: 10.9 %
NEUTROPHILS # BLD AUTO: 10.81 X10(3)/MCL (ref 2.1–9.2)
NEUTROPHILS NFR BLD AUTO: 69.3 %
PLATELET # BLD AUTO: 245 X10(3)/MCL (ref 130–400)
PMV BLD AUTO: 11.2 FL (ref 7.4–10.4)
POCT GLUCOSE: 127 MG/DL (ref 70–110)
POCT GLUCOSE: 146 MG/DL (ref 70–110)
POCT GLUCOSE: 191 MG/DL (ref 70–110)
POCT GLUCOSE: 208 MG/DL (ref 70–110)
POTASSIUM SERPL-SCNC: 4.2 MMOL/L (ref 3.5–5.1)
PROT SERPL-MCNC: 5.5 GM/DL (ref 5.8–7.6)
RBC # BLD AUTO: 4 X10(6)/MCL (ref 4.7–6.1)
SODIUM SERPL-SCNC: 137 MMOL/L (ref 136–145)
WBC # SPEC AUTO: 15.64 X10(3)/MCL (ref 4.5–11.5)

## 2023-12-06 PROCEDURE — 97530 THERAPEUTIC ACTIVITIES: CPT

## 2023-12-06 PROCEDURE — 11000004 HC SNF PRIVATE

## 2023-12-06 PROCEDURE — 97110 THERAPEUTIC EXERCISES: CPT

## 2023-12-06 PROCEDURE — 94760 N-INVAS EAR/PLS OXIMETRY 1: CPT

## 2023-12-06 PROCEDURE — 97535 SELF CARE MNGMENT TRAINING: CPT

## 2023-12-06 PROCEDURE — 97535 SELF CARE MNGMENT TRAINING: CPT | Mod: CQ

## 2023-12-06 PROCEDURE — 85025 COMPLETE CBC W/AUTO DIFF WBC: CPT | Performed by: PHYSICIAN ASSISTANT

## 2023-12-06 PROCEDURE — 94640 AIRWAY INHALATION TREATMENT: CPT

## 2023-12-06 PROCEDURE — 80053 COMPREHEN METABOLIC PANEL: CPT | Performed by: PHYSICIAN ASSISTANT

## 2023-12-06 PROCEDURE — 25000242 PHARM REV CODE 250 ALT 637 W/ HCPCS: Performed by: PHYSICIAN ASSISTANT

## 2023-12-06 PROCEDURE — 25000003 PHARM REV CODE 250: Performed by: PHYSICIAN ASSISTANT

## 2023-12-06 PROCEDURE — 63600175 PHARM REV CODE 636 W HCPCS: Performed by: PHYSICIAN ASSISTANT

## 2023-12-06 RX ORDER — DICLOFENAC SODIUM 10 MG/G
4 GEL TOPICAL 2 TIMES DAILY
Status: DISCONTINUED | OUTPATIENT
Start: 2023-12-06 | End: 2023-12-13 | Stop reason: HOSPADM

## 2023-12-06 RX ADMIN — INSULIN DETEMIR 17 UNITS: 100 INJECTION, SOLUTION SUBCUTANEOUS at 08:12

## 2023-12-06 RX ADMIN — METHOCARBAMOL 750 MG: 750 TABLET ORAL at 04:12

## 2023-12-06 RX ADMIN — DICLOFENAC SODIUM 4 G: 10 GEL TOPICAL at 08:12

## 2023-12-06 RX ADMIN — GABAPENTIN 300 MG: 300 CAPSULE ORAL at 08:12

## 2023-12-06 RX ADMIN — INSULIN ASPART 4 UNITS: 100 INJECTION, SOLUTION INTRAVENOUS; SUBCUTANEOUS at 01:12

## 2023-12-06 RX ADMIN — DOXAZOSIN 4 MG: 4 TABLET ORAL at 08:12

## 2023-12-06 RX ADMIN — MULTIPLE VITAMINS W/ MINERALS TAB 1 TABLET: TAB at 08:12

## 2023-12-06 RX ADMIN — SENNOSIDES AND DOCUSATE SODIUM 2 TABLET: 8.6; 5 TABLET ORAL at 08:12

## 2023-12-06 RX ADMIN — ENOXAPARIN SODIUM 40 MG: 40 INJECTION SUBCUTANEOUS at 05:12

## 2023-12-06 RX ADMIN — MEGESTROL ACETATE 40 MG: 20 TABLET ORAL at 08:12

## 2023-12-06 RX ADMIN — METHOCARBAMOL 750 MG: 750 TABLET ORAL at 08:12

## 2023-12-06 RX ADMIN — HYDROCODONE BITARTRATE AND ACETAMINOPHEN 1 TABLET: 5; 325 TABLET ORAL at 01:12

## 2023-12-06 RX ADMIN — DICLOFENAC SODIUM 4 G: 10 GEL TOPICAL at 09:12

## 2023-12-06 RX ADMIN — ASPIRIN 81 MG: 81 TABLET, COATED ORAL at 08:12

## 2023-12-06 RX ADMIN — IPRATROPIUM BROMIDE AND ALBUTEROL SULFATE 3 ML: .5; 3 SOLUTION RESPIRATORY (INHALATION) at 07:12

## 2023-12-06 RX ADMIN — MIDODRINE HYDROCHLORIDE 5 MG: 5 TABLET ORAL at 01:12

## 2023-12-06 RX ADMIN — CITALOPRAM HYDROBROMIDE 20 MG: 20 TABLET ORAL at 08:12

## 2023-12-06 RX ADMIN — ALLOPURINOL 100 MG: 100 TABLET ORAL at 08:12

## 2023-12-06 RX ADMIN — TAMSULOSIN HYDROCHLORIDE 0.4 MG: 0.4 CAPSULE ORAL at 08:12

## 2023-12-06 RX ADMIN — GUAIFENESIN 600 MG: 600 TABLET, EXTENDED RELEASE ORAL at 08:12

## 2023-12-06 RX ADMIN — MONTELUKAST SODIUM 10 MG: 10 TABLET, COATED ORAL at 08:12

## 2023-12-06 RX ADMIN — MICONAZOLE NITRATE 2 % TOPICAL POWDER: at 09:12

## 2023-12-06 RX ADMIN — MICONAZOLE NITRATE 2 % TOPICAL POWDER: at 08:12

## 2023-12-06 RX ADMIN — ATORVASTATIN CALCIUM 20 MG: 10 TABLET, FILM COATED ORAL at 08:12

## 2023-12-06 RX ADMIN — HYDROCODONE BITARTRATE AND ACETAMINOPHEN 1 TABLET: 10; 325 TABLET ORAL at 08:12

## 2023-12-06 RX ADMIN — MIDODRINE HYDROCHLORIDE 5 MG: 5 TABLET ORAL at 05:12

## 2023-12-06 RX ADMIN — GABAPENTIN 300 MG: 300 CAPSULE ORAL at 04:12

## 2023-12-06 RX ADMIN — CEFTRIAXONE SODIUM 1 G: 1 INJECTION, POWDER, FOR SOLUTION INTRAMUSCULAR; INTRAVENOUS at 09:12

## 2023-12-06 RX ADMIN — MIDODRINE HYDROCHLORIDE 5 MG: 5 TABLET ORAL at 08:12

## 2023-12-06 NOTE — PROGRESS NOTES
[facility] MEDICINE ~ PROGRESS NOTE        CHIEF COMPLAINT   Hospital follow up    HOSPITAL COURSE     Patient is a 73-year-old  male with a past medical history HTN, HLD, CAD, CVA, DM, PFO s/p closure in 2022, aortic stenosis, and PVD s/p lower extremity stenting who presented to the ER at [facility] on 11/14/2023 for extremity weakness with recurrent falls and unsteadiness.  MRI cervical spine without contrast on 11/14/2023 showed chronic multilevel central canal stenosis with effacement of the CSF, most severe at C3-C4 with mild chronic cord compression.  MRI thoracic spine without contrast on 11/14/2023 showed slightly increased signal seen in the disc at T11-T12 possibly representing early discitis, no epidural abscess, no osteomyelitis.  MRI lumbar spine without contrast on 11/14/2023 showed unchanged chronic degenerative changes with central canal and neuroforaminal stenosis.  Patient was evaluated by Infectious Disease on 11/15/2023 who found no evidence of clinical infectious syndrome and ordered blood cultures which resulted as negative.  Neurosurgery was consulted on 11/15/2023 and patient subsequently underwent posterior spinal fusion on 11/20/2023. Patient was admitted to [facility] swing unit on 11/29/2023 for PT/OT. Labs today are significant for leukocytosis, CORKY, and UTI. Patient was started on Rocephin today and urine culture is pending. Patient's glucose has remained elevated since initial admission. Levemir 17u BID started today and continue moderate dose insulin sliding scale, adjusting as needed. Last dose of Decadron will be given today per neurosurgery note on 11/27/2023, and is to follow up with Dr. Hutchisnon in 3-4 weeks. Patient continue to wear soft collar.     Today  Gardiner placed yesterday.  Renal function returned to normal.  White blood cell count remains elevated.  Completed 3 days of Rocephin, will resume Rocephin for an additional 2 days to  complete a total of 5 days.    OBJECTIVE/PHYSICAL EXAM     VITAL SIGNS (MOST RECENT):  Temp: 98.5 °F (36.9 °C) (12/06/23 0715)  Pulse: 76 (12/06/23 0715)  Resp: 18 (12/06/23 0843)  BP: 137/73 (12/06/23 0715)  SpO2: 97 % (12/06/23 0715) VITAL SIGNS (24 HOUR RANGE):  Temp:  [98.5 °F (36.9 °C)-98.6 °F (37 °C)] 98.5 °F (36.9 °C)  Pulse:  [76-84] 76  Resp:  [18] 18  SpO2:  [92 %-97 %] 97 %  BP: (125-137)/(70-73) 137/73     GENERAL: In no acute distress, afebrile  HEENT: Atraumatic, normocephalic, moist mucous membranes, soft neck collar in place   CHEST: Clear to auscultation bilaterally  HEART: S1, S2, no appreciable murmur  ABDOMEN: Soft, nontender, BS +  MSK: Warm, no lower extremity edema, no clubbing or cyanosis  NEUROLOGIC: Alert and oriented, moving all extremities   INTEGUMENTARY: No obvious skin rashes   PSYCHIATRY: Appropriate mood and affect    ASSESSMENT/PLAN     Spinal stenosis   S/p posterior cervical spinal fusion on 11/20/2023  Completed Decadron 11/30/2023  Follow up with Dr. Hutchinson is 12/19/2023 at 11:30am   Continue soft collar  PT/OT   Pain control as needed     UTI   Rocephin completed 3 days  Urine culture showing e coli sens to rocephin     Leukocytosis   UTI vs steroid induced  Decadron completed 11/30/2023  Rocephin completed 3 days, resume for additional 2 days to complete 5 total days  Monitor     CORKY   Urinary retention   Urinary incontinence   Decadron completed 11/30/2023  Sodium bicarb PO 12/01/2023 x1   Gardienr placement 12/01/2023 due to urinary retention   Gardiner removed 12/04/2023, condom cath placed   Gardiner replaced 12/05/2023 for urinary retention which will remain   Renal ultrasound 12/02/2023 normal  Renal function returned to normal      DM  Hyperglycemia  Continue Levemir 17u BID  and moderate dose SSI     Hx of HLD, CAD, CVA, PFO s/p closure in 2022, aortic stenosis, and PVD s/p lower extremity stenting  Continue home Lisinopril, Atorvastatin, Aspirin      Hx of BPH, gout,  "anxiety/depression   Continue home medication     DVT prophylaxis: Lovenox     Anticipated discharge and disposition: Continue PT/OT   __________________________________________________________________________    LABS/MICRO/MEDS/DIAGNOSTICS     LABS  Recent Labs     12/06/23  0246      K 4.2   CHLORIDE 101   CO2 26   BUN 20.4   CREATININE 0.73   GLUCOSE 155*   CALCIUM 9.1   ALKPHOS 74   AST 11   ALT 15   ALBUMIN 2.4*     Recent Labs     12/06/23  0246   WBC 15.64*   RBC 4.00*   HCT 39.5*   MCV 98.8*        MICROBIOLOGY  Microbiology Results (last 7 days)       Procedure Component Value Units Date/Time    Urine culture [[id number]]  (Abnormal)  (Susceptibility) Collected: 11/29/23 2245    Order Status: Completed Specimen: Urine Updated: 12/02/23 1025     Urine Culture >/= 100,000 colonies/ml Escherichia coli             MEDICATIONS   albuterol-ipratropium  3 mL Nebulization Q24H    allopurinoL  100 mg Oral Daily    aspirin  81 mg Oral Daily    atorvastatin  20 mg Oral Every Mon, Wed, Fri    cefTRIAXone (ROCEPHIN) IVPB  1 g Intravenous Q24H    citalopram  20 mg Oral Daily    diclofenac sodium  4 g Topical (Top) BID    doxazosin  4 mg Oral Daily    enoxparin  40 mg Subcutaneous Q24H (prophylaxis, 1700)    gabapentin  300 mg Oral TID    guaiFENesin  600 mg Oral BID    insulin detemir U-100  17 Units Subcutaneous BID    megestroL  40 mg Oral Daily    methocarbamoL  750 mg Oral TID    miconazole NITRATE 2 %   Topical (Top) BID    midodrine  5 mg Oral TID WM    montelukast  10 mg Oral QHS    multivitamin  1 tablet Oral Daily    polyethylene glycol  17 g Oral Daily    senna-docusate 8.6-50 mg  2 tablet Oral BID    tamsulosin  1 capsule Oral Daily         INFUSIONS         DIAGNOSTIC TESTS  US Retroperitoneal Complete   Final Result      Normal sonographic appearance of the kidneys.         Electronically signed by: Stevie Ochoa MD   Date:    12/02/2023   Time:    11:03           No results found for: "EF" "     NUTRITION STATUS  Patient meets ASPEN criteria for   malnutrition of   per RD assessment as evidenced by:                       A minimum of two characteristics is recommended for diagnosis of either severe or non-severe malnutrition.     Case related differential diagnoses have been reviewed; assessment and plan has been documented. I have personally reviewed the labs and test results that are currently available; I have reviewed the patients medication list. I have reviewed the consulting providers recommendations. I have reviewed or attempted to review medical records based upon their availability.  All of the patient's and/or family's questions have been addressed and answered to the best of my ability.  I will continue to monitor closely and make adjustments to medical management as needed.  This document was created using M*Modal Fluency Direct.  Transcription errors may have been made.  Please contact me if any questions may rise regarding documentation to clarify transcription.      TAYLOR Serrano   Internal Medicine  Department of Hospital Medicine [facility]

## 2023-12-06 NOTE — PROGRESS NOTES
Inpatient Nutrition Assessment    Admit Date: 11/29/2023   Total duration of encounter: 7 days   Patient Age: 73 y.o.    Nutrition Recommendation/Prescription     Continue diabetic diet. 2.Continue boost glucose control all meals. 3. Continue megestrol if medically appropriate.  . 4. Continue calorie count as ordered. 5. Continue 1:1 feeds as ordered.     Communication of Recommendations: reviewed with patient    Nutrition Assessment     Malnutrition Assessment/Nutrition-Focused Physical Exam                                                                A minimum of two characteristics is recommended for diagnosis of either severe or non-severe malnutrition.    Chart Review    Reason Seen: continuous nutrition monitoring, length of stay, and follow-up    Malnutrition Screening Tool Results   Have you recently lost weight without trying?: No  Have you been eating poorly because of a decreased appetite?: Yes   MST Score: 1   Diagnosis:    Cervical stenosis of spinal canal    Relevant Medical History: Personal history of colonic polyps   Date Unknown  BPH (benign prostatic hyperplasia)  Date Unknown  CVA (cerebral vascular accident)  Date Unknown  Depression  Date Unknown  History of claustrophobia  Date Unknown  HLD (hyperlipidemia)  Date Unknown  HTN (hypertension)  Date Unknown  Type 2 diabetes mellitus without complications       Nutrition-Related Medications: allopurinol, atorvastatin, multivitamin, insulin aspart, insulin detemir, magnesium hydroxide, polyethylene glycol, senna-docusate   Calorie Containing IV Medications: no significant kcals from medications at this time    Nutrition-Related Labs:   Latest Reference Range & Units 12/06/23 02:46   WBC 4.50 - 11.50 x10(3)/mcL 15.64 (H)   RBC 4.70 - 6.10 x10(6)/mcL 4.00 (L)   Hemoglobin 14.0 - 18.0 g/dL 12.7 (L)   Hematocrit 42.0 - 52.0 % 39.5 (L)   MCV 80.0 - 94.0 fL 98.8 (H)   MCH 27.0 - 31.0 pg 31.8 (H)   MCHC 33.0 - 36.0 g/dL 32.2 (L)   RDW 11.5 - 17.0 % 12.5  "  Platelet Count 130 - 400 x10(3)/mcL 245   MPV 7.4 - 10.4 fL 11.2 (H)   Neut % % 69.3   LYMPH % % 12.3   Mono % % 10.9   Eosinophil % % 2.0   Basophil % % 0.6   Immature Granulocytes % 4.9   Neut # 2.1 - 9.2 x10(3)/mcL 10.81 (H)   Lymph # 0.6 - 4.6 x10(3)/mcL 1.93   Mono # 0.1 - 1.3 x10(3)/mcL 1.71 (H)   Eos # 0 - 0.9 x10(3)/mcL 0.32   Baso # <=0.2 x10(3)/mcL 0.10   Immature Grans (Abs) 0 - 0.04 x10(3)/mcL 0.77 (H)   Sodium 136 - 145 mmol/L 137   Potassium 3.5 - 5.1 mmol/L 4.2   Chloride 98 - 107 mmol/L 101   CO2 23 - 31 mmol/L 26   BUN 8.4 - 25.7 mg/dL 20.4   Creatinine 0.73 - 1.18 mg/dL 0.73   eGFR mls/min/1.73/m2 >60   Glucose 82 - 115 mg/dL 155 (H)   Calcium 8.8 - 10.0 mg/dL 9.1   ALP 40 - 150 unit/L 74   PROTEIN TOTAL 5.8 - 7.6 gm/dL 5.5 (L)   Albumin 3.4 - 4.8 g/dL 2.4 (L)   Albumin/Globulin Ratio 1.1 - 2.0 ratio 0.8 (L)   BILIRUBIN TOTAL <=1.5 mg/dL 0.7   AST 5 - 34 unit/L 11   ALT 0 - 55 unit/L 15   Globulin, Total 2.4 - 3.5 gm/dL 3.1   (H): Data is abnormally high  (L): Data is abnormally low    Nutrition Orders:   Diet Adult Regular Diabetic  Dietary nutrition supplements Boost Glucose Control - Chocolate; All Meals    Appetite/Oral Intake: fair/25-50% of meals    Factors Affecting Nutritional Intake: decreased appetite, needs assistance with meals.     Food/Scientologist/Cultural Preferences:  eggs with cheese    Food Allergies: none reported    Wound(s):       Last Bowel Movement: 12/05/23    Comments    Pt reports eating well at breakfast. Staffing today with interdisciplinary rounds, therapy, MD, physician assistance, and case management present. Discussed current nutrition needs. Physician assistance start appetite stimulant. Calorie count on 12/5/23 provides 1,107 kcal and 73 gm of protein, meeting 58% kcal needs and 70% protein needs. Continue to encourage good PO Intake.     Anthropometrics    Height: 5' 9.02" (175.3 cm) Height Method: Stated  Last Weight: 87.3 kg (192 lb 7.4 oz) (12/04/23 0500) " Weight Method: Bed Scale  BMI (Calculated): 28.4  BMI Classification: overweight (BMI 25-29.9)        Ideal Body Weight (IBW), Male: 160.12 lb     % Ideal Body Weight, Male (lb): 118.91 %                          Usual Weight Provided By: unable to obtain usual weight    Wt Readings from Last 5 Encounters:   23 87.3 kg (192 lb 7.4 oz)   23 99.3 kg (219 lb)   23 101.2 kg (223 lb)   23 106.6 kg (235 lb)   23 102.1 kg (225 lb)     Weight Change(s) Since Admission: No new wt recorded.   Wt Readings from Last 1 Encounters:   23 0500 87.3 kg (192 lb 7.4 oz)   23 1725 86.3 kg (190 lb 4.1 oz)   Admit Weight: 86.3 kg (190 lb 4.1 oz) (23 1725), Weight Method: Bed Scale    Estimated Needs    Weight Used For Calorie Calculations: 87.3 kg (192 lb 7.4 oz)  Energy Calorie Requirements (kcal): 1918 kcal (Linn-St Jeor x 1.2 stress factor/CBW)  Energy Need Method: Linn-St Jeor  Weight Used For Protein Calculations: 87.3 kg (192 lb 7.4 oz)  Protein Requirements: 104.98 gm (1.2 gm/kg/CBW)     Temp (24hrs), Av.6 °F (37 °C), Min:98.5 °F (36.9 °C), Max:98.6 °F (37 °C)       Enteral Nutrition    Patient not receiving enteral nutrition at this time.    Parenteral Nutrition    Patient not receiving parenteral nutrition support at this time.    Evaluation of Received Nutrient Intake    Calories: not meeting estimated needs  Protein: not meeting estimated needs    Patient Education    Not applicable.    Nutrition Diagnosis     PES:  Inadequate oral intake related to inability to consume sufficient nutrients as evidenced by Needs assistance with meals and patient reports when in pain, pt doesn't feel like eating. Calorie count on 23-58% kcal needs and 70% protein needs. (continues)   Interventions/Goals     Intervention(s): general/healthful diet, commercial beverage, prescription medication, and collaboration with other providers    Goal: Meet greater than 75% of nutritional needs  by follow-up. (goal progressing)    Monitoring & Evaluation     Dietitian will monitor food and beverage intake, weight, weight change, electrolyte/renal panel, glucose/endocrine profile, and gastrointestinal profile.    Nutrition Risk/Follow-Up: moderate (follow-up in 3-5 days)   Please consult if re-assessment needed sooner.

## 2023-12-06 NOTE — PT/OT/SLP PROGRESS
Name: Stevie Cummins    : 1950 (73 y.o.)  MRN: [id number]            Interdisciplinary Team Conference     Case conference held with patient/family and care team to discuss progress, plan of care, barriers to be addressed for safe return home, equipment recommendations, and discharge planning. Communicated therapy progress with MD, RN, therapy clinicians and case management. All questions/concerns answered.

## 2023-12-06 NOTE — PLAN OF CARE
Problem: Adult Inpatient Plan of Care  Goal: Plan of Care Review  Outcome: Ongoing, Progressing  Flowsheets (Taken 12/6/2023 1127)  Plan of Care Reviewed With: patient  Goal: Patient-Specific Goal (Individualized)  Outcome: Ongoing, Progressing  Flowsheets (Taken 12/6/2023 1127)  Anxieties, Fears or Concerns: none  Individualized Care Needs: safety, monitor CBG, monitor pain  Goal: Absence of Hospital-Acquired Illness or Injury  Outcome: Ongoing, Progressing  Intervention: Identify and Manage Fall Risk  Flowsheets (Taken 12/6/2023 1127)  Safety Promotion/Fall Prevention:   assistive device/personal item within reach   side rails raised x 3   nonskid shoes/socks when out of bed  Intervention: Prevent Skin Injury  Flowsheets (Taken 12/6/2023 1127)  Body Position: position changed independently  Skin Protection: incontinence pads utilized  Intervention: Prevent and Manage VTE (Venous Thromboembolism) Risk  Flowsheets (Taken 12/6/2023 1127)  Activity Management: Up in chair - L3  VTE Prevention/Management: ambulation promoted  Range of Motion: active ROM (range of motion) encouraged  Intervention: Prevent Infection  Flowsheets (Taken 12/6/2023 1127)  Infection Prevention: cohorting utilized  Goal: Optimal Comfort and Wellbeing  Outcome: Ongoing, Progressing  Intervention: Monitor Pain and Promote Comfort  Flowsheets (Taken 12/6/2023 1127)  Pain Management Interventions:   pain management plan reviewed with patient/caregiver   care clustered   position adjusted   pillow support provided  Intervention: Provide Person-Centered Care  Flowsheets (Taken 12/6/2023 1127)  Trust Relationship/Rapport:   care explained   choices provided   questions answered   reassurance provided  Goal: Readiness for Transition of Care  Outcome: Ongoing, Progressing  Intervention: Mutually Develop Transition Plan  Flowsheets (Taken 12/6/2023 1127)  Equipment Currently Used at Home:   walker, rolling   bedside commode  Transportation  Anticipated: family or friend will provide  Who are your caregiver(s) and their phone number(s)?: Yesy Martinez [phone]  Communicated NAZANIN with patient/caregiver: Date not available/Unable to determine  Do you expect to return to your current living situation?: Yes  Do you have help at home or someone to help you manage your care at home?: Yes  Readmission within 30 days?: No  Do you currently have service(s) that help you manage your care at home?: No  Is the pt/caregiver preference to resume services with current agency: No     Problem: Diabetes Comorbidity  Goal: Blood Glucose Level Within Targeted Range  Outcome: Ongoing, Progressing  Intervention: Monitor and Manage Glycemia  Flowsheets (Taken 12/6/2023 1127)  Glycemic Management:   blood glucose monitored   supplemental insulin given     Problem: Infection  Goal: Absence of Infection Signs and Symptoms  Outcome: Ongoing, Progressing  Intervention: Prevent or Manage Infection  Flowsheets (Taken 12/6/2023 1127)  Fever Reduction/Comfort Measures:   lightweight bedding   lightweight clothing  Infection Management: aseptic technique maintained  Isolation Precautions:   precautions maintained   protective     Problem: Skin Injury Risk Increased  Goal: Skin Health and Integrity  Outcome: Ongoing, Progressing  Intervention: Optimize Skin Protection  Flowsheets (Taken 12/6/2023 1127)  Pressure Reduction Techniques: frequent weight shift encouraged  Pressure Reduction Devices: heel offloading device utilized  Skin Protection: incontinence pads utilized  Head of Bed (HOB) Positioning: HOB at 30-45 degrees  Intervention: Promote and Optimize Oral Intake  Flowsheets (Taken 12/6/2023 1127)  Oral Nutrition Promotion:   calorie-dense foods provided   calorie-dense liquids provided   safe use of adaptive equipment encouraged     Problem: Urinary Retention  Goal: Effective Urinary Elimination  Outcome: Ongoing, Progressing  Intervention: Promote Effective Urine  Elimination  Flowsheets (Taken 12/6/2023 1127)  Bowel Function Promotion: straining discouraged  Urinary Elimination Promotion: frequent voiding encouraged     Problem: Fall Injury Risk  Goal: Absence of Fall and Fall-Related Injury  Outcome: Ongoing, Progressing  Intervention: Identify and Manage Contributors  Flowsheets (Taken 12/6/2023 1127)  Self-Care Promotion:   independence encouraged   BADL personal objects within reach   BADL personal routines maintained   safe use of adaptive equipment encouraged  Medication Review/Management: medications reviewed  Intervention: Promote Injury-Free Environment  Flowsheets (Taken 12/6/2023 1127)  Safety Promotion/Fall Prevention:   assistive device/personal item within reach   side rails raised x 3   nonskid shoes/socks when out of bed

## 2023-12-06 NOTE — PT/OT/SLP PROGRESS
Physical Therapy Treatment Note           Name: Stevie Cummins    : 1950 (73 y.o.)  MRN: [id number]           TREATMENT SUMMARY AND RECOMMENDATIONS:    PT Received On: 23  PT Start Time: 930     PT Stop Time: 955  PT Total Time (min): 25 min     Subjective Assessment:   No complaints  Lethargic    Awake, alert, cooperative  Uncooperative    Agitated  c/o pain    Appropriate x c/o fatigue    Confused  Treated at bedside     Emotionally labile x Treated in gym/dept.    Impulsive  Other:    Flat affect       Therapy Precautions:    Cognitive deficits x Spinal precautions    Collar - hard  Sternal precautions   x Collar - soft   TLSO   x Fall risk  LSO    Hip precautions - posterior  Knee immobilizer    Hip precautions - anterior  WBAT    Impaired communication  Partial weightbearing    Oxygen  TTWB    PEG tube  NWB    Visual deficits  Other:    Hearing deficits          Treatment Objectives:     Mobility Training:   Assist level Comments    Bed mobility MIN A Supine>Sit   Transfer MIN/MOD A Squat pivot from bed>WC without AD   Gait     Sit to stand transitions MIN    Sitting balance     Standing balance      Wheelchair mobility     Car transfer     Other:          Therapeutic Exercise:   Exercise Sets Reps Comments   B LE bike in sitting using UBE  10' 5' Forward/ 5' Backward   Supine B LE exercises in bed 2 10 Ankle pumps, SLR, hip add/abd isometrics in hook lying                    Additional Comments:  Pt continues with increased R shoulder and ankle joint pain, unable to ambulate and needed more assistance with transfer.      Assessment: Patient tolerated session fair.    PT Plan: continue per POC  Revisions made to plan of care: No.     GOALS:   Multidisciplinary Problems       Physical Therapy Goals          Problem: Physical Therapy    Goal Priority Disciplines Outcome Goal Variances Interventions   Physical Therapy Goal     PT, PT/OT Ongoing, Progressing     Description: Goals to be met by:  Discharge      Patient will increase functional independence with mobility by performin. Supine to sit with Stand-by Assistance  2. Sit to supine with Stand-by Assistance  3. Sitting at edge of bed x 15 minutes with Stand-by Assistance without dizziness or low BP  4. Increased functional strength to 5/5 for B hips  5. Sit to stands to be completed at SBA using RW  6. bed<>chair transfers to be completed at CGA using RW.   7. gait to be assessed with goals set when able/appropriate.                          Skilled PT Minutes Provided: 25   Communication with Treatment Team:     Equipment recommendations:       At end of treatment, patient remained:  x Up in chair     Up in wheelchair in room    In bed    With alarm activated    Bed rails up    Call bell in reach     Family/friends present    Restraints secured properly    In bathroom with CNA/RN notified    Nurse aware   x In gym with therapist/tech    Other:

## 2023-12-06 NOTE — PT/OT/SLP PROGRESS
Name: Stevie Cummins    : 1950 (73 y.o.)  MRN: [id number]            Interdisciplinary Team Conference     Case conference held with patient/family and care team to discuss progress, plan of care, barriers to be addressed for safe return home, equipment recommendations, and discharge planning. Communicated therapy progress with MD, RN, therapy clinicians and case management. All questions/concerns answered.

## 2023-12-06 NOTE — PT/OT/SLP PROGRESS
Occupational Therapy  Treatment    Name: Setvie Cummins    : 1950 (73 y.o.)  MRN: [id number]           TREATMENT SUMMARY AND RECOMMENDATIONS:      OT Date of Treatment: 23  OT Start Time: 1330  OT Stop Time: 1400  OT Total Time (min): 30 min      Subjective Assessment:   No complaints  Lethargic   x Awake, alert, cooperative  Impulsive    Uncooperative   Flat affect    Agitated  c/o pain   x Appropriate  c/o fatigue    Confused  Treated at bedside     Emotionally labile x Treated in gym/dept.      Other:        Therapy Precautions:    Cognitive deficits x Spinal precautions    Collar - hard  Sternal precautions   x Collar - soft   TLSO   x Fall risk  LSO    Hip precautions - posterior  Knee immobilizer    Hip precautions - anterior  WBAT    Impaired communication  Partial weightbearing    Oxygen  TTWB    PEG tube  NWB    Visual deficits      Hearing deficits   Other:        Treatment Objectives:     Mobility Training:    Mobility task Assist level Comments    Bed mobility SBA EOB<supine    Transfer CGA Stand pivot t/f from WC<EOB   Sit to stands transitions Min A WC<>standing x2 reps using a RW and gait belt    Functional mobility CGA 20ft using a RW and gait belt; following with a WC   Sitting balance     Standing balance      Other:        ADL Training:    ADL Assist level Comments    Feeding     Grooming/hygiene     Bathing     Upper body dressing     Lower body dressing     Toileting     Toilet transfer     Adaptive equipment training MI   Min A Use of reacher to doff socks sitting up in his WC   Continued training in use of sock-aid to don socks; assist to don left sock on sock aid    Other:           Therapeutic Exercise:   Exercise Sets Reps Comments   Yellow flex bar 2 10 Pronation, supination, and twists to increase  strength for grasping objects   BUE strengthening exercises 2 10 2# weighted dowel performing bicep curls, chest press, straight arm raises to shoulder height                     Additional Comments:      Assessment: Patient tolerated session well. Pt demonstrates increased activity tolerance as evident of being able to participate in a longer session. Pt states he has his own sock-aid that is more flexible at home and is able to use it to don his socks independently. Pt agreed to get his girlfriend to bring it. Pt shows an improved tolerance of his UE exercises as compared to previous session. Pt continues to require assistance for STS t/fs. Pt would benefit from continued OT services to reach his full rehab potential and get closer to his baseline.     GOALS:   Multidisciplinary Problems       Occupational Therapy Goals          Problem: Occupational Therapy    Goal Priority Disciplines Outcome Interventions   Occupational Therapy Goal     OT, PT/OT Ongoing, Progressing    Description: Goals to be met by: Discharge     Patient will increase functional independence with ADLs by performing:    UE Dressing with Stand-by Assistance. -ongoing,progressing; Min A  LE Dressing with Moderate Assistance. Goal Met 12/4/23  Grooming while standing at sink with Contact Guard Assistance. Ongoing progressing  Toileting from bedside commode with Moderate Assistance for hygiene and clothing management. -ongoing, progressing  Bathing from  shower chair/bench with Moderate Assistance. Ongoing, progressing   Toilet transfer to bedside commode with Stand-by Assistance. -ongoing, progressing; CGA-min A  Increased functional strength to WFL for ADLs.     New goals added 12/5/23:   Pt will perform self-feeding requiring modified independence.    Pt will perform LE dressing including donning/doffing footwear and pants using AE requiring CGA.                          Recommendations:     Discharge Recommendations:   Discharge Equipment Recommendations:  wheelchair  Barriers to discharge:  Other (Comment) (Severity of deficits)     Plan:     Patient to be seen 6 x/week to address the above listed problems via  self-care/home management, therapeutic activities, therapeutic exercises, neuromuscular re-education  Plan of Care Expires: 12/21/23  Plan of Care Reviewed with: patient  Revisions made to plan of care: No      Skilled OT Minutes Provided: 40  Communication with Treatment Team:     Equipment recommendations:       At end of treatment, patient remained:   Up in chair     Up in wheelchair in room   x In bed   x With alarm activated   x Bed rails up   x Call bell in reach     Family/friends present    Restraints secured properly    In bathroom with CNA/RN notified    In gym with PT/PTA/tech   x Nurse aware    Other:

## 2023-12-06 NOTE — PT/OT/SLP PROGRESS
Physical Therapy Treatment Note           Name: Stevie Cummins    : 1950 (73 y.o.)  MRN: [id number]           TREATMENT SUMMARY AND RECOMMENDATIONS:    PT Received On: 23  PT Start Time: 1255     PT Stop Time: 1330  PT Total Time (min): 35 min     Subjective Assessment:   No complaints  Lethargic    Awake, alert, cooperative  Uncooperative    Agitated  c/o pain    Appropriate x c/o fatigue    Confused  Treated at bedside     Emotionally labile x Treated in gym/dept.    Impulsive  Other:    Flat affect       Therapy Precautions:    Cognitive deficits x Spinal precautions    Collar - hard  Sternal precautions   x Collar - soft   TLSO   x Fall risk  LSO    Hip precautions - posterior  Knee immobilizer    Hip precautions - anterior  WBAT    Impaired communication  Partial weightbearing    Oxygen  TTWB    PEG tube  NWB    Visual deficits  Other:    Hearing deficits          Treatment Objectives:     Mobility Training:   Assist level Comments    Bed mobility SBA Supine>Sit   Transfer MIN A Stand pivot from bed>WC with RW   Gait     Sit to stand transitions MIN From EOB and WC using RW   Sitting balance     Standing balance      Wheelchair mobility     Car transfer     Other:          Therapeutic Exercise:   Exercise Sets Reps Comments   B LE exercises in sitting 2 10 2# ankle wts hip flex, knee ext, ankle PF/DF   R ankle STM and PROM PF/DF  10'                    Additional Comments:  Pt very upset/angry upon entry to room, yelling at PT that we treated him like a piece of trash when we left him in the chair after therapy this morning. PT explained the benefits of being up and the impacts of prolonged immobility in the bed - that therapy was not being mean, only trying to help and get him home. Pt understood and apologized. Reduced ankle pain following STM.  PT to progress as able. Fatigue noted post exercise.   Assessment: Patient tolerated session fair.    PT Plan: continue per POC  Revisions made to  plan of care: No.     GOALS:   Multidisciplinary Problems       Physical Therapy Goals          Problem: Physical Therapy    Goal Priority Disciplines Outcome Goal Variances Interventions   Physical Therapy Goal     PT, PT/OT Ongoing, Progressing     Description: Goals to be met by: Discharge      Patient will increase functional independence with mobility by performin. Supine to sit with Stand-by Assistance  2. Sit to supine with Stand-by Assistance  3. Sitting at edge of bed x 15 minutes with Stand-by Assistance without dizziness or low BP  4. Increased functional strength to 5/5 for B hips  5. Sit to stands to be completed at SBA using RW  6. bed<>chair transfers to be completed at CGA using RW.   7. gait to be assessed with goals set when able/appropriate.                          Skilled PT Minutes Provided: 30   Communication with Treatment Team:     Equipment recommendations:       At end of treatment, patient remained:  x Up in chair     Up in wheelchair in room    In bed    With alarm activated    Bed rails up    Call bell in reach     Family/friends present    Restraints secured properly    In bathroom with CNA/RN notified    Nurse aware   x In gym with therapist/tech    Other:

## 2023-12-06 NOTE — PT/OT/SLP PROGRESS
Occupational Therapy  Treatment    Name: Stevie Cummins    : 1950 (73 y.o.)  MRN: [id number]           TREATMENT SUMMARY AND RECOMMENDATIONS:      OT Date of Treatment: 23  OT Start Time: 1000  OT Stop Time: 1030  OT Total Time (min): 30 min      Subjective Assessment:   No complaints  Lethargic   x Awake, alert, cooperative  Impulsive    Uncooperative   Flat affect    Agitated x c/o pain   x Appropriate  c/o fatigue    Confused  Treated at bedside     Emotionally labile x Treated in gym/dept.      Other:        Therapy Precautions:    Cognitive deficits x Spinal precautions    Collar - hard  Sternal precautions   x Collar - soft   TLSO   x Fall risk  LSO    Hip precautions - posterior  Knee immobilizer    Hip precautions - anterior  WBAT    Impaired communication  Partial weightbearing    Oxygen  TTWB    PEG tube  NWB    Visual deficits      Hearing deficits   Other:        Treatment Objectives:     Mobility Training:    Mobility task Assist level Comments    Bed mobility     Transfer Min A Stand pivot t/f from WC<BSChair using a RW and gait belt    Sit to stands transitions Mod A WC<standing using a gait belt and RW   Functional mobility     Sitting balance     Standing balance      Other:        ADL Training:    ADL Assist level Comments    Feeding     Grooming/hygiene     Bathing     Upper body dressing     Lower body dressing     Toileting     Toilet transfer     Adaptive equipment training     Other:           Therapeutic Exercise:   Exercise Sets Reps Comments   BUE endurance training    Ergometer level 1; forwards 1 minute; terminated d/t pain in right shoulder                         Therapeutic activity: Pt engages in therapeutic activity focusing on FMC, pincher pinch, and ability to manipulate objects by unbottoning in order to increase independence with self-feeding, grooming, and dressing.       Assessment: Patient tolerated session poorly d/t pain in neck and all over. Pt continues to show  decreased activity tolerance. Pt noted with an ill-fitting soft collar. Nursing notified about his collar and pain.     GOALS:   Multidisciplinary Problems       Occupational Therapy Goals          Problem: Occupational Therapy    Goal Priority Disciplines Outcome Interventions   Occupational Therapy Goal     OT, PT/OT Ongoing, Progressing    Description: Goals to be met by: Discharge     Patient will increase functional independence with ADLs by performing:    UE Dressing with Stand-by Assistance. -ongoing,progressing; Min A  LE Dressing with Moderate Assistance. Goal Met 12/4/23  Grooming while standing at sink with Contact Guard Assistance. Ongoing progressing  Toileting from bedside commode with Moderate Assistance for hygiene and clothing management. -ongoing, progressing  Bathing from  shower chair/bench with Moderate Assistance. Ongoing, progressing   Toilet transfer to bedside commode with Stand-by Assistance. -ongoing, progressing; CGA-min A  Increased functional strength to WFL for ADLs.     New goals added 12/5/23:   Pt will perform self-feeding requiring modified independence.    Pt will perform LE dressing including donning/doffing footwear and pants using AE requiring CGA.                          Recommendations:     Discharge Recommendations: Intermittent assistance   Discharge Equipment Recommendations:  wheelchair  Barriers to discharge:  Other (Comment) (Severity of deficits)     Plan:     Patient to be seen 6 x/week to address the above listed problems via self-care/home management, therapeutic activities, therapeutic exercises, neuromuscular re-education  Plan of Care Expires: 12/21/23  Plan of Care Reviewed with: patient  Revisions made to plan of care: No      Skilled OT Minutes Provided: 30  Communication with Treatment Team:     Equipment recommendations:       At end of treatment, patient remained:  x Up in chair     Up in wheelchair in room    In bed   x With alarm activated    Bed rails  up   x Call bell in reach     Family/friends present    Restraints secured properly    In bathroom with CNA/RN notified    In gym with PT/PTA/tech    Nurse aware    Other:

## 2023-12-06 NOTE — PLAN OF CARE
Problem: Adult Inpatient Plan of Care  Goal: Plan of Care Review  Outcome: Ongoing, Progressing  Flowsheets (Taken 12/5/2023 1926)  Plan of Care Reviewed With: patient  Goal: Patient-Specific Goal (Individualized)  Outcome: Ongoing, Progressing  Flowsheets (Taken 12/5/2023 1926)  Anxieties, Fears or Concerns: None expressed at this time  Individualized Care Needs: Monitor for urinary retention, hillman care, encourage fluids, pain management, monitor CBGs, monitor labs, PT/OT, soft collar at all times until F/U appointment  Goal: Absence of Hospital-Acquired Illness or Injury  Outcome: Ongoing, Progressing  Intervention: Identify and Manage Fall Risk  Flowsheets (Taken 12/5/2023 1926)  Safety Promotion/Fall Prevention:   assistive device/personal item within reach   bed alarm set   Fall Risk reviewed with patient/family   gait belt with ambulation   lighting adjusted   medications reviewed   side rails raised x 3   supervised activity   room near unit station   instructed to call staff for mobility   toileting scheduled   nonskid shoes/socks when out of bed   diversional activities provided   high risk medications identified  Intervention: Prevent Skin Injury  Flowsheets (Taken 12/5/2023 1926)  Body Position:   weight shifting   sitting up in bed  Skin Protection:   adhesive use limited   incontinence pads utilized   transparent dressing maintained   tubing/devices free from skin contact  Intervention: Prevent and Manage VTE (Venous Thromboembolism) Risk  Flowsheets (Taken 12/5/2023 1926)  Activity Management: Rolling - L1  VTE Prevention/Management:   ambulation promoted   fluids promoted   intravenous hydration  Range of Motion: active ROM (range of motion) encouraged  Intervention: Prevent Infection  Flowsheets (Taken 12/5/2023 1926)  Infection Prevention:   cohorting utilized   environmental surveillance performed   equipment surfaces disinfected   rest/sleep promoted   hand hygiene promoted   single patient  room provided     Problem: Diabetes Comorbidity  Goal: Blood Glucose Level Within Targeted Range  Outcome: Ongoing, Progressing  Intervention: Monitor and Manage Glycemia  Flowsheets (Taken 12/5/2023 1926)  Glycemic Management:   blood glucose monitored   supplemental insulin given     Problem: Infection  Goal: Absence of Infection Signs and Symptoms  Outcome: Ongoing, Progressing  Intervention: Prevent or Manage Infection  Flowsheets (Taken 12/5/2023 1926)  Fever Reduction/Comfort Measures:   lightweight bedding   lightweight clothing  Infection Management: aseptic technique maintained  Isolation Precautions:   protective   precautions maintained     Problem: Urinary Retention  Goal: Effective Urinary Elimination  Outcome: Ongoing, Progressing  Intervention: Promote Effective Urine Elimination  Flowsheets (Taken 12/5/2023 1926)  Bowel Function Promotion:   straining discouraged   prompt response to urge promoted   toileting schedule established  Urinary Elimination Promotion: absorbent pad/diaper use encouraged

## 2023-12-07 LAB
BASOPHILS # BLD AUTO: 0.11 X10(3)/MCL
BASOPHILS NFR BLD AUTO: 0.6 %
EOSINOPHIL # BLD AUTO: 0.33 X10(3)/MCL (ref 0–0.9)
EOSINOPHIL NFR BLD AUTO: 1.7 %
ERYTHROCYTE [DISTWIDTH] IN BLOOD BY AUTOMATED COUNT: 12.4 % (ref 11.5–17)
HCT VFR BLD AUTO: 40.6 % (ref 42–52)
HGB BLD-MCNC: 13 G/DL (ref 14–18)
IMM GRANULOCYTES # BLD AUTO: 0.71 X10(3)/MCL (ref 0–0.04)
IMM GRANULOCYTES NFR BLD AUTO: 3.7 %
LYMPHOCYTES # BLD AUTO: 1.82 X10(3)/MCL (ref 0.6–4.6)
LYMPHOCYTES NFR BLD AUTO: 9.6 %
MCH RBC QN AUTO: 31.5 PG (ref 27–31)
MCHC RBC AUTO-ENTMCNC: 32 G/DL (ref 33–36)
MCV RBC AUTO: 98.3 FL (ref 80–94)
MONOCYTES # BLD AUTO: 1.94 X10(3)/MCL (ref 0.1–1.3)
MONOCYTES NFR BLD AUTO: 10.2 %
NEUTROPHILS # BLD AUTO: 14.14 X10(3)/MCL (ref 2.1–9.2)
NEUTROPHILS NFR BLD AUTO: 74.2 %
PLATELET # BLD AUTO: 246 X10(3)/MCL (ref 130–400)
PMV BLD AUTO: 10.5 FL (ref 7.4–10.4)
POCT GLUCOSE: 135 MG/DL (ref 70–110)
POCT GLUCOSE: 181 MG/DL (ref 70–110)
POCT GLUCOSE: 269 MG/DL (ref 70–110)
RBC # BLD AUTO: 4.13 X10(6)/MCL (ref 4.7–6.1)
WBC # SPEC AUTO: 19.05 X10(3)/MCL (ref 4.5–11.5)

## 2023-12-07 PROCEDURE — 63600175 PHARM REV CODE 636 W HCPCS: Performed by: STUDENT IN AN ORGANIZED HEALTH CARE EDUCATION/TRAINING PROGRAM

## 2023-12-07 PROCEDURE — 94760 N-INVAS EAR/PLS OXIMETRY 1: CPT

## 2023-12-07 PROCEDURE — 25000003 PHARM REV CODE 250: Performed by: STUDENT IN AN ORGANIZED HEALTH CARE EDUCATION/TRAINING PROGRAM

## 2023-12-07 PROCEDURE — 97110 THERAPEUTIC EXERCISES: CPT

## 2023-12-07 PROCEDURE — 97535 SELF CARE MNGMENT TRAINING: CPT

## 2023-12-07 PROCEDURE — 85025 COMPLETE CBC W/AUTO DIFF WBC: CPT | Performed by: PHYSICIAN ASSISTANT

## 2023-12-07 PROCEDURE — 97530 THERAPEUTIC ACTIVITIES: CPT

## 2023-12-07 PROCEDURE — 99900035 HC TECH TIME PER 15 MIN (STAT)

## 2023-12-07 PROCEDURE — 25000003 PHARM REV CODE 250: Performed by: PHYSICIAN ASSISTANT

## 2023-12-07 PROCEDURE — 25000242 PHARM REV CODE 250 ALT 637 W/ HCPCS: Performed by: STUDENT IN AN ORGANIZED HEALTH CARE EDUCATION/TRAINING PROGRAM

## 2023-12-07 PROCEDURE — 63600175 PHARM REV CODE 636 W HCPCS: Performed by: PHYSICIAN ASSISTANT

## 2023-12-07 PROCEDURE — 11000004 HC SNF PRIVATE

## 2023-12-07 PROCEDURE — 94640 AIRWAY INHALATION TREATMENT: CPT

## 2023-12-07 RX ORDER — INSULIN ASPART 100 [IU]/ML
10 INJECTION, SOLUTION INTRAVENOUS; SUBCUTANEOUS
Status: DISCONTINUED | OUTPATIENT
Start: 2023-12-08 | End: 2023-12-13 | Stop reason: HOSPADM

## 2023-12-07 RX ORDER — PANTOPRAZOLE SODIUM 40 MG/1
40 TABLET, DELAYED RELEASE ORAL 2 TIMES DAILY
Status: DISCONTINUED | OUTPATIENT
Start: 2023-12-07 | End: 2023-12-13 | Stop reason: HOSPADM

## 2023-12-07 RX ORDER — IPRATROPIUM BROMIDE AND ALBUTEROL SULFATE 2.5; .5 MG/3ML; MG/3ML
3 SOLUTION RESPIRATORY (INHALATION) EVERY 4 HOURS PRN
Status: DISCONTINUED | OUTPATIENT
Start: 2023-12-07 | End: 2023-12-13 | Stop reason: HOSPADM

## 2023-12-07 RX ORDER — PREDNISONE 20 MG/1
40 TABLET ORAL DAILY
Status: DISCONTINUED | OUTPATIENT
Start: 2023-12-07 | End: 2023-12-13 | Stop reason: HOSPADM

## 2023-12-07 RX ADMIN — INSULIN DETEMIR 10 UNITS: 100 INJECTION, SOLUTION SUBCUTANEOUS at 08:12

## 2023-12-07 RX ADMIN — MICONAZOLE NITRATE 2 % TOPICAL POWDER: at 08:12

## 2023-12-07 RX ADMIN — INSULIN ASPART 6 UNITS: 100 INJECTION, SOLUTION INTRAVENOUS; SUBCUTANEOUS at 04:12

## 2023-12-07 RX ADMIN — SENNOSIDES AND DOCUSATE SODIUM 2 TABLET: 8.6; 5 TABLET ORAL at 09:12

## 2023-12-07 RX ADMIN — MONTELUKAST SODIUM 10 MG: 10 TABLET, COATED ORAL at 08:12

## 2023-12-07 RX ADMIN — CITALOPRAM HYDROBROMIDE 20 MG: 20 TABLET ORAL at 09:12

## 2023-12-07 RX ADMIN — GABAPENTIN 300 MG: 300 CAPSULE ORAL at 03:12

## 2023-12-07 RX ADMIN — TAMSULOSIN HYDROCHLORIDE 0.4 MG: 0.4 CAPSULE ORAL at 09:12

## 2023-12-07 RX ADMIN — GABAPENTIN 300 MG: 300 CAPSULE ORAL at 09:12

## 2023-12-07 RX ADMIN — INSULIN ASPART 4 UNITS: 100 INJECTION, SOLUTION INTRAVENOUS; SUBCUTANEOUS at 08:12

## 2023-12-07 RX ADMIN — METHOCARBAMOL 750 MG: 750 TABLET ORAL at 03:12

## 2023-12-07 RX ADMIN — ALLOPURINOL 100 MG: 100 TABLET ORAL at 09:12

## 2023-12-07 RX ADMIN — METHOCARBAMOL 750 MG: 750 TABLET ORAL at 08:12

## 2023-12-07 RX ADMIN — MEGESTROL ACETATE 40 MG: 20 TABLET ORAL at 09:12

## 2023-12-07 RX ADMIN — IPRATROPIUM BROMIDE AND ALBUTEROL SULFATE 3 ML: .5; 3 SOLUTION RESPIRATORY (INHALATION) at 08:12

## 2023-12-07 RX ADMIN — ENOXAPARIN SODIUM 40 MG: 40 INJECTION SUBCUTANEOUS at 04:12

## 2023-12-07 RX ADMIN — GUAIFENESIN 600 MG: 600 TABLET, EXTENDED RELEASE ORAL at 09:12

## 2023-12-07 RX ADMIN — INSULIN DETEMIR 17 UNITS: 100 INJECTION, SOLUTION SUBCUTANEOUS at 09:12

## 2023-12-07 RX ADMIN — CEFTRIAXONE SODIUM 1 G: 1 INJECTION, POWDER, FOR SOLUTION INTRAMUSCULAR; INTRAVENOUS at 10:12

## 2023-12-07 RX ADMIN — HYDROCODONE BITARTRATE AND ACETAMINOPHEN 1 TABLET: 10; 325 TABLET ORAL at 09:12

## 2023-12-07 RX ADMIN — DOXAZOSIN 4 MG: 4 TABLET ORAL at 09:12

## 2023-12-07 RX ADMIN — SENNOSIDES AND DOCUSATE SODIUM 2 TABLET: 8.6; 5 TABLET ORAL at 08:12

## 2023-12-07 RX ADMIN — DICLOFENAC SODIUM 4 G: 10 GEL TOPICAL at 09:12

## 2023-12-07 RX ADMIN — DICLOFENAC SODIUM 4 G: 10 GEL TOPICAL at 08:12

## 2023-12-07 RX ADMIN — ASPIRIN 81 MG: 81 TABLET, COATED ORAL at 09:12

## 2023-12-07 RX ADMIN — MIDODRINE HYDROCHLORIDE 5 MG: 5 TABLET ORAL at 09:12

## 2023-12-07 RX ADMIN — PANTOPRAZOLE SODIUM 40 MG: 40 TABLET, DELAYED RELEASE ORAL at 08:12

## 2023-12-07 RX ADMIN — MIDODRINE HYDROCHLORIDE 5 MG: 5 TABLET ORAL at 12:12

## 2023-12-07 RX ADMIN — PANTOPRAZOLE SODIUM 40 MG: 40 TABLET, DELAYED RELEASE ORAL at 12:12

## 2023-12-07 RX ADMIN — METHOCARBAMOL 750 MG: 750 TABLET ORAL at 09:12

## 2023-12-07 RX ADMIN — MULTIPLE VITAMINS W/ MINERALS TAB 1 TABLET: TAB at 09:12

## 2023-12-07 RX ADMIN — MIDODRINE HYDROCHLORIDE 5 MG: 5 TABLET ORAL at 05:12

## 2023-12-07 RX ADMIN — INSULIN ASPART 2 UNITS: 100 INJECTION, SOLUTION INTRAVENOUS; SUBCUTANEOUS at 12:12

## 2023-12-07 RX ADMIN — GABAPENTIN 300 MG: 300 CAPSULE ORAL at 08:12

## 2023-12-07 RX ADMIN — MICONAZOLE NITRATE 2 % TOPICAL POWDER: at 10:12

## 2023-12-07 RX ADMIN — PREDNISONE 40 MG: 20 TABLET ORAL at 12:12

## 2023-12-07 NOTE — PT/OT/SLP PROGRESS
Occupational Therapy  Treatment    Name: Stevie Cummins    : 1950 (73 y.o.)  MRN: [id number]           TREATMENT SUMMARY AND RECOMMENDATIONS:      OT Date of Treatment: 23  OT Start Time: 1400  OT Stop Time: 1430  OT Total Time (min): 30 min      Subjective Assessment:   No complaints  Lethargic   x Awake, alert, cooperative  Impulsive    Uncooperative   Flat affect    Agitated  c/o pain   x Appropriate  c/o fatigue    Confused x Treated at bedside     Emotionally labile  Treated in gym/dept.      Other:        Therapy Precautions:    Cognitive deficits x Spinal precautions    Collar - hard  Sternal precautions   x Collar - soft   TLSO   x Fall risk  LSO    Hip precautions - posterior  Knee immobilizer    Hip precautions - anterior  WBAT    Impaired communication  Partial weightbearing    Oxygen  TTWB    PEG tube  NWB    Visual deficits      Hearing deficits   Other:        Treatment Objectives:     Mobility Training:    Mobility task Assist level Comments    Bed mobility Min-CGA Supine<sidelying<EOB using the log rolling technique and bed rail x2 reps; increased assist for the 2nd trial   Transfer     Sit to stands transitions     Functional mobility     Sitting balance Fair/ SBA-CGA Sitting EOB while doffing pull-over shirt; x1 LOB posteriorly requiring assist to regain his balance    Standing balance      Other:        ADL Training:    ADL Assist level Comments    Feeding     Grooming/hygiene     Bathing     Upper body dressing Min A   Set-up  Doff pull-over shirt    Don hospital gown    Lower body dressing SBA [place] pants in bed by rolling side to side d/t pain in bilateral ankles; use of reacher to unthread pants around ankles    Toileting     Toilet transfer     Adaptive equipment training     Other:           Therapeutic Exercise:   Exercise Sets Reps Comments                               Additional Comments:      Assessment: Patient tolerated session fair-well. Pt requested to stay in his room  and not get out of bed. Pt is limited by his pain in his ankles. Pt educated on the benefit of wearing bigger shirts while having to wear his soft neck collar to decrease pain and difficulty of donning/doffing. Pt verbalized understanding. Pt would benefit from continued OT services to increase independence with occupational performance, decrease risk for falls, and decrease caregiver burden.     GOALS:   Multidisciplinary Problems       Occupational Therapy Goals          Problem: Occupational Therapy    Goal Priority Disciplines Outcome Interventions   Occupational Therapy Goal     OT, PT/OT Ongoing, Progressing    Description: Goals to be met by: Discharge     Patient will increase functional independence with ADLs by performing:    UE Dressing with Stand-by Assistance. -ongoing,progressing; Min A  LE Dressing with Moderate Assistance. Goal Met 12/4/23  Grooming while standing at sink with Contact Guard Assistance. Ongoing progressing  Toileting from bedside commode with Moderate Assistance for hygiene and clothing management. -ongoing, progressing  Bathing from  shower chair/bench with Moderate Assistance. Ongoing, progressing   Toilet transfer to bedside commode with Stand-by Assistance. -ongoing, progressing; CGA-min A  Increased functional strength to WFL for ADLs.     New goals added 12/5/23:   Pt will perform self-feeding requiring modified independence.    Pt will perform LE dressing including donning/doffing footwear and pants using AE requiring CGA.                          Recommendations:     Discharge Recommendations: Intermittent assistance   Discharge Equipment Recommendations:  wheelchair  Barriers to discharge:  Other (Comment) (Severity of deficits)     Plan:     Patient to be seen 6 x/week to address the above listed problems via self-care/home management, therapeutic activities, therapeutic exercises, neuromuscular re-education  Plan of Care Expires: 12/21/23  Plan of Care Reviewed with:  patient  Revisions made to plan of care: No      Skilled OT Minutes Provided: 30  Communication with Treatment Team:     Equipment recommendations:       At end of treatment, patient remained:   Up in chair     Up in wheelchair in room   x In bed   x With alarm activated   x Bed rails up   x Call bell in reach     Family/friends present    Restraints secured properly    In bathroom with CNA/RN notified    In gym with PT/PTA/tech    Nurse aware    Other:

## 2023-12-07 NOTE — PLAN OF CARE
Problem: Adult Inpatient Plan of Care  Goal: Plan of Care Review  Outcome: Ongoing, Progressing  Flowsheets (Taken 12/7/2023 1117)  Plan of Care Reviewed With: patient  Goal: Patient-Specific Goal (Individualized)  Outcome: Ongoing, Progressing  Flowsheets (Taken 12/7/2023 1117)  Anxieties, Fears or Concerns: NONE  Individualized Care Needs: monitor CBG, Safety  Goal: Absence of Hospital-Acquired Illness or Injury  Outcome: Ongoing, Progressing  Intervention: Identify and Manage Fall Risk  Flowsheets (Taken 12/7/2023 1117)  Safety Promotion/Fall Prevention:   assistive device/personal item within reach   nonskid shoes/socks when out of bed   gait belt with ambulation   medications reviewed  Intervention: Prevent Skin Injury  Flowsheets (Taken 12/7/2023 1117)  Body Position: position changed independently  Skin Protection: incontinence pads utilized  Intervention: Prevent and Manage VTE (Venous Thromboembolism) Risk  Flowsheets (Taken 12/7/2023 1117)  Activity Management:   Rolling - L1   Ambulated in choi - L4   Walk with assistive devise and /or staff member - L3  VTE Prevention/Management:   ambulation promoted   fluids promoted   bleeding precations maintained  Range of Motion: active ROM (range of motion) encouraged  Intervention: Prevent Infection  Flowsheets (Taken 12/7/2023 1117)  Infection Prevention: personal protective equipment utilized  Goal: Optimal Comfort and Wellbeing  Outcome: Ongoing, Progressing  Intervention: Monitor Pain and Promote Comfort  Flowsheets (Taken 12/7/2023 1117)  Pain Management Interventions:   medication offered   care clustered   position adjusted  Intervention: Provide Person-Centered Care  Flowsheets (Taken 12/7/2023 1117)  Trust Relationship/Rapport:   care explained   choices provided   thoughts/feelings acknowledged  Goal: Readiness for Transition of Care  Outcome: Ongoing, Progressing  Intervention: Mutually Develop Transition Plan  Flowsheets (Taken 12/7/2023  1117)  Equipment Currently Used at Home:   walker, rolling   bedside commode  Transportation Anticipated: family or friend will provide  Who are your caregiver(s) and their phone number(s)?: Yesy   Communicated NAZANIN with patient/caregiver: Date not available/Unable to determine  Do you expect to return to your current living situation?: Yes  Do you have help at home or someone to help you manage your care at home?: Yes  Readmission within 30 days?: No  Do you currently have service(s) that help you manage your care at home?: No  Is the pt/caregiver preference to resume services with current agency: No     Problem: Diabetes Comorbidity  Goal: Blood Glucose Level Within Targeted Range  Outcome: Ongoing, Progressing  Intervention: Monitor and Manage Glycemia  Flowsheets (Taken 12/7/2023 1117)  Glycemic Management:   blood glucose monitored   supplemental insulin given   oral hydration promoted     Problem: Infection  Goal: Absence of Infection Signs and Symptoms  Outcome: Ongoing, Progressing  Intervention: Prevent or Manage Infection  Flowsheets (Taken 12/7/2023 1117)  Fever Reduction/Comfort Measures:   lightweight bedding   lightweight clothing  Infection Management: aseptic technique maintained  Isolation Precautions: precautions maintained     Problem: Skin Injury Risk Increased  Goal: Skin Health and Integrity  Outcome: Ongoing, Progressing  Intervention: Optimize Skin Protection  Flowsheets (Taken 12/7/2023 1117)  Pressure Reduction Techniques: frequent weight shift encouraged  Pressure Reduction Devices: positioning supports utilized  Skin Protection: incontinence pads utilized  Head of Bed (HOB) Positioning: HOB at 30-45 degrees  Intervention: Promote and Optimize Oral Intake  Flowsheets (Taken 12/7/2023 1117)  Oral Nutrition Promotion:   calorie-dense foods provided   calorie-dense liquids provided   safe use of adaptive equipment encouraged     Problem: Urinary Retention  Goal: Effective Urinary  Elimination  Outcome: Ongoing, Progressing  Intervention: Promote Effective Urine Elimination  Flowsheets (Taken 12/7/2023 1117)  Bowel Function Promotion: straining discouraged  Urinary Elimination Promotion: catheter patency maintained     Problem: Fall Injury Risk  Goal: Absence of Fall and Fall-Related Injury  Outcome: Ongoing, Progressing  Intervention: Identify and Manage Contributors  Flowsheets (Taken 12/7/2023 1117)  Self-Care Promotion:   independence encouraged   BADL personal routines maintained   safe use of adaptive equipment encouraged  Medication Review/Management:   medications reviewed   high-risk medications identified  Intervention: Promote Injury-Free Environment  Flowsheets (Taken 12/7/2023 1117)  Safety Promotion/Fall Prevention:   assistive device/personal item within reach   nonskid shoes/socks when out of bed   gait belt with ambulation   medications reviewed

## 2023-12-07 NOTE — PLAN OF CARE
Problem: Adult Inpatient Plan of Care  Goal: Plan of Care Review  Outcome: Ongoing, Progressing  Flowsheets (Taken 12/7/2023 0437)  Plan of Care Reviewed With: patient  Goal: Patient-Specific Goal (Individualized)  Outcome: Ongoing, Progressing  Flowsheets (Taken 12/7/2023 0437)  Anxieties, Fears or Concerns: none voiced at this time  Individualized Care Needs: monitor blood sugar levels  Goal: Absence of Hospital-Acquired Illness or Injury  Outcome: Ongoing, Progressing  Intervention: Identify and Manage Fall Risk  Flowsheets (Taken 12/7/2023 0437)  Safety Promotion/Fall Prevention:   assistive device/personal item within reach   bed alarm set   lighting adjusted   medications reviewed   high risk medications identified   room near unit station   nonskid shoes/socks when out of bed   instructed to call staff for mobility  Intervention: Prevent Skin Injury  Flowsheets (Taken 12/7/2023 0437)  Body Position:   position changed independently   position maintained  Skin Protection: incontinence pads utilized  Intervention: Prevent and Manage VTE (Venous Thromboembolism) Risk  Flowsheets (Taken 12/7/2023 0437)  Activity Management: Rolling - L1  VTE Prevention/Management:   ambulation promoted   bleeding precations maintained   bleeding risk assessed   fluids promoted  Range of Motion: active ROM (range of motion) encouraged  Intervention: Prevent Infection  Flowsheets (Taken 12/7/2023 0437)  Infection Prevention:   personal protective equipment utilized   cohorting utilized   environmental surveillance performed   rest/sleep promoted   single patient room provided   equipment surfaces disinfected   hand hygiene promoted     Problem: Diabetes Comorbidity  Goal: Blood Glucose Level Within Targeted Range  Outcome: Ongoing, Progressing  Intervention: Monitor and Manage Glycemia  Flowsheets (Taken 12/7/2023 0437)  Glycemic Management:   blood glucose monitored   supplemental insulin given     Problem: Fall Injury  Risk  Goal: Absence of Fall and Fall-Related Injury  Outcome: Ongoing, Progressing  Intervention: Identify and Manage Contributors  Flowsheets (Taken 12/7/2023 0437)  Self-Care Promotion:   safe use of adaptive equipment encouraged   independence encouraged   BADL personal objects within reach   BADL personal routines maintained   meal set-up provided  Medication Review/Management:   medications reviewed   high-risk medications identified  Intervention: Promote Injury-Free Environment  Flowsheets (Taken 12/7/2023 0437)  Safety Promotion/Fall Prevention:   assistive device/personal item within reach   bed alarm set   lighting adjusted   medications reviewed   high risk medications identified   room near unit station   nonskid shoes/socks when out of bed   instructed to call staff for mobility

## 2023-12-07 NOTE — PT/OT/SLP PROGRESS
Physical Therapy Treatment Note           Name: Stevie Cummins    : 1950 (73 y.o.)  MRN: [id number]           TREATMENT SUMMARY AND RECOMMENDATIONS:    PT Received On: 23  PT Start Time: 1335     PT Stop Time: 1355  PT Total Time (min): 20 min     Subjective Assessment:   No complaints x Lethargic    Awake, alert, cooperative x Uncooperative    Agitated x c/o pain    Appropriate x c/o fatigue    Confused x Treated at bedside     Emotionally labile  Treated in gym/dept.   x Impulsive  Other:    Flat affect       Therapy Precautions:    Cognitive deficits x Spinal precautions    Collar - hard  Sternal precautions   x Collar - soft   TLSO   x Fall risk  LSO    Hip precautions - posterior  Knee immobilizer    Hip precautions - anterior  WBAT    Impaired communication  Partial weightbearing    Oxygen  TTWB    PEG tube  NWB    Visual deficits  Other:    Hearing deficits          Treatment Objectives:     Mobility Training:   Assist level Comments    Bed mobility MIN A Rolling side to side, supine<>sit and scooting up.    Transfer SBA Attempted scooting transfers from bed>WC due to ankle ankle, but after multiple attempts pt thew himself back in bed and said he couldn't do it.   Attempted squat pivot as well, but same response as above.    Gait     Sit to stand transitions     Sitting balance CGA/SBA X 10 min EOB   Standing balance      Wheelchair mobility     Car transfer     Other:          Therapeutic Exercise:   Exercise Sets Reps Comments                               Additional Comments:  Pt was impulsive again today, laying down abruptly after not being able to scoot transfers. Pt then reported he felt dizzy. BP was 153/72.   Nursing made aware of pt's responses to therapy.     Assessment: Patient tolerated session fair.    PT Plan: continue per POC  Revisions made to plan of care: Yes, gait added.      GOALS:   Multidisciplinary Problems       Physical Therapy Goals          Problem: Physical  Therapy    Goal Priority Disciplines Outcome Goal Variances Interventions   Physical Therapy Goal     PT, PT/OT Ongoing, Progressing     Description: Goals to be met by: Discharge      Patient will increase functional independence with mobility by performin. Supine to sit with Stand-by Assistance  2. Sit to supine with Stand-by Assistance  3. Sitting at edge of bed x 15 minutes with Stand-by Assistance without dizziness or low BP  4. Increased functional strength to 5/5 for B hips  5. Sit to stands to be completed at SBA using RW  6. bed<>chair transfers to be completed at [facility] using RW.   7. Gait x 50 feet using RW at SBA                         Skilled PT Minutes Provided: 18   Communication with Treatment Team:     Equipment recommendations:       At end of treatment, patient remained:   Up in chair     Up in wheelchair in room   x In bed   x With alarm activated   x Bed rails up   x Call bell in reach     Family/friends present    Restraints secured properly    In bathroom with CNA/RN notified   x Nurse aware    In gym with therapist/tech    Other:

## 2023-12-07 NOTE — PT/OT/SLP PROGRESS
Occupational Therapy  Treatment    Name: Stevie Cummins    : 1950 (73 y.o.)  MRN: [id number]           TREATMENT SUMMARY AND RECOMMENDATIONS:      OT Date of Treatment: 23  OT Start Time: 850  OT Stop Time: 930  OT Total Time (min): 40 min      Subjective Assessment:   No complaints  Lethargic   x Awake, alert, cooperative  Impulsive    Uncooperative   Flat affect    Agitated x c/o pain   x Appropriate  c/o fatigue    Confused x Treated at bedside     Emotionally labile x Treated in gym/dept.      Other:        Therapy Precautions:    Cognitive deficits x Spinal precautions    Collar - hard  Sternal precautions   x Collar - soft   TLSO   x Fall risk  LSO    Hip precautions - posterior  Knee immobilizer    Hip precautions - anterior  WBAT    Impaired communication  Partial weightbearing    Oxygen  TTWB    PEG tube  NWB    Visual deficits      Hearing deficits   Other:        Treatment Objectives:     Mobility Training:    Mobility task Assist level Comments    Bed mobility Min-Mod A  Supine<sidelying<sitting EOB using bed rail and log rolling technique    Transfer Min A Stand pivot t/f from EOB<WC using a RW and gait belt; increased assistance with maneuvering to WC d/t increased ankle pain    Sit to stands transitions Mod A EOB<standing using a gait belt and RW   Functional mobility     Sitting balance     Standing balance      Other:        ADL Training:    ADL Assist level Comments    Feeding     Grooming/hygiene     Bathing     Upper body dressing Min A Donning a pull-over shirt sitting EOB; assistance with getting second arm through the sleeve; min verbal cues to use daniel-dressing technique    Lower body dressing Mod A Don new brief and pants; assist with pulling pants up over hips    Toileting     Toilet transfer     Adaptive equipment training     Other:           Therapeutic Exercise:   Exercise Sets Reps Comments   BUE strengthening exercises 3 10 2# weighted dowel performing bicep curls, chest  press, straight arm raises to 90 degrees, forward/backward rows    Yellow flex-bar 2 10 Twist, supination/pronation                    Additional Comments:      Assessment: Patient tolerated session well. Pt would benefit from continued OT services to increase independence with self-care, decrease risks for falls, and decrease caregiver burden.     GOALS:   Multidisciplinary Problems       Occupational Therapy Goals          Problem: Occupational Therapy    Goal Priority Disciplines Outcome Interventions   Occupational Therapy Goal     OT, PT/OT Ongoing, Progressing    Description: Goals to be met by: Discharge     Patient will increase functional independence with ADLs by performing:    UE Dressing with Stand-by Assistance. -ongoing,progressing; Min A  LE Dressing with Moderate Assistance. Goal Met 12/4/23  Grooming while standing at sink with Contact Guard Assistance. Ongoing progressing  Toileting from bedside commode with Moderate Assistance for hygiene and clothing management. -ongoing, progressing  Bathing from  shower chair/bench with Moderate Assistance. Ongoing, progressing   Toilet transfer to bedside commode with Stand-by Assistance. -ongoing, progressing; CGA-min A  Increased functional strength to WFL for ADLs.     New goals added 12/5/23:   Pt will perform self-feeding requiring modified independence.    Pt will perform LE dressing including donning/doffing footwear and pants using AE requiring CGA.                          Recommendations:     Discharge Recommendations:   Discharge Equipment Recommendations:  wheelchair  Barriers to discharge:  Other (Comment) (Severity of deficits)     Plan:     Patient to be seen 6 x/week to address the above listed problems via self-care/home management, therapeutic activities, therapeutic exercises, neuromuscular re-education  Plan of Care Expires: 12/21/23  Plan of Care Reviewed with: patient  Revisions made to plan of care: No      Skilled OT Minutes Provided:  40  Communication with Treatment Team:     Equipment recommendations:       At end of treatment, patient remained:   Up in chair    x Up in wheelchair in room    In bed    With alarm activated    Bed rails up   x Call bell in reach     Family/friends present    Restraints secured properly    In bathroom with CNA/RN notified    In gym with PT/PTA/tech   x Nurse aware    Other:

## 2023-12-07 NOTE — PT/OT/SLP PROGRESS
Physical Therapy Treatment Note           Name: Stevie Cummins    : 1950 (73 y.o.)  MRN: [id number]           TREATMENT SUMMARY AND RECOMMENDATIONS:    PT Received On: 23  PT Start Time: 1036     PT Stop Time: 1106  PT Total Time (min): 30 min     Subjective Assessment:   No complaints  Lethargic    Awake, alert, cooperative  Uncooperative    Agitated x c/o pain    Appropriate x c/o fatigue    Confused x Treated at bedside     Emotionally labile  Treated in gym/dept.    Impulsive  Other:    Flat affect       Therapy Precautions:    Cognitive deficits x Spinal precautions    Collar - hard  Sternal precautions   x Collar - soft   TLSO   x Fall risk  LSO    Hip precautions - posterior  Knee immobilizer    Hip precautions - anterior  WBAT    Impaired communication  Partial weightbearing    Oxygen  TTWB    PEG tube  NWB    Visual deficits  Other:    Hearing deficits          Treatment Objectives:     Mobility Training:   Assist level Comments    Bed mobility SBA/CGA Focus placed on rolling with using trunk to avoid pain in shoulder and ankle, along with scooting up in bed (trendelenburg needed) scooting side to side- again trying to reduce pain in shoulders (with pulling) and ankles (with pushing)   Transfer     Gait     Sit to stand transitions     Sitting balance     Standing balance      Wheelchair mobility     Car transfer     Other:          Therapeutic Exercise:   Exercise Sets Reps Comments   Supine Sciatic N glides  10 In bed   Supine B LE PROM  10 KTC, knee to opposite chest, SLR and ankle DF/PF   Supine B LE exercises   10 Quad sets, glute sets, SLR, KTC, ankle PF/DF (unable to complete with L ankle pain (it was R yesterday))             Additional Comments:  Pt with poor OOB tolerance impacting his therapy progress. Pt is very agitated and angry that we want him to stay out of bed, stating we are treating him like a piece of trash when we leave him up. PT explained we planned this together  yesterday and that he was agreeable to try the WC x 1 hour.  Pt then proceeded we don't understand the pain.  Nursing, MD and CM all made aware to all help encourage.     Assessment: Patient tolerated session fair.    PT Plan: continue per POC  Revisions made to plan of care: No.     GOALS:   Multidisciplinary Problems       Physical Therapy Goals          Problem: Physical Therapy    Goal Priority Disciplines Outcome Goal Variances Interventions   Physical Therapy Goal     PT, PT/OT Ongoing, Progressing     Description: Goals to be met by: Discharge      Patient will increase functional independence with mobility by performin. Supine to sit with Stand-by Assistance  2. Sit to supine with Stand-by Assistance  3. Sitting at edge of bed x 15 minutes with Stand-by Assistance without dizziness or low BP  4. Increased functional strength to 5/5 for B hips  5. Sit to stands to be completed at SBA using RW  6. bed<>chair transfers to be completed at CGA using RW.   7. gait to be assessed with goals set when able/appropriate.                          Skilled PT Minutes Provided: 25   Communication with Treatment Team:     Equipment recommendations:       At end of treatment, patient remained:   Up in chair     Up in wheelchair in room   x In bed   x With alarm activated   x Bed rails up   x Call bell in reach     Family/friends present    Restraints secured properly    In bathroom with CNA/RN notified   x Nurse aware    In gym with therapist/tech    Other:

## 2023-12-07 NOTE — PROGRESS NOTES
[facility] MEDICINE ~ PROGRESS NOTE        CHIEF COMPLAINT   Hospital follow up    HOSPITAL COURSE     Patient is a 73-year-old  male with a past medical history HTN, HLD, CAD, CVA, DM, PFO s/p closure in 2022, aortic stenosis, and PVD s/p lower extremity stenting who presented to the ER at [facility] on 11/14/2023 for extremity weakness with recurrent falls and unsteadiness.  MRI cervical spine without contrast on 11/14/2023 showed chronic multilevel central canal stenosis with effacement of the CSF, most severe at C3-C4 with mild chronic cord compression.  MRI thoracic spine without contrast on 11/14/2023 showed slightly increased signal seen in the disc at T11-T12 possibly representing early discitis, no epidural abscess, no osteomyelitis.  MRI lumbar spine without contrast on 11/14/2023 showed unchanged chronic degenerative changes with central canal and neuroforaminal stenosis.  Patient was evaluated by Infectious Disease on 11/15/2023 who found no evidence of clinical infectious syndrome and ordered blood cultures which resulted as negative.  Neurosurgery was consulted on 11/15/2023 and patient subsequently underwent posterior spinal fusion on 11/20/2023. Patient was admitted to [facility] swing unit on 11/29/2023 for PT/OT. Labs today are significant for leukocytosis, CORKY, and UTI. Patient was started on Rocephin today and urine culture is pending. Patient's glucose has remained elevated since initial admission. Levemir 17u BID started today and continue moderate dose insulin sliding scale, adjusting as needed. Last dose of Decadron will be given today per neurosurgery note on 11/27/2023, and is to follow up with Dr. Hutchinson in 3-4 weeks. Patient continue to wear soft collar.     Today  White blood cell count increased today.  Completed a total of 5 days of Rocephin today.  Patient's main complaint on exam is bilateral ankle pain which was not relieved with Voltaren gel.   Patient states this is a chronic pain that has acute flares.  At home patient typically uses Ace bandage and warm Epson salt soaks to relieve the pain.    OBJECTIVE/PHYSICAL EXAM     VITAL SIGNS (MOST RECENT):  Temp: 98.8 °F (37.1 °C) (12/07/23 0724)  Pulse: 74 (12/07/23 0824)  Resp: 18 (12/07/23 0912)  BP: 130/74 (12/07/23 0724)  SpO2: (!) 94 % (12/07/23 0824) VITAL SIGNS (24 HOUR RANGE):  Temp:  [97.8 °F (36.6 °C)-98.8 °F (37.1 °C)] 98.8 °F (37.1 °C)  Pulse:  [74-87] 74  Resp:  [18] 18  SpO2:  [92 %-94 %] 94 %  BP: (123-130)/(73-74) 130/74     GENERAL: In no acute distress, afebrile  HEENT: Atraumatic, normocephalic, moist mucous membranes, soft neck collar in place   CHEST: Clear to auscultation bilaterally  HEART: S1, S2, no appreciable murmur  ABDOMEN: Soft, nontender, BS +  MSK: Warm, no lower extremity edema, no clubbing or cyanosis, bilateral ankle pain when removing/putting on socks  NEUROLOGIC: Alert and oriented, moving all extremities   INTEGUMENTARY: No obvious skin rashes   PSYCHIATRY: Appropriate mood and affect    ASSESSMENT/PLAN     Spinal stenosis   S/p posterior cervical spinal fusion on 11/20/2023  Completed Decadron 11/30/2023  Follow up with Dr. Hutchinson is 12/19/2023 at 11:30am   Continue soft collar - awaiting smaller collar   PT/OT   Pain control as needed     UTI   Rocephin completed 5 days  Urine culture showing e coli sens to rocephin     Leukocytosis   UTI vs steroid induced vs gout  Decadron completed 11/30/2023  Rocephin completed 5 total days  Monitor     CORKY   Urinary retention   Urinary incontinence   Sodium bicarb PO 12/01/2023 x1   Gardiner placement 12/01/2023 due to urinary retention   Gardiner removed 12/04/2023, condom cath placed   Gardiner replaced 12/05/2023 for urinary retention which will remain   Renal ultrasound 12/02/2023 normal  Renal function returned to normal      DM  Hyperglycemia  Continue Levemir 17u BID and moderate dose SSI     Hx of HLD, CAD, CVA, PFO s/p closure in  2022, aortic stenosis, and PVD s/p lower extremity stenting  Continue home Lisinopril, Atorvastatin, Aspirin      Hx of BPH, gout, anxiety/depression   Continue home medication  Start Prednisone 40mg PO QD then taper once ankle pain improves  Start PPI BID while on steroids      DVT prophylaxis: Lovenox     Anticipated discharge and disposition: Continue PT/OT   __________________________________________________________________________    LABS/MICRO/MEDS/DIAGNOSTICS     LABS  Recent Labs     12/06/23  0246      K 4.2   CHLORIDE 101   CO2 26   BUN 20.4   CREATININE 0.73   GLUCOSE 155*   CALCIUM 9.1   ALKPHOS 74   AST 11   ALT 15   ALBUMIN 2.4*     Recent Labs     12/07/23  0328   WBC 19.05*   RBC 4.13*   HCT 40.6*   MCV 98.3*        MICROBIOLOGY  Microbiology Results (last 7 days)       Procedure Component Value Units Date/Time    Urine culture [[id number]]  (Abnormal)  (Susceptibility) Collected: 11/29/23 2245    Order Status: Completed Specimen: Urine Updated: 12/02/23 1025     Urine Culture >/= 100,000 colonies/ml Escherichia coli             MEDICATIONS   albuterol-ipratropium  3 mL Nebulization Q24H    allopurinoL  100 mg Oral Daily    aspirin  81 mg Oral Daily    atorvastatin  20 mg Oral Every Mon, Wed, Fri    cefTRIAXone (ROCEPHIN) IVPB  1 g Intravenous Q24H    citalopram  20 mg Oral Daily    diclofenac sodium  4 g Topical (Top) BID    doxazosin  4 mg Oral Daily    enoxparin  40 mg Subcutaneous Q24H (prophylaxis, 1700)    gabapentin  300 mg Oral TID    guaiFENesin  600 mg Oral BID    insulin detemir U-100  17 Units Subcutaneous BID    megestroL  40 mg Oral Daily    methocarbamoL  750 mg Oral TID    miconazole NITRATE 2 %   Topical (Top) BID    midodrine  5 mg Oral TID WM    montelukast  10 mg Oral QHS    multivitamin  1 tablet Oral Daily    polyethylene glycol  17 g Oral Daily    senna-docusate 8.6-50 mg  2 tablet Oral BID    tamsulosin  1 capsule Oral Daily         INFUSIONS         DIAGNOSTIC  "TESTS  US Retroperitoneal Complete   Final Result      Normal sonographic appearance of the kidneys.         Electronically signed by: Stevie Ochoa MD   Date:    12/02/2023   Time:    11:03      RADIOLOGY REPORT   Final Result           No results found for: "EF"     NUTRITION STATUS  Patient meets ASPEN criteria for   malnutrition of   per RD assessment as evidenced by:                       A minimum of two characteristics is recommended for diagnosis of either severe or non-severe malnutrition.     Case related differential diagnoses have been reviewed; assessment and plan has been documented. I have personally reviewed the labs and test results that are currently available; I have reviewed the patients medication list. I have reviewed the consulting providers recommendations. I have reviewed or attempted to review medical records based upon their availability.  All of the patient's and/or family's questions have been addressed and answered to the best of my ability.  I will continue to monitor closely and make adjustments to medical management as needed.  This document was created using [employer] Fluency Direct.  Transcription errors may have been made.  Please contact me if any questions may rise regarding documentation to clarify transcription.      TAYLOR Serrano   Internal Medicine  Department of Hospital Medicine [facility]  "

## 2023-12-07 NOTE — PLAN OF CARE
[facility]  Discharge Reassessment    Primary Care Provider: Robert Alarcon MD    Expected Discharge Date:     Reassessment (most recent)       Discharge Reassessment - 12/07/23 1755          Discharge Reassessment    Assessment Type Discharge Planning Reassessment     Did the patient's condition or plan change since previous assessment? No     Discharge Plan discussed with: Patient     Communicated NAZANIN with patient/caregiver Date not available/Unable to determine     Discharge Plan A Home Health;Home with family     DME Needed Upon Discharge  walker, standard     Transition of Care Barriers None     Why the patient remains in the hospital Requires continued medical care        Post-Acute Status    Post-Acute Authorization Home Health     Home Health Status Pending medical clearance/testing     Hospital Resources/Appts/Education Provided Provided patient/caregiver with written discharge plan information     Discharge Delays None known at this time

## 2023-12-08 LAB
ANION GAP SERPL CALC-SCNC: 8 MEQ/L
BASOPHILS # BLD AUTO: 0.06 X10(3)/MCL
BASOPHILS NFR BLD AUTO: 0.3 %
BUN SERPL-MCNC: 26.1 MG/DL (ref 8.4–25.7)
CALCIUM SERPL-MCNC: 9.3 MG/DL (ref 8.8–10)
CHLORIDE SERPL-SCNC: 100 MMOL/L (ref 98–107)
CO2 SERPL-SCNC: 27 MMOL/L (ref 23–31)
CREAT SERPL-MCNC: 0.91 MG/DL (ref 0.73–1.18)
CREAT/UREA NIT SERPL: 29
CRP SERPL-MCNC: 124 MG/L
EOSINOPHIL # BLD AUTO: 0.04 X10(3)/MCL (ref 0–0.9)
EOSINOPHIL NFR BLD AUTO: 0.2 %
ERYTHROCYTE [DISTWIDTH] IN BLOOD BY AUTOMATED COUNT: 12.4 % (ref 11.5–17)
ERYTHROCYTE [SEDIMENTATION RATE] IN BLOOD: 102 MM/HR (ref 0–15)
GFR SERPLBLD CREATININE-BSD FMLA CKD-EPI: >60 MLS/MIN/1.73/M2
GLUCOSE SERPL-MCNC: 184 MG/DL (ref 82–115)
HCT VFR BLD AUTO: 39.5 % (ref 42–52)
HGB BLD-MCNC: 12.8 G/DL (ref 14–18)
IMM GRANULOCYTES # BLD AUTO: 0.6 X10(3)/MCL (ref 0–0.04)
IMM GRANULOCYTES NFR BLD AUTO: 2.9 %
LYMPHOCYTES # BLD AUTO: 1.48 X10(3)/MCL (ref 0.6–4.6)
LYMPHOCYTES NFR BLD AUTO: 7.3 %
MCH RBC QN AUTO: 31.6 PG (ref 27–31)
MCHC RBC AUTO-ENTMCNC: 32.4 G/DL (ref 33–36)
MCV RBC AUTO: 97.5 FL (ref 80–94)
MONOCYTES # BLD AUTO: 1.49 X10(3)/MCL (ref 0.1–1.3)
MONOCYTES NFR BLD AUTO: 7.3 %
NEUTROPHILS # BLD AUTO: 16.74 X10(3)/MCL (ref 2.1–9.2)
NEUTROPHILS NFR BLD AUTO: 82 %
PLATELET # BLD AUTO: 244 X10(3)/MCL (ref 130–400)
PMV BLD AUTO: 11 FL (ref 7.4–10.4)
POCT GLUCOSE: 136 MG/DL (ref 70–110)
POCT GLUCOSE: 140 MG/DL (ref 70–110)
POCT GLUCOSE: 250 MG/DL (ref 70–110)
POCT GLUCOSE: 273 MG/DL (ref 70–110)
POCT GLUCOSE: 330 MG/DL (ref 70–110)
POTASSIUM SERPL-SCNC: 4.4 MMOL/L (ref 3.5–5.1)
RBC # BLD AUTO: 4.05 X10(6)/MCL (ref 4.7–6.1)
SODIUM SERPL-SCNC: 135 MMOL/L (ref 136–145)
WBC # SPEC AUTO: 20.41 X10(3)/MCL (ref 4.5–11.5)

## 2023-12-08 PROCEDURE — 97110 THERAPEUTIC EXERCISES: CPT

## 2023-12-08 PROCEDURE — 25000003 PHARM REV CODE 250: Performed by: PHYSICIAN ASSISTANT

## 2023-12-08 PROCEDURE — 99900035 HC TECH TIME PER 15 MIN (STAT)

## 2023-12-08 PROCEDURE — 63600175 PHARM REV CODE 636 W HCPCS: Performed by: STUDENT IN AN ORGANIZED HEALTH CARE EDUCATION/TRAINING PROGRAM

## 2023-12-08 PROCEDURE — 97530 THERAPEUTIC ACTIVITIES: CPT

## 2023-12-08 PROCEDURE — 63600175 PHARM REV CODE 636 W HCPCS: Performed by: PHYSICIAN ASSISTANT

## 2023-12-08 PROCEDURE — 97535 SELF CARE MNGMENT TRAINING: CPT

## 2023-12-08 PROCEDURE — 25000003 PHARM REV CODE 250: Performed by: STUDENT IN AN ORGANIZED HEALTH CARE EDUCATION/TRAINING PROGRAM

## 2023-12-08 PROCEDURE — 11000004 HC SNF PRIVATE

## 2023-12-08 PROCEDURE — 86140 C-REACTIVE PROTEIN: CPT | Performed by: STUDENT IN AN ORGANIZED HEALTH CARE EDUCATION/TRAINING PROGRAM

## 2023-12-08 PROCEDURE — 80048 BASIC METABOLIC PNL TOTAL CA: CPT | Performed by: PHYSICIAN ASSISTANT

## 2023-12-08 PROCEDURE — 85025 COMPLETE CBC W/AUTO DIFF WBC: CPT | Performed by: PHYSICIAN ASSISTANT

## 2023-12-08 PROCEDURE — 94760 N-INVAS EAR/PLS OXIMETRY 1: CPT

## 2023-12-08 PROCEDURE — 85652 RBC SED RATE AUTOMATED: CPT | Performed by: STUDENT IN AN ORGANIZED HEALTH CARE EDUCATION/TRAINING PROGRAM

## 2023-12-08 RX ADMIN — DICLOFENAC SODIUM 4 G: 10 GEL TOPICAL at 09:12

## 2023-12-08 RX ADMIN — MONTELUKAST SODIUM 10 MG: 10 TABLET, COATED ORAL at 08:12

## 2023-12-08 RX ADMIN — INSULIN ASPART 2 UNITS: 100 INJECTION, SOLUTION INTRAVENOUS; SUBCUTANEOUS at 08:12

## 2023-12-08 RX ADMIN — MICONAZOLE NITRATE 2 % TOPICAL POWDER: at 09:12

## 2023-12-08 RX ADMIN — INSULIN ASPART 6 UNITS: 100 INJECTION, SOLUTION INTRAVENOUS; SUBCUTANEOUS at 04:12

## 2023-12-08 RX ADMIN — INSULIN DETEMIR 10 UNITS: 100 INJECTION, SOLUTION SUBCUTANEOUS at 08:12

## 2023-12-08 RX ADMIN — MULTIPLE VITAMINS W/ MINERALS TAB 1 TABLET: TAB at 09:12

## 2023-12-08 RX ADMIN — MEGESTROL ACETATE 40 MG: 20 TABLET ORAL at 09:12

## 2023-12-08 RX ADMIN — PREDNISONE 40 MG: 20 TABLET ORAL at 09:12

## 2023-12-08 RX ADMIN — GABAPENTIN 300 MG: 300 CAPSULE ORAL at 09:12

## 2023-12-08 RX ADMIN — INSULIN ASPART 10 UNITS: 100 INJECTION, SOLUTION INTRAVENOUS; SUBCUTANEOUS at 11:12

## 2023-12-08 RX ADMIN — ASPIRIN 81 MG: 81 TABLET, COATED ORAL at 09:12

## 2023-12-08 RX ADMIN — SENNOSIDES AND DOCUSATE SODIUM 2 TABLET: 8.6; 5 TABLET ORAL at 09:12

## 2023-12-08 RX ADMIN — METHOCARBAMOL 750 MG: 750 TABLET ORAL at 08:12

## 2023-12-08 RX ADMIN — CITALOPRAM HYDROBROMIDE 20 MG: 20 TABLET ORAL at 09:12

## 2023-12-08 RX ADMIN — PANTOPRAZOLE SODIUM 40 MG: 40 TABLET, DELAYED RELEASE ORAL at 08:12

## 2023-12-08 RX ADMIN — INSULIN ASPART 10 UNITS: 100 INJECTION, SOLUTION INTRAVENOUS; SUBCUTANEOUS at 04:12

## 2023-12-08 RX ADMIN — POLYETHYLENE GLYCOL 3350 17 G: 17 POWDER, FOR SOLUTION ORAL at 09:12

## 2023-12-08 RX ADMIN — MIDODRINE HYDROCHLORIDE 5 MG: 5 TABLET ORAL at 04:12

## 2023-12-08 RX ADMIN — PANTOPRAZOLE SODIUM 40 MG: 40 TABLET, DELAYED RELEASE ORAL at 09:12

## 2023-12-08 RX ADMIN — ALLOPURINOL 100 MG: 100 TABLET ORAL at 09:12

## 2023-12-08 RX ADMIN — METHOCARBAMOL 750 MG: 750 TABLET ORAL at 03:12

## 2023-12-08 RX ADMIN — DOXAZOSIN 4 MG: 4 TABLET ORAL at 09:12

## 2023-12-08 RX ADMIN — ENOXAPARIN SODIUM 40 MG: 40 INJECTION SUBCUTANEOUS at 04:12

## 2023-12-08 RX ADMIN — TAMSULOSIN HYDROCHLORIDE 0.4 MG: 0.4 CAPSULE ORAL at 09:12

## 2023-12-08 RX ADMIN — SENNOSIDES AND DOCUSATE SODIUM 2 TABLET: 8.6; 5 TABLET ORAL at 08:12

## 2023-12-08 RX ADMIN — MIDODRINE HYDROCHLORIDE 5 MG: 5 TABLET ORAL at 11:12

## 2023-12-08 RX ADMIN — MIDODRINE HYDROCHLORIDE 5 MG: 5 TABLET ORAL at 07:12

## 2023-12-08 RX ADMIN — HYDROCODONE BITARTRATE AND ACETAMINOPHEN 1 TABLET: 10; 325 TABLET ORAL at 09:12

## 2023-12-08 RX ADMIN — GABAPENTIN 300 MG: 300 CAPSULE ORAL at 03:12

## 2023-12-08 RX ADMIN — METHOCARBAMOL 750 MG: 750 TABLET ORAL at 09:12

## 2023-12-08 RX ADMIN — ATORVASTATIN CALCIUM 20 MG: 10 TABLET, FILM COATED ORAL at 09:12

## 2023-12-08 RX ADMIN — GABAPENTIN 300 MG: 300 CAPSULE ORAL at 08:12

## 2023-12-08 RX ADMIN — INSULIN DETEMIR 10 UNITS: 100 INJECTION, SOLUTION SUBCUTANEOUS at 09:12

## 2023-12-08 RX ADMIN — INSULIN ASPART 10 UNITS: 100 INJECTION, SOLUTION INTRAVENOUS; SUBCUTANEOUS at 07:12

## 2023-12-08 NOTE — PT/OT/SLP PROGRESS
Name: Stevie Cummins    : 1950 (73 y.o.)  MRN: [id number]      Patient is currently performing min-mod A with self-care.     DME Recommendations:    Wheelchair:  Patient would benefit from a 20 inch manual wheelchair with elevating leg rests and removable arm rests d/t patient having a mobility limitation that significantly impairs his ability to participate in one or more mobility related activities of daily living. The patient's mobility limitation can not be sufficiently resolved by the use of a cane or walker. The use of a manual wheelchair will significantly improve the patient's ability to participate in ADLs/mobility tasks and the patient will use it on a regular basis in the home environment. The patient is able to self propel wheelchair/ has a caregiver who is available, willing and able to provide assistance with the wheelchair.

## 2023-12-08 NOTE — PT/OT/SLP PROGRESS
Occupational Therapy  Treatment    Name: Stevie Cummins    : 1950 (73 y.o.)  MRN: [id number]           TREATMENT SUMMARY AND RECOMMENDATIONS:      OT Date of Treatment: 23  OT Start Time: 1430  OT Stop Time: 1500  OT Total Time (min): 30 min      Subjective Assessment:   No complaints  Lethargic   x Awake, alert, cooperative  Impulsive    Uncooperative   Flat affect    Agitated  c/o pain   x Appropriate  c/o fatigue    Confused  Treated at bedside     Emotionally labile x Treated in gym/dept.      Other:        Therapy Precautions:    Cognitive deficits x Spinal precautions    Collar - hard  Sternal precautions   x Collar - soft   TLSO   x Fall risk  LSO    Hip precautions - posterior  Knee immobilizer    Hip precautions - anterior  WBAT    Impaired communication  Partial weightbearing    Oxygen  TTWB    PEG tube  NWB    Visual deficits      Hearing deficits   Other:        Treatment Objectives:     Mobility Training:    Mobility task Assist level Comments    Bed mobility SBA EOB<sidelying<supine    Transfer Min A     CGA Stand pivot t/f from WC<>chair using a RW and gait belt x3reps    Stand pivot t/f from WC<EOB using a gait belt and RW    Sit to stands transitions Min-mod A WC<standing using a RW and gait belt    Functional mobility     Sitting balance     Standing balance      Other:        ADL Training:    ADL Assist level Comments    Feeding     Grooming/hygiene     Bathing     Upper body dressing     Lower body dressing     Toileting     Toilet transfer     Adaptive equipment training     Other:           Therapeutic Exercise:   Exercise Sets Reps Comments   BUE strengthening exercises 3 10 2# weighted dowel performing bicep curls, chest press, straight arm raises, forward/backward rows    Yellow flexbar 3 10 Twists, supination/pronation                    Additional Comments:      Assessment: Patient tolerated session well. Pt demonstrates an increase in UE strength and activity tolerance as  compared to previous sessions. Pt would benefit from continued OT services to increase independence with occupational performance, decrease risks for falls, and decrease caregiver burden.     GOALS:   Multidisciplinary Problems       Occupational Therapy Goals          Problem: Occupational Therapy    Goal Priority Disciplines Outcome Interventions   Occupational Therapy Goal     OT, PT/OT Ongoing, Progressing    Description: Goals to be met by: Discharge     Patient will increase functional independence with ADLs by performing:    UE Dressing with Stand-by Assistance. -ongoing,progressing; Min A  LE Dressing with Moderate Assistance. Goal Met 12/4/23  Grooming while standing at sink with Contact Guard Assistance. Ongoing progressing  Toileting from bedside commode with Moderate Assistance for hygiene and clothing management. -ongoing, progressing  Bathing from  shower chair/bench with Moderate Assistance. Ongoing, progressing   Toilet transfer to bedside commode with Stand-by Assistance. -ongoing, progressing; CGA-min A  Increased functional strength to WFL for ADLs.     New goals added 12/5/23:   Pt will perform self-feeding requiring modified independence.    Pt will perform LE dressing including donning/doffing footwear and pants using AE requiring CGA.                          Recommendations:     Discharge Equipment Recommendations:  wheelchair     Plan:     Patient to be seen 6 x/week to address the above listed problems via self-care/home management, therapeutic activities, therapeutic exercises, neuromuscular re-education  Plan of Care Expires: 12/21/23  Plan of Care Reviewed with: patient  Revisions made to plan of care: No      Skilled OT Minutes Provided: 30  Communication with Treatment Team:     Equipment recommendations:       At end of treatment, patient remained:   Up in chair     Up in wheelchair in room   x In bed   x With alarm activated   x Bed rails up   x Call bell in reach     Family/friends  present    Restraints secured properly    In bathroom with CNA/RN notified    In gym with PT/PTA/tech    Nurse aware    Other:

## 2023-12-08 NOTE — PLAN OF CARE
Problem: Adult Inpatient Plan of Care  Goal: Plan of Care Review  Outcome: Ongoing, Progressing  Flowsheets (Taken 12/8/2023 1028)  Plan of Care Reviewed With: patient  Goal: Patient-Specific Goal (Individualized)  Outcome: Ongoing, Progressing  Flowsheets (Taken 12/8/2023 1028)  Anxieties, Fears or Concerns: worried about soreness on bottom  Individualized Care Needs: monitor CBGs, promote being OOB and safety with mobility, encourage therapy, pain management  Goal: Absence of Hospital-Acquired Illness or Injury  Outcome: Ongoing, Progressing  Intervention: Identify and Manage Fall Risk  Flowsheets (Taken 12/8/2023 1028)  Safety Promotion/Fall Prevention:   assistive device/personal item within reach   chair alarm set   in recliner, wheels locked   nonskid shoes/socks when out of bed   side rails raised x 3   instructed to call staff for mobility  Intervention: Prevent Skin Injury  Flowsheets (Taken 12/8/2023 1028)  Body Position: position maintained  Skin Protection:   adhesive use limited   incontinence pads utilized   transparent dressing maintained   tubing/devices free from skin contact  Intervention: Prevent and Manage VTE (Venous Thromboembolism) Risk  Flowsheets (Taken 12/8/2023 1028)  Activity Management: Rolling - L1  VTE Prevention/Management:   ambulation promoted   bleeding precations maintained   fluids promoted  Range of Motion: active ROM (range of motion) encouraged  Intervention: Prevent Infection  Flowsheets (Taken 12/8/2023 1028)  Infection Prevention:   cohorting utilized   hand hygiene promoted   equipment surfaces disinfected  Goal: Optimal Comfort and Wellbeing  Outcome: Ongoing, Progressing  Intervention: Monitor Pain and Promote Comfort  Flowsheets (Taken 12/8/2023 1028)  Pain Management Interventions:   care clustered   relaxation techniques promoted   quiet environment facilitated   pain management plan reviewed with patient/caregiver  Intervention: Provide Person-Centered  Care  Flowsheets (Taken 12/8/2023 1028)  Trust Relationship/Rapport:   care explained   choices provided   questions encouraged  Goal: Readiness for Transition of Care  Outcome: Ongoing, Progressing  Intervention: Mutually Develop Transition Plan  Flowsheets (Taken 12/8/2023 1028)  Communicated NAZANIN with patient/caregiver: Date not available/Unable to determine     Problem: Diabetes Comorbidity  Goal: Blood Glucose Level Within Targeted Range  Outcome: Ongoing, Progressing  Intervention: Monitor and Manage Glycemia  Flowsheets (Taken 12/8/2023 1028)  Glycemic Management:   blood glucose monitored   oral hydration promoted   supplemental insulin given     Problem: Infection  Goal: Absence of Infection Signs and Symptoms  Outcome: Ongoing, Progressing  Intervention: Prevent or Manage Infection  Flowsheets (Taken 12/8/2023 1028)  Fever Reduction/Comfort Measures:   lightweight bedding   lightweight clothing  Infection Management: aseptic technique maintained     Problem: Skin Injury Risk Increased  Goal: Skin Health and Integrity  Outcome: Ongoing, Progressing  Intervention: Optimize Skin Protection  Flowsheets (Taken 12/8/2023 1028)  Pressure Reduction Techniques:   frequent weight shift encouraged   pressure points protected   weight shift assistance provided  Pressure Reduction Devices:   positioning supports utilized   foam padding utilized  Skin Protection:   adhesive use limited   incontinence pads utilized   transparent dressing maintained   tubing/devices free from skin contact  Head of Bed (HOB) Positioning: HOB at 30-45 degrees  Intervention: Promote and Optimize Oral Intake  Flowsheets (Taken 12/8/2023 1028)  Oral Nutrition Promotion:   calorie-dense foods provided   calorie-dense liquids provided   physical activity promoted   rest periods promoted     Problem: Urinary Retention  Goal: Effective Urinary Elimination  Outcome: Ongoing, Progressing  Intervention: Promote Effective Urine Elimination  Flowsheets  (Taken 12/8/2023 1028)  Bowel Function Promotion: straining discouraged  Urinary Elimination Promotion: catheter patency maintained     Problem: Fall Injury Risk  Goal: Absence of Fall and Fall-Related Injury  Outcome: Ongoing, Progressing  Intervention: Identify and Manage Contributors  Flowsheets (Taken 12/8/2023 1028)  Self-Care Promotion:   independence encouraged   BADL personal objects within reach  Medication Review/Management: medications reviewed  Intervention: Promote Injury-Free Environment  Flowsheets (Taken 12/8/2023 1028)  Safety Promotion/Fall Prevention:   assistive device/personal item within reach   chair alarm set   in recliner, wheels locked   nonskid shoes/socks when out of bed   side rails raised x 3   instructed to call staff for mobility

## 2023-12-08 NOTE — PT/OT/SLP PROGRESS
Physical Therapy Treatment Note           Name: Stevie Cummins    : 1950 (73 y.o.)  MRN: [id number]           TREATMENT SUMMARY AND RECOMMENDATIONS:    PT Received On: 23  PT Start Time: 930     PT Stop Time:   PT Total Time (min): 82 min     Subjective Assessment:   No complaints x Lethargic    Awake, alert, cooperative x Uncooperative    Agitated x c/o pain    Appropriate x c/o fatigue    Confused x Treated at bedside     Emotionally labile  Treated in gym/dept.   x Impulsive  Other:    Flat affect       Therapy Precautions:    Cognitive deficits x Spinal precautions    Collar - hard  Sternal precautions   x Collar - soft   TLSO   x Fall risk  LSO    Hip precautions - posterior  Knee immobilizer    Hip precautions - anterior  WBAT    Impaired communication  Partial weightbearing    Oxygen  TTWB    PEG tube  NWB    Visual deficits  Other:    Hearing deficits          Treatment Objectives:     Mobility Training:   Assist level Comments    Bed mobility MIN A Rolling side to side, supine<>sit and scooting up.    Transfer CGA Stand pivot using RW with sig. Other.    Gait     Sit to stand transitions CGA/MIN A From EOB with sig. Other.    Sitting balance     Standing balance      Wheelchair mobility     Car transfer     Other:        Therapeutic Exercise:   Exercise Sets Reps Comments   Supine Sciatic N glides  10 In bed   Supine B LE PROM  10 KTC, knee to opposite chest, SLR and ankle DF/PF   Supine B LE exercises   10 Quad sets, glute sets, SLR, KTC, ankle PF/DF (unable to complete with L ankle pain (it was R yesterday))               Additional Comments:  Family training went great, she reports doing this for him before. Plans to DC next week.     Nursing made aware of pt's responses to therapy. Wrong cervical collar came in, nursing looking into it.     Assessment: Patient tolerated session fair.    PT Plan: continue per POC  Revisions made to plan of care: No  GOALS:   Multidisciplinary  Problems       Physical Therapy Goals          Problem: Physical Therapy    Goal Priority Disciplines Outcome Goal Variances Interventions   Physical Therapy Goal     PT, PT/OT Ongoing, Progressing     Description: Goals to be met by: Discharge      Patient will increase functional independence with mobility by performin. Supine to sit with Stand-by Assistance  2. Sit to supine with Stand-by Assistance  3. Sitting at edge of bed x 15 minutes with Stand-by Assistance without dizziness or low BP  4. Increased functional strength to 5/5 for B hips  5. Sit to stands to be completed at SBA using RW  6. bed<>chair transfers to be completed at [facility] using RW.   7. Gait x 50 feet using RW at SBA                         Skilled PT Minutes Provided: 32 ([phone] with sig other for training, then [id number] for tx)   Communication with Treatment Team:     Equipment recommendations:       At end of treatment, patient remained:   Up in chair     Up in wheelchair in room   x In bed   x With alarm activated   x Bed rails up   x Call bell in reach     Family/friends present    Restraints secured properly    In bathroom with CNA/RN notified   x Nurse aware    In gym with therapist/tech    Other:

## 2023-12-08 NOTE — PT/OT/SLP PROGRESS
Physical Therapy Treatment Note           Name: Stevie Cummins    : 1950 (73 y.o.)  MRN: [id number]           TREATMENT SUMMARY AND RECOMMENDATIONS:    PT Received On: 23  PT Start Time: 1408     PT Stop Time: 1434  PT Total Time (min): 26 min     Subjective Assessment:   No complaints  Lethargic    Awake, alert, cooperative  Uncooperative    Agitated  c/o pain    Appropriate x c/o fatigue    Confused  Treated at bedside     Emotionally labile x Treated in gym/dept.    Impulsive  Other:    Flat affect       Therapy Precautions:    Cognitive deficits x Spinal precautions    Collar - hard  Sternal precautions   x Collar - soft   TLSO   x Fall risk  LSO    Hip precautions - posterior  Knee immobilizer    Hip precautions - anterior  WBAT    Impaired communication  Partial weightbearing    Oxygen  TTWB    PEG tube  NWB    Visual deficits  Other:    Hearing deficits          Treatment Objectives:     Mobility Training:   Assist level Comments    Bed mobility SBA Supine>Sit   Transfer MIN/Mod A Stand pivot using RW for toilet transfers   Gait Min/Mod A 2 x 15 feet using RW   Sit to stand transitions MIN From EOB, toilet and WC   Sitting balance     Standing balance      Wheelchair mobility     Car transfer     Other:          Therapeutic Exercise:   Exercise Sets Reps Comments   B LE bike using UBE  10' 5'F/5'B                         Additional Comments:  Pt with improved tolerance and less pain. Pt had BM with therapy.     Assessment: Patient tolerated session better.     PT Plan: continue per POC  Revisions made to plan of care: No.     GOALS:   Multidisciplinary Problems       Physical Therapy Goals          Problem: Physical Therapy    Goal Priority Disciplines Outcome Goal Variances Interventions   Physical Therapy Goal     PT, PT/OT Ongoing, Progressing     Description: Goals to be met by: Discharge      Patient will increase functional independence with mobility by performin. Supine to sit  with Stand-by Assistance  2. Sit to supine with Stand-by Assistance  3. Sitting at edge of bed x 15 minutes with Stand-by Assistance without dizziness or low BP  4. Increased functional strength to 5/5 for B hips  5. Sit to stands to be completed at SBA using RW  6. bed<>chair transfers to be completed at CGA using RW.   7. Gait x 50 feet using RW at SBA                         Skilled PT Minutes Provided: 25   Communication with Treatment Team:     Equipment recommendations:       At end of treatment, patient remained:  x Up in chair     Up in wheelchair in room    In bed    With alarm activated    Bed rails up    Call bell in reach     Family/friends present    Restraints secured properly    In bathroom with CNA/RN notified    Nurse aware   x In gym with therapist/tech    Other:

## 2023-12-08 NOTE — PT/OT/SLP PROGRESS
Occupational Therapy  Treatment    Name: Stevie Cummins    : 1950 (73 y.o.)  MRN: [id number]           TREATMENT SUMMARY AND RECOMMENDATIONS:      OT Date of Treatment: 23  OT Start Time: 933  OT Stop Time:   OT Total Time (min): 35 min      Subjective Assessment:   No complaints  Lethargic   x Awake, alert, cooperative  Impulsive    Uncooperative   Flat affect    Agitated x c/o pain   x Appropriate  c/o fatigue    Confused x Treated at bedside     Emotionally labile  Treated in gym/dept.      Other:        Therapy Precautions:    Cognitive deficits x Spinal precautions    Collar - hard  Sternal precautions   x Collar - soft   TLSO   x Fall risk  LSO    Hip precautions - posterior  Knee immobilizer    Hip precautions - anterior  WBAT    Impaired communication  Partial weightbearing    Oxygen  TTWB    PEG tube  NWB    Visual deficits      Hearing deficits   Other:        Treatment Objectives:     Mobility Training:    Mobility task Assist level Comments    Bed mobility SBA Sitting EOB<supine    Transfer Min A Stand pivot t/f from chair<EOB using a RW and gait belt    Sit to stands transitions Min A Chair<standing using a RW and gait belt    Functional mobility Min A FM in his room from chair<>toilet using a RW and gait belt; pt's girlfriend on the side of him during ambulation    Sitting balance     Standing balance      Other:        ADL Training:    ADL Assist level Comments    Feeding     Grooming/hygiene     Bathing     Upper body dressing Min A Donned big pull-over shirt; pt demonstrated competency with getting it over neck collar, pt's girlfriend assisted with pulling his shirt down around his torso   Lower body dressing Max A Donning shorts; pt's girlfriend assisted in getting pt's shorts threaded and pulling them up over hips d/t her stating this is how they do it at home   Toileting Mod A -BM; assist to pt's brief down; pt able to pull them up   Toilet transfer Min A Toilet<>standing using a  RW, gait belt, and grab bar    Adaptive equipment training     Other:           Therapeutic Exercise:   Exercise Sets Reps Comments                               Additional Comments:  Pt c/o of pain in right ankle during functional mobility to restroom. Pt stated he needed to get back in bed d/t the pain.     Assessment: Patient tolerated session fair. Pt's girlfriend present for family training. Pt and pt's girlfriend educated and demonstrated how to perform toileting, toilet t/fs, LB dressing, UB dressing, and showering using AE if needed to maintain spinal precautions. They demonstrated competency in performing UB and LB dressing without using AE because that's how they do it at home. Pt's girlfriend educated on how pt can use reacher, sock-aid, and long handled sponge to perform self-care. She verbalized understanding. All questions and concerns addressed.     GOALS:   Multidisciplinary Problems       Occupational Therapy Goals          Problem: Occupational Therapy    Goal Priority Disciplines Outcome Interventions   Occupational Therapy Goal     OT, PT/OT Ongoing, Progressing    Description: Goals to be met by: Discharge     Patient will increase functional independence with ADLs by performing:    UE Dressing with Stand-by Assistance. -ongoing,progressing; Min A  LE Dressing with Moderate Assistance. Goal Met 12/4/23  Grooming while standing at sink with Contact Guard Assistance. Ongoing progressing  Toileting from bedside commode with Moderate Assistance for hygiene and clothing management. -ongoing, progressing  Bathing from  shower chair/bench with Moderate Assistance. Ongoing, progressing   Toilet transfer to bedside commode with Stand-by Assistance. -ongoing, progressing; CGA-min A  Increased functional strength to WFL for ADLs.     New goals added 12/5/23:   Pt will perform self-feeding requiring modified independence.    Pt will perform LE dressing including donning/doffing footwear and pants using AE  requiring CGA.                          Recommendations:     Discharge Equipment Recommendations:  wheelchair     Plan:     Patient to be seen 6 x/week to address the above listed problems via self-care/home management, therapeutic activities, therapeutic exercises, neuromuscular re-education  Plan of Care Expires: 12/21/23  Plan of Care Reviewed with: patient  Revisions made to plan of care: No      Skilled OT Minutes Provided: 35  Communication with Treatment Team:     Equipment recommendations:       At end of treatment, patient remained:   Up in chair     Up in wheelchair in room   x In bed   x With alarm activated   x Bed rails up   x Call bell in reach    x Family/friends present    Restraints secured properly    In bathroom with CNA/RN notified    In gym with PT/PTA/tech    Nurse aware    Other:

## 2023-12-08 NOTE — PLAN OF CARE
Problem: Adult Inpatient Plan of Care  Goal: Plan of Care Review  Outcome: Ongoing, Progressing  Flowsheets (Taken 12/7/2023 1908)  Plan of Care Reviewed With: patient  Goal: Patient-Specific Goal (Individualized)  Outcome: Ongoing, Progressing  Flowsheets (Taken 12/7/2023 1908)  Anxieties, Fears or Concerns: None expressed at this time  Individualized Care Needs: Monitor for urinary retention, hillman care, encourage fluids, pain management, monitor CBGs, monitor labs, PT/OT, soft collar at all times until F/U appointment  Goal: Absence of Hospital-Acquired Illness or Injury  Outcome: Ongoing, Progressing  Intervention: Identify and Manage Fall Risk  Flowsheets (Taken 12/7/2023 1908)  Safety Promotion/Fall Prevention:   assistive device/personal item within reach   bed alarm set   Fall Risk reviewed with patient/family   gait belt with ambulation   lighting adjusted   medications reviewed   side rails raised x 3   supervised activity   room near unit station   instructed to call staff for mobility   toileting scheduled   nonskid shoes/socks when out of bed   diversional activities provided   high risk medications identified  Intervention: Prevent Skin Injury  Flowsheets (Taken 12/7/2023 1908)  Body Position:   weight shifting   sitting up in bed  Skin Protection:   adhesive use limited   incontinence pads utilized   transparent dressing maintained   tubing/devices free from skin contact  Intervention: Prevent and Manage VTE (Venous Thromboembolism) Risk  Flowsheets (Taken 12/7/2023 1908)  Activity Management: Rolling - L1  VTE Prevention/Management:   ambulation promoted   fluids promoted   intravenous hydration  Range of Motion: active ROM (range of motion) encouraged  Intervention: Prevent Infection  Flowsheets (Taken 12/7/2023 1908)  Infection Prevention:   cohorting utilized   environmental surveillance performed   equipment surfaces disinfected   rest/sleep promoted   hand hygiene promoted   single patient  room provided     Problem: Diabetes Comorbidity  Goal: Blood Glucose Level Within Targeted Range  Outcome: Ongoing, Progressing  Intervention: Monitor and Manage Glycemia  Flowsheets (Taken 12/7/2023 1908)  Glycemic Management:   blood glucose monitored   supplemental insulin given     Problem: Infection  Goal: Absence of Infection Signs and Symptoms  Outcome: Ongoing, Progressing  Intervention: Prevent or Manage Infection  Flowsheets (Taken 12/7/2023 1908)  Fever Reduction/Comfort Measures:   lightweight bedding   lightweight clothing  Infection Management: aseptic technique maintained  Isolation Precautions:   protective   precautions maintained     Problem: Urinary Retention  Goal: Effective Urinary Elimination  Outcome: Ongoing, Progressing  Intervention: Promote Effective Urine Elimination  Flowsheets (Taken 12/7/2023 1908)  Bowel Function Promotion:   straining discouraged   prompt response to urge promoted   toileting schedule established  Urinary Elimination Promotion: absorbent pad/diaper use encouraged

## 2023-12-08 NOTE — PROGRESS NOTES
Inpatient Nutrition Assessment    Admit Date: 11/29/2023   Total duration of encounter: 9 days   Patient Age: 73 y.o.    Nutrition Recommendation/Prescription     Continue diabetic diet. 2.Continue boost glucose control all meals. 3. Continue megestrol if medically appropriate.  . 4. Continue calorie count as ordered. 5. Continue 1:1 feeds as ordered.     Communication of Recommendations: reviewed with patient    Nutrition Assessment     Malnutrition Assessment/Nutrition-Focused Physical Exam                                                                A minimum of two characteristics is recommended for diagnosis of either severe or non-severe malnutrition.    Chart Review    Reason Seen: continuous nutrition monitoring, length of stay, and follow-up    Malnutrition Screening Tool Results   Have you recently lost weight without trying?: No  Have you been eating poorly because of a decreased appetite?: Yes   MST Score: 1   Diagnosis:    Cervical stenosis of spinal canal       Relevant Medical History: Personal history of colonic polyps   Date Unknown  BPH (benign prostatic hyperplasia)  Date Unknown  CVA (cerebral vascular accident)  Date Unknown  Depression  Date Unknown  History of claustrophobia  Date Unknown  HLD (hyperlipidemia)  Date Unknown  HTN (hypertension)  Date Unknown  Type 2 diabetes mellitus without complications       Nutrition-Related Medications: allopurinol, atorvastatin, multivitamin, insulin aspart, insulin detemir, magnesium hydroxide, polyethylene glycol, senna-docusate    Calorie Containing IV Medications: no significant kcals from medications at this time    Nutrition-Related Labs:     Latest Reference Range & Units 12/08/23 02:58   WBC 4.50 - 11.50 x10(3)/mcL 20.41 (H)   RBC 4.70 - 6.10 x10(6)/mcL 4.05 (L)   Hemoglobin 14.0 - 18.0 g/dL 12.8 (L)   Hematocrit 42.0 - 52.0 % 39.5 (L)   MCV 80.0 - 94.0 fL 97.5 (H)   MCH 27.0 - 31.0 pg 31.6 (H)   MCHC 33.0 - 36.0 g/dL 32.4 (L)   RDW 11.5 - 17.0  "% 12.4   Platelet Count 130 - 400 x10(3)/mcL 244   MPV 7.4 - 10.4 fL 11.0 (H)   Neut % % 82.0   LYMPH % % 7.3   Mono % % 7.3   Eosinophil % % 0.2   Basophil % % 0.3   Immature Granulocytes % 2.9   Neut # 2.1 - 9.2 x10(3)/mcL 16.74 (H)   Lymph # 0.6 - 4.6 x10(3)/mcL 1.48   Mono # 0.1 - 1.3 x10(3)/mcL 1.49 (H)   Eos # 0 - 0.9 x10(3)/mcL 0.04   Baso # <=0.2 x10(3)/mcL 0.06   Immature Grans (Abs) 0 - 0.04 x10(3)/mcL 0.60 (H)   Sed Rate 0 - 15 mm/hr 102 (H)   Sodium 136 - 145 mmol/L 135 (L)   Potassium 3.5 - 5.1 mmol/L 4.4   Chloride 98 - 107 mmol/L 100   CO2 23 - 31 mmol/L 27   Anion Gap mEq/L 8.0   BUN 8.4 - 25.7 mg/dL 26.1 (H)   Creatinine 0.73 - 1.18 mg/dL 0.91   BUN/CREAT RATIO  29   eGFR mls/min/1.73/m2 >60   Glucose 82 - 115 mg/dL 184 (H)   Calcium 8.8 - 10.0 mg/dL 9.3   CRP <5.00 mg/L 124.00 (H)   (H): Data is abnormally high  (L): Data is abnormally low  Nutrition Orders:   Diet Adult Regular Diabetic  Dietary nutrition supplements Boost Glucose Control - Chocolate; All Meals    Appetite/Oral Intake: good/% of meals    Factors Affecting Nutritional Intake: none identified    Food/Buddhism/Cultural Preferences: egg with cheese    Food Allergies: none reported    Wound(s):       Last Bowel Movement: 12/05/23    Comments    Made rounds today. Intake improved. Pt ate 75% eggs, ham, and 1/2 biscuit. Discussed food preferences. Marked menus. Calorie count on 12/6/23 provides 658 kcal and 32 gm of of protein, meeting 34% kcal needs and 30% protein needs. Calorie count on 12/7/23 provides 1684 kcal and 85 gm of protein, meeting 88% kcal needs and 81% protein needs.  Pt drinking boost glucose control. Will monitor.     Anthropometrics    Height: 5' 9.02" (175.3 cm) Height Method: Stated  Last Weight: 87.3 kg (192 lb 7.4 oz) (12/04/23 0500) Weight Method: Bed Scale  BMI (Calculated): 28.4  BMI Classification: overweight (BMI 25-29.9)        Ideal Body Weight (IBW), Male: 160.12 lb     % Ideal Body Weight, Male " (lb): 118.91 %                          Usual Weight Provided By: unable to obtain usual weight    Wt Readings from Last 5 Encounters:   23 87.3 kg (192 lb 7.4 oz)   23 99.3 kg (219 lb)   23 101.2 kg (223 lb)   23 106.6 kg (235 lb)   23 102.1 kg (225 lb)     Weight Change(s) Since Admission: No new wt recorded  Wt Readings from Last 1 Encounters:   23 0500 87.3 kg (192 lb 7.4 oz)   23 1725 86.3 kg (190 lb 4.1 oz)   Admit Weight: 86.3 kg (190 lb 4.1 oz) (23 1725), Weight Method: Bed Scale    Estimated Needs    Weight Used For Calorie Calculations: 87.3 kg (192 lb 7.4 oz)  Energy Calorie Requirements (kcal): 1918 kcal (Dundy-St Jeor x 1.2 stress factor/CBW)  Energy Need Method: Dundy-St Jeor  Weight Used For Protein Calculations: 87.3 kg (192 lb 7.4 oz)  Protein Requirements: 104.98 gm (1.2 gm/kg/CBW)     Temp (24hrs), Av °F (37.2 °C), Min:98.8 °F (37.1 °C), Max:99.1 °F (37.3 °C)       Enteral Nutrition    Patient not receiving enteral nutrition at this time.    Parenteral Nutrition    Patient not receiving parenteral nutrition support at this time.    Evaluation of Received Nutrient Intake    Calories: meeting estimated needs  Protein: meeting estimated needs    Patient Education    Not applicable.    Nutrition Diagnosis     PES:   Inadequate oral intake related to inability to consume sufficient nutrients as evidenced by Needs assistance with meals and patient reports when in pain, pt doesn't feel like eating. Calorie count on 23-58% kcal needs and 70% protein needs. (progressing)    Interventions/Goals     Intervention(s): general/healthful diet, commercial beverage, and prescription medication    Goal: Meet greater than 80% of nutritional needs by follow-up. goal progressing)    Monitoring & Evaluation     Dietitian will monitor food and beverage intake, weight, weight change, electrolyte/renal panel, glucose/endocrine profile, and gastrointestinal  profile.    Nutrition Risk/Follow-Up: moderate (follow-up in 3-5 days)   Please consult if re-assessment needed sooner.

## 2023-12-08 NOTE — PROGRESS NOTES
[facility] MEDICINE ~ PROGRESS NOTE        CHIEF COMPLAINT   Hospital follow up    HOSPITAL COURSE     Patient is a 73-year-old  male with a past medical history HTN, HLD, CAD, CVA, DM, PFO s/p closure in 2022, aortic stenosis, and PVD s/p lower extremity stenting who presented to the ER at [facility] on 11/14/2023 for extremity weakness with recurrent falls and unsteadiness.  MRI cervical spine without contrast on 11/14/2023 showed chronic multilevel central canal stenosis with effacement of the CSF, most severe at C3-C4 with mild chronic cord compression.  MRI thoracic spine without contrast on 11/14/2023 showed slightly increased signal seen in the disc at T11-T12 possibly representing early discitis, no epidural abscess, no osteomyelitis.  MRI lumbar spine without contrast on 11/14/2023 showed unchanged chronic degenerative changes with central canal and neuroforaminal stenosis.  Patient was evaluated by Infectious Disease on 11/15/2023 who found no evidence of clinical infectious syndrome and ordered blood cultures which resulted as negative.  Neurosurgery was consulted on 11/15/2023 and patient subsequently underwent posterior spinal fusion on 11/20/2023. Patient was admitted to [facility] swing unit on 11/29/2023 for PT/OT. Labs today are significant for leukocytosis, CORKY, and UTI. Patient was started on Rocephin today and urine culture is pending. Patient's glucose has remained elevated since initial admission. Levemir 17u BID started today and continue moderate dose insulin sliding scale, adjusting as needed. Last dose of Decadron will be given today per neurosurgery note on 11/27/2023, and is to follow up with Dr. Hutchinson in 3-4 weeks. Patient continue to wear soft collar.     Today  CRP and sed rate both elevated, WBC remains elevated. Likely 2/2 gout. Right ankle pain resolved, left is improving slowly. Poor participation in therapy, patient states due to ankle  pain.     OBJECTIVE/PHYSICAL EXAM     VITAL SIGNS (MOST RECENT):  Temp: 99.1 °F (37.3 °C) (12/08/23 0703)  Pulse: 70 (12/08/23 0703)  Resp: 18 (12/08/23 0917)  BP: (!) 143/89 (12/08/23 0703)  SpO2: (!) 93 % (12/08/23 0703) VITAL SIGNS (24 HOUR RANGE):  Temp:  [98.8 °F (37.1 °C)-99.1 °F (37.3 °C)] 99.1 °F (37.3 °C)  Pulse:  [70-84] 70  Resp:  [18-19] 18  SpO2:  [90 %-95 %] 93 %  BP: (127-143)/(69-89) 143/89     GENERAL: In no acute distress, afebrile  HEENT: Atraumatic, normocephalic, moist mucous membranes, soft neck collar in place   CHEST: Clear to auscultation bilaterally  HEART: S1, S2, no appreciable murmur  ABDOMEN: Soft, nontender, BS +  MSK: Warm, no lower extremity edema, no clubbing or cyanosis, left ankle tenderness  NEUROLOGIC: Alert and oriented, moving all extremities   INTEGUMENTARY: No obvious skin rashes   PSYCHIATRY: Appropriate mood and affect    ASSESSMENT/PLAN     Spinal stenosis   S/p posterior cervical spinal fusion on 11/20/2023  Completed Decadron 11/30/2023  Follow up with Dr. Hutchinson is 12/19/2023 at 11:30am   Continue soft collar - awaiting smaller collar   PT/OT - poor participation, planning for family training and possible d/c home soon if continues   Pain control as needed    Leukocytosis   Initially suspected UTI vs steroid induced, now suspecting gout  Decadron completed 11/30/2023  Rocephin completed 5 total days  Monitor     CORKY   Urinary retention   Urinary incontinence   Sodium bicarb PO 12/01/2023 x1   Gardiner placement 12/01/2023 due to urinary retention   Gardiner removed 12/04/2023, condom cath placed   Gardiner replaced 12/05/2023 for urinary retention which will remain   Renal ultrasound 12/02/2023 normal  Renal function returned to normal   Needs follow up appointment with Dr. Solis upon d/c - concern patient may need chronic UTI suppression     UTI   Rocephin completed 5 days  Urine culture showing e coli sens to rocephin     DM  Hyperglycemia  Insulin regimen changed to  Detemir 10u BID and Aspart 10u TID   Continue moderate SSI      Hx of BPH, acute on chronic gout, anxiety/depression   Continue home medication  Sed rate and CRP elevated   Continue Prednisone 40mg PO QD then taper once ankle pain improves  Continue PPI BID while on steroids       Hx of HLD, CAD, CVA, PFO s/p closure in 2022, aortic stenosis, and PVD s/p lower extremity stenting  Continue home Lisinopril, Atorvastatin, Aspirin      DVT prophylaxis: Lovenox     Anticipated discharge and disposition: Continue PT/OT - planning for family training with possible discharge home soon due to poor participation   __________________________________________________________________________    LABS/MICRO/MEDS/DIAGNOSTICS     LABS  Recent Labs     12/06/23  0246 12/08/23  0258    135*   K 4.2 4.4   CHLORIDE 101 100   CO2 26 27   BUN 20.4 26.1*   CREATININE 0.73 0.91   GLUCOSE 155* 184*   CALCIUM 9.1 9.3   ALKPHOS 74  --    AST 11  --    ALT 15  --    ALBUMIN 2.4*  --      Recent Labs     12/08/23  0258   WBC 20.41*   RBC 4.05*   HCT 39.5*   MCV 97.5*        MICROBIOLOGY  Microbiology Results (last 7 days)       Procedure Component Value Units Date/Time    Urine culture [[id number]]  (Abnormal)  (Susceptibility) Collected: 11/29/23 5801    Order Status: Completed Specimen: Urine Updated: 12/02/23 1025     Urine Culture >/= 100,000 colonies/ml Escherichia coli             MEDICATIONS   allopurinoL  100 mg Oral Daily    aspirin  81 mg Oral Daily    atorvastatin  20 mg Oral Every Mon, Wed, Fri    citalopram  20 mg Oral Daily    diclofenac sodium  4 g Topical (Top) BID    doxazosin  4 mg Oral Daily    enoxparin  40 mg Subcutaneous Q24H (prophylaxis, 1700)    gabapentin  300 mg Oral TID    insulin aspart U-100  10 Units Subcutaneous TIDWM    insulin detemir U-100  10 Units Subcutaneous BID    megestroL  40 mg Oral Daily    methocarbamoL  750 mg Oral TID    miconazole NITRATE 2 %   Topical (Top) BID    midodrine  5 mg Oral  "TID WM    montelukast  10 mg Oral QHS    multivitamin  1 tablet Oral Daily    pantoprazole  40 mg Oral BID    polyethylene glycol  17 g Oral Daily    predniSONE  40 mg Oral Daily    senna-docusate 8.6-50 mg  2 tablet Oral BID    tamsulosin  1 capsule Oral Daily         INFUSIONS         DIAGNOSTIC TESTS  US Retroperitoneal Complete   Final Result      Normal sonographic appearance of the kidneys.         Electronically signed by: Stevie Ochoa MD   Date:    12/02/2023   Time:    11:03      RADIOLOGY REPORT   Final Result           No results found for: "EF"     NUTRITION STATUS  Patient meets ASPEN criteria for   malnutrition of   per RD assessment as evidenced by:                       A minimum of two characteristics is recommended for diagnosis of either severe or non-severe malnutrition.     Case related differential diagnoses have been reviewed; assessment and plan has been documented. I have personally reviewed the labs and test results that are currently available; I have reviewed the patients medication list. I have reviewed the consulting providers recommendations. I have reviewed or attempted to review medical records based upon their availability.  All of the patient's and/or family's questions have been addressed and answered to the best of my ability.  I will continue to monitor closely and make adjustments to medical management as needed.  This document was created using M*Modal Fluency Direct.  Transcription errors may have been made.  Please contact me if any questions may rise regarding documentation to clarify transcription.      TAYLOR Serrano   Internal Medicine  Department of Hospital Medicine [facility]  "

## 2023-12-08 NOTE — PT/OT/SLP PROGRESS
Name: Stevie Cummins    : 1950 (73 y.o.)  MRN: [id number]      Patient is currently performing min-mod A.     DME Recommendations:    Bedside commode: Patient would benefit from a bedside commode to place near the bedside in order to improve independence and safety in toileting and toilet transfers. Patient is currently requiring the use of a bedside commode for safety with toilet transfers. Arm rails and adjustable height of a commode will reduce the difficulty and increase safety/independence for toilet transfers. Patient would benefit from the use of a bedside commode at home to increase safety and reduce risk of falls at home.

## 2023-12-09 LAB
POCT GLUCOSE: 144 MG/DL (ref 70–110)
POCT GLUCOSE: 146 MG/DL (ref 70–110)
POCT GLUCOSE: 291 MG/DL (ref 70–110)
POCT GLUCOSE: 380 MG/DL (ref 70–110)

## 2023-12-09 PROCEDURE — 25000003 PHARM REV CODE 250: Performed by: PHYSICIAN ASSISTANT

## 2023-12-09 PROCEDURE — 94760 N-INVAS EAR/PLS OXIMETRY 1: CPT

## 2023-12-09 PROCEDURE — 25000003 PHARM REV CODE 250: Performed by: STUDENT IN AN ORGANIZED HEALTH CARE EDUCATION/TRAINING PROGRAM

## 2023-12-09 PROCEDURE — 11000004 HC SNF PRIVATE

## 2023-12-09 PROCEDURE — 63600175 PHARM REV CODE 636 W HCPCS: Performed by: STUDENT IN AN ORGANIZED HEALTH CARE EDUCATION/TRAINING PROGRAM

## 2023-12-09 PROCEDURE — 99900035 HC TECH TIME PER 15 MIN (STAT)

## 2023-12-09 PROCEDURE — 97535 SELF CARE MNGMENT TRAINING: CPT

## 2023-12-09 PROCEDURE — 63600175 PHARM REV CODE 636 W HCPCS: Performed by: PHYSICIAN ASSISTANT

## 2023-12-09 RX ADMIN — INSULIN ASPART 10 UNITS: 100 INJECTION, SOLUTION INTRAVENOUS; SUBCUTANEOUS at 07:12

## 2023-12-09 RX ADMIN — MONTELUKAST SODIUM 10 MG: 10 TABLET, COATED ORAL at 08:12

## 2023-12-09 RX ADMIN — SENNOSIDES AND DOCUSATE SODIUM 2 TABLET: 8.6; 5 TABLET ORAL at 09:12

## 2023-12-09 RX ADMIN — ENOXAPARIN SODIUM 40 MG: 40 INJECTION SUBCUTANEOUS at 04:12

## 2023-12-09 RX ADMIN — DICLOFENAC SODIUM 4 G: 10 GEL TOPICAL at 08:12

## 2023-12-09 RX ADMIN — INSULIN ASPART 10 UNITS: 100 INJECTION, SOLUTION INTRAVENOUS; SUBCUTANEOUS at 05:12

## 2023-12-09 RX ADMIN — DICLOFENAC SODIUM 4 G: 10 GEL TOPICAL at 09:12

## 2023-12-09 RX ADMIN — METHOCARBAMOL 750 MG: 750 TABLET ORAL at 03:12

## 2023-12-09 RX ADMIN — METHOCARBAMOL 750 MG: 750 TABLET ORAL at 08:12

## 2023-12-09 RX ADMIN — GABAPENTIN 300 MG: 300 CAPSULE ORAL at 09:12

## 2023-12-09 RX ADMIN — TAMSULOSIN HYDROCHLORIDE 0.4 MG: 0.4 CAPSULE ORAL at 09:12

## 2023-12-09 RX ADMIN — ALLOPURINOL 100 MG: 100 TABLET ORAL at 09:12

## 2023-12-09 RX ADMIN — GABAPENTIN 300 MG: 300 CAPSULE ORAL at 08:12

## 2023-12-09 RX ADMIN — DOXAZOSIN 4 MG: 4 TABLET ORAL at 09:12

## 2023-12-09 RX ADMIN — ASPIRIN 81 MG: 81 TABLET, COATED ORAL at 09:12

## 2023-12-09 RX ADMIN — PANTOPRAZOLE SODIUM 40 MG: 40 TABLET, DELAYED RELEASE ORAL at 08:12

## 2023-12-09 RX ADMIN — MICONAZOLE NITRATE 2 % TOPICAL POWDER: at 08:12

## 2023-12-09 RX ADMIN — MICONAZOLE NITRATE 2 % TOPICAL POWDER: at 09:12

## 2023-12-09 RX ADMIN — MIDODRINE HYDROCHLORIDE 5 MG: 5 TABLET ORAL at 04:12

## 2023-12-09 RX ADMIN — INSULIN DETEMIR 10 UNITS: 100 INJECTION, SOLUTION SUBCUTANEOUS at 08:12

## 2023-12-09 RX ADMIN — HYDROCODONE BITARTRATE AND ACETAMINOPHEN 1 TABLET: 10; 325 TABLET ORAL at 09:12

## 2023-12-09 RX ADMIN — INSULIN DETEMIR 10 UNITS: 100 INJECTION, SOLUTION SUBCUTANEOUS at 09:12

## 2023-12-09 RX ADMIN — PREDNISONE 40 MG: 20 TABLET ORAL at 09:12

## 2023-12-09 RX ADMIN — MIDODRINE HYDROCHLORIDE 5 MG: 5 TABLET ORAL at 07:12

## 2023-12-09 RX ADMIN — PANTOPRAZOLE SODIUM 40 MG: 40 TABLET, DELAYED RELEASE ORAL at 09:12

## 2023-12-09 RX ADMIN — MEGESTROL ACETATE 40 MG: 20 TABLET ORAL at 09:12

## 2023-12-09 RX ADMIN — SENNOSIDES AND DOCUSATE SODIUM 2 TABLET: 8.6; 5 TABLET ORAL at 08:12

## 2023-12-09 RX ADMIN — MIDODRINE HYDROCHLORIDE 5 MG: 5 TABLET ORAL at 12:12

## 2023-12-09 RX ADMIN — GABAPENTIN 300 MG: 300 CAPSULE ORAL at 03:12

## 2023-12-09 RX ADMIN — METHOCARBAMOL 750 MG: 750 TABLET ORAL at 09:12

## 2023-12-09 RX ADMIN — CITALOPRAM HYDROBROMIDE 20 MG: 20 TABLET ORAL at 09:12

## 2023-12-09 RX ADMIN — INSULIN ASPART 10 UNITS: 100 INJECTION, SOLUTION INTRAVENOUS; SUBCUTANEOUS at 12:12

## 2023-12-09 RX ADMIN — MULTIPLE VITAMINS W/ MINERALS TAB 1 TABLET: TAB at 09:12

## 2023-12-09 RX ADMIN — INSULIN ASPART 3 UNITS: 100 INJECTION, SOLUTION INTRAVENOUS; SUBCUTANEOUS at 08:12

## 2023-12-09 NOTE — PLAN OF CARE
Problem: Adult Inpatient Plan of Care  Goal: Plan of Care Review  Outcome: Ongoing, Progressing  Flowsheets (Taken 12/9/2023 1041)  Plan of Care Reviewed With: patient  Goal: Patient-Specific Goal (Individualized)  Outcome: Ongoing, Progressing  Flowsheets (Taken 12/9/2023 1041)  Anxieties, Fears or Concerns: none voiced  Individualized Care Needs: monitor CBGs, pain management, encourage therapy and being OOB  Goal: Absence of Hospital-Acquired Illness or Injury  Outcome: Ongoing, Progressing  Intervention: Identify and Manage Fall Risk  Flowsheets (Taken 12/9/2023 1041)  Safety Promotion/Fall Prevention:   assistive device/personal item within reach   bed alarm set   instructed to call staff for mobility   side rails raised x 3   nonskid shoes/socks when out of bed  Intervention: Prevent Skin Injury  Flowsheets (Taken 12/9/2023 1041)  Body Position: sitting up in bed  Skin Protection:   adhesive use limited   incontinence pads utilized   tubing/devices free from skin contact   transparent dressing maintained  Intervention: Prevent and Manage VTE (Venous Thromboembolism) Risk  Flowsheets (Taken 12/9/2023 1041)  Activity Management: Rolling - L1  VTE Prevention/Management:   ambulation promoted   bleeding precations maintained   fluids promoted  Range of Motion: active ROM (range of motion) encouraged  Intervention: Prevent Infection  Flowsheets (Taken 12/9/2023 1041)  Infection Prevention:   cohorting utilized   hand hygiene promoted   equipment surfaces disinfected  Goal: Optimal Comfort and Wellbeing  Outcome: Ongoing, Progressing  Intervention: Monitor Pain and Promote Comfort  Flowsheets (Taken 12/9/2023 1041)  Pain Management Interventions:   care clustered   quiet environment facilitated   relaxation techniques promoted  Intervention: Provide Person-Centered Care  Flowsheets (Taken 12/9/2023 1041)  Trust Relationship/Rapport:   care explained   choices provided   questions encouraged  Goal: Readiness for  Transition of Care  Outcome: Ongoing, Progressing  Intervention: Mutually Develop Transition Plan  Flowsheets (Taken 12/9/2023 1041)  Communicated NAZANIN with patient/caregiver: Date not available/Unable to determine     Problem: Diabetes Comorbidity  Goal: Blood Glucose Level Within Targeted Range  Outcome: Ongoing, Progressing  Intervention: Monitor and Manage Glycemia  Flowsheets (Taken 12/9/2023 1041)  Glycemic Management:   blood glucose monitored   supplemental insulin given   oral hydration promoted     Problem: Infection  Goal: Absence of Infection Signs and Symptoms  Outcome: Ongoing, Progressing  Intervention: Prevent or Manage Infection  Flowsheets (Taken 12/9/2023 1041)  Fever Reduction/Comfort Measures:   lightweight clothing   lightweight bedding  Infection Management: aseptic technique maintained     Problem: Skin Injury Risk Increased  Goal: Skin Health and Integrity  Outcome: Ongoing, Progressing  Intervention: Optimize Skin Protection  Flowsheets (Taken 12/9/2023 1041)  Pressure Reduction Techniques:   frequent weight shift encouraged   weight shift assistance provided   pressure points protected  Pressure Reduction Devices:   positioning supports utilized   foam padding utilized  Skin Protection:   adhesive use limited   incontinence pads utilized   tubing/devices free from skin contact   transparent dressing maintained  Head of Bed (HOB) Positioning: HOB at 20-30 degrees  Intervention: Promote and Optimize Oral Intake  Flowsheets (Taken 12/9/2023 1041)  Oral Nutrition Promotion:   calorie-dense foods provided   calorie-dense liquids provided   social interaction promoted   physical activity promoted     Problem: Urinary Retention  Goal: Effective Urinary Elimination  Outcome: Ongoing, Progressing  Intervention: Promote Effective Urine Elimination  Flowsheets (Taken 12/9/2023 1041)  Bowel Function Promotion: straining discouraged  Urinary Elimination Promotion: catheter patency maintained      Problem: Fall Injury Risk  Goal: Absence of Fall and Fall-Related Injury  Outcome: Ongoing, Progressing  Intervention: Identify and Manage Contributors  Flowsheets (Taken 12/9/2023 1041)  Self-Care Promotion:   independence encouraged   BADL personal objects within reach  Medication Review/Management: medications reviewed  Intervention: Promote Injury-Free Environment  Flowsheets (Taken 12/9/2023 1041)  Safety Promotion/Fall Prevention:   assistive device/personal item within reach   bed alarm set   instructed to call staff for mobility   side rails raised x 3   nonskid shoes/socks when out of bed

## 2023-12-09 NOTE — PT/OT/SLP PROGRESS
Occupational Therapy  Treatment    Name: Stevie Cummins    : 1950 (73 y.o.)  MRN: [id number]           TREATMENT SUMMARY AND RECOMMENDATIONS:      OT Date of Treatment: 23  OT Start Time: 1300  OT Stop Time: 1333  OT Total Time (min): 33 min      Subjective Assessment:   No complaints  Lethargic   x Awake, alert, cooperative  Impulsive    Uncooperative   Flat affect    Agitated  c/o pain    Appropriate  c/o fatigue    Confused x Treated at bedside     Emotionally labile  Treated in gym/dept.      Other:        Therapy Precautions:    Cognitive deficits  Spinal precautions    Collar - hard  Sternal precautions   x Collar - soft   TLSO   x Fall risk  LSO    Hip precautions - posterior  Knee immobilizer    Hip precautions - anterior  WBAT    Impaired communication  Partial weightbearing    Oxygen  TTWB    PEG tube  NWB    Visual deficits      Hearing deficits   Other:        Treatment Objectives:     Mobility Training:    Mobility task Assist level Comments    Bed mobility SBA Supine>EOB   Transfer CGA Stand/pivot t/f with RW to chair   Sit to stands transitions     Functional mobility CGA 2x10 feet with RW in room    Sitting balance     Standing balance      Other:        ADL Training:    ADL Assist level Comments    Feeding     Grooming/hygiene     Bathing Max A Pt required assist to bath most body parts; able to bath UEs and chest area. Reports difficulty with grasping towel to perform.    Upper body dressing     Lower body dressing Max A  Pt required assist to bright boxer briefs    Toileting Max A  Pt attempted to have BM; pt required assist for all parts    Toilet transfer CGA Stand/pivot t/f with RW to [facility] over toilet    Adaptive equipment training     Other:         Additional Comments:  Pt reports no ankle pain this session; cues needed for RW management and alignment of BLEs.    Assessment: Patient tolerated session well.    GOALS:   Multidisciplinary Problems       Occupational Therapy Goals           Problem: Occupational Therapy    Goal Priority Disciplines Outcome Interventions   Occupational Therapy Goal     OT, PT/OT Ongoing, Progressing    Description: Goals to be met by: Discharge     Patient will increase functional independence with ADLs by performing:    UE Dressing with Stand-by Assistance. -ongoing,progressing; Min A  LE Dressing with Moderate Assistance. Goal Met 12/4/23  Grooming while standing at sink with Contact Guard Assistance. Ongoing progressing  Toileting from bedside commode with Moderate Assistance for hygiene and clothing management. -ongoing, progressing  Bathing from  shower chair/bench with Moderate Assistance. Ongoing, progressing   Toilet transfer to bedside commode with Stand-by Assistance. -ongoing, progressing; CGA-min A  Increased functional strength to WFL for ADLs.     New goals added 12/5/23:   Pt will perform self-feeding requiring modified independence.    Pt will perform LE dressing including donning/doffing footwear and pants using AE requiring CGA.                          Recommendations:     Discharge Equipment Recommendations:  wheelchair     Plan:     Patient to be seen 6 x/week to address the above listed problems via self-care/home management, therapeutic activities, therapeutic exercises, neuromuscular re-education  Plan of Care Expires: 12/21/23  Plan of Care Reviewed with: patient  Revisions made to plan of care: No      Skilled OT Minutes Provided: 33  Communication with Treatment Team:     Equipment recommendations:       At end of treatment, patient remained:  x Up in chair     Up in wheelchair in room    In bed   x With alarm activated    Bed rails up   x Call bell in reach    x Family/friends present    Restraints secured properly    In bathroom with CNA/RN notified    In gym with PT/PTA/tech    Nurse aware    Other:

## 2023-12-09 NOTE — PLAN OF CARE
Problem: Adult Inpatient Plan of Care  Goal: Plan of Care Review  Outcome: Ongoing, Progressing  Flowsheets (Taken 12/8/2023 2000)  Plan of Care Reviewed With: patient  Goal: Patient-Specific Goal (Individualized)  Outcome: Ongoing, Progressing  Flowsheets (Taken 12/8/2023 2000)  Anxieties, Fears or Concerns: none voiced  Individualized Care Needs: monitor cbgs, pain management  Goal: Absence of Hospital-Acquired Illness or Injury  Outcome: Ongoing, Progressing  Intervention: Identify and Manage Fall Risk  Flowsheets (Taken 12/8/2023 2000)  Safety Promotion/Fall Prevention:   assistive device/personal item within reach   bed alarm set   commode/urinal/bedpan at bedside   instructed to call staff for mobility   muscle strengthening facilitated   high risk medications identified   in recliner, wheels locked   lighting adjusted   medications reviewed   nonskid shoes/socks when out of bed  Intervention: Prevent Skin Injury  Flowsheets (Taken 12/8/2023 2133)  Body Position:   position changed independently   position maintained  Skin Protection:   adhesive use limited   pulse oximeter probe site changed  Intervention: Prevent and Manage VTE (Venous Thromboembolism) Risk  Flowsheets (Taken 12/8/2023 2133)  Activity Management: Rolling - L1  VTE Prevention/Management:   ambulation promoted   bleeding precations maintained   bleeding risk assessed   fluids promoted  Range of Motion: active ROM (range of motion) encouraged  Intervention: Prevent Infection  Flowsheets (Taken 12/8/2023 2133)  Infection Prevention:   personal protective equipment utilized   cohorting utilized   environmental surveillance performed   rest/sleep promoted   single patient room provided   equipment surfaces disinfected   hand hygiene promoted     Problem: Diabetes Comorbidity  Goal: Blood Glucose Level Within Targeted Range  Outcome: Ongoing, Progressing  Intervention: Monitor and Manage Glycemia  Flowsheets (Taken 12/8/2023 2133)  Glycemic  Management:   blood glucose monitored   supplemental insulin given     Problem: Fall Injury Risk  Goal: Absence of Fall and Fall-Related Injury  Outcome: Ongoing, Progressing  Intervention: Identify and Manage Contributors  Flowsheets (Taken 12/8/2023 2133)  Self-Care Promotion:   safe use of adaptive equipment encouraged   BADL personal objects within reach   BADL personal routines maintained   meal set-up provided   independence encouraged  Medication Review/Management:   medications reviewed   high-risk medications identified

## 2023-12-09 NOTE — PROGRESS NOTES
[facility] - Medical Surgical Unit  [facility] MEDICINE ~ PROGRESS NOTE    CHIEF COMPLAINT   Hospital follow up    HOSPITAL COURSE   74 yo male with a past medical history HTN, HLD, CAD, CVA, DM, PFO s/p closure in 2022, aortic stenosis, and PVD s/p lower extremity stenting who presented to the ER at [facility] on 11/14/2023 for extremity weakness with recurrent falls and unsteadiness.  MRI cervical spine without contrast on 11/14/2023 showed chronic multilevel central canal stenosis with effacement of the CSF, most severe at C3-C4 with mild chronic cord compression.  MRI thoracic spine without contrast on 11/14/2023 showed slightly increased signal seen in the disc at T11-T12 possibly representing early discitis, no epidural abscess, no osteomyelitis.  MRI lumbar spine without contrast on 11/14/2023 showed unchanged chronic degenerative changes with central canal and neuroforaminal stenosis. Neurosurgery was consulted on 11/15/2023 and patient subsequently underwent posterior spinal fusion on 11/20/2023. Patient was admitted to [facility] swing unit on 11/29/2023 for PT/OT. Patient was started on Rocephin for E coli UTI. Patient's glucose has remained elevated since initial admission.  Tolerating 10 units t.i.d. with meals and detemir 10 unit b.i.d..  Has been on and off voids per neurosurgery and now to treat a gouty attack. Last dose of Decadron per neurosurgery note on 11/27/2023, and is to follow up with Dr. Hutchinson in 3-4 weeks. Patient continue to wear soft collar.     Today  Left ankle pain is resolving.  Ambulating 50 ft with a rolling walker moderate assistance.  He is Min/mod assistance for ADLs.  Family training complete discharge likely early next week.    OBJECTIVE/PHYSICAL EXAM     VITAL SIGNS (MOST RECENT):  Temp: 98.2 °F (36.8 °C) (12/09/23 0900)  Pulse: 74 (12/09/23 0900)  Resp: 18 (12/09/23 0908)  BP: (!) 150/70 (12/09/23 0900)  SpO2: 95 % (12/09/23 0900) VITAL SIGNS (24 HOUR RANGE):  Temp:  [98.1 °F  (36.7 °C)-98.2 °F (36.8 °C)] 98.2 °F (36.8 °C)  Pulse:  [74-75] 74  Resp:  [18] 18  SpO2:  [91 %-95 %] 95 %  BP: (129-150)/(70-73) 150/70     GENERAL: In no acute distress, afebrile  HEENT: Atraumatic, normocephalic, moist mucous membranes, soft neck collar in place   CHEST: Clear to auscultation bilaterally  HEART: S1, S2, no appreciable murmur  ABDOMEN: Soft, nontender, BS +  MSK: Warm, no lower extremity edema, no clubbing or cyanosis, left ankle tenderness  NEUROLOGIC: Alert and oriented, moving all extremities   INTEGUMENTARY: No obvious skin rashes   PSYCHIATRY: Appropriate mood and affect    ASSESSMENT/PLAN     Spinal stenosis   S/p posterior cervical spinal fusion on 11/20/2023  Completed Decadron 11/30/2023  Follow up with Dr. Hutchinson is 12/19/2023 at 11:30am   Continue soft collar - awaiting smaller collar     Leukocytosis   Low suspicion for acute infection, patient has been receiving dexamethasone and now currently receiving prednisone for gouty attack    CORKY -resolved  Urinary retention   Urinary incontinence   Gardiner replaced 12/05/2023 for urinary retention which will remain  Needs follow up appointment with Dr. Solis upon d/c     E coli UTI -resolved  Rocephin completed 5 days     DM  Hyperglycemia  Insulin regimen changed to Detemir 10u BID and Aspart 10u TID   Continue moderate SSI      Acute on chronic gout   Sed rate and CRP elevated   Continue Prednisone 40mg PO QD then taper once ankle pain improves  Continue PPI BID while on steroids       HLD, CAD, CVA  Continue home Lisinopril, Atorvastatin, Aspirin      H/o: BPH, anxiety, depression, PFO s/p closure in 2022, aortic stenosis, and PVD s/p lower extremity stenting    DVT prophylaxis: Lovenox   Anticipated discharge and disposition: home with [facility]  __________________________________________________________________________    LABS/MICRO/MEDS/DIAGNOSTICS     LABS  Recent Labs     12/08/23  0258   *   K 4.4   CHLORIDE 100   CO2 27   BUN  "26.1*   CREATININE 0.91   GLUCOSE 184*   CALCIUM 9.3     Recent Labs     12/08/23  0258   WBC 20.41*   RBC 4.05*   HCT 39.5*   MCV 97.5*        MICROBIOLOGY  Microbiology Results (last 7 days)       ** No results found for the last 168 hours. **             MEDICATIONS   allopurinoL  100 mg Oral Daily    aspirin  81 mg Oral Daily    atorvastatin  20 mg Oral Every Mon, Wed, Fri    citalopram  20 mg Oral Daily    diclofenac sodium  4 g Topical (Top) BID    doxazosin  4 mg Oral Daily    enoxparin  40 mg Subcutaneous Q24H (prophylaxis, 1700)    gabapentin  300 mg Oral TID    insulin aspart U-100  10 Units Subcutaneous TIDWM    insulin detemir U-100  10 Units Subcutaneous BID    megestroL  40 mg Oral Daily    methocarbamoL  750 mg Oral TID    miconazole NITRATE 2 %   Topical (Top) BID    midodrine  5 mg Oral TID WM    montelukast  10 mg Oral QHS    multivitamin  1 tablet Oral Daily    pantoprazole  40 mg Oral BID    polyethylene glycol  17 g Oral Daily    predniSONE  40 mg Oral Daily    senna-docusate 8.6-50 mg  2 tablet Oral BID    tamsulosin  1 capsule Oral Daily         INFUSIONS         DIAGNOSTIC TESTS  US Retroperitoneal Complete   Final Result      Normal sonographic appearance of the kidneys.         Electronically signed by: Stevie Ochoa MD   Date:    12/02/2023   Time:    11:03      RADIOLOGY REPORT   Final Result           No results found for: "EF"     NUTRITION STATUS  Patient meets ASPEN criteria for   malnutrition of   per RD assessment as evidenced by:                       A minimum of two characteristics is recommended for diagnosis of either severe or non-severe malnutrition.     Case related differential diagnoses have been reviewed; assessment and plan has been documented. I have personally reviewed the labs and test results that are currently available; I have reviewed the patients medication list. I have reviewed the consulting providers recommendations. I have reviewed or attempted to review " medical records based upon their availability.  All of the patient's and/or family's questions have been addressed and answered to the best of my ability.  I will continue to monitor closely and make adjustments to medical management as needed.  This document was created using M*Modal Fluency Direct.  Transcription errors may have been made.  Please contact me if any questions may rise regarding documentation to clarify transcription.      Thairy G Reyes, DO   Internal Medicine  Department of Hospital Medicine [facility] - [facility]

## 2023-12-10 LAB
POCT GLUCOSE: 106 MG/DL (ref 70–110)
POCT GLUCOSE: 126 MG/DL (ref 70–110)
POCT GLUCOSE: 202 MG/DL (ref 70–110)
POCT GLUCOSE: 220 MG/DL (ref 70–110)

## 2023-12-10 PROCEDURE — 25000003 PHARM REV CODE 250: Performed by: STUDENT IN AN ORGANIZED HEALTH CARE EDUCATION/TRAINING PROGRAM

## 2023-12-10 PROCEDURE — 99900035 HC TECH TIME PER 15 MIN (STAT)

## 2023-12-10 PROCEDURE — 25000003 PHARM REV CODE 250: Performed by: PHYSICIAN ASSISTANT

## 2023-12-10 PROCEDURE — 94760 N-INVAS EAR/PLS OXIMETRY 1: CPT

## 2023-12-10 PROCEDURE — 63600175 PHARM REV CODE 636 W HCPCS: Performed by: PHYSICIAN ASSISTANT

## 2023-12-10 PROCEDURE — 63600175 PHARM REV CODE 636 W HCPCS: Performed by: STUDENT IN AN ORGANIZED HEALTH CARE EDUCATION/TRAINING PROGRAM

## 2023-12-10 PROCEDURE — 11000004 HC SNF PRIVATE

## 2023-12-10 RX ORDER — MIDODRINE HYDROCHLORIDE 2.5 MG/1
2.5 TABLET ORAL
Status: DISCONTINUED | OUTPATIENT
Start: 2023-12-10 | End: 2023-12-11

## 2023-12-10 RX ADMIN — INSULIN DETEMIR 10 UNITS: 100 INJECTION, SOLUTION SUBCUTANEOUS at 08:12

## 2023-12-10 RX ADMIN — GABAPENTIN 300 MG: 300 CAPSULE ORAL at 08:12

## 2023-12-10 RX ADMIN — PREDNISONE 40 MG: 20 TABLET ORAL at 08:12

## 2023-12-10 RX ADMIN — PANTOPRAZOLE SODIUM 40 MG: 40 TABLET, DELAYED RELEASE ORAL at 08:12

## 2023-12-10 RX ADMIN — INSULIN ASPART 2 UNITS: 100 INJECTION, SOLUTION INTRAVENOUS; SUBCUTANEOUS at 08:12

## 2023-12-10 RX ADMIN — INSULIN ASPART 4 UNITS: 100 INJECTION, SOLUTION INTRAVENOUS; SUBCUTANEOUS at 04:12

## 2023-12-10 RX ADMIN — MULTIPLE VITAMINS W/ MINERALS TAB 1 TABLET: TAB at 08:12

## 2023-12-10 RX ADMIN — POLYETHYLENE GLYCOL 3350 17 G: 17 POWDER, FOR SOLUTION ORAL at 09:12

## 2023-12-10 RX ADMIN — SENNOSIDES AND DOCUSATE SODIUM 2 TABLET: 8.6; 5 TABLET ORAL at 08:12

## 2023-12-10 RX ADMIN — MAGNESIUM HYDROXIDE 2400 MG: 400 SUSPENSION ORAL at 02:12

## 2023-12-10 RX ADMIN — ASPIRIN 81 MG: 81 TABLET, COATED ORAL at 08:12

## 2023-12-10 RX ADMIN — DICLOFENAC SODIUM 4 G: 10 GEL TOPICAL at 09:12

## 2023-12-10 RX ADMIN — CITALOPRAM HYDROBROMIDE 20 MG: 20 TABLET ORAL at 08:12

## 2023-12-10 RX ADMIN — MIDODRINE HYDROCHLORIDE 2.5 MG: 2.5 TABLET ORAL at 12:12

## 2023-12-10 RX ADMIN — METHOCARBAMOL 750 MG: 750 TABLET ORAL at 02:12

## 2023-12-10 RX ADMIN — MONTELUKAST SODIUM 10 MG: 10 TABLET, COATED ORAL at 08:12

## 2023-12-10 RX ADMIN — MIDODRINE HYDROCHLORIDE 5 MG: 5 TABLET ORAL at 08:12

## 2023-12-10 RX ADMIN — ENOXAPARIN SODIUM 40 MG: 40 INJECTION SUBCUTANEOUS at 04:12

## 2023-12-10 RX ADMIN — ALLOPURINOL 100 MG: 100 TABLET ORAL at 08:12

## 2023-12-10 RX ADMIN — DOXAZOSIN 4 MG: 4 TABLET ORAL at 08:12

## 2023-12-10 RX ADMIN — GABAPENTIN 300 MG: 300 CAPSULE ORAL at 02:12

## 2023-12-10 RX ADMIN — INSULIN ASPART 10 UNITS: 100 INJECTION, SOLUTION INTRAVENOUS; SUBCUTANEOUS at 04:12

## 2023-12-10 RX ADMIN — MEGESTROL ACETATE 40 MG: 20 TABLET ORAL at 08:12

## 2023-12-10 RX ADMIN — TAMSULOSIN HYDROCHLORIDE 0.4 MG: 0.4 CAPSULE ORAL at 08:12

## 2023-12-10 RX ADMIN — INSULIN ASPART 10 UNITS: 100 INJECTION, SOLUTION INTRAVENOUS; SUBCUTANEOUS at 11:12

## 2023-12-10 RX ADMIN — METHOCARBAMOL 750 MG: 750 TABLET ORAL at 08:12

## 2023-12-10 RX ADMIN — MIDODRINE HYDROCHLORIDE 2.5 MG: 2.5 TABLET ORAL at 04:12

## 2023-12-10 RX ADMIN — INSULIN ASPART 10 UNITS: 100 INJECTION, SOLUTION INTRAVENOUS; SUBCUTANEOUS at 08:12

## 2023-12-10 NOTE — PLAN OF CARE
Problem: Adult Inpatient Plan of Care  Goal: Plan of Care Review  Outcome: Ongoing, Progressing  Goal: Patient-Specific Goal (Individualized)  Outcome: Ongoing, Progressing  Flowsheets (Taken 12/9/2023 2032)  Anxieties, Fears or Concerns: none voiced  Individualized Care Needs: monitor cbgs, pain management  Goal: Absence of Hospital-Acquired Illness or Injury  Outcome: Ongoing, Progressing  Intervention: Prevent Skin Injury  Flowsheets (Taken 12/9/2023 2032)  Body Position: position changed independently  Skin Protection: incontinence pads utilized  Intervention: Prevent Infection  Flowsheets (Taken 12/9/2023 2032)  Infection Prevention:   cohorting utilized   personal protective equipment utilized   environmental surveillance performed   rest/sleep promoted   equipment surfaces disinfected   hand hygiene promoted   single patient room provided     Problem: Diabetes Comorbidity  Goal: Blood Glucose Level Within Targeted Range  Outcome: Ongoing, Progressing  Intervention: Monitor and Manage Glycemia  Flowsheets (Taken 12/9/2023 2000)  Glycemic Management:   supplemental insulin given   blood glucose monitored     Problem: Fall Injury Risk  Goal: Absence of Fall and Fall-Related Injury  Outcome: Ongoing, Progressing  Intervention: Identify and Manage Contributors  Flowsheets (Taken 12/9/2023 2000)  Self-Care Promotion:   independence encouraged   safe use of adaptive equipment encouraged   BADL personal objects within reach   BADL personal routines maintained   meal set-up provided  Medication Review/Management:   medications reviewed   high-risk medications identified

## 2023-12-10 NOTE — PROGRESS NOTES
[facility]  [facility] MEDICINE ~ PROGRESS NOTE    CHIEF COMPLAINT   Hospital follow up    HOSPITAL COURSE   72 yo male with a past medical history HTN, HLD, CAD, CVA, DM, PFO s/p closure in 2022, aortic stenosis, and PVD s/p lower extremity stenting who presented to the ER at [facility] on 11/14/2023 for extremity weakness with recurrent falls and unsteadiness.  MRI cervical spine without contrast on 11/14/2023 showed chronic multilevel central canal stenosis with effacement of the CSF, most severe at C3-C4 with mild chronic cord compression.  MRI thoracic spine without contrast on 11/14/2023 showed slightly increased signal seen in the disc at T11-T12 possibly representing early discitis, no epidural abscess, no osteomyelitis.  MRI lumbar spine without contrast on 11/14/2023 showed unchanged chronic degenerative changes with central canal and neuroforaminal stenosis. Neurosurgery was consulted on 11/15/2023 and patient subsequently underwent posterior spinal fusion on 11/20/2023. Patient was admitted to [facility] swing unit on 11/29/2023 for PT/OT. Patient was started on Rocephin for E coli UTI. Patient's glucose has remained elevated since initial admission.  Tolerating 10 units t.i.d. with meals and detemir 10 unit b.i.d..  Has been on and off voids per neurosurgery and now to treat a gouty attack. Last dose of Decadron per neurosurgery note on 11/27/2023, and is to follow up with Dr. Hutchinson in 3-4 weeks. Patient continue to wear soft collar.     Today  Patient doing well, no complaints.  Decreased dose of midodrine on 12/10 as he has been hypertensive with a goal of discontinuing altogether.  OBJECTIVE/PHYSICAL EXAM     VITAL SIGNS (MOST RECENT):  Temp: 98.2 °F (36.8 °C) (12/10/23 0900)  Pulse: 76 (12/10/23 0900)  Resp: 18 (12/10/23 0900)  BP: (!) 173/81 (12/10/23 0900)  SpO2: 95 % (12/10/23 0900) VITAL SIGNS (24 HOUR RANGE):  Temp:  [98.2 °F (36.8 °C)-98.5 °F (36.9 °C)] 98.2 °F (36.8  °C)  Pulse:  [76-81] 76  Resp:  [18] 18  SpO2:  [95 %-96 %] 95 %  BP: (160-173)/(81-82) 173/81   GENERAL: In no acute distress, afebrile  HEENT: Atraumatic, normocephalic, moist mucous membranes, soft neck collar in place   CHEST: Clear to auscultation bilaterally  HEART: S1, S2, no appreciable murmur  ABDOMEN: Soft, nontender, BS +  MSK: Warm, no lower extremity edema, no clubbing or cyanosis, left ankle tenderness  NEUROLOGIC: Alert and oriented, moving all extremities   INTEGUMENTARY: No obvious skin rashes   PSYCHIATRY: Appropriate mood and affect    ASSESSMENT/PLAN   Spinal stenosis   S/p posterior cervical spinal fusion on 11/20/2023  Completed Decadron 11/30/2023  Follow up with Dr. Hutchinson is 12/19/2023 at 11:30am   Continue soft collar - awaiting smaller collar     Leukocytosis   Low suspicion for acute infection, patient has been receiving dexamethasone and now currently receiving prednisone for gouty attack    CORKY -resolved  Urinary retention   Urinary incontinence   Gardiner replaced 12/05/2023 for urinary retention which will remain  Needs follow up appointment with Dr. Solis upon d/c     E coli UTI -resolved  Rocephin completed 5 days     DM  Hyperglycemia  Insulin regimen changed to Detemir 10u BID and Aspart 10u TID   Continue moderate SSI      Acute on chronic gout   Sed rate and CRP elevated   Continue Prednisone 40mg PO QD then taper once ankle pain improves  Can likely start taper on 12/14/2023  Continue PPI BID while on steroids       HLD, CAD, CVA  Continue home Atorvastatin, Aspirin     Hypotension   Patient experienced hypotension requiring midodrine at prior facility, has been hypertensive here therefore decreased midodrine to 2.5 t.i.d., goal is to discontinue altogether in the coming days     H/o: BPH, anxiety, depression, PFO s/p closure in 2022, aortic stenosis, and PVD s/p lower extremity stenting    DVT prophylaxis: Lovenox   Anticipated discharge and disposition: home with  "HHC  __________________________________________________________________________    LABS/MICRO/MEDS/DIAGNOSTICS     LABS  Recent Labs     12/08/23  0258   *   K 4.4   CHLORIDE 100   CO2 27   BUN 26.1*   CREATININE 0.91   GLUCOSE 184*   CALCIUM 9.3     Recent Labs     12/08/23  0258   WBC 20.41*   RBC 4.05*   HCT 39.5*   MCV 97.5*        MICROBIOLOGY  Microbiology Results (last 7 days)       ** No results found for the last 168 hours. **             MEDICATIONS   allopurinoL  100 mg Oral Daily    aspirin  81 mg Oral Daily    atorvastatin  20 mg Oral Every Mon, Wed, Fri    citalopram  20 mg Oral Daily    diclofenac sodium  4 g Topical (Top) BID    doxazosin  4 mg Oral Daily    enoxparin  40 mg Subcutaneous Q24H (prophylaxis, 1700)    gabapentin  300 mg Oral TID    insulin aspart U-100  10 Units Subcutaneous TIDWM    insulin detemir U-100  10 Units Subcutaneous BID    megestroL  40 mg Oral Daily    methocarbamoL  750 mg Oral TID    miconazole NITRATE 2 %   Topical (Top) BID    midodrine  2.5 mg Oral TID WM    montelukast  10 mg Oral QHS    multivitamin  1 tablet Oral Daily    pantoprazole  40 mg Oral BID    polyethylene glycol  17 g Oral Daily    predniSONE  40 mg Oral Daily    senna-docusate 8.6-50 mg  2 tablet Oral BID    tamsulosin  1 capsule Oral Daily         INFUSIONS         DIAGNOSTIC TESTS  US Retroperitoneal Complete   Final Result      Normal sonographic appearance of the kidneys.         Electronically signed by: Stevie Ochoa MD   Date:    12/02/2023   Time:    11:03      RADIOLOGY REPORT   Final Result           No results found for: "EF"     NUTRITION STATUS  Patient meets ASPEN criteria for   malnutrition of   per RD assessment as evidenced by:                       A minimum of two characteristics is recommended for diagnosis of either severe or non-severe malnutrition.     Case related differential diagnoses have been reviewed; assessment and plan has been documented. I have personally " reviewed the labs and test results that are currently available; I have reviewed the patients medication list. I have reviewed the consulting providers recommendations. I have reviewed or attempted to review medical records based upon their availability.  All of the patient's and/or family's questions have been addressed and answered to the best of my ability.  I will continue to monitor closely and make adjustments to medical management as needed.  This document was created using M*Modal Fluency Direct.  Transcription errors may have been made.  Please contact me if any questions may rise regarding documentation to clarify transcription.      Thairy G Reyes, DO   Internal Medicine  Department of Hospital Medicine  [facility]

## 2023-12-10 NOTE — PLAN OF CARE
Problem: Adult Inpatient Plan of Care  Goal: Plan of Care Review  Outcome: Ongoing, Progressing  Flowsheets (Taken 12/10/2023 0715)  Plan of Care Reviewed With: patient  Goal: Patient-Specific Goal (Individualized)  Outcome: Ongoing, Progressing  Flowsheets (Taken 12/10/2023 0715)  Anxieties, Fears or Concerns: denies any concerns at this time  Individualized Care Needs: Monitor CBGs, participate in PT/OT,  Goal: Absence of Hospital-Acquired Illness or Injury  Outcome: Ongoing, Progressing  Intervention: Identify and Manage Fall Risk  Flowsheets (Taken 12/10/2023 0715)  Safety Promotion/Fall Prevention:   assistive device/personal item within reach   bed alarm set   nonskid shoes/socks when out of bed   instructed to call staff for mobility  Intervention: Prevent Skin Injury  Flowsheets (Taken 12/10/2023 0734)  Body Position: position changed independently  Skin Protection:   adhesive use limited   tubing/devices free from skin contact   skin-to-device areas padded   skin-to-skin areas padded  Intervention: Prevent and Manage VTE (Venous Thromboembolism) Risk  Flowsheets (Taken 12/10/2023 0715)  Activity Management: Rolling - L1  VTE Prevention/Management: ROM (active) performed  Range of Motion: active ROM (range of motion) encouraged  Intervention: Prevent Infection  Flowsheets (Taken 12/10/2023 0715)  Infection Prevention:   cohorting utilized   personal protective equipment utilized   environmental surveillance performed   rest/sleep promoted   equipment surfaces disinfected   single patient room provided   hand hygiene promoted  Goal: Optimal Comfort and Wellbeing  Outcome: Ongoing, Progressing  Intervention: Monitor Pain and Promote Comfort  Flowsheets (Taken 12/10/2023 0715)  Pain Management Interventions: care clustered  Intervention: Provide Person-Centered Care  Flowsheets (Taken 12/10/2023 0715)  Trust Relationship/Rapport:   care explained   questions encouraged   choices provided   reassurance  provided   emotional support provided   thoughts/feelings acknowledged   empathic listening provided   questions answered  Goal: Readiness for Transition of Care  Outcome: Ongoing, Progressing     Problem: Diabetes Comorbidity  Goal: Blood Glucose Level Within Targeted Range  Outcome: Ongoing, Progressing  Intervention: Monitor and Manage Glycemia  Flowsheets (Taken 12/10/2023 0715)  Glycemic Management: blood glucose monitored     Problem: Infection  Goal: Absence of Infection Signs and Symptoms  Outcome: Ongoing, Progressing  Intervention: Prevent or Manage Infection  Flowsheets (Taken 12/10/2023 0715)  Fever Reduction/Comfort Measures: lightweight bedding  Infection Management: aseptic technique maintained  Isolation Precautions:   protective   precautions maintained     Problem: Skin Injury Risk Increased  Goal: Skin Health and Integrity  Outcome: Ongoing, Progressing  Intervention: Optimize Skin Protection  Flowsheets (Taken 12/10/2023 0715)  Pressure Reduction Techniques:   frequent weight shift encouraged   heels elevated off bed   positioned off wounds   pressure points protected   sit time limited to 2 hours  Pressure Reduction Devices: positioning supports utilized  Skin Protection:   adhesive use limited   tubing/devices free from skin contact   skin-to-device areas padded   skin-to-skin areas padded  Head of Bed (HOB) Positioning: HOB elevated  Intervention: Promote and Optimize Oral Intake  Flowsheets (Taken 12/10/2023 0715)  Oral Nutrition Promotion: physical activity promoted     Problem: Urinary Retention  Goal: Effective Urinary Elimination  Outcome: Ongoing, Progressing  Intervention: Promote Effective Urine Elimination  Flowsheets (Taken 12/10/2023 0715)  Bowel Function Promotion: toileting schedule established  Urinary Elimination Promotion: frequent voiding encouraged     Problem: Fall Injury Risk  Goal: Absence of Fall and Fall-Related Injury  Outcome: Ongoing, Progressing  Intervention:  Identify and Manage Contributors  Flowsheets (Taken 12/10/2023 0715)  Self-Care Promotion: independence encouraged  Medication Review/Management: medications reviewed  Intervention: Promote Injury-Free Environment  Flowsheets (Taken 12/10/2023 0715)  Safety Promotion/Fall Prevention:   assistive device/personal item within reach   bed alarm set   nonskid shoes/socks when out of bed   instructed to call staff for mobility

## 2023-12-10 NOTE — PLAN OF CARE
[facility]  Discharge Reassessment    Primary Care Provider: Robert Alarcon MD    Expected Discharge Date:     Reassessment (most recent)       Discharge Reassessment - 12/10/23 1724          Discharge Reassessment    Assessment Type Discharge Planning Reassessment     Did the patient's condition or plan change since previous assessment? No     Discharge Plan discussed with: Patient     Communicated NAZANIN with patient/caregiver Date not available/Unable to determine     Discharge Plan A Home with family;Home Health     DME Needed Upon Discharge  walker, standard     Transition of Care Barriers None     Why the patient remains in the hospital Requires continued medical care        Post-Acute Status    Post-Acute Authorization Home Health     Home Health Status Pending medical clearance/testing     Hospital Resources/Appts/Education Provided Provided patient/caregiver with written discharge plan information     Discharge Delays None known at this time

## 2023-12-11 LAB
ANION GAP SERPL CALC-SCNC: 10 MEQ/L
BASOPHILS # BLD AUTO: 0.02 X10(3)/MCL
BASOPHILS NFR BLD AUTO: 0.1 %
BUN SERPL-MCNC: 25.5 MG/DL (ref 8.4–25.7)
CALCIUM SERPL-MCNC: 9 MG/DL (ref 8.8–10)
CHLORIDE SERPL-SCNC: 99 MMOL/L (ref 98–107)
CO2 SERPL-SCNC: 31 MMOL/L (ref 23–31)
CREAT SERPL-MCNC: 0.84 MG/DL (ref 0.73–1.18)
CREAT/UREA NIT SERPL: 30
EOSINOPHIL # BLD AUTO: 0.02 X10(3)/MCL (ref 0–0.9)
EOSINOPHIL NFR BLD AUTO: 0.1 %
ERYTHROCYTE [DISTWIDTH] IN BLOOD BY AUTOMATED COUNT: 12.2 % (ref 11.5–17)
GFR SERPLBLD CREATININE-BSD FMLA CKD-EPI: >60 MLS/MIN/1.73/M2
GLUCOSE SERPL-MCNC: 140 MG/DL (ref 82–115)
HCT VFR BLD AUTO: 41.2 % (ref 42–52)
HGB BLD-MCNC: 13.3 G/DL (ref 14–18)
IMM GRANULOCYTES # BLD AUTO: 0.58 X10(3)/MCL (ref 0–0.04)
IMM GRANULOCYTES NFR BLD AUTO: 3.3 %
LYMPHOCYTES # BLD AUTO: 2.04 X10(3)/MCL (ref 0.6–4.6)
LYMPHOCYTES NFR BLD AUTO: 11.6 %
MCH RBC QN AUTO: 31.2 PG (ref 27–31)
MCHC RBC AUTO-ENTMCNC: 32.3 G/DL (ref 33–36)
MCV RBC AUTO: 96.7 FL (ref 80–94)
MONOCYTES # BLD AUTO: 1.2 X10(3)/MCL (ref 0.1–1.3)
MONOCYTES NFR BLD AUTO: 6.8 %
NEUTROPHILS # BLD AUTO: 13.79 X10(3)/MCL (ref 2.1–9.2)
NEUTROPHILS NFR BLD AUTO: 78.1 %
PLATELET # BLD AUTO: 217 X10(3)/MCL (ref 130–400)
PMV BLD AUTO: 10.6 FL (ref 7.4–10.4)
POCT GLUCOSE: 134 MG/DL (ref 70–110)
POCT GLUCOSE: 228 MG/DL (ref 70–110)
POCT GLUCOSE: 316 MG/DL (ref 70–110)
POTASSIUM SERPL-SCNC: 4.7 MMOL/L (ref 3.5–5.1)
RBC # BLD AUTO: 4.26 X10(6)/MCL (ref 4.7–6.1)
SODIUM SERPL-SCNC: 140 MMOL/L (ref 136–145)
WBC # SPEC AUTO: 17.65 X10(3)/MCL (ref 4.5–11.5)

## 2023-12-11 PROCEDURE — 97530 THERAPEUTIC ACTIVITIES: CPT

## 2023-12-11 PROCEDURE — 25000003 PHARM REV CODE 250: Performed by: PHYSICIAN ASSISTANT

## 2023-12-11 PROCEDURE — 94761 N-INVAS EAR/PLS OXIMETRY MLT: CPT

## 2023-12-11 PROCEDURE — 97110 THERAPEUTIC EXERCISES: CPT

## 2023-12-11 PROCEDURE — 97535 SELF CARE MNGMENT TRAINING: CPT

## 2023-12-11 PROCEDURE — 25000003 PHARM REV CODE 250: Performed by: STUDENT IN AN ORGANIZED HEALTH CARE EDUCATION/TRAINING PROGRAM

## 2023-12-11 PROCEDURE — 80048 BASIC METABOLIC PNL TOTAL CA: CPT | Performed by: STUDENT IN AN ORGANIZED HEALTH CARE EDUCATION/TRAINING PROGRAM

## 2023-12-11 PROCEDURE — 99900035 HC TECH TIME PER 15 MIN (STAT)

## 2023-12-11 PROCEDURE — 63600175 PHARM REV CODE 636 W HCPCS: Performed by: PHYSICIAN ASSISTANT

## 2023-12-11 PROCEDURE — 97116 GAIT TRAINING THERAPY: CPT

## 2023-12-11 PROCEDURE — 63600175 PHARM REV CODE 636 W HCPCS: Performed by: STUDENT IN AN ORGANIZED HEALTH CARE EDUCATION/TRAINING PROGRAM

## 2023-12-11 PROCEDURE — 85025 COMPLETE CBC W/AUTO DIFF WBC: CPT | Performed by: STUDENT IN AN ORGANIZED HEALTH CARE EDUCATION/TRAINING PROGRAM

## 2023-12-11 PROCEDURE — 11000004 HC SNF PRIVATE

## 2023-12-11 RX ORDER — BISACODYL 10 MG
10 SUPPOSITORY, RECTAL RECTAL DAILY PRN
Status: DISCONTINUED | OUTPATIENT
Start: 2023-12-11 | End: 2023-12-13 | Stop reason: HOSPADM

## 2023-12-11 RX ADMIN — ALLOPURINOL 100 MG: 100 TABLET ORAL at 08:12

## 2023-12-11 RX ADMIN — ATORVASTATIN CALCIUM 20 MG: 10 TABLET, FILM COATED ORAL at 08:12

## 2023-12-11 RX ADMIN — INSULIN DETEMIR 10 UNITS: 100 INJECTION, SOLUTION SUBCUTANEOUS at 08:12

## 2023-12-11 RX ADMIN — GABAPENTIN 300 MG: 300 CAPSULE ORAL at 08:12

## 2023-12-11 RX ADMIN — PANTOPRAZOLE SODIUM 40 MG: 40 TABLET, DELAYED RELEASE ORAL at 08:12

## 2023-12-11 RX ADMIN — INSULIN ASPART 10 UNITS: 100 INJECTION, SOLUTION INTRAVENOUS; SUBCUTANEOUS at 11:12

## 2023-12-11 RX ADMIN — DICLOFENAC SODIUM 4 G: 10 GEL TOPICAL at 08:12

## 2023-12-11 RX ADMIN — ASPIRIN 81 MG: 81 TABLET, COATED ORAL at 08:12

## 2023-12-11 RX ADMIN — GABAPENTIN 300 MG: 300 CAPSULE ORAL at 04:12

## 2023-12-11 RX ADMIN — POLYETHYLENE GLYCOL 3350 17 G: 17 POWDER, FOR SOLUTION ORAL at 08:12

## 2023-12-11 RX ADMIN — DOXAZOSIN 4 MG: 4 TABLET ORAL at 08:12

## 2023-12-11 RX ADMIN — SENNOSIDES AND DOCUSATE SODIUM 2 TABLET: 8.6; 5 TABLET ORAL at 08:12

## 2023-12-11 RX ADMIN — ENOXAPARIN SODIUM 40 MG: 40 INJECTION SUBCUTANEOUS at 04:12

## 2023-12-11 RX ADMIN — MIDODRINE HYDROCHLORIDE 2.5 MG: 2.5 TABLET ORAL at 08:12

## 2023-12-11 RX ADMIN — CITALOPRAM HYDROBROMIDE 20 MG: 20 TABLET ORAL at 08:12

## 2023-12-11 RX ADMIN — INSULIN ASPART 4 UNITS: 100 INJECTION, SOLUTION INTRAVENOUS; SUBCUTANEOUS at 08:12

## 2023-12-11 RX ADMIN — MULTIPLE VITAMINS W/ MINERALS TAB 1 TABLET: TAB at 08:12

## 2023-12-11 RX ADMIN — METHOCARBAMOL 750 MG: 750 TABLET ORAL at 04:12

## 2023-12-11 RX ADMIN — METHOCARBAMOL 750 MG: 750 TABLET ORAL at 08:12

## 2023-12-11 RX ADMIN — INSULIN ASPART 10 UNITS: 100 INJECTION, SOLUTION INTRAVENOUS; SUBCUTANEOUS at 04:12

## 2023-12-11 RX ADMIN — MAGNESIUM HYDROXIDE 2400 MG: 400 SUSPENSION ORAL at 06:12

## 2023-12-11 RX ADMIN — MICONAZOLE NITRATE 2 % TOPICAL POWDER: at 08:12

## 2023-12-11 RX ADMIN — MEGESTROL ACETATE 40 MG: 20 TABLET ORAL at 08:12

## 2023-12-11 RX ADMIN — PREDNISONE 40 MG: 20 TABLET ORAL at 08:12

## 2023-12-11 RX ADMIN — TAMSULOSIN HYDROCHLORIDE 0.4 MG: 0.4 CAPSULE ORAL at 08:12

## 2023-12-11 RX ADMIN — INSULIN ASPART 10 UNITS: 100 INJECTION, SOLUTION INTRAVENOUS; SUBCUTANEOUS at 06:12

## 2023-12-11 RX ADMIN — MONTELUKAST SODIUM 10 MG: 10 TABLET, COATED ORAL at 08:12

## 2023-12-11 RX ADMIN — INSULIN ASPART 4 UNITS: 100 INJECTION, SOLUTION INTRAVENOUS; SUBCUTANEOUS at 04:12

## 2023-12-11 NOTE — PT/OT/SLP PROGRESS
Occupational Therapy  Treatment    Name: Stevie Cummins    : 1950 (73 y.o.)  MRN: [id number]           TREATMENT SUMMARY AND RECOMMENDATIONS:      OT Date of Treatment: 23  OT Start Time: 1330  OT Stop Time: 1400  OT Total Time (min): 30 min      Subjective Assessment:   No complaints  Lethargic   x Awake, alert, cooperative  Impulsive    Uncooperative   Flat affect    Agitated  c/o pain   x Appropriate  c/o fatigue    Confused x Treated at bedside     Emotionally labile x Treated in gym/dept.      Other:        Therapy Precautions:    Cognitive deficits x Spinal precautions    Collar - hard  Sternal precautions   x Collar - soft   TLSO   x Fall risk  LSO    Hip precautions - posterior  Knee immobilizer    Hip precautions - anterior  WBAT    Impaired communication  Partial weightbearing    Oxygen  TTWB    PEG tube  NWB    Visual deficits      Hearing deficits   Other:        Treatment Objectives:     Mobility Training:    Mobility task Assist level Comments    Bed mobility SBA Supine<sitting EOB; using bed rail and log rolling technique    Transfer     Sit to stands transitions CGA EOB<standing using a RW and gait belt    Functional mobility CGA FM in his room from his bed<toilet<WC using a RW and gait belt    Sitting balance     Standing balance      Other:        ADL Training:    ADL Assist level Comments    Feeding     Grooming/hygiene     Bathing     Upper body dressing     Lower body dressing CGA Don and doff pants using a reacher to thread/unthread    Toileting CGA +BM; able to manage his clothing and perform posterior hygiene in sitting    Toilet transfer CGA BSC<>standing using a RW, grab bar, and gait belt    Adaptive equipment training     Other:           Therapeutic Exercise:   Exercise Sets Reps Comments   WC push-ups  3 2 Increase tricep strength to improve functional mobility                          Additional Comments:      Assessment: Patient tolerated session well. Pt shows an  improvement in his performance of toileting and LB dressing as evident of requiring less assistance as compared to previous sessions. Pt would benefit from continued OT services to increase independence with occupational performance, decrease risks for falls, and decrease caregiver burden.     GOALS:   Multidisciplinary Problems       Occupational Therapy Goals          Problem: Occupational Therapy    Goal Priority Disciplines Outcome Interventions   Occupational Therapy Goal     OT, PT/OT Ongoing, Progressing    Description: Goals to be met by: Discharge     Patient will increase functional independence with ADLs by performing:    UE Dressing with Stand-by Assistance. -ongoing,progressing; Min A  LE Dressing with Moderate Assistance. Goal Met 12/4/23  Grooming while standing at sink with Contact Guard Assistance. Ongoing progressing  Toileting from bedside commode with Moderate Assistance for hygiene and clothing management. -ongoing, progressing  Bathing from  shower chair/bench with Moderate Assistance. Ongoing, progressing   Toilet transfer to bedside commode with Stand-by Assistance. -ongoing, progressing; CGA-min A  Increased functional strength to WFL for ADLs.     New goals added 12/5/23:   Pt will perform self-feeding requiring modified independence.    Pt will perform LE dressing including donning/doffing footwear and pants using AE requiring CGA.                          Recommendations:     Discharge Equipment Recommendations:  wheelchair     Plan:     Patient to be seen 6 x/week to address the above listed problems via self-care/home management, therapeutic activities, therapeutic exercises, neuromuscular re-education  Plan of Care Expires: 12/21/23  Plan of Care Reviewed with: patient  Revisions made to plan of care: Yes      Skilled OT Minutes Provided: 30  Communication with Treatment Team:     Equipment recommendations:       At end of treatment, patient remained:   Up in chair     Up in wheelchair  in room    In bed    With alarm activated    Bed rails up    Call bell in reach     Family/friends present    Restraints secured properly    In bathroom with CNA/RN notified   x In gym with PT/PTA/tech    Nurse aware    Other:

## 2023-12-11 NOTE — PLAN OF CARE
Problem: Adult Inpatient Plan of Care  Goal: Plan of Care Review  Outcome: Ongoing, Progressing  Goal: Patient-Specific Goal (Individualized)  Outcome: Ongoing, Progressing  Flowsheets (Taken 12/10/2023 2053)  Anxieties, Fears or Concerns: concerned about being constipated  Individualized Care Needs: administed scheduled bowel meds, monitor cbgs  Goal: Absence of Hospital-Acquired Illness or Injury  Outcome: Ongoing, Progressing  Intervention: Prevent Skin Injury  Flowsheets (Taken 12/10/2023 2000)  Body Position:   position changed independently   position maintained  Skin Protection:   adhesive use limited   incontinence pads utilized     Problem: Diabetes Comorbidity  Goal: Blood Glucose Level Within Targeted Range  Outcome: Ongoing, Progressing     Problem: Fall Injury Risk  Goal: Absence of Fall and Fall-Related Injury  Outcome: Ongoing, Progressing  Intervention: Identify and Manage Contributors  Flowsheets (Taken 12/10/2023 2000)  Self-Care Promotion:   safe use of adaptive equipment encouraged   independence encouraged   BADL personal objects within reach   BADL personal routines maintained   meal set-up provided  Medication Review/Management:   medications reviewed   high-risk medications identified

## 2023-12-11 NOTE — PROGRESS NOTES
Inpatient Nutrition Evaluation    Admit Date: 11/29/2023   Total duration of encounter: 12 days   Patient Age: 73 y.o.    Nutrition Recommendation/Prescription   Continue diabetic diet. 2.Continue boost glucose control all meals 3. Continue calorie count as ordered. 4. Continue 1:1 feeds as ordered       Nutrition Assessment     Chart Review    Reason Seen: continuous nutrition monitoring, length of stay, and follow-up    Malnutrition Screening Tool Results   Have you recently lost weight without trying?: No  Have you been eating poorly because of a decreased appetite?: Yes   MST Score: 1   Diagnosis:  Cervical stenosis of spinal canal        Relevant Medical History: Personal history of colonic polyps   Date Unknown  BPH (benign prostatic hyperplasia)  Date Unknown  CVA (cerebral vascular accident)  Date Unknown  Depression  Date Unknown  History of claustrophobia  Date Unknown  HLD (hyperlipidemia)  Date Unknown  HTN (hypertension)  Date Unknown  Type 2 diabetes mellitus without complications    Scheduled Medications:  allopurinoL, 100 mg, Daily  aspirin, 81 mg, Daily  atorvastatin, 20 mg, Every Mon, Wed, Fri  citalopram, 20 mg, Daily  diclofenac sodium, 4 g, BID  doxazosin, 4 mg, Daily  enoxparin, 40 mg, Q24H (prophylaxis, 1700)  gabapentin, 300 mg, TID  insulin aspart U-100, 10 Units, TIDWM  insulin detemir U-100, 10 Units, BID  methocarbamoL, 750 mg, TID  miconazole NITRATE 2 %, , BID  montelukast, 10 mg, QHS  multivitamin, 1 tablet, Daily  pantoprazole, 40 mg, BID  polyethylene glycol, 17 g, Daily  predniSONE, 40 mg, Daily  senna-docusate 8.6-50 mg, 2 tablet, BID  tamsulosin, 1 capsule, Daily    Continuous Infusions:   PRN Medications: acetaminophen, acetaminophen, albuterol-ipratropium, aluminum-magnesium hydroxide-simethicone, bisacodyL, bisacodyL, dextrose 10%, dextrose 10%, HYDROcodone-acetaminophen, HYDROcodone-acetaminophen, insulin aspart U-100, magnesium hydroxide 400 mg/5 ml, melatonin, ondansetron,  "prochlorperazine, sodium chloride 0.9%    Recent Labs   Lab 12/06/23  0246 12/07/23  0328 12/08/23  0258 12/11/23  0300     --  135* 140   K 4.2  --  4.4 4.7   CALCIUM 9.1  --  9.3 9.0   CHLORIDE 101  --  100 99   CO2 26  --  27 31   BUN 20.4  --  26.1* 25.5   CREATININE 0.73  --  0.91 0.84   EGFRNORACEVR >60  --  >60 >60   GLUCOSE 155*  --  184* 140*   BILITOT 0.7  --   --   --    ALKPHOS 74  --   --   --    ALT 15  --   --   --    AST 11  --   --   --    ALBUMIN 2.4*  --   --   --    CRP  --   --  124.00*  --    WBC 15.64* 19.05* 20.41* 17.65*   HGB 12.7* 13.0* 12.8* 13.3*   HCT 39.5* 40.6* 39.5* 41.2*     Nutrition Orders:  Diet Adult Regular Diabetic  Dietary nutrition supplements Boost Glucose Control - Chocolate; All Meals    Appetite/Oral Intake: good/% of meals  Factors Affecting Nutritional Intake: decreased appetite  Food/Buddhism/Cultural Preferences: none reported  Food Allergies: none reported  Last Bowel Movement: 12/11/23  Wound(s):      Comments    Pt reports intake improved. Pt ate breakfast, left toast. Discussed food preferences. Marked menus. Calorie count on 12/8/23 provides 1,239 kcal and 76 gm of protein, meeting 60% kcal needs and 68% protein needs. Calorie count on 12/9/23 provides 1695 kcal and 103.8 gm of protein, meeting 82% kcal needs and 93% protein needs. Calorie count incomplete on 12/10/23, only breakfast and lunch recorded. Will try again. Intake has improved. Wt decrease 1.8 kg from 12/4/23. Current wt: 85.5 kg. Previous wt 12/4/23: 87.3 kg    Anthropometrics    Height: 5' 9.02" (175.3 cm), Height Method: Stated  Last Weight: 85.5 kg (188 lb 7.9 oz) (12/11/23 0402), Weight Method: Bed Scale  BMI (Calculated): 27.8  BMI Classification: overweight (BMI 25-29.9)        Ideal Body Weight (IBW), Male: 160.12 lb     % Ideal Body Weight, Male (lb): 118.91 %                          Usual Weight Provided By: unable to obtain usual weight    Wt Readings from Last 5 Encounters: "   12/11/23 85.5 kg (188 lb 7.9 oz)   11/17/23 99.3 kg (219 lb)   09/22/23 101.2 kg (223 lb)   07/22/23 106.6 kg (235 lb)   05/18/23 102.1 kg (225 lb)     Weight Change(s) Since Admission:   Wt Readings from Last 1 Encounters:   12/11/23 0402 85.5 kg (188 lb 7.9 oz)   12/04/23 0500 87.3 kg (192 lb 7.4 oz)   11/29/23 1725 86.3 kg (190 lb 4.1 oz)   Admit Weight: 86.3 kg (190 lb 4.1 oz) (11/29/23 1725), Weight Method: Bed Scale    Estimated Needs    Weight Used For Calorie Calculations: 85.5 kg (188 lb 7.9 oz)  Energy Calorie Requirements (kcal): 2,055 kcal (Platte-St Jeor X 1.3 stress factor/CBW)  Energy Need Method: Platte-St Jeor  Weight Used For Protein Calculations: 85.5 kg (188 lb 7.9 oz)  Protein Requirements: 111.38 gm (1.3 gm/kg/CBW)  Fluid Requirements (mL): 2,055 mL (1 mL/kca)    Enteral Nutrition    Patient not receiving enteral nutrition at this time.    Parenteral Nutrition    Patient not receiving parenteral nutrition support at this time.    Patient Education     Not applicable.    Nutrition Goals & Monitoring     Dietitian will monitor: food and beverage intake, weight, weight change, electrolyte/renal panel, glucose/endocrine profile, and gastrointestinal profile    Nutrition Risk/Follow-Up: low (follow-up in 5-7 days)  Patients assigned 'low nutrition risk' status do not qualify for a full nutritional assessment but will be monitored and re-evaluated in a 5-7 day time period. Please consult if re-evaluation needed sooner.

## 2023-12-11 NOTE — PT/OT/SLP PROGRESS
Physical Therapy Treatment Note           Name: Stevie Cummins    : 1950 (73 y.o.)  MRN: [id number]           TREATMENT SUMMARY AND RECOMMENDATIONS:    PT Received On: 23  PT Start Time: 847     PT Stop Time: 925  PT Total Time (min): 38 min     Subjective Assessment:   No complaints  Lethargic   x Awake, alert, cooperative  Uncooperative    Agitated  c/o pain    Appropriate  c/o fatigue    Confused x Treated at bedside     Emotionally labile x Treated in gym/dept.    Impulsive  Other:    Flat affect       Therapy Precautions:    Cognitive deficits x Spinal precautions    Collar - hard  Sternal precautions   x Collar - soft   TLSO   x Fall risk  LSO    Hip precautions - posterior  Knee immobilizer    Hip precautions - anterior  WBAT    Impaired communication  Partial weightbearing    Oxygen  TTWB    PEG tube  NWB    Visual deficits  Other:    Hearing deficits          Treatment Objectives:     Mobility Training:   Assist level Comments    Bed mobility SBA Supine>Sit   Transfer MIN A Stand pivot using RW for toilet transfers   Gait Min/CGA 2 x 15 feet and x 50 feet using RW   Sit to stand transitions MIN From EOB, toilet and WC   Sitting balance MOD I Sitting EOB x 5 min for dressing and medication tasks.    Standing balance  SBA Prolonged standing using RW for cleaning tasks.    Wheelchair mobility     Car transfer     Other:          Therapeutic Exercise:   Exercise Sets Reps Comments   B LE bike using UBE  10' 5'F/5'B                         Additional Comments:  Pt with improved tolerance and less pain. Pt had large BM with therapy. Nursing in room and aware.     Assessment: Patient tolerated session better.     PT Plan: continue per POC  Revisions made to plan of care: No.     GOALS:   Multidisciplinary Problems       Physical Therapy Goals          Problem: Physical Therapy    Goal Priority Disciplines Outcome Goal Variances Interventions   Physical Therapy Goal     PT, PT/OT Ongoing,  Progressing     Description: Goals to be met by: Discharge      Patient will increase functional independence with mobility by performin. Supine to sit with Stand-by Assistance  2. Sit to supine with Stand-by Assistance  3. Sitting at edge of bed x 15 minutes with Stand-by Assistance without dizziness or low BP  4. Increased functional strength to 5/5 for B hips  5. Sit to stands to be completed at SBA using RW  6. bed<>chair transfers to be completed at CGA using RW.   7. Gait x 50 feet using RW at SBA                         Skilled PT Minutes Provided: 38   Communication with Treatment Team:     Equipment recommendations:       At end of treatment, patient remained:  x Up in chair     Up in wheelchair in room    In bed    With alarm activated    Bed rails up    Call bell in reach     Family/friends present    Restraints secured properly    In bathroom with CNA/RN notified    Nurse aware   x In gym with therapist/tech    Other:

## 2023-12-11 NOTE — PT/OT/SLP PROGRESS
Occupational Therapy  Treatment    Name: Stevie Cummins    : 1950 (73 y.o.)  MRN: [id number]           TREATMENT SUMMARY AND RECOMMENDATIONS:      OT Date of Treatment: 23  OT Start Time: 932  OT Stop Time:   OT Total Time (min): 28 min      Subjective Assessment:   No complaints  Lethargic   x Awake, alert, cooperative  Impulsive    Uncooperative   Flat affect    Agitated  c/o pain   x Appropriate  c/o fatigue    Confused  Treated at bedside     Emotionally labile x Treated in gym/dept.      Other:        Therapy Precautions:    Cognitive deficits x Spinal precautions    Collar - hard  Sternal precautions   x Collar - soft   TLSO   x Fall risk  LSO    Hip precautions - posterior  Knee immobilizer    Hip precautions - anterior  WBAT    Impaired communication  Partial weightbearing    Oxygen  TTWB    PEG tube  NWB    Visual deficits      Hearing deficits   Other:        Treatment Objectives:     Mobility Training:    Mobility task Assist level Comments    Bed mobility CGA EOB<supine using a bed rail    Transfer CGA Stand step t/f to EOB using a RW and gait belt    Sit to stands transitions Min-CGA WC<>standing using a RW and gait belt; x3 reps; 3rd trial pt required CGA   Functional mobility CGA 50ft using a RW and gait belt; following with a WC   Sitting balance     Standing balance  CGA Dynamic standing balance addressing reaching in various planes to challenge his balance and increase independence with toileting, LB dressing, and bathing. Pt uses a RW for support and had no LOB.    Other:        ADL Training:    ADL Assist level Comments    Feeding     Grooming/hygiene     Bathing     Upper body dressing     Lower body dressing     Toileting     Toilet transfer     Adaptive equipment training     Other:           Therapeutic Exercise:   Exercise Sets Reps Comments   BUE strengthening exercises 2 10 3# weighted dowel performing bicep curls, chest press, straight arm raises to 90 degrees   Red flexbar  2 10 Twists, supination/pronation                    Additional Comments:      Assessment: Patient tolerated session well. Pt shows an improvement in UB strength as evident of performing exercises with a heavier weight. Pt demonstrates increased functional mobility d/t not having ankle pain. Pt would benefit from continued OT services to increase independence with occupational performance, decrease risk for falls, and decrease caregiver burden.     GOALS:   Multidisciplinary Problems       Occupational Therapy Goals          Problem: Occupational Therapy    Goal Priority Disciplines Outcome Interventions   Occupational Therapy Goal     OT, PT/OT Ongoing, Progressing    Description: Goals to be met by: Discharge     Patient will increase functional independence with ADLs by performing:    UE Dressing with Stand-by Assistance. -ongoing,progressing; Min A  LE Dressing with Moderate Assistance. Goal Met 12/4/23  Grooming while standing at sink with Contact Guard Assistance. Ongoing progressing  Toileting from bedside commode with Moderate Assistance for hygiene and clothing management. -ongoing, progressing  Bathing from  shower chair/bench with Moderate Assistance. Ongoing, progressing   Toilet transfer to bedside commode with Stand-by Assistance. -ongoing, progressing; CGA-min A  Increased functional strength to WFL for ADLs.     New goals added 12/5/23:   Pt will perform self-feeding requiring modified independence.    Pt will perform LE dressing including donning/doffing footwear and pants using AE requiring CGA.                          Recommendations:     Discharge Equipment Recommendations:  wheelchair     Plan:     Patient to be seen 6 x/week to address the above listed problems via self-care/home management, therapeutic activities, therapeutic exercises, neuromuscular re-education  Plan of Care Expires: 12/21/23  Plan of Care Reviewed with: patient  Revisions made to plan of care: No      Skilled OT Minutes  Provided: 30  Communication with Treatment Team:     Equipment recommendations:       At end of treatment, patient remained:   Up in chair     Up in wheelchair in room   x In bed   x With alarm activated   x Bed rails up   x Call bell in reach     Family/friends present    Restraints secured properly    In bathroom with CNA/RN notified    In gym with PT/PTA/tech    Nurse aware    Other:

## 2023-12-11 NOTE — PLAN OF CARE
Problem: Adult Inpatient Plan of Care  Goal: Plan of Care Review  Outcome: Ongoing, Progressing  Flowsheets (Taken 12/11/2023 0701)  Plan of Care Reviewed With: patient  Goal: Patient-Specific Goal (Individualized)  Outcome: Ongoing, Progressing  Flowsheets (Taken 12/11/2023 0701)  Anxieties, Fears or Concerns: denies any concerns at this time  Individualized Care Needs: PT/OT, monitor cbgs  Goal: Absence of Hospital-Acquired Illness or Injury  Outcome: Ongoing, Progressing  Intervention: Identify and Manage Fall Risk  Flowsheets (Taken 12/11/2023 0701)  Safety Promotion/Fall Prevention:   assistive device/personal item within reach   bed alarm set   lighting adjusted   instructed to call staff for mobility   supervised activity   nonskid shoes/socks when out of bed  Intervention: Prevent Skin Injury  Flowsheets (Taken 12/11/2023 0701)  Body Position: position changed independently  Skin Protection:   adhesive use limited   tubing/devices free from skin contact   skin-to-device areas padded   skin-to-skin areas padded  Intervention: Prevent and Manage VTE (Venous Thromboembolism) Risk  Flowsheets (Taken 12/11/2023 0701)  Activity Management: Ankle pumps - L1  VTE Prevention/Management:   remove, assess skin, and reapply sequential compression device   ambulation promoted   fluids promoted   ROM (active) performed  Range of Motion: active ROM (range of motion) encouraged  Intervention: Prevent Infection  Flowsheets (Taken 12/11/2023 0701)  Infection Prevention:   cohorting utilized   personal protective equipment utilized   environmental surveillance performed   rest/sleep promoted   equipment surfaces disinfected   single patient room provided   hand hygiene promoted  Goal: Optimal Comfort and Wellbeing  Outcome: Ongoing, Progressing  Intervention: Monitor Pain and Promote Comfort  Flowsheets (Taken 12/11/2023 0701)  Pain Management Interventions: care clustered  Intervention: Provide Person-Centered  Care  Flowsheets (Taken 12/11/2023 0701)  Trust Relationship/Rapport:   care explained   questions encouraged   choices provided   reassurance provided   emotional support provided   thoughts/feelings acknowledged   empathic listening provided   questions answered  Goal: Readiness for Transition of Care  Outcome: Ongoing, Progressing     Problem: Diabetes Comorbidity  Goal: Blood Glucose Level Within Targeted Range  Outcome: Ongoing, Progressing  Intervention: Monitor and Manage Glycemia  Flowsheets (Taken 12/11/2023 0701)  Glycemic Management: blood glucose monitored     Problem: Infection  Goal: Absence of Infection Signs and Symptoms  Outcome: Ongoing, Progressing  Intervention: Prevent or Manage Infection  Flowsheets (Taken 12/11/2023 0701)  Fever Reduction/Comfort Measures: fluid intake increased  Infection Management: aseptic technique maintained  Isolation Precautions:   protective   precautions maintained     Problem: Skin Injury Risk Increased  Goal: Skin Health and Integrity  Outcome: Ongoing, Progressing  Intervention: Optimize Skin Protection  Flowsheets (Taken 12/11/2023 0701)  Pressure Reduction Techniques:   frequent weight shift encouraged   heels elevated off bed  Pressure Reduction Devices: positioning supports utilized  Skin Protection:   adhesive use limited   tubing/devices free from skin contact   skin-to-device areas padded   skin-to-skin areas padded  Head of Bed (HOB) Positioning: HOB elevated  Intervention: Promote and Optimize Oral Intake  Flowsheets (Taken 12/11/2023 0701)  Oral Nutrition Promotion: physical activity promoted     Problem: Urinary Retention  Goal: Effective Urinary Elimination  Outcome: Ongoing, Progressing     Problem: Fall Injury Risk  Goal: Absence of Fall and Fall-Related Injury  Outcome: Ongoing, Progressing

## 2023-12-11 NOTE — PLAN OF CARE
Problem: Occupational Therapy  Goal: Occupational Therapy Goal  Description: Goals to be met by: Discharge     Patient will increase functional independence with ADLs by performing:    UE Dressing with Stand-by Assistance. -ongoing,progressing; Min A  LE Dressing with Moderate Assistance. Goal Met 12/4/23  Grooming while standing at sink with Contact Guard Assistance. Ongoing progressing  Toileting from bedside commode with Moderate Assistance for hygiene and clothing management. -Goal Met 12/11/23  Bathing from  shower chair/bench with Moderate Assistance. Ongoing, progressing   Toilet transfer to bedside commode with Stand-by Assistance. -ongoing, progressing; CGA-min A  Increased functional strength to WFL for ADLs.     New goals added 12/5/23:   Pt will perform self-feeding requiring modified independence. Goal Met 12/11/23  Pt will perform LE dressing including donning/doffing footwear and pants using AE requiring CGA.     Outcome: Ongoing, Progressing

## 2023-12-11 NOTE — PROGRESS NOTES
[facility] - Medical Surgical Unit  [facility] MEDICINE ~ PROGRESS NOTE    CHIEF COMPLAINT   Hospital follow up    HOSPITAL COURSE   74 yo male with a past medical history HTN, HLD, CAD, CVA, DM, PFO s/p closure in 2022, aortic stenosis, and PVD s/p lower extremity stenting who presented to the ER at [facility] on 11/14/2023 for extremity weakness with recurrent falls and unsteadiness.  MRI cervical spine without contrast on 11/14/2023 showed chronic multilevel central canal stenosis with effacement of the CSF, most severe at C3-C4 with mild chronic cord compression.  MRI thoracic spine without contrast on 11/14/2023 showed slightly increased signal seen in the disc at T11-T12 possibly representing early discitis, no epidural abscess, no osteomyelitis.  MRI lumbar spine without contrast on 11/14/2023 showed unchanged chronic degenerative changes with central canal and neuroforaminal stenosis. Neurosurgery was consulted on 11/15/2023 and patient subsequently underwent posterior spinal fusion on 11/20/2023. Patient was admitted to [facility] swing unit on 11/29/2023 for PT/OT. Patient was started on Rocephin for E coli UTI. Patient's glucose has remained elevated since initial admission.  Tolerating 10 units t.i.d. with meals and detemir 10 unit b.i.d..  Has been on and off voids per neurosurgery and now to treat a gouty attack. Last dose of Decadron per neurosurgery note on 11/27/2023, and is to follow up with Dr. Hutchinson in 3-4 weeks. Patient continue to wear soft collar.     Today  Patient doing well, reports mild bilateral ankle pain.  Continue Prednisone. Discontinue Midodrine. Nurse reports large BM today.     OBJECTIVE/PHYSICAL EXAM     VITAL SIGNS (MOST RECENT):  Temp: 97.8 °F (36.6 °C) (12/11/23 0752)  Pulse: 84 (12/11/23 0752)  Resp: 18 (12/10/23 1935)  BP: (!) 151/77 (12/11/23 0752)  SpO2: (!) 94 % (12/11/23 0752) VITAL SIGNS (24 HOUR RANGE):  Temp:  [97.8 °F (36.6 °C)-98.2 °F (36.8 °C)] 97.8 °F (36.6 °C)  Pulse:   [76-84] 84  Resp:  [18] 18  SpO2:  [94 %-95 %] 94 %  BP: (126-173)/(67-81) 151/77     GENERAL: In no acute distress, afebrile  HEENT: Atraumatic, normocephalic, moist mucous membranes, soft neck collar in place   CHEST: Clear to auscultation bilaterally  HEART: S1, S2, no appreciable murmur  ABDOMEN: Soft, nontender, BS +  MSK: Warm, no lower extremity edema, no clubbing or cyanosis, mild bilateral ankle tenderness  NEUROLOGIC: Alert and oriented, moving all extremities   INTEGUMENTARY: No obvious skin rashes   PSYCHIATRY: Appropriate mood and affect    ASSESSMENT/PLAN   Spinal stenosis   S/p posterior cervical spinal fusion on 11/20/2023  Completed Decadron 11/30/2023  Follow up with Dr. Hutchinson is 12/19/2023 at 11:30am   Continue soft collar - awaiting smaller collar   PT/OT - 2 x 15 feet and x 50 feet using RW Eugenia/CGA    Leukocytosis   Low suspicion for acute infection, patient has been receiving dexamethasone and now currently receiving prednisone for gout attack    CORKY -resolved  Urinary retention   Urinary incontinence   Gardiner replaced 12/05/2023 for urinary retention which will remain  Needs follow up appointment with Dr. Solis upon d/c     E coli UTI -resolved  Rocephin completed 5 days     DM  Hyperglycemia  Continue Detemir 10u BID and Aspart 10u TID   Continue moderate SSI      Acute on chronic gout   Sed rate and CRP elevated   Continue Prednisone 40mg PO QD then start taper on 12/14/2023  Continue PPI BID while on steroids       HLD, CAD, CVA  Continue home Atorvastatin, Aspirin     Hypotension   Patient experienced hypotension requiring midodrine at prior facility, has been hypertensive here therefore discontinue Midodrine      H/o: BPH, anxiety, depression, PFO s/p closure in 2022, aortic stenosis, and PVD s/p lower extremity stenting    DVT prophylaxis: Lovenox   Anticipated discharge and disposition: home with  "HHC  __________________________________________________________________________    LABS/MICRO/MEDS/DIAGNOSTICS     LABS  Recent Labs     12/11/23  0300      K 4.7   CHLORIDE 99   CO2 31   BUN 25.5   CREATININE 0.84   GLUCOSE 140*   CALCIUM 9.0     Recent Labs     12/11/23  0300   WBC 17.65*   RBC 4.26*   HCT 41.2*   MCV 96.7*        MICROBIOLOGY  Microbiology Results (last 7 days)       ** No results found for the last 168 hours. **             MEDICATIONS   allopurinoL  100 mg Oral Daily    aspirin  81 mg Oral Daily    atorvastatin  20 mg Oral Every Mon, Wed, Fri    citalopram  20 mg Oral Daily    diclofenac sodium  4 g Topical (Top) BID    doxazosin  4 mg Oral Daily    enoxparin  40 mg Subcutaneous Q24H (prophylaxis, 1700)    gabapentin  300 mg Oral TID    insulin aspart U-100  10 Units Subcutaneous TIDWM    insulin detemir U-100  10 Units Subcutaneous BID    megestroL  40 mg Oral Daily    methocarbamoL  750 mg Oral TID    miconazole NITRATE 2 %   Topical (Top) BID    midodrine  2.5 mg Oral TID WM    montelukast  10 mg Oral QHS    multivitamin  1 tablet Oral Daily    pantoprazole  40 mg Oral BID    polyethylene glycol  17 g Oral Daily    predniSONE  40 mg Oral Daily    senna-docusate 8.6-50 mg  2 tablet Oral BID    tamsulosin  1 capsule Oral Daily         INFUSIONS         DIAGNOSTIC TESTS  US Retroperitoneal Complete   Final Result      Normal sonographic appearance of the kidneys.         Electronically signed by: Stevie Ochoa MD   Date:    12/02/2023   Time:    11:03      RADIOLOGY REPORT   Final Result           No results found for: "EF"     NUTRITION STATUS  Patient meets ASPEN criteria for   malnutrition of   per RD assessment as evidenced by:                       A minimum of two characteristics is recommended for diagnosis of either severe or non-severe malnutrition.     Case related differential diagnoses have been reviewed; assessment and plan has been documented. I have personally reviewed " the labs and test results that are currently available; I have reviewed the patients medication list. I have reviewed the consulting providers recommendations. I have reviewed or attempted to review medical records based upon their availability.  All of the patient's and/or family's questions have been addressed and answered to the best of my ability.  I will continue to monitor closely and make adjustments to medical management as needed.  This document was created using M*Modal Fluency Direct.  Transcription errors may have been made.  Please contact me if any questions may rise regarding documentation to clarify transcription.      TAYLOR Serrano   Internal Medicine  Department of Hospital Medicine [facility] - [facility] Surgical Unit

## 2023-12-11 NOTE — PT/OT/SLP PROGRESS
Physical Therapy Treatment Note           Name: Stevie Cummins    : 1950 (73 y.o.)  MRN: [id number]           TREATMENT SUMMARY AND RECOMMENDATIONS:    PT Received On: 23  PT Start Time: 1403     PT Stop Time: 1426  PT Total Time (min): 23 min     Subjective Assessment:   No complaints  Lethargic    Awake, alert, cooperative  Uncooperative    Agitated  c/o pain    Appropriate x c/o fatigue    Confused  Treated at bedside     Emotionally labile x Treated in gym/dept.    Impulsive  Other:    Flat affect       Therapy Precautions:    Cognitive deficits x Spinal precautions    Collar - hard  Sternal precautions   x Collar - soft   TLSO   x Fall risk  LSO    Hip precautions - posterior  Knee immobilizer    Hip precautions - anterior  WBAT    Impaired communication  Partial weightbearing    Oxygen  TTWB    PEG tube  NWB    Visual deficits  Other:    Hearing deficits          Treatment Objectives:     Mobility Training:   Assist level Comments    Bed mobility SBA Rolling side to side, sit>supine and scooting up in bed   Transfer SBA Stand pivot using RW WC>bed   Gait CGA X 175 feet with one sitting rest breaks, using RW and following with WC   Sit to stand transitions CGA/SBA From WC x 3 reps   Sitting balance     Standing balance      Wheelchair mobility     Car transfer     Other:        Therapeutic Exercise:   Exercise Sets Reps Comments   Supine Sciatic N glides  10 In bed   Supine B LE PROM  10 KTC, knee to opposite chest, SLR and ankle DF/PF                     Additional Comments:  Improved gait tolerance and overall function. PT to progress as able. Approaching readiness for DC home.      Assessment: Patient tolerated session fair.    PT Plan: continue per POC  Revisions made to plan of care: No  GOALS:   Multidisciplinary Problems       Physical Therapy Goals          Problem: Physical Therapy    Goal Priority Disciplines Outcome Goal Variances Interventions   Physical Therapy Goal     PT, PT/OT  Ongoing, Progressing     Description: Goals to be met by: Discharge      Patient will increase functional independence with mobility by performin. Supine to sit with Stand-by Assistance  2. Sit to supine with Stand-by Assistance  3. Sitting at edge of bed x 15 minutes with Stand-by Assistance without dizziness or low BP  4. Increased functional strength to 5/5 for B hips  5. Sit to stands to be completed at SBA using RW  6. bed<>chair transfers to be completed at [facility] using RW.   7. Gait x 50 feet using RW at SBA                         Skilled PT Minutes Provided: 23   Communication with Treatment Team:     Equipment recommendations:       At end of treatment, patient remained:   Up in chair     Up in wheelchair in room   x In bed    With alarm activated   x Bed rails up   x Call bell in reach     Family/friends present    Restraints secured properly    In bathroom with CNA/RN notified   x Nurse aware    In gym with therapist/tech    Other:

## 2023-12-12 ENCOUNTER — TELEPHONE (OUTPATIENT)
Dept: INTERNAL MEDICINE | Facility: CLINIC | Age: 73
End: 2023-12-12
Payer: MEDICARE

## 2023-12-12 LAB
POCT GLUCOSE: 159 MG/DL (ref 70–110)
POCT GLUCOSE: 164 MG/DL (ref 70–110)
POCT GLUCOSE: 267 MG/DL (ref 70–110)
POCT GLUCOSE: 274 MG/DL (ref 70–110)

## 2023-12-12 PROCEDURE — 97530 THERAPEUTIC ACTIVITIES: CPT

## 2023-12-12 PROCEDURE — 63600175 PHARM REV CODE 636 W HCPCS: Performed by: PHYSICIAN ASSISTANT

## 2023-12-12 PROCEDURE — 97110 THERAPEUTIC EXERCISES: CPT

## 2023-12-12 PROCEDURE — 97116 GAIT TRAINING THERAPY: CPT

## 2023-12-12 PROCEDURE — 25000003 PHARM REV CODE 250: Performed by: STUDENT IN AN ORGANIZED HEALTH CARE EDUCATION/TRAINING PROGRAM

## 2023-12-12 PROCEDURE — 25000003 PHARM REV CODE 250: Performed by: PHYSICIAN ASSISTANT

## 2023-12-12 PROCEDURE — 63600175 PHARM REV CODE 636 W HCPCS: Performed by: STUDENT IN AN ORGANIZED HEALTH CARE EDUCATION/TRAINING PROGRAM

## 2023-12-12 PROCEDURE — 97535 SELF CARE MNGMENT TRAINING: CPT

## 2023-12-12 PROCEDURE — 94760 N-INVAS EAR/PLS OXIMETRY 1: CPT

## 2023-12-12 PROCEDURE — 99900035 HC TECH TIME PER 15 MIN (STAT)

## 2023-12-12 PROCEDURE — 11000004 HC SNF PRIVATE

## 2023-12-12 RX ADMIN — MONTELUKAST SODIUM 10 MG: 10 TABLET, COATED ORAL at 08:12

## 2023-12-12 RX ADMIN — MULTIPLE VITAMINS W/ MINERALS TAB 1 TABLET: TAB at 09:12

## 2023-12-12 RX ADMIN — INSULIN ASPART 2 UNITS: 100 INJECTION, SOLUTION INTRAVENOUS; SUBCUTANEOUS at 12:12

## 2023-12-12 RX ADMIN — GABAPENTIN 300 MG: 300 CAPSULE ORAL at 04:12

## 2023-12-12 RX ADMIN — SENNOSIDES AND DOCUSATE SODIUM 2 TABLET: 8.6; 5 TABLET ORAL at 09:12

## 2023-12-12 RX ADMIN — CITALOPRAM HYDROBROMIDE 20 MG: 20 TABLET ORAL at 09:12

## 2023-12-12 RX ADMIN — ENOXAPARIN SODIUM 40 MG: 40 INJECTION SUBCUTANEOUS at 04:12

## 2023-12-12 RX ADMIN — INSULIN ASPART 10 UNITS: 100 INJECTION, SOLUTION INTRAVENOUS; SUBCUTANEOUS at 07:12

## 2023-12-12 RX ADMIN — GABAPENTIN 300 MG: 300 CAPSULE ORAL at 08:12

## 2023-12-12 RX ADMIN — POLYETHYLENE GLYCOL 3350 17 G: 17 POWDER, FOR SOLUTION ORAL at 09:12

## 2023-12-12 RX ADMIN — PANTOPRAZOLE SODIUM 40 MG: 40 TABLET, DELAYED RELEASE ORAL at 08:12

## 2023-12-12 RX ADMIN — INSULIN ASPART 10 UNITS: 100 INJECTION, SOLUTION INTRAVENOUS; SUBCUTANEOUS at 04:12

## 2023-12-12 RX ADMIN — DOXAZOSIN 4 MG: 4 TABLET ORAL at 09:12

## 2023-12-12 RX ADMIN — METHOCARBAMOL 750 MG: 750 TABLET ORAL at 08:12

## 2023-12-12 RX ADMIN — INSULIN ASPART 10 UNITS: 100 INJECTION, SOLUTION INTRAVENOUS; SUBCUTANEOUS at 11:12

## 2023-12-12 RX ADMIN — INSULIN DETEMIR 10 UNITS: 100 INJECTION, SOLUTION SUBCUTANEOUS at 08:12

## 2023-12-12 RX ADMIN — ASPIRIN 81 MG: 81 TABLET, COATED ORAL at 09:12

## 2023-12-12 RX ADMIN — GABAPENTIN 300 MG: 300 CAPSULE ORAL at 09:12

## 2023-12-12 RX ADMIN — DICLOFENAC SODIUM 4 G: 10 GEL TOPICAL at 09:12

## 2023-12-12 RX ADMIN — ALLOPURINOL 100 MG: 100 TABLET ORAL at 09:12

## 2023-12-12 RX ADMIN — SENNOSIDES AND DOCUSATE SODIUM 2 TABLET: 8.6; 5 TABLET ORAL at 08:12

## 2023-12-12 RX ADMIN — PANTOPRAZOLE SODIUM 40 MG: 40 TABLET, DELAYED RELEASE ORAL at 09:12

## 2023-12-12 RX ADMIN — METHOCARBAMOL 750 MG: 750 TABLET ORAL at 09:12

## 2023-12-12 RX ADMIN — METHOCARBAMOL 750 MG: 750 TABLET ORAL at 04:12

## 2023-12-12 RX ADMIN — INSULIN DETEMIR 10 UNITS: 100 INJECTION, SOLUTION SUBCUTANEOUS at 09:12

## 2023-12-12 RX ADMIN — TAMSULOSIN HYDROCHLORIDE 0.4 MG: 0.4 CAPSULE ORAL at 09:12

## 2023-12-12 RX ADMIN — INSULIN ASPART 6 UNITS: 100 INJECTION, SOLUTION INTRAVENOUS; SUBCUTANEOUS at 04:12

## 2023-12-12 RX ADMIN — PREDNISONE 40 MG: 20 TABLET ORAL at 09:12

## 2023-12-12 NOTE — PROGRESS NOTES
[facility] - Medical Surgical Unit  [facility] MEDICINE ~ PROGRESS NOTE    CHIEF COMPLAINT   Hospital follow up    HOSPITAL COURSE   74 yo male with a past medical history HTN, HLD, CAD, CVA, DM, PFO s/p closure in 2022, aortic stenosis, and PVD s/p lower extremity stenting who presented to the ER at [facility] on 11/14/2023 for extremity weakness with recurrent falls and unsteadiness.  MRI cervical spine without contrast on 11/14/2023 showed chronic multilevel central canal stenosis with effacement of the CSF, most severe at C3-C4 with mild chronic cord compression.  MRI thoracic spine without contrast on 11/14/2023 showed slightly increased signal seen in the disc at T11-T12 possibly representing early discitis, no epidural abscess, no osteomyelitis.  MRI lumbar spine without contrast on 11/14/2023 showed unchanged chronic degenerative changes with central canal and neuroforaminal stenosis. Neurosurgery was consulted on 11/15/2023 and patient subsequently underwent posterior spinal fusion on 11/20/2023. Patient was admitted to [facility] swing unit on 11/29/2023 for PT/OT. Patient was started on Rocephin for E coli UTI. Patient's glucose has remained elevated since initial admission.  Tolerating 10 units t.i.d. with meals and detemir 10 unit b.i.d..  Has been on and off voids per neurosurgery and now to treat a gouty attack. Last dose of Decadron per neurosurgery note on 11/27/2023, and is to follow up with Dr. Hutchinson in 3-4 weeks. Patient continue to wear soft collar.     Today  Bilateral ankle pain resolved. Improving tolerance with therapy - ambulated 335 ft with one sitting rest breaks, using RW and following with WC. Patient ha large BM yesterday.      OBJECTIVE/PHYSICAL EXAM     VITAL SIGNS (MOST RECENT):  Temp: 98.3 °F (36.8 °C) (12/12/23 0700)  Pulse: 86 (12/12/23 0840)  Resp: 18 (12/12/23 0840)  BP: (!) 149/80 (12/12/23 0700)  SpO2: 98 % (12/12/23 0840) VITAL SIGNS (24 HOUR RANGE):  Temp:  [98.3 °F (36.8  °C)-98.4 °F (36.9 °C)] 98.3 °F (36.8 °C)  Pulse:  [76-88] 86  Resp:  [18] 18  SpO2:  [92 %-98 %] 98 %  BP: (131-149)/(80-87) 149/80     GENERAL: In no acute distress, afebrile  HEENT: Atraumatic, normocephalic, moist mucous membranes, soft neck collar in place   CHEST: Clear to auscultation bilaterally  HEART: S1, S2, no appreciable murmur  ABDOMEN: Soft, nontender, BS +  MSK: Warm, no lower extremity edema, no clubbing or cyanosis, no ankle tenderness   NEUROLOGIC: Alert and oriented, moving all extremities   INTEGUMENTARY: No obvious skin rashes   PSYCHIATRY: Appropriate mood and affect    ASSESSMENT/PLAN   Spinal stenosis   S/p posterior cervical spinal fusion on 11/20/2023  Completed Decadron 11/30/2023  Follow up with Dr. Hutchinson is 12/19/2023 at 11:30am   Continue soft collar - awaiting smaller collar   PT/OT - ambulated 335 ft with one sitting rest breaks, using RW and following with WC    Leukocytosis   Low suspicion for acute infection, patient has been receiving dexamethasone and now currently receiving prednisone for gout attack    CORKY -resolved  Urinary retention   Urinary incontinence   Gardiner replaced 12/05/2023 for urinary retention which will remain  Needs follow up appointment with Dr. Solis upon d/c     E coli UTI -resolved  Rocephin completed 5 days     DM  Hyperglycemia  Continue Detemir 10u BID and Aspart 10u TID   Continue moderate SSI      Acute on chronic gout   Sed rate and CRP elevated   Continue Prednisone 40mg PO QD   Start Medrol dose pack on discharge   Continue PPI BID while on steroids       HLD, CAD, CVA  Continue home Atorvastatin, Aspirin     Hypotension   Patient experienced hypotension requiring midodrine at prior facility, has been hypertensive here therefore discontinue Midodrine      H/o: BPH, anxiety, depression, PFO s/p closure in 2022, aortic stenosis, and PVD s/p lower extremity stenting    DVT prophylaxis: Lovenox   Anticipated discharge and disposition: home with [facility],  "possible d/c home tomorrow   __________________________________________________________________________    LABS/MICRO/MEDS/DIAGNOSTICS     LABS  Recent Labs     12/11/23  0300      K 4.7   CHLORIDE 99   CO2 31   BUN 25.5   CREATININE 0.84   GLUCOSE 140*   CALCIUM 9.0     Recent Labs     12/11/23  0300   WBC 17.65*   RBC 4.26*   HCT 41.2*   MCV 96.7*        MICROBIOLOGY  Microbiology Results (last 7 days)       ** No results found for the last 168 hours. **             MEDICATIONS   allopurinoL  100 mg Oral Daily    aspirin  81 mg Oral Daily    atorvastatin  20 mg Oral Every Mon, Wed, Fri    citalopram  20 mg Oral Daily    diclofenac sodium  4 g Topical (Top) BID    doxazosin  4 mg Oral Daily    enoxparin  40 mg Subcutaneous Q24H (prophylaxis, 1700)    gabapentin  300 mg Oral TID    insulin aspart U-100  10 Units Subcutaneous TIDWM    insulin detemir U-100  10 Units Subcutaneous BID    methocarbamoL  750 mg Oral TID    miconazole NITRATE 2 %   Topical (Top) BID    montelukast  10 mg Oral QHS    multivitamin  1 tablet Oral Daily    pantoprazole  40 mg Oral BID    polyethylene glycol  17 g Oral Daily    predniSONE  40 mg Oral Daily    senna-docusate 8.6-50 mg  2 tablet Oral BID    tamsulosin  1 capsule Oral Daily         INFUSIONS         DIAGNOSTIC TESTS  US Retroperitoneal Complete   Final Result      Normal sonographic appearance of the kidneys.         Electronically signed by: Stevie Ochoa MD   Date:    12/02/2023   Time:    11:03      RADIOLOGY REPORT   Final Result           No results found for: "EF"     NUTRITION STATUS  Patient meets ASPEN criteria for   malnutrition of   per RD assessment as evidenced by:                       A minimum of two characteristics is recommended for diagnosis of either severe or non-severe malnutrition.     Case related differential diagnoses have been reviewed; assessment and plan has been documented. I have personally reviewed the labs and test results that are " currently available; I have reviewed the patients medication list. I have reviewed the consulting providers recommendations. I have reviewed or attempted to review medical records based upon their availability.  All of the patient's and/or family's questions have been addressed and answered to the best of my ability.  I will continue to monitor closely and make adjustments to medical management as needed.  This document was created using M*Modal Fluency Direct.  Transcription errors may have been made.  Please contact me if any questions may rise regarding documentation to clarify transcription.      TAYLOR Serrano   Internal Medicine  Department of Hospital Medicine [facility] - [facility] Surgical Unit

## 2023-12-12 NOTE — PT/OT/SLP PROGRESS
Physical Therapy Treatment Note           Name: Stevie Cummins    : 1950 (73 y.o.)  MRN: [id number]           TREATMENT SUMMARY AND RECOMMENDATIONS:    PT Received On: 23  PT Start Time: 1300     PT Stop Time: 1330  PT Total Time (min): 30 min     Subjective Assessment:   No complaints  Lethargic   x Awake, alert, cooperative  Uncooperative    Agitated  c/o pain    Appropriate  c/o fatigue    Confused x Treated at bedside     Emotionally labile x Treated in gym/dept.    Impulsive  Other:    Flat affect       Therapy Precautions:    Cognitive deficits x Spinal precautions    Collar - hard  Sternal precautions   x Collar - soft   TLSO   x Fall risk  LSO    Hip precautions - posterior  Knee immobilizer    Hip precautions - anterior  WBAT    Impaired communication  Partial weightbearing    Oxygen  TTWB    PEG tube  NWB    Visual deficits  Other:    Hearing deficits          Treatment Objectives:     Mobility Training:   Assist level Comments    Bed mobility SBA Supine>Sit   Transfer CGA/SBA Stand pivot using RW for toilet transfers   Gait CGA 2 x 15 feet and x 50 feet using RW   Sit to stand transitions SBA From EOB, toilet and WC   Sitting balance     Standing balance  SBA Prolonged standing using RW for cleaning tasks.    Wheelchair mobility     Car transfer     Other:          Therapeutic Exercise:   Exercise Sets Reps Comments   B LE bike using UBE  10' 5'F/5'B                         Additional Comments:  Pt with improved tolerance and less pain. Pt had BM with therapy. Progress demonstrated and ready for DC soon.     Assessment: Patient tolerated session well.     PT Plan: continue per POC  Revisions made to plan of care: No.     GOALS:   Multidisciplinary Problems       Physical Therapy Goals          Problem: Physical Therapy    Goal Priority Disciplines Outcome Goal Variances Interventions   Physical Therapy Goal     PT, PT/OT Ongoing, Progressing     Description: Goals to be met by:  Discharge      Patient will increase functional independence with mobility by performin. Supine to sit with Stand-by Assistance  2. Sit to supine with Stand-by Assistance  3. Sitting at edge of bed x 15 minutes with Stand-by Assistance without dizziness or low BP  4. Increased functional strength to 5/5 for B hips  5. Sit to stands to be completed at SBA using RW  6. bed<>chair transfers to be completed at CGA using RW.   7. Gait x 50 feet using RW at SBA                         Skilled PT Minutes Provided: 30   Communication with Treatment Team:     Equipment recommendations:       At end of treatment, patient remained:  x Up in chair     Up in wheelchair in room    In bed    With alarm activated    Bed rails up    Call bell in reach     Family/friends present    Restraints secured properly    In bathroom with CNA/RN notified    Nurse aware   x In gym with therapist/tech    Other:

## 2023-12-12 NOTE — PLAN OF CARE
Problem: Adult Inpatient Plan of Care  Goal: Plan of Care Review  Outcome: Ongoing, Progressing  Flowsheets (Taken 12/12/2023 0735)  Plan of Care Reviewed With: patient  Goal: Patient-Specific Goal (Individualized)  Outcome: Ongoing, Progressing  Flowsheets (Taken 12/12/2023 0735)  Anxieties, Fears or Concerns: denies any concerns at this time  Individualized Care Needs: PT/OT, monitor CBGs, OOB with nurses  Goal: Absence of Hospital-Acquired Illness or Injury  Outcome: Ongoing, Progressing  Intervention: Identify and Manage Fall Risk  Flowsheets (Taken 12/12/2023 0735)  Safety Promotion/Fall Prevention:   assistive device/personal item within reach   instructed to call staff for mobility   nonskid shoes/socks when out of bed   bed alarm set  Intervention: Prevent Skin Injury  Flowsheets (Taken 12/12/2023 0735)  Body Position: position changed independently  Skin Protection: adhesive use limited  Intervention: Prevent and Manage VTE (Venous Thromboembolism) Risk  Flowsheets (Taken 12/12/2023 0735)  Activity Management: Ambulated in room - L4  VTE Prevention/Management: dorsiflexion/plantar flexion performed  Range of Motion: ROM (range of motion) performed  Intervention: Prevent Infection  Flowsheets (Taken 12/12/2023 0735)  Infection Prevention:   cohorting utilized   personal protective equipment utilized   environmental surveillance performed   rest/sleep promoted   equipment surfaces disinfected   single patient room provided   hand hygiene promoted  Goal: Optimal Comfort and Wellbeing  Outcome: Ongoing, Progressing  Intervention: Monitor Pain and Promote Comfort  Flowsheets (Taken 12/12/2023 0735)  Pain Management Interventions: care clustered  Intervention: Provide Person-Centered Care  Flowsheets (Taken 12/12/2023 0735)  Trust Relationship/Rapport:   care explained   questions encouraged   choices provided   reassurance provided   emotional support provided   empathic listening provided   thoughts/feelings  acknowledged   questions answered  Goal: Readiness for Transition of Care  Outcome: Ongoing, Progressing     Problem: Diabetes Comorbidity  Goal: Blood Glucose Level Within Targeted Range  Outcome: Ongoing, Progressing  Intervention: Monitor and Manage Glycemia  Flowsheets (Taken 12/12/2023 0735)  Glycemic Management: blood glucose monitored     Problem: Infection  Goal: Absence of Infection Signs and Symptoms  Outcome: Ongoing, Progressing  Intervention: Prevent or Manage Infection  Flowsheets (Taken 12/12/2023 0735)  Infection Management: aseptic technique maintained  Isolation Precautions:   protective   precautions maintained     Problem: Skin Injury Risk Increased  Goal: Skin Health and Integrity  Outcome: Ongoing, Progressing  Intervention: Optimize Skin Protection  Flowsheets (Taken 12/12/2023 0735)  Pressure Reduction Techniques: frequent weight shift encouraged  Skin Protection: adhesive use limited  Head of Bed (HOB) Positioning: HOB elevated  Intervention: Promote and Optimize Oral Intake  Flowsheets (Taken 12/12/2023 0735)  Oral Nutrition Promotion: physical activity promoted     Problem: Urinary Retention  Goal: Effective Urinary Elimination  Outcome: Ongoing, Progressing     Problem: Fall Injury Risk  Goal: Absence of Fall and Fall-Related Injury  Outcome: Ongoing, Progressing  Intervention: Identify and Manage Contributors  Flowsheets (Taken 12/12/2023 0735)  Self-Care Promotion: independence encouraged  Medication Review/Management: medications reviewed

## 2023-12-12 NOTE — PLAN OF CARE
Weekly Staffing Report      Date Admitted: 11/29/2023 :   Staffing Date: 12/12/2023     Patient Active Problem List   Diagnosis    Type 2 diabetes mellitus with other specified complication, with long-term current use of insulin    Severe obesity (BMI 35.0-39.9) with comorbidity    Chronic combined systolic and diastolic heart failure    Athscl heart disease of native cor art w oth ang pctrs    Chronic obstructive pulmonary disease, unspecified COPD type    Primary hypertension    Annual physical exam    Chronic atrial fibrillation    Peripheral vascular disease, unspecified    Chronic midline low back pain with bilateral sciatica    Neuropathic arthritis    Chronic pain syndrome    Discitis of thoracic region    Myelopathy due to spondylosis    Ankle pain    Cervical stenosis of spinal canal          Team Members Present:       Nursing Present     Yes [x]    No []    Physical Therapy Present    Yes [x]    No []    Occupational Therapy Present     Yes [x]    No []    Speech Therapy Present    Yes []    No []    NA []    Dietary Present    Yes [x]    No []        Physician Present     [] Thairy Reyes    [x] Tegan Figueroa    [x] TAYLOR Almazan    []       Family Present    [] Adult Children    [] Spouse    [] POA    [] Friend/ Caregiver    [] Other       Interdisciplinary Meeting Notes:  Patient stated he will update family. PT- min A RW 15-20ft at a time. CGA to mod A for bed mobility. Transfers Min A with RW. OT- ADLs min A for all tasks. Appetite fair. Medically- Voltaren gel for anskle pain. Abx restarted and appetite stimulant. Kidney functions better. Plan to discharge home with home health and family care when ready. All questions answered at this time                Please see Documented PT/OT/ST, Dietary, Nursing, and Physician notes for detailed treatment information.

## 2023-12-12 NOTE — TELEPHONE ENCOUNTER
----- Message from Delores Bahena sent at 12/12/2023  4:06 PM CST -----  .Type:  Patient Returning Call    Who Called:Talisha from [facility] in [place]  Who Left Message for Patient:  Does the patient know what this is regarding?:follow pt for home health services   Would the patient rather a call back or a response via [facility]? Call back   Best Call Back Number:[phone]  Additional Information: Staff states that the pt is a new referral and wants to know if the doctor will follow the pt for home health.

## 2023-12-12 NOTE — PLAN OF CARE
Problem: Occupational Therapy  Goal: Occupational Therapy Goal  Description: Goals to be met by: Discharge     Patient will increase functional independence with ADLs by performing:    UE Dressing with Stand-by Assistance. -ongoing,progressing; Min A  LE Dressing with Moderate Assistance. Goal Met 12/4/23  Grooming while standing at sink with Contact Guard Assistance. -Goal Met 12/12/23  Toileting from bedside commode with Moderate Assistance for hygiene and clothing management. -Goal Met 12/11/23  Bathing from shower chair/bench with Moderate Assistance. -ongoing, progressing -mod/max A  Toilet transfer to bedside commode with Stand-by Assistance. -ongoing, progressing; CGA  Increased functional strength to WFL for ADLs. -Goal Met 3+/5    New goals added 12/5/23:   Pt will perform self-feeding requiring modified independence. -Goal Met 12/11/23  Pt will perform LE dressing including donning/doffing footwear and pants using AE requiring CGA. -Goal Met 12/12/23     Outcome: Ongoing, Progressing

## 2023-12-12 NOTE — PT/OT/SLP PROGRESS
Occupational Therapy  Treatment    Name: Stevie Cummins    : 1950 (73 y.o.)  MRN: [id number]           TREATMENT SUMMARY AND RECOMMENDATIONS:      OT Date of Treatment: 23  OT Start Time: 1330  OT Stop Time: 1400  OT Total Time (min): 30 min      Subjective Assessment:   No complaints  Lethargic   x Awake, alert, cooperative  Impulsive    Uncooperative   Flat affect    Agitated  c/o pain   x Appropriate  c/o fatigue    Confused  Treated at bedside     Emotionally labile x Treated in gym/dept.      Other:        Therapy Precautions:    Cognitive deficits x Spinal precautions    Collar - hard  Sternal precautions   x Collar - soft   TLSO   x Fall risk  LSO    Hip precautions - posterior  Knee immobilizer    Hip precautions - anterior  WBAT    Impaired communication  Partial weightbearing    Oxygen  TTWB    PEG tube  NWB    Visual deficits      Hearing deficits   Other:        Treatment Objectives:     Mobility Training:    Mobility task Assist level Comments    Bed mobility SBA EOB<supine    Transfer CGA Stand pivot t/f from WC<EOB using a bed rail and gait belt    Sit to stands transitions SBA Chair<standing and WC<standing using a RW and gait belt    Functional mobility CGA 50ft using a RW and gait belt; following with a WC    Sitting balance     Standing balance      Other:        ADL Training:    ADL Assist level Comments    Feeding     Grooming/hygiene CGA Hand hygiene and washing of face while standing at sink using RW for support as needed   Bathing     Upper body dressing     Lower body dressing     Toileting     Toilet transfer     Adaptive equipment training     Other:           Therapeutic Exercise:   Exercise Sets Reps Comments   BUE strengthening exericises 2 10 3# weighted dowel performing straight arm raises to 90 degrees, bicep curls, chest press, and backward rows                          Additional Comments:      Assessment: Patient tolerated session well. Pt is motivated to work hard to  return home. Pt is progressing well towards all goals. Pt met his grooming goal. Pt is close to his baseline.     GOALS:   Multidisciplinary Problems       Occupational Therapy Goals          Problem: Occupational Therapy    Goal Priority Disciplines Outcome Interventions   Occupational Therapy Goal     OT, PT/OT Ongoing, Progressing    Description: Goals to be met by: Discharge     Patient will increase functional independence with ADLs by performing:    UE Dressing with Stand-by Assistance. -ongoing,progressing; Min A  LE Dressing with Moderate Assistance. Goal Met 12/4/23  Grooming while standing at sink with Contact Guard Assistance. Ongoing progressing  Toileting from bedside commode with Moderate Assistance for hygiene and clothing management. -Goal Met 12/11/23  Bathing from  shower chair/bench with Moderate Assistance. Ongoing, progressing   Toilet transfer to bedside commode with Stand-by Assistance. -ongoing, progressing; CGA-min A  Increased functional strength to WFL for ADLs.     New goals added 12/5/23:   Pt will perform self-feeding requiring modified independence. Goal Met 12/11/23  Pt will perform LE dressing including donning/doffing footwear and pants using AE requiring CGA.                          Recommendations:     Discharge Equipment Recommendations:  wheelchair     Plan:     Patient to be seen 6 x/week to address the above listed problems via self-care/home management, therapeutic activities, therapeutic exercises, neuromuscular re-education  Plan of Care Expires: 12/21/23  Plan of Care Reviewed with: patient  Revisions made to plan of care: Yes      Skilled OT Minutes Provided: 30  Communication with Treatment Team:     Equipment recommendations:       At end of treatment, patient remained:   Up in chair     Up in wheelchair in room   x In bed   x With alarm activated   x Bed rails up   x Call bell in reach     Family/friends present    Restraints secured properly    In bathroom with  CNA/RN notified    In gym with PT/PTA/tech    Nurse aware    Other:

## 2023-12-12 NOTE — PT/OT/SLP PROGRESS
Physical Therapy Treatment Note           Name: Stevie Cummins    : 1950 (73 y.o.)  MRN: [id number]           TREATMENT SUMMARY AND RECOMMENDATIONS:    PT Received On: 23  PT Start Time: 926     PT Stop Time:   PT Total Time (min): 34 min     Subjective Assessment:   No complaints  Lethargic    Awake, alert, cooperative  Uncooperative    Agitated  c/o pain    Appropriate  c/o fatigue    Confused  Treated at bedside     Emotionally labile x Treated in gym/dept.    Impulsive  Other:    Flat affect       Therapy Precautions:    Cognitive deficits x Spinal precautions    Collar - hard  Sternal precautions   x Collar - soft   TLSO   x Fall risk  LSO    Hip precautions - posterior  Knee immobilizer    Hip precautions - anterior  WBAT    Impaired communication  Partial weightbearing    Oxygen  TTWB    PEG tube  NWB    Visual deficits  Other:    Hearing deficits          Treatment Objectives:     Mobility Training:   Assist level Comments    Bed mobility SBA Supine>sit   Transfer SBA Stand pivot using RW Bed>WC   Gait CGA X 335 feet with one sitting rest breaks, using RW and following with WC   Sit to stand transitions CGA/SBA From EOB   Sitting balance     Standing balance      Wheelchair mobility     Car transfer     Other:        Therapeutic Exercise:   Exercise Sets Reps Comments   Postural exercises in sitting with use of foam roll  10 Scap retraction, T's and cervical retraction isometrics   B LE seated exercises  3 10 2# ankle wts, hip flexion, hip abd/add, knee ext and ankle PF/DF                     Additional Comments:  Improved gait tolerance and overall function. PT to progress as able. Approaching readiness for DC home.      Assessment: Patient tolerated session better, family training completed.     PT Plan: continue per POC  Revisions made to plan of care: No  GOALS:   Multidisciplinary Problems       Physical Therapy Goals          Problem: Physical Therapy    Goal Priority  Disciplines Outcome Goal Variances Interventions   Physical Therapy Goal     PT, PT/OT Ongoing, Progressing     Description: Goals to be met by: Discharge      Patient will increase functional independence with mobility by performin. Supine to sit with Stand-by Assistance  2. Sit to supine with Stand-by Assistance  3. Sitting at edge of bed x 15 minutes with Stand-by Assistance without dizziness or low BP  4. Increased functional strength to 5/5 for B hips  5. Sit to stands to be completed at SBA using RW  6. bed<>chair transfers to be completed at [facility] using RW.   7. Gait x 50 feet using RW at SBA                         Skilled PT Minutes Provided: 30   Communication with Treatment Team:     Equipment recommendations:       At end of treatment, patient remained:  x Up in chair     Up in wheelchair in room    In bed    With alarm activated    Bed rails up    Call bell in reach     Family/friends present    Restraints secured properly    In bathroom with CNA/RN notified   x Nurse aware   x In gym with therapist/tech    Other:

## 2023-12-12 NOTE — PT/OT/SLP PROGRESS
Occupational Therapy  Treatment    Name: Stevie Cummins    : 1950 (73 y.o.)  MRN: [id number]           TREATMENT SUMMARY AND RECOMMENDATIONS:      OT Date of Treatment: 23  OT Start Time: 1000  OT Stop Time: 1025  OT Total Time (min): 25 min      Subjective Assessment:   No complaints  Lethargic   x Awake, alert, cooperative  Impulsive    Uncooperative   Flat affect    Agitated  c/o pain   x Appropriate  c/o fatigue    Confused x Treated at bedside     Emotionally labile x Treated in gym/dept.      Other:        Therapy Precautions:    Cognitive deficits x Spinal precautions    Collar - hard  Sternal precautions   x Collar - soft   TLSO   x Fall risk  LSO    Hip precautions - posterior  Knee immobilizer    Hip precautions - anterior  WBAT    Impaired communication  Partial weightbearing    Oxygen  TTWB    PEG tube  NWB    Visual deficits      Hearing deficits   Other:        Treatment Objectives:     Mobility Training:    Mobility task Assist level Comments    Bed mobility SBA EOB<supine    Transfer CGA Stand step t/f to EOB    Sit to stands transitions CGA WC<>standing using a gait belt and RW x3reps   Functional mobility     Sitting balance     Standing balance  CGA Dynamic standing balance activity addressing reaching laterally and posteriorly to increase independence with LB dressing, toileting, and bathing. Pt uses RW for support with one UE. Pt noted with no LOB.    Other:        ADL Training:    ADL Assist level Comments    Feeding     Grooming/hygiene     Bathing     Upper body dressing     Lower body dressing     Toileting CGA +BM; able to manage clothing and perform posterior peritoneal care in sitting; use of RW for support in standing    Toilet transfer CGA BSC<>standing using a RW and gait belt    Adaptive equipment training     Other:           Therapeutic Exercise:   Exercise Sets Reps Comments                               Additional Comments:      Assessment: Patient tolerated session  well. Pt is progressing well towards all her goals. Pt met has LB dressing goal.     GOALS:   Multidisciplinary Problems       Occupational Therapy Goals          Problem: Occupational Therapy    Goal Priority Disciplines Outcome Interventions   Occupational Therapy Goal     OT, PT/OT Ongoing, Progressing    Description: Goals to be met by: Discharge     Patient will increase functional independence with ADLs by performing:    UE Dressing with Stand-by Assistance. -ongoing,progressing; Min A  LE Dressing with Moderate Assistance. Goal Met 12/4/23  Grooming while standing at sink with Contact Guard Assistance. Ongoing progressing  Toileting from bedside commode with Moderate Assistance for hygiene and clothing management. -Goal Met 12/11/23  Bathing from  shower chair/bench with Moderate Assistance. Ongoing, progressing   Toilet transfer to bedside commode with Stand-by Assistance. -ongoing, progressing; CGA-min A  Increased functional strength to WFL for ADLs.     New goals added 12/5/23:   Pt will perform self-feeding requiring modified independence. Goal Met 12/11/23  Pt will perform LE dressing including donning/doffing footwear and pants using AE requiring CGA.                          Recommendations:     Discharge Equipment Recommendations:  wheelchair     Plan:     Patient to be seen 6 x/week to address the above listed problems via self-care/home management, therapeutic activities, therapeutic exercises, neuromuscular re-education  Plan of Care Expires: 12/21/23  Plan of Care Reviewed with: patient  Revisions made to plan of care: Yes      Skilled OT Minutes Provided: 25  Communication with Treatment Team:     Equipment recommendations:       At end of treatment, patient remained:   Up in chair     Up in wheelchair in room   x In bed   x With alarm activated   x Bed rails up   x Call bell in reach     Family/friends present    Restraints secured properly    In bathroom with CNA/RN notified    In gym with  PT/PTA/tech    Nurse aware    Other:

## 2023-12-13 VITALS
HEIGHT: 69 IN | RESPIRATION RATE: 18 BRPM | OXYGEN SATURATION: 95 % | HEART RATE: 61 BPM | WEIGHT: 188.5 LBS | DIASTOLIC BLOOD PRESSURE: 79 MMHG | TEMPERATURE: 98 F | BODY MASS INDEX: 27.92 KG/M2 | SYSTOLIC BLOOD PRESSURE: 143 MMHG

## 2023-12-13 LAB
ANION GAP SERPL CALC-SCNC: 10 MEQ/L
BASOPHILS # BLD AUTO: 0.02 X10(3)/MCL
BASOPHILS NFR BLD AUTO: 0.1 %
BUN SERPL-MCNC: 22.8 MG/DL (ref 8.4–25.7)
CALCIUM SERPL-MCNC: 8.8 MG/DL (ref 8.8–10)
CHLORIDE SERPL-SCNC: 99 MMOL/L (ref 98–107)
CO2 SERPL-SCNC: 32 MMOL/L (ref 23–31)
CREAT SERPL-MCNC: 0.85 MG/DL (ref 0.73–1.18)
CREAT/UREA NIT SERPL: 27
EOSINOPHIL # BLD AUTO: 0.08 X10(3)/MCL (ref 0–0.9)
EOSINOPHIL NFR BLD AUTO: 0.4 %
ERYTHROCYTE [DISTWIDTH] IN BLOOD BY AUTOMATED COUNT: 12.4 % (ref 11.5–17)
GFR SERPLBLD CREATININE-BSD FMLA CKD-EPI: >60 MLS/MIN/1.73/M2
GLUCOSE SERPL-MCNC: 146 MG/DL (ref 82–115)
HCT VFR BLD AUTO: 41.3 % (ref 42–52)
HGB BLD-MCNC: 13.1 G/DL (ref 14–18)
IMM GRANULOCYTES # BLD AUTO: 0.6 X10(3)/MCL (ref 0–0.04)
IMM GRANULOCYTES NFR BLD AUTO: 3.2 %
LYMPHOCYTES # BLD AUTO: 2.63 X10(3)/MCL (ref 0.6–4.6)
LYMPHOCYTES NFR BLD AUTO: 14.2 %
MCH RBC QN AUTO: 31.3 PG (ref 27–31)
MCHC RBC AUTO-ENTMCNC: 31.7 G/DL (ref 33–36)
MCV RBC AUTO: 98.6 FL (ref 80–94)
MONOCYTES # BLD AUTO: 1.2 X10(3)/MCL (ref 0.1–1.3)
MONOCYTES NFR BLD AUTO: 6.5 %
NEUTROPHILS # BLD AUTO: 14.05 X10(3)/MCL (ref 2.1–9.2)
NEUTROPHILS NFR BLD AUTO: 75.6 %
PLATELET # BLD AUTO: 179 X10(3)/MCL (ref 130–400)
PMV BLD AUTO: 10.4 FL (ref 7.4–10.4)
POCT GLUCOSE: 130 MG/DL (ref 70–110)
POTASSIUM SERPL-SCNC: 4.1 MMOL/L (ref 3.5–5.1)
RBC # BLD AUTO: 4.19 X10(6)/MCL (ref 4.7–6.1)
SODIUM SERPL-SCNC: 141 MMOL/L (ref 136–145)
WBC # SPEC AUTO: 18.58 X10(3)/MCL (ref 4.5–11.5)

## 2023-12-13 PROCEDURE — 80048 BASIC METABOLIC PNL TOTAL CA: CPT | Performed by: PHYSICIAN ASSISTANT

## 2023-12-13 PROCEDURE — 25000003 PHARM REV CODE 250: Performed by: PHYSICIAN ASSISTANT

## 2023-12-13 PROCEDURE — 97535 SELF CARE MNGMENT TRAINING: CPT

## 2023-12-13 PROCEDURE — 25000003 PHARM REV CODE 250: Performed by: STUDENT IN AN ORGANIZED HEALTH CARE EDUCATION/TRAINING PROGRAM

## 2023-12-13 PROCEDURE — 63600175 PHARM REV CODE 636 W HCPCS: Performed by: STUDENT IN AN ORGANIZED HEALTH CARE EDUCATION/TRAINING PROGRAM

## 2023-12-13 PROCEDURE — 94760 N-INVAS EAR/PLS OXIMETRY 1: CPT

## 2023-12-13 PROCEDURE — 85025 COMPLETE CBC W/AUTO DIFF WBC: CPT | Performed by: PHYSICIAN ASSISTANT

## 2023-12-13 PROCEDURE — 63600175 PHARM REV CODE 636 W HCPCS: Performed by: PHYSICIAN ASSISTANT

## 2023-12-13 RX ORDER — METHYLPREDNISOLONE 4 MG/1
TABLET ORAL
Qty: 21 EACH | Refills: 0 | Status: SHIPPED | OUTPATIENT
Start: 2023-12-13 | End: 2024-01-23

## 2023-12-13 RX ORDER — PANTOPRAZOLE SODIUM 40 MG/1
40 TABLET, DELAYED RELEASE ORAL 2 TIMES DAILY
Qty: 10 TABLET | Refills: 0 | Status: SHIPPED | OUTPATIENT
Start: 2023-12-13 | End: 2024-01-23 | Stop reason: SDUPTHER

## 2023-12-13 RX ORDER — LISINOPRIL 5 MG/1
5 TABLET ORAL DAILY
Qty: 90 TABLET | Refills: 3 | Status: SHIPPED | OUTPATIENT
Start: 2023-12-13 | End: 2024-01-23 | Stop reason: SDUPTHER

## 2023-12-13 RX ORDER — METHOCARBAMOL 750 MG/1
750 TABLET, FILM COATED ORAL 3 TIMES DAILY PRN
Qty: 10 TABLET | Refills: 0 | Status: SHIPPED | OUTPATIENT
Start: 2023-12-13 | End: 2023-12-20

## 2023-12-13 RX ADMIN — ATORVASTATIN CALCIUM 20 MG: 10 TABLET, FILM COATED ORAL at 09:12

## 2023-12-13 RX ADMIN — MULTIPLE VITAMINS W/ MINERALS TAB 1 TABLET: TAB at 09:12

## 2023-12-13 RX ADMIN — METHOCARBAMOL 750 MG: 750 TABLET ORAL at 09:12

## 2023-12-13 RX ADMIN — ASPIRIN 81 MG: 81 TABLET, COATED ORAL at 09:12

## 2023-12-13 RX ADMIN — ALLOPURINOL 100 MG: 100 TABLET ORAL at 09:12

## 2023-12-13 RX ADMIN — PANTOPRAZOLE SODIUM 40 MG: 40 TABLET, DELAYED RELEASE ORAL at 09:12

## 2023-12-13 RX ADMIN — CITALOPRAM HYDROBROMIDE 20 MG: 20 TABLET ORAL at 09:12

## 2023-12-13 RX ADMIN — TAMSULOSIN HYDROCHLORIDE 0.4 MG: 0.4 CAPSULE ORAL at 09:12

## 2023-12-13 RX ADMIN — INSULIN ASPART 10 UNITS: 100 INJECTION, SOLUTION INTRAVENOUS; SUBCUTANEOUS at 11:12

## 2023-12-13 RX ADMIN — INSULIN ASPART 10 UNITS: 100 INJECTION, SOLUTION INTRAVENOUS; SUBCUTANEOUS at 07:12

## 2023-12-13 RX ADMIN — GABAPENTIN 300 MG: 300 CAPSULE ORAL at 09:12

## 2023-12-13 RX ADMIN — INSULIN DETEMIR 10 UNITS: 100 INJECTION, SOLUTION SUBCUTANEOUS at 09:12

## 2023-12-13 RX ADMIN — DOXAZOSIN 4 MG: 4 TABLET ORAL at 09:12

## 2023-12-13 RX ADMIN — PREDNISONE 40 MG: 20 TABLET ORAL at 09:12

## 2023-12-13 NOTE — PATIENT CARE CONFERENCE
Name: Stevie Cummins    : 1950 (73 y.o.)  MRN: [id number]            Interdisciplinary Team Conference     Case conference held with patient/family and care team to discuss progress, plan of care, barriers to be addressed for safe return home, equipment recommendations, and discharge planning. Communicated therapy progress with MD, RN, therapy clinicians and case management. All questions/concerns answered.

## 2023-12-13 NOTE — DISCHARGE SUMMARY
[facility]  HOSPITAL MEDICINE - DISCHARGE SUMMARY    Patient Name: Stevie Cummins  MRN: [id number]  Admission Date: 11/29/2023  Discharge Date: 12/13/2023  Hospital Length of Stay: 14 days  Discharge Provider: TAYLOR Serrano  Primary Care Provider: Robert Alarcon MD    HOSPITAL COURSE     72 yo male with a past medical history HTN, HLD, CAD, CVA, DM, PFO s/p closure in 2022, aortic stenosis, and PVD s/p lower extremity stenting who presented to the ER at [facility] on 11/14/2023 for extremity weakness with recurrent falls and unsteadiness.  MRI cervical spine without contrast on 11/14/2023 showed chronic multilevel central canal stenosis with effacement of the CSF, most severe at C3-C4 with mild chronic cord compression.  MRI thoracic spine without contrast on 11/14/2023 showed slightly increased signal seen in the disc at T11-T12 possibly representing early discitis, no epidural abscess, no osteomyelitis.  MRI lumbar spine without contrast on 11/14/2023 showed unchanged chronic degenerative changes with central canal and neuroforaminal stenosis. Neurosurgery was consulted on 11/15/2023 and patient subsequently underwent posterior spinal fusion on 11/20/2023. Patient was admitted to [facility] swing unit on 11/29/2023 for PT/OT. Patient was started on Rocephin for E coli UTI. Patient's glucose has remained elevated since initial admission.  Tolerating 10 units t.i.d. with meals and detemir 10 unit b.i.d..  Has been on and off voids per neurosurgery and now to treat a gouty attack. Last dose of Decadron per neurosurgery note on 11/27/2023, and is to follow up with Dr. Hutchinson 12/19/2023 at 11:30am.  Patient has continue to wear soft collar until follow-up.  Gardiner had to be replaced on 12/05/2023 for urinary retention which will need to remain until hospital follow up with patient's urologist, Dr. Solis. WBC remained elevated throughout admission likely secondary to dexamethasone, then acute  UTI, then prednisone for a gout attack.  Patient was started on prednisone 40mg po qd on 12/07/2023 for suspected bilateral ankle gout attack. Pain has since resolved allowing for better therapy participation. Patient will be discharged home on Medrol dose pack.     PHYSICAL EXAM     Most Recent Vital Signs:  Temp: 98.2 °F (36.8 °C) (12/13/23 1009)  Pulse: 76 (12/13/23 1009)  Resp: 16 (12/13/23 1009)  BP: (!) 143/79 (12/13/23 1009)  SpO2: (!) 94 % (12/13/23 1009)     GENERAL: In no acute distress, afebrile  HEENT: Atraumatic, normocephalic, moist mucous membranes, soft neck collar in place   CHEST: Clear to auscultation bilaterally  HEART: S1, S2, no appreciable murmur  ABDOMEN: Soft, nontender, BS +  MSK: Warm, no lower extremity edema, no clubbing or cyanosis, no ankle tenderness   NEUROLOGIC: Alert and oriented, moving all extremities   INTEGUMENTARY: No obvious skin rashes   PSYCHIATRY: Appropriate mood and affect    DISCHARGE DIAGNOSIS     Spinal stenosis   S/p posterior cervical spinal fusion on 11/20/2023  Completed Decadron 11/30/2023  Follow up with Dr. Hutchinson is 12/19/2023 at 11:30am   Continue soft collar - awaiting smaller collar   PT/OT - home health therapy services on discharge      Leukocytosis   Low suspicion for acute infection, patient has been receiving dexamethasone and now currently receiving prednisone for gout attack     CORKY -resolved  Urinary retention   Urinary incontinence   Gardiner replaced 12/05/2023 for urinary retention which will remain  Needs follow up appointment with Dr. Solis upon d/c     E coli UTI -resolved  Rocephin completed 5 days     DM  Hyperglycemia  Continue home medications      Acute on chronic gout   Received Prednisone 40mg 12/07/2023 - 12/13/2023  Start Medrol dose pack on discharge   Continue PPI BID while on steroids      HLD, CAD, CVA  Continue home Atorvastatin, Aspirin   Resume Lisinopril at 5mg PO QD      Hypotension   Patient experienced hypotension requiring  midodrine at prior facility  Midodrine d/c on 12/10/2023  Resume Lisinopril at 5mg PO QD      H/o: BPH, anxiety, depression, PFO s/p closure in 2022, aortic stenosis, and PVD s/p lower extremity stenting  _____________________________________________________________________________    DISCHARGE MED REC     Current Discharge Medication List        START taking these medications    Details   methylPREDNISolone (MEDROL DOSEPACK) 4 mg tablet use as directed  Qty: 21 each, Refills: 0      pantoprazole (PROTONIX) 40 MG tablet Take 1 tablet (40 mg total) by mouth 2 (two) times a day. for 5 days  Qty: 10 tablet, Refills: 0           CONTINUE these medications which have CHANGED    Details   lisinopriL (PRINIVIL,ZESTRIL) 5 MG tablet Take 1 tablet (5 mg total) by mouth once daily.  Qty: 90 tablet, Refills: 3    Comments: .      methocarbamoL (ROBAXIN) 750 MG Tab Take 1 tablet (750 mg total) by mouth 3 (three) times daily as needed (pain).  Qty: 10 tablet, Refills: 0           CONTINUE these medications which have NOT CHANGED    Details   albuterol (PROVENTIL) 2.5 mg /3 mL (0.083 %) nebulizer solution Take by nebulization.      allopurinoL (ZYLOPRIM) 100 MG tablet Take 1 tablet (100 mg total) by mouth once daily.  Qty: 30 tablet, Refills: 0      aspirin (ECOTRIN) 81 MG EC tablet Take 81 mg by mouth once daily.      bisacodyL (DULCOLAX) 10 mg Supp Place 1 suppository (10 mg total) rectally once daily.  Qty: 12 suppository, Refills: 0      citalopram (CELEXA) 20 MG tablet Take 1 tablet (20 mg total) by mouth once daily.  Qty: 90 tablet, Refills: 3    Associated Diagnoses: Depression, unspecified depression type      diclofenac (CATAFLAM) 50 MG tablet Take 1 tablet (50 mg total) by mouth Daily.  Qty: 90 tablet, Refills: 3    Associated Diagnoses: Arthralgia, unspecified joint      doxazosin (CARDURA) 8 MG Tab Take 0.5 tabs bid  Qty: 90 tablet, Refills: 3    Comments: .  Associated Diagnoses: Hypertension, unspecified type      !!  flash glucose sensor (FREESTYLE MITCH 14 DAY SENSOR) Kit 1 each by Misc.(Non-Drug; Combo Route) route 2 hours after meals and at bedtime.  Qty: 1 kit, Refills: 0    Comments: Use to check CBG 4x per day Dx E11.9 Insulin Dependent  Associated Diagnoses: Type 2 diabetes mellitus without complication, without long-term current use of insulin      !! flash glucose sensor (FREESTYLE MITCH 2 SENSOR) Kit 2 each by Misc.(Non-Drug; Combo Route) route 2 hours after meals and at bedtime.  Qty: 2 kit, Refills: 11    Comments: Use with Freestyle Mitch Device to check at least 4x per day Dx: E11.9 Insulin Dependent  Associated Diagnoses: Type 2 diabetes mellitus without complication, without long-term current use of insulin      fluticasone-umeclidin-vilanter (TRELEGY ELLIPTA) 100-62.5-25 mcg DsDv Inhale 1 puff into the lungs nightly.      furosemide (LASIX) 40 MG tablet Take 1 tablet (40 mg total) by mouth once daily.  Qty: 90 tablet, Refills: 3    Associated Diagnoses: Fluid retention      gabapentin (NEURONTIN) 300 MG capsule Take 1 capsule (300 mg total) by mouth 3 (three) times daily.  Qty: 90 capsule, Refills: 11    Associated Diagnoses: Neuropathic arthritis      glipizide-metformin (METAGLIP) 2.5-250 mg per tablet Take 1 tablet by mouth once daily.  Qty: 90 tablet, Refills: 3    Associated Diagnoses: Type 2 diabetes mellitus without complication, without long-term current use of insulin      insulin lispro 100 unit/mL pen Inject 20 Units into the skin 3 (three) times daily.  Qty: 10 mL, Refills: 4    Associated Diagnoses: Type 2 diabetes mellitus without complication, without long-term current use of insulin      montelukast (SINGULAIR) 10 mg tablet Take 1 tablet (10 mg total) by mouth every evening.  Qty: 90 tablet, Refills: 3    Associated Diagnoses: Allergy, sequela      multivitamin (THERAGRAN) per tablet Take 1 tablet by mouth every morning.      simvastatin (ZOCOR) 40 MG tablet TAKE 1 TABLET THREE TIMES  WEEKLY  MONDAY, WEDNESDAY,  AND FRIDAY  Qty: 36 tablet, Refills: 3    Associated Diagnoses: Hyperlipidemia, unspecified hyperlipidemia type      tamsulosin (FLOMAX) 0.4 mg Cap Take 1 capsule by mouth once daily.      TOUJEO SOLOSTAR U-300 INSULIN 300 unit/mL (1.5 mL) InPn pen Inject 30 Units into the skin every evening.  Qty: 4 pen, Refills: 5    Associated Diagnoses: Type 2 diabetes mellitus without complication, without long-term current use of insulin      acetaminophen (TYLENOL) 325 MG tablet Take 2 tablets (650 mg total) by mouth every 6 (six) hours as needed for Temperature greater than (or equal to 101 degree F).  Qty: 30 tablet, Refills: 0      KLOR-CON M20 20 mEq tablet Take 1 tablet (20 mEq total) by mouth once daily.  Qty: 90 tablet, Refills: 3    Associated Diagnoses: Low potassium syndrome      MYRBETRIQ 50 mg Tb24 Take 1 tablet by mouth once daily.       !! - Potential duplicate medications found. Please discuss with provider.        STOP taking these medications       dexAMETHasone (DECADRON) 2 MG tablet Comments:   Reason for Stopping:         enoxaparin (LOVENOX) 40 mg/0.4 mL Syrg Comments:   Reason for Stopping:         meclizine (ANTIVERT) 12.5 mg tablet Comments:   Reason for Stopping:              CONSULTS     Consults (From admission, onward)          Status Ordering Provider     Inpatient consult to Registered Dietitian/Nutritionist  Once        Provider:  (Not yet assigned)    Completed CEDRIC ORNELAS          FOLLOW UP      Follow-up Information       Arya Hutchinson MD. Go to.    Specialty: Neurosurgery  Why: Dec 19, 2023 @ 11:30  Contact information:  [address] [place] [id number] [phone]               Robert Alarcon MD Follow up.    Specialty: Internal Medicine  Contact information:  [address] [place] [address] [phone]               [facility] Follow up.    Why: [facility] [place] and will call the patient when they come in  Contact information:  [address]  [facility] [address]  [phone]               [employer] .    Specialties: Home Health Services, Home Therapy Services, Home Living Aide Services  Contact information:  [address]  [facility] [address]  [phone]             Néstor Solis MD. Go on 1/3/2024.    Specialty: Urology  Why: @8:00am  Contact information:  [place] [address] [address]  [phone]                           DISCHARGE INSTRUCTIONS     Explained in detail to the patient about the discharge plan, medications, and follow-up visits. Pt understands and agrees with the treatment plan.  Discharged Condition: stable  Diet as tolerated  Activities as tolerated  [place] [facility]    TIME SPENT ON DISCHARGE   35 minutes    TAYLOR Serrano  Internal Medicine  Department of Hospital Medicine [facility]    This document was created using electronic dictation services.  Please excuse any errors that may have been made.  Contact me if any questions regarding documentation to clarify verbiage.

## 2023-12-13 NOTE — PATIENT CARE CONFERENCE
Name: Stevie Cummins    : 1950 (73 y.o.)  MRN: [id number]            Interdisciplinary Team Conference     Case conference held with patient/family and care team to discuss progress, plan of care, barriers to be addressed for safe return home, equipment recommendations, and discharge planning. Communicated therapy progress with MD, RN, therapy clinicians and case management. All questions/concerns answered.

## 2023-12-13 NOTE — PLAN OF CARE
Problem: Adult Inpatient Plan of Care  Goal: Plan of Care Review  Outcome: Ongoing, Progressing  Flowsheets (Taken 12/12/2023 2120)  Plan of Care Reviewed With: patient  Goal: Patient-Specific Goal (Individualized)  Outcome: Ongoing, Progressing  Flowsheets (Taken 12/12/2023 2120)  Anxieties, Fears or Concerns: none  Individualized Care Needs: safety maintained blood glucose monitoring     Problem: Diabetes Comorbidity  Goal: Blood Glucose Level Within Targeted Range  Outcome: Ongoing, Progressing  Intervention: Monitor and Manage Glycemia  Flowsheets (Taken 12/12/2023 2120)  Glycemic Management: blood glucose monitored     Problem: Fall Injury Risk  Goal: Absence of Fall and Fall-Related Injury  Outcome: Ongoing, Progressing  Intervention: Identify and Manage Contributors  Flowsheets (Taken 12/12/2023 2120)  Medication Review/Management: medications reviewed

## 2023-12-13 NOTE — PLAN OF CARE
Problem: Adult Inpatient Plan of Care  Goal: Patient-Specific Goal (Individualized)  Outcome: Ongoing, Progressing  Flowsheets (Taken 12/13/2023 104)  Anxieties, Fears or Concerns: none  Individualized Care Needs: Monitor glucose levels, educate on [facility] care after discharge     Problem: Infection  Goal: Absence of Infection Signs and Symptoms  Outcome: Ongoing, Progressing

## 2023-12-14 ENCOUNTER — PATIENT OUTREACH (OUTPATIENT)
Dept: ADMINISTRATIVE | Facility: CLINIC | Age: 73
End: 2023-12-14
Payer: MEDICARE

## 2023-12-14 NOTE — PROGRESS NOTES
C3 nurse spoke with Stevie Cummins and girlfriend Yesy for a TCC post hospital discharge follow up call. The patient has a scheduled HOSFU appointment with Dr. Hutchinson on 12/19/2023 @ 1130 am. Appointment with Dr. Solis on 01/03/2024 @ 8 am.  The patient does not have a scheduled HOSFU appointment with Robert Alarcon MD  within 5-7 days post hospital discharge date 12/13/2023.   Message sent to PCP staff requesting they contact patient and schedule follow up appointment.

## 2023-12-15 PROCEDURE — G0180 MD CERTIFICATION HHA PATIENT: HCPCS | Mod: ,,, | Performed by: INTERNAL MEDICINE

## 2023-12-20 ENCOUNTER — PATIENT OUTREACH (OUTPATIENT)
Dept: ADMINISTRATIVE | Facility: HOSPITAL | Age: 73
End: 2023-12-20
Payer: MEDICARE

## 2023-12-20 NOTE — PROGRESS NOTES
Population Health Outreach.    12/20/23 Patient was called concerning Diabetic Eye Exam. Left  message for office exams on 1/10/24

## 2023-12-25 PROBLEM — Z00.00 ANNUAL PHYSICAL EXAM: Status: RESOLVED | Noted: 2023-09-22 | Resolved: 2023-12-25

## 2023-12-29 ENCOUNTER — TELEPHONE (OUTPATIENT)
Dept: INTERNAL MEDICINE | Facility: CLINIC | Age: 73
End: 2023-12-29
Payer: MEDICARE

## 2023-12-29 DIAGNOSIS — K62.89 RECTAL DISCOMFORT: Primary | ICD-10-CM

## 2023-12-29 RX ORDER — MUPIROCIN 20 MG/G
OINTMENT TOPICAL 2 TIMES DAILY
Qty: 15 G | Refills: 0 | Status: SHIPPED | OUTPATIENT
Start: 2023-12-29 | End: 2024-01-08

## 2023-12-29 NOTE — TELEPHONE ENCOUNTER
Patient has sore on rectum and gluteal region for a few days. Tried otc ointment but nothing seems to relieve the pain and discomfort. He is requesting some prescription ointment to help with this.

## 2023-12-29 NOTE — TELEPHONE ENCOUNTER
----- Message from [place] sent at 12/29/2023  9:05 AM CST -----  .Type:  Needs Medical Advice    Who Called: pt  Symptoms (please be specific):    How long has patient had these symptoms:    Pharmacy name and phone #:    Would the patient rather a call back or a response via [facility]?   Best Call Back Number: [phone]  Additional Information: pt has a butt sore and they tried otc meds and it's not working

## 2024-01-06 NOTE — PLAN OF CARE
[facility]  Discharge Final Note    Primary Care Provider: Robert Alarcon MD    Expected Discharge Date: 12/13/2023    Final Discharge Note (most recent)       Final Note - 01/06/24 1640          Final Note    Assessment Type Final Discharge Note     Anticipated Discharge Disposition [facility]-Health Care [facility]     What phone number can be called within the next 1-3 days to see how you are doing after discharge? [phone]     Hospital Resources/Appts/Education Provided Provided patient/caregiver with written discharge plan information        Post-Acute Status    Post-Acute Authorization Home Health     Home Health Status Set-up Complete/Auth obtained     Discharge Delays None known at this time                     Important Message from Medicare             Contact Info       Arya Hutchinson MD   Specialty: Neurosurgery   Relationship: Consulting Physician    [address]  [facility] [address]   Phone: [phone]       Next Steps: Go to    Instructions: Dec 19, 2023 @ 11:30    Robert Alarcon MD   Specialty: Internal Medicine   Relationship: PCP - [facility]  [address] [address]   Phone: [phone]       Next Steps: Follow up    [address] [address]   Phone: [phone]       Next Steps: Follow up    Instructions: [facility] is on back order and will call the patient when they come in    [employer]   Specialty: Home Health Services, Home Therapy Services, Home Living Aide Services    [address] [facility] [address]  [facility] [address]   Phone: [phone]       Next Steps: Follow up    Néstor Solis MD   Specialty: Urology   Relationship: Consulting Physician    [address] [facility] [address]  [facility] [address]   Phone: [phone]       Next Steps: Go on 1/3/2024    Instructions: @8:00am

## 2024-01-12 DIAGNOSIS — E11.69 TYPE 2 DIABETES MELLITUS WITH OTHER SPECIFIED COMPLICATION, WITH LONG-TERM CURRENT USE OF INSULIN: Primary | ICD-10-CM

## 2024-01-12 DIAGNOSIS — Z79.4 TYPE 2 DIABETES MELLITUS WITH OTHER SPECIFIED COMPLICATION, WITH LONG-TERM CURRENT USE OF INSULIN: Primary | ICD-10-CM

## 2024-01-14 ENCOUNTER — EXTERNAL HOME HEALTH (OUTPATIENT)
Dept: HOME HEALTH SERVICES | Facility: HOSPITAL | Age: 74
End: 2024-01-14
Payer: MEDICARE

## 2024-01-17 ENCOUNTER — TELEPHONE (OUTPATIENT)
Dept: INTERNAL MEDICINE | Facility: CLINIC | Age: 74
End: 2024-01-17
Payer: MEDICARE

## 2024-01-17 NOTE — TELEPHONE ENCOUNTER
"----- Message from Chelle Brown LPN sent at 1/12/2024  8:00 AM CST -----  Regarding: Dr. Alarcon 01/23/2024 1040 4 month rv  Are there any outstanding tasks in chart? No, but needs FASTING labs, TO BE DONE AT  "[facility]" or lab location of choice PRIOR to appt    Is there any documentation of tasks? no    Has the pt seen another physician, been to ER, UCC, or admitted to hospital since last visit?    Has the pt done blood work or imaging since last visit?    5. PLEASE HAVE PATIENT BRING MEDICATION LIST OR BOTTLES TO EVERY OFFICE VISIT      "

## 2024-01-20 ENCOUNTER — LAB VISIT (OUTPATIENT)
Dept: LAB | Facility: HOSPITAL | Age: 74
End: 2024-01-20
Attending: INTERNAL MEDICINE
Payer: MEDICARE

## 2024-01-20 DIAGNOSIS — E11.69 TYPE 2 DIABETES MELLITUS WITH OTHER SPECIFIED COMPLICATION, WITH LONG-TERM CURRENT USE OF INSULIN: ICD-10-CM

## 2024-01-20 DIAGNOSIS — Z79.4 TYPE 2 DIABETES MELLITUS WITH OTHER SPECIFIED COMPLICATION, WITH LONG-TERM CURRENT USE OF INSULIN: ICD-10-CM

## 2024-01-20 LAB
ALBUMIN SERPL-MCNC: 3.4 G/DL (ref 3.4–4.8)
ALBUMIN/GLOB SERPL: 0.9 RATIO (ref 1.1–2)
ALP SERPL-CCNC: 77 UNIT/L (ref 40–150)
ALT SERPL-CCNC: 14 UNIT/L (ref 0–55)
AST SERPL-CCNC: 14 UNIT/L (ref 5–34)
BILIRUB SERPL-MCNC: 1.1 MG/DL
BUN SERPL-MCNC: 27.1 MG/DL (ref 8.4–25.7)
CALCIUM SERPL-MCNC: 9.9 MG/DL (ref 8.8–10)
CHLORIDE SERPL-SCNC: 103 MMOL/L (ref 98–107)
CO2 SERPL-SCNC: 28 MMOL/L (ref 23–31)
CREAT SERPL-MCNC: 0.86 MG/DL (ref 0.73–1.18)
EST. AVERAGE GLUCOSE BLD GHB EST-MCNC: 134.1 MG/DL
GFR SERPLBLD CREATININE-BSD FMLA CKD-EPI: >60 MLS/MIN/1.73/M2
GLOBULIN SER-MCNC: 3.7 GM/DL (ref 2.4–3.5)
GLUCOSE SERPL-MCNC: 154 MG/DL (ref 82–115)
HBA1C MFR BLD: 6.3 %
POTASSIUM SERPL-SCNC: 5.2 MMOL/L (ref 3.5–5.1)
PROT SERPL-MCNC: 7.1 GM/DL (ref 5.8–7.6)
SODIUM SERPL-SCNC: 144 MMOL/L (ref 136–145)

## 2024-01-20 PROCEDURE — 36415 COLL VENOUS BLD VENIPUNCTURE: CPT

## 2024-01-20 PROCEDURE — 80053 COMPREHEN METABOLIC PANEL: CPT

## 2024-01-20 PROCEDURE — 83036 HEMOGLOBIN GLYCOSYLATED A1C: CPT

## 2024-01-23 ENCOUNTER — OFFICE VISIT (OUTPATIENT)
Dept: INTERNAL MEDICINE | Facility: CLINIC | Age: 74
End: 2024-01-23
Payer: MEDICARE

## 2024-01-23 VITALS
HEIGHT: 69 IN | SYSTOLIC BLOOD PRESSURE: 136 MMHG | HEART RATE: 64 BPM | BODY MASS INDEX: 27.25 KG/M2 | WEIGHT: 184 LBS | DIASTOLIC BLOOD PRESSURE: 82 MMHG | OXYGEN SATURATION: 95 %

## 2024-01-23 DIAGNOSIS — Z79.4 TYPE 2 DIABETES MELLITUS WITH OTHER SPECIFIED COMPLICATION, WITH LONG-TERM CURRENT USE OF INSULIN: ICD-10-CM

## 2024-01-23 DIAGNOSIS — E78.5 HYPERLIPIDEMIA, UNSPECIFIED HYPERLIPIDEMIA TYPE: ICD-10-CM

## 2024-01-23 DIAGNOSIS — I25.118 ATHSCL HEART DISEASE OF NATIVE COR ART W OTH ANG PCTRS: ICD-10-CM

## 2024-01-23 DIAGNOSIS — I50.42 CHRONIC COMBINED SYSTOLIC AND DIASTOLIC HEART FAILURE: ICD-10-CM

## 2024-01-23 DIAGNOSIS — L89.309 PRESSURE INJURY OF SKIN OF BUTTOCK, UNSPECIFIED INJURY STAGE, UNSPECIFIED LATERALITY: Primary | ICD-10-CM

## 2024-01-23 DIAGNOSIS — E66.01 SEVERE OBESITY (BMI 35.0-39.9) WITH COMORBIDITY: ICD-10-CM

## 2024-01-23 DIAGNOSIS — I48.20 CHRONIC ATRIAL FIBRILLATION: ICD-10-CM

## 2024-01-23 DIAGNOSIS — K59.00 CONSTIPATION, UNSPECIFIED CONSTIPATION TYPE: Primary | ICD-10-CM

## 2024-01-23 DIAGNOSIS — I10 HYPERTENSION, UNSPECIFIED TYPE: ICD-10-CM

## 2024-01-23 DIAGNOSIS — I73.9 PERIPHERAL VASCULAR DISEASE, UNSPECIFIED: ICD-10-CM

## 2024-01-23 DIAGNOSIS — F32.A DEPRESSION, UNSPECIFIED DEPRESSION TYPE: ICD-10-CM

## 2024-01-23 DIAGNOSIS — E11.9 TYPE 2 DIABETES MELLITUS WITHOUT COMPLICATION, WITHOUT LONG-TERM CURRENT USE OF INSULIN: ICD-10-CM

## 2024-01-23 DIAGNOSIS — J44.9 CHRONIC OBSTRUCTIVE PULMONARY DISEASE, UNSPECIFIED COPD TYPE: ICD-10-CM

## 2024-01-23 DIAGNOSIS — T78.40XS ALLERGY, SEQUELA: ICD-10-CM

## 2024-01-23 DIAGNOSIS — E11.69 TYPE 2 DIABETES MELLITUS WITH OTHER SPECIFIED COMPLICATION, WITH LONG-TERM CURRENT USE OF INSULIN: ICD-10-CM

## 2024-01-23 PROCEDURE — 1160F RVW MEDS BY RX/DR IN RCRD: CPT | Mod: CPTII,,, | Performed by: INTERNAL MEDICINE

## 2024-01-23 PROCEDURE — 99214 OFFICE O/P EST MOD 30 MIN: CPT | Mod: ,,, | Performed by: INTERNAL MEDICINE

## 2024-01-23 PROCEDURE — 1125F AMNT PAIN NOTED PAIN PRSNT: CPT | Mod: CPTII,,, | Performed by: INTERNAL MEDICINE

## 2024-01-23 PROCEDURE — 3288F FALL RISK ASSESSMENT DOCD: CPT | Mod: CPTII,,, | Performed by: INTERNAL MEDICINE

## 2024-01-23 PROCEDURE — 1159F MED LIST DOCD IN RCRD: CPT | Mod: CPTII,,, | Performed by: INTERNAL MEDICINE

## 2024-01-23 PROCEDURE — 1101F PT FALLS ASSESS-DOCD LE1/YR: CPT | Mod: CPTII,,, | Performed by: INTERNAL MEDICINE

## 2024-01-23 PROCEDURE — 3079F DIAST BP 80-89 MM HG: CPT | Mod: CPTII,,, | Performed by: INTERNAL MEDICINE

## 2024-01-23 PROCEDURE — 3075F SYST BP GE 130 - 139MM HG: CPT | Mod: CPTII,,, | Performed by: INTERNAL MEDICINE

## 2024-01-23 RX ORDER — SIMVASTATIN 40 MG/1
TABLET, FILM COATED ORAL
Qty: 36 TABLET | Refills: 3 | Status: SHIPPED | OUTPATIENT
Start: 2024-01-23

## 2024-01-23 RX ORDER — PANTOPRAZOLE SODIUM 40 MG/1
40 TABLET, DELAYED RELEASE ORAL 2 TIMES DAILY
Qty: 10 TABLET | Refills: 0 | Status: SHIPPED | OUTPATIENT
Start: 2024-01-23 | End: 2024-01-28

## 2024-01-23 RX ORDER — TRAMADOL HYDROCHLORIDE 50 MG/1
50 TABLET ORAL EVERY 8 HOURS PRN
Qty: 30 TABLET | Refills: 0 | Status: SHIPPED | OUTPATIENT
Start: 2024-01-23 | End: 2024-03-01 | Stop reason: SDUPTHER

## 2024-01-23 RX ORDER — ASPIRIN 81 MG/1
81 TABLET ORAL DAILY
Qty: 90 TABLET | Refills: 3 | Status: SHIPPED | OUTPATIENT
Start: 2024-01-23

## 2024-01-23 RX ORDER — MULTIVITAMIN
1 TABLET ORAL EVERY MORNING
Qty: 90 TABLET | Refills: 3 | Status: SHIPPED | OUTPATIENT
Start: 2024-01-23

## 2024-01-23 RX ORDER — BISACODYL 10 MG/1
10 SUPPOSITORY RECTAL DAILY
Qty: 12 SUPPOSITORY | Refills: 0 | Status: SHIPPED | OUTPATIENT
Start: 2024-01-23

## 2024-01-23 RX ORDER — DOXAZOSIN 8 MG/1
TABLET ORAL
Qty: 90 TABLET | Refills: 3 | Status: SHIPPED | OUTPATIENT
Start: 2024-01-23

## 2024-01-23 RX ORDER — LISINOPRIL 5 MG/1
5 TABLET ORAL DAILY
Qty: 90 TABLET | Refills: 3 | Status: SHIPPED | OUTPATIENT
Start: 2024-01-23 | End: 2025-01-22

## 2024-01-23 RX ORDER — INSULIN GLARGINE 300 U/ML
30 INJECTION, SOLUTION SUBCUTANEOUS NIGHTLY
Qty: 4 PEN | Refills: 5 | Status: SHIPPED | OUTPATIENT
Start: 2024-01-23

## 2024-01-23 RX ORDER — CITALOPRAM 20 MG/1
20 TABLET, FILM COATED ORAL DAILY
Qty: 90 TABLET | Refills: 3 | Status: SHIPPED | OUTPATIENT
Start: 2024-01-23

## 2024-01-23 RX ORDER — GLIPIZIDE AND METFORMIN HCL 2.5; 25 MG/1; MG/1
1 TABLET, FILM COATED ORAL DAILY
Qty: 90 TABLET | Refills: 3 | Status: SHIPPED | OUTPATIENT
Start: 2024-01-23

## 2024-01-23 RX ORDER — ALBUTEROL SULFATE 0.83 MG/ML
2.5 SOLUTION RESPIRATORY (INHALATION) 4 TIMES DAILY PRN
Qty: 1 EACH | Refills: 3 | Status: SHIPPED | OUTPATIENT
Start: 2024-01-23 | End: 2024-04-09 | Stop reason: SDUPTHER

## 2024-01-23 RX ORDER — MIRABEGRON 50 MG/1
1 TABLET, FILM COATED, EXTENDED RELEASE ORAL DAILY
Qty: 90 TABLET | Refills: 3 | Status: SHIPPED | OUTPATIENT
Start: 2024-01-23 | End: 2024-05-29

## 2024-01-23 RX ORDER — MONTELUKAST SODIUM 10 MG/1
10 TABLET ORAL NIGHTLY
Qty: 90 TABLET | Refills: 3 | Status: SHIPPED | OUTPATIENT
Start: 2024-01-23

## 2024-01-23 RX ORDER — FLASH GLUCOSE SENSOR
2 KIT MISCELLANEOUS
Qty: 2 KIT | Refills: 11 | Status: SHIPPED | OUTPATIENT
Start: 2024-01-23 | End: 2024-04-15 | Stop reason: SDUPTHER

## 2024-01-23 RX ORDER — ALLOPURINOL 100 MG/1
100 TABLET ORAL DAILY
Qty: 30 TABLET | Refills: 0 | Status: SHIPPED | OUTPATIENT
Start: 2024-01-23 | End: 2024-03-05 | Stop reason: SDUPTHER

## 2024-01-23 RX ORDER — TAMSULOSIN HYDROCHLORIDE 0.4 MG/1
1 CAPSULE ORAL DAILY
Qty: 90 CAPSULE | Refills: 3 | Status: SHIPPED | OUTPATIENT
Start: 2024-01-23

## 2024-01-23 RX ORDER — INSULIN LISPRO 100 [IU]/ML
20 INJECTION, SOLUTION INTRAVENOUS; SUBCUTANEOUS 3 TIMES DAILY
Qty: 10 ML | Refills: 4 | Status: SHIPPED | OUTPATIENT
Start: 2024-01-23 | End: 2025-01-22

## 2024-01-23 NOTE — PROGRESS NOTES
Patient ID: Stevie Cummins is a 74 y.o. male.  Chief Complaint: Follow-up (4 month )    HPI:       Current Outpatient Medications:     acetaminophen (TYLENOL) 325 MG tablet, Take 2 tablets (650 mg total) by mouth every 6 (six) hours as needed for Temperature greater than (or equal to 101 degree F)., Disp: 30 tablet, Rfl: 0    allopurinoL (ZYLOPRIM) 100 MG tablet, Take 1 tablet (100 mg total) by mouth once daily., Disp: 30 tablet, Rfl: 0    aspirin (ECOTRIN) 81 MG EC tablet, Take 1 tablet (81 mg total) by mouth once daily., Disp: 90 tablet, Rfl: 3    citalopram (CELEXA) 20 MG tablet, Take 1 tablet (20 mg total) by mouth once daily., Disp: 90 tablet, Rfl: 3    diclofenac (CATAFLAM) 50 MG tablet, Take 1 tablet (50 mg total) by mouth Daily., Disp: 90 tablet, Rfl: 3    doxazosin (CARDURA) 8 MG Tab, Take 0.5 tabs bid, Disp: 90 tablet, Rfl: 3    flash glucose sensor (FREESTYLE MITCH 14 DAY SENSOR) Kit, 1 each by Misc.(Non-Drug; Combo Route) route 2 hours after meals and at bedtime., Disp: 1 kit, Rfl: 0    flash glucose sensor (FREESTYLE MITCH 2 SENSOR) Kit, 2 each by Misc.(Non-Drug; Combo Route) route 2 hours after meals and at bedtime., Disp: 2 kit, Rfl: 11    fluticasone-umeclidin-vilanter (TRELEGY ELLIPTA) 100-62.5-25 mcg DsDv, Inhale 1 puff into the lungs nightly., Disp: 1 each, Rfl: 3    glipizide-metformin (METAGLIP) 2.5-250 mg per tablet, Take 1 tablet by mouth once daily., Disp: 90 tablet, Rfl: 3    insulin lispro 100 unit/mL pen, Inject 20 Units into the skin 3 (three) times daily., Disp: 10 mL, Rfl: 4    lisinopriL (PRINIVIL,ZESTRIL) 5 MG tablet, Take 1 tablet (5 mg total) by mouth once daily., Disp: 90 tablet, Rfl: 3    montelukast (SINGULAIR) 10 mg tablet, Take 1 tablet (10 mg total) by mouth every evening., Disp: 90 tablet, Rfl: 3    multivitamin (THERAGRAN) per tablet, Take 1 tablet by mouth every morning., Disp: 90 tablet, Rfl: 3    MYRBETRIQ 50 mg Tb24, Take 1 tablet (50 mg total) by mouth once daily., Disp:  "90 tablet, Rfl: 3    pantoprazole (PROTONIX) 40 MG tablet, Take 1 tablet (40 mg total) by mouth 2 (two) times a day. for 5 days, Disp: 10 tablet, Rfl: 0    simvastatin (ZOCOR) 40 MG tablet, TAKE 1 TABLET THREE TIMES  WEEKLY MONDAY, WEDNESDAY,  AND FRIDAY, Disp: 36 tablet, Rfl: 3    tamsulosin (FLOMAX) 0.4 mg Cap, Take 1 capsule (0.4 mg total) by mouth once daily., Disp: 90 capsule, Rfl: 3    TOUJEO SOLOSTAR U-300 INSULIN 300 unit/mL (1.5 mL) InPn pen, Inject 30 Units into the skin every evening., Disp: 4 pen , Rfl: 5    albuterol (PROVENTIL) 2.5 mg /3 mL (0.083 %) nebulizer solution, Take 3 mLs (2.5 mg total) by nebulization 4 (four) times daily as needed for Wheezing., Disp: 1 each, Rfl: 3    bisacodyL (DULCOLAX) 10 mg Supp, Place 1 suppository (10 mg total) rectally once daily., Disp: 12 suppository, Rfl: 0  ROS:   Constitutional: No weight gain, No fever, No chills, No fatigue.   Eyes: No blurring, No visual disturbances.   Ear/Nose/Mouth/Throat: No decreased hearing, No ear pain, No nasal congestion, No sore throat.   Respiratory: No shortness of breath, No cough, No wheezing.   Cardiovascular: No chest pain, No palpitations, No peripheral edema.   Gastrointestinal: No nausea, No vomiting, No diarrhea, No constipation, No abdominal pain.   Genitourinary: No dysuria, No hematuria.   Hematology/Lymphatics: No bruising tendency, No bleeding tendency, No swollen lymph glands.   Endocrine: No excessive thirst, No polyuria, No excessive hunger.   Musculoskeletal: No joint pain, No muscle pain, No decreased range of motion.   Integumentary: No rash, No pruritus.   Neurologic: No abnormal balance, No confusion, No headache.   Psychiatric: No anxiety, No depression, Not suicidal, No hallucinations.    PE/Vitals:   /82 (BP Location: Left arm, Patient Position: Sitting, BP Method: Medium (Manual))   Pulse 64   Ht 5' 9" (1.753 m)   Wt 83.5 kg (184 lb)   SpO2 95%   BMI 27.17 kg/m²   General: Alert and oriented, No " acute distress.   Eye: Normal conjunctiva without exudate.  HENMT: Normocephalic/AT, Normal hearing, Oral mucosa is moist and pink   Neck: No goiter visualized.   Respiratory: Lungs CTAB, Respirations are non-labored, Breath sounds are equal, Symmetrical chest wall expansion.  Cardiovascular: Normal rate, Regular rhythm, No murmur, No edema.   Gastrointestinal: Non-distended.   Genitourinary: Deferred.  Musculoskeletal: Normal ROM, Normal gait, No deformities or amputations.  Integumentary: Warm, Dry, Intact. No diaphoresis, or flushing.  Neurologic: No focal deficits, Cranial Nerves II-XII are grossly intact.   Psychiatric: Cooperative, Appropriate mood & affect, Normal judgment, Non-suicidal.  Assessment/Plan   1. Constipation, unspecified constipation type  -     bisacodyL (DULCOLAX) 10 mg Supp; Place 1 suppository (10 mg total) rectally once daily.  Dispense: 12 suppository; Refill: 0    2. Type 2 diabetes mellitus without complication, without long-term current use of insulin  -     albuterol (PROVENTIL) 2.5 mg /3 mL (0.083 %) nebulizer solution; Take 3 mLs (2.5 mg total) by nebulization 4 (four) times daily as needed for Wheezing.  Dispense: 1 each; Refill: 3  -     flash glucose sensor (FREESTYLE MITCH 2 SENSOR) Kit; 2 each by Misc.(Non-Drug; Combo Route) route 2 hours after meals and at bedtime.  Dispense: 2 kit; Refill: 11  -     glipizide-metformin (METAGLIP) 2.5-250 mg per tablet; Take 1 tablet by mouth once daily.  Dispense: 90 tablet; Refill: 3  -     allopurinoL (ZYLOPRIM) 100 MG tablet; Take 1 tablet (100 mg total) by mouth once daily.  Dispense: 30 tablet; Refill: 0  -     aspirin (ECOTRIN) 81 MG EC tablet; Take 1 tablet (81 mg total) by mouth once daily.  Dispense: 90 tablet; Refill: 3  -     fluticasone-umeclidin-vilanter (TRELEGY ELLIPTA) 100-62.5-25 mcg DsDv; Inhale 1 puff into the lungs nightly.  Dispense: 1 each; Refill: 3  -     insulin lispro 100 unit/mL pen; Inject 20 Units into the skin 3  (three) times daily.  Dispense: 10 mL; Refill: 4  -     lisinopriL (PRINIVIL,ZESTRIL) 5 MG tablet; Take 1 tablet (5 mg total) by mouth once daily.  Dispense: 90 tablet; Refill: 3  -     multivitamin (THERAGRAN) per tablet; Take 1 tablet by mouth every morning.  Dispense: 90 tablet; Refill: 3  -     MYRBETRIQ 50 mg Tb24; Take 1 tablet (50 mg total) by mouth once daily.  Dispense: 90 tablet; Refill: 3  -     pantoprazole (PROTONIX) 40 MG tablet; Take 1 tablet (40 mg total) by mouth 2 (two) times a day. for 5 days  Dispense: 10 tablet; Refill: 0  -     tamsulosin (FLOMAX) 0.4 mg Cap; Take 1 capsule (0.4 mg total) by mouth once daily.  Dispense: 90 capsule; Refill: 3  -     TOUJEO SOLOSTAR U-300 INSULIN 300 unit/mL (1.5 mL) InPn pen; Inject 30 Units into the skin every evening.  Dispense: 4 pen ; Refill: 5    3. Hypertension, unspecified type  -     albuterol (PROVENTIL) 2.5 mg /3 mL (0.083 %) nebulizer solution; Take 3 mLs (2.5 mg total) by nebulization 4 (four) times daily as needed for Wheezing.  Dispense: 1 each; Refill: 3  -     allopurinoL (ZYLOPRIM) 100 MG tablet; Take 1 tablet (100 mg total) by mouth once daily.  Dispense: 30 tablet; Refill: 0  -     aspirin (ECOTRIN) 81 MG EC tablet; Take 1 tablet (81 mg total) by mouth once daily.  Dispense: 90 tablet; Refill: 3  -     doxazosin (CARDURA) 8 MG Tab; Take 0.5 tabs bid  Dispense: 90 tablet; Refill: 3  -     fluticasone-umeclidin-vilanter (TRELEGY ELLIPTA) 100-62.5-25 mcg DsDv; Inhale 1 puff into the lungs nightly.  Dispense: 1 each; Refill: 3  -     lisinopriL (PRINIVIL,ZESTRIL) 5 MG tablet; Take 1 tablet (5 mg total) by mouth once daily.  Dispense: 90 tablet; Refill: 3  -     multivitamin (THERAGRAN) per tablet; Take 1 tablet by mouth every morning.  Dispense: 90 tablet; Refill: 3  -     MYRBETRIQ 50 mg Tb24; Take 1 tablet (50 mg total) by mouth once daily.  Dispense: 90 tablet; Refill: 3  -     pantoprazole (PROTONIX) 40 MG tablet; Take 1 tablet (40 mg total)  by mouth 2 (two) times a day. for 5 days  Dispense: 10 tablet; Refill: 0  -     tamsulosin (FLOMAX) 0.4 mg Cap; Take 1 capsule (0.4 mg total) by mouth once daily.  Dispense: 90 capsule; Refill: 3    4. Depression, unspecified depression type  -     albuterol (PROVENTIL) 2.5 mg /3 mL (0.083 %) nebulizer solution; Take 3 mLs (2.5 mg total) by nebulization 4 (four) times daily as needed for Wheezing.  Dispense: 1 each; Refill: 3  -     allopurinoL (ZYLOPRIM) 100 MG tablet; Take 1 tablet (100 mg total) by mouth once daily.  Dispense: 30 tablet; Refill: 0  -     aspirin (ECOTRIN) 81 MG EC tablet; Take 1 tablet (81 mg total) by mouth once daily.  Dispense: 90 tablet; Refill: 3  -     citalopram (CELEXA) 20 MG tablet; Take 1 tablet (20 mg total) by mouth once daily.  Dispense: 90 tablet; Refill: 3  -     fluticasone-umeclidin-vilanter (TRELEGY ELLIPTA) 100-62.5-25 mcg DsDv; Inhale 1 puff into the lungs nightly.  Dispense: 1 each; Refill: 3  -     lisinopriL (PRINIVIL,ZESTRIL) 5 MG tablet; Take 1 tablet (5 mg total) by mouth once daily.  Dispense: 90 tablet; Refill: 3  -     multivitamin (THERAGRAN) per tablet; Take 1 tablet by mouth every morning.  Dispense: 90 tablet; Refill: 3  -     MYRBETRIQ 50 mg Tb24; Take 1 tablet (50 mg total) by mouth once daily.  Dispense: 90 tablet; Refill: 3  -     pantoprazole (PROTONIX) 40 MG tablet; Take 1 tablet (40 mg total) by mouth 2 (two) times a day. for 5 days  Dispense: 10 tablet; Refill: 0  -     tamsulosin (FLOMAX) 0.4 mg Cap; Take 1 capsule (0.4 mg total) by mouth once daily.  Dispense: 90 capsule; Refill: 3    5. Allergy, sequela  -     albuterol (PROVENTIL) 2.5 mg /3 mL (0.083 %) nebulizer solution; Take 3 mLs (2.5 mg total) by nebulization 4 (four) times daily as needed for Wheezing.  Dispense: 1 each; Refill: 3  -     allopurinoL (ZYLOPRIM) 100 MG tablet; Take 1 tablet (100 mg total) by mouth once daily.  Dispense: 30 tablet; Refill: 0  -     aspirin (ECOTRIN) 81 MG EC tablet;  Take 1 tablet (81 mg total) by mouth once daily.  Dispense: 90 tablet; Refill: 3  -     fluticasone-umeclidin-vilanter (TRELEGY ELLIPTA) 100-62.5-25 mcg DsDv; Inhale 1 puff into the lungs nightly.  Dispense: 1 each; Refill: 3  -     lisinopriL (PRINIVIL,ZESTRIL) 5 MG tablet; Take 1 tablet (5 mg total) by mouth once daily.  Dispense: 90 tablet; Refill: 3  -     montelukast (SINGULAIR) 10 mg tablet; Take 1 tablet (10 mg total) by mouth every evening.  Dispense: 90 tablet; Refill: 3  -     multivitamin (THERAGRAN) per tablet; Take 1 tablet by mouth every morning.  Dispense: 90 tablet; Refill: 3  -     MYRBETRIQ 50 mg Tb24; Take 1 tablet (50 mg total) by mouth once daily.  Dispense: 90 tablet; Refill: 3  -     pantoprazole (PROTONIX) 40 MG tablet; Take 1 tablet (40 mg total) by mouth 2 (two) times a day. for 5 days  Dispense: 10 tablet; Refill: 0  -     tamsulosin (FLOMAX) 0.4 mg Cap; Take 1 capsule (0.4 mg total) by mouth once daily.  Dispense: 90 capsule; Refill: 3    6. Hyperlipidemia, unspecified hyperlipidemia type  -     albuterol (PROVENTIL) 2.5 mg /3 mL (0.083 %) nebulizer solution; Take 3 mLs (2.5 mg total) by nebulization 4 (four) times daily as needed for Wheezing.  Dispense: 1 each; Refill: 3  -     allopurinoL (ZYLOPRIM) 100 MG tablet; Take 1 tablet (100 mg total) by mouth once daily.  Dispense: 30 tablet; Refill: 0  -     aspirin (ECOTRIN) 81 MG EC tablet; Take 1 tablet (81 mg total) by mouth once daily.  Dispense: 90 tablet; Refill: 3  -     fluticasone-umeclidin-vilanter (TRELEGY ELLIPTA) 100-62.5-25 mcg DsDv; Inhale 1 puff into the lungs nightly.  Dispense: 1 each; Refill: 3  -     lisinopriL (PRINIVIL,ZESTRIL) 5 MG tablet; Take 1 tablet (5 mg total) by mouth once daily.  Dispense: 90 tablet; Refill: 3  -     multivitamin (THERAGRAN) per tablet; Take 1 tablet by mouth every morning.  Dispense: 90 tablet; Refill: 3  -     MYRBETRIQ 50 mg Tb24; Take 1 tablet (50 mg total) by mouth once daily.  Dispense:  90 tablet; Refill: 3  -     pantoprazole (PROTONIX) 40 MG tablet; Take 1 tablet (40 mg total) by mouth 2 (two) times a day. for 5 days  Dispense: 10 tablet; Refill: 0  -     simvastatin (ZOCOR) 40 MG tablet; TAKE 1 TABLET THREE TIMES  WEEKLY MONDAY, WEDNESDAY,  AND FRIDAY  Dispense: 36 tablet; Refill: 3  -     tamsulosin (FLOMAX) 0.4 mg Cap; Take 1 capsule (0.4 mg total) by mouth once daily.  Dispense: 90 capsule; Refill: 3    7. Chronic obstructive pulmonary disease, unspecified COPD type    8. Type 2 diabetes mellitus with other specified complication, with long-term current use of insulin    9. Severe obesity (BMI 35.0-39.9) with comorbidity    10. Chronic combined systolic and diastolic heart failure    11. Chronic atrial fibrillation    12. 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 Peripheral vascular disease, unspecified      No orders of the defined types were placed in this encounter.    Education and counseling done face to face regarding medical conditions and plan. Contact office if new symptoms develop. Should any symptoms ever significantly worsen seek emergency medical attention/go to ER. Follow up at least yearly for wellness or sooner PRN. Nurse to call patient with any results. The patient is receptive, expresses understanding and is agreeable to plan. All questions have been answered.  No follow-ups on file.

## 2024-02-06 ENCOUNTER — DOCUMENT SCAN (OUTPATIENT)
Dept: HOME HEALTH SERVICES | Facility: HOSPITAL | Age: 74
End: 2024-02-06
Payer: MEDICARE

## 2024-02-13 PROCEDURE — G0179 MD RECERTIFICATION HHA PT: HCPCS | Mod: ,,, | Performed by: INTERNAL MEDICINE

## 2024-02-29 ENCOUNTER — DOCUMENT SCAN (OUTPATIENT)
Dept: HOME HEALTH SERVICES | Facility: HOSPITAL | Age: 74
End: 2024-02-29
Payer: MEDICARE

## 2024-03-01 DIAGNOSIS — G89.4 CHRONIC PAIN SYNDROME: Primary | ICD-10-CM

## 2024-03-01 RX ORDER — TRAMADOL HYDROCHLORIDE 50 MG/1
50 TABLET ORAL EVERY 8 HOURS PRN
Qty: 30 TABLET | Refills: 0 | Status: SHIPPED | OUTPATIENT
Start: 2024-03-01 | End: 2024-04-25 | Stop reason: SDUPTHER

## 2024-03-03 ENCOUNTER — EXTERNAL HOME HEALTH (OUTPATIENT)
Dept: HOME HEALTH SERVICES | Facility: HOSPITAL | Age: 74
End: 2024-03-03
Payer: MEDICARE

## 2024-03-04 DIAGNOSIS — R42 DIZZINESS: ICD-10-CM

## 2024-03-04 RX ORDER — MECLIZINE HCL 12.5 MG 12.5 MG/1
12.5 TABLET ORAL 2 TIMES DAILY
Qty: 180 TABLET | Refills: 3 | Status: SHIPPED | OUTPATIENT
Start: 2024-03-04

## 2024-03-05 ENCOUNTER — LAB REQUISITION (OUTPATIENT)
Dept: LAB | Facility: HOSPITAL | Age: 74
End: 2024-03-05
Payer: MEDICARE

## 2024-03-05 DIAGNOSIS — T78.40XS ALLERGY, SEQUELA: ICD-10-CM

## 2024-03-05 DIAGNOSIS — F32.A DEPRESSION, UNSPECIFIED DEPRESSION TYPE: ICD-10-CM

## 2024-03-05 DIAGNOSIS — I10 HYPERTENSION, UNSPECIFIED TYPE: ICD-10-CM

## 2024-03-05 DIAGNOSIS — E78.5 HYPERLIPIDEMIA, UNSPECIFIED HYPERLIPIDEMIA TYPE: ICD-10-CM

## 2024-03-05 DIAGNOSIS — E11.9 TYPE 2 DIABETES MELLITUS WITHOUT COMPLICATION, WITHOUT LONG-TERM CURRENT USE OF INSULIN: ICD-10-CM

## 2024-03-05 DIAGNOSIS — M25.579 PAIN IN UNSPECIFIED ANKLE AND JOINTS OF UNSPECIFIED FOOT: ICD-10-CM

## 2024-03-05 LAB — URATE SERPL-MCNC: 8.7 MG/DL (ref 3.5–7.2)

## 2024-03-05 PROCEDURE — 84550 ASSAY OF BLOOD/URIC ACID: CPT | Performed by: INTERNAL MEDICINE

## 2024-03-05 RX ORDER — ALLOPURINOL 100 MG/1
100 TABLET ORAL DAILY
Qty: 90 TABLET | Refills: 3 | Status: SHIPPED | OUTPATIENT
Start: 2024-03-05

## 2024-03-07 ENCOUNTER — TELEPHONE (OUTPATIENT)
Dept: INTERNAL MEDICINE | Facility: CLINIC | Age: 74
End: 2024-03-07
Payer: MEDICARE

## 2024-03-07 NOTE — TELEPHONE ENCOUNTER
----- Message from Olivia Montes sent at 3/7/2024  9:57 AM CST -----  Regarding: return call  Type:  Patient Returning Call    Who Called:pt    Best Call Back Number:[phone]    Additional Information: medication did arrive in the mail today

## 2024-03-09 NOTE — PLAN OF CARE
Problem: Adult Inpatient Plan of Care  Goal: Plan of Care Review  Outcome: Ongoing, Progressing  Goal: Patient-Specific Goal (Individualized)  Outcome: Ongoing, Progressing  Flowsheets (Taken 11/30/2023 2000)  Anxieties, Fears or Concerns: none expressed at this time  Individualized Care Needs: monitor for urinary retention, monitor CBGs, pain management, PT/OT, keep soft collar until F/U appointment  Goal: Absence of Hospital-Acquired Illness or Injury  Outcome: Ongoing, Progressing  Intervention: Prevent Skin Injury  Flowsheets (Taken 11/30/2023 2000)  Body Position: position maintained  Goal: Optimal Comfort and Wellbeing  Outcome: Ongoing, Progressing  Intervention: Monitor Pain and Promote Comfort  Flowsheets (Taken 11/30/2023 0200)  Pain Management Interventions:   care clustered   relaxation techniques promoted   quiet environment facilitated   pillow support provided  Goal: Readiness for Transition of Care  Outcome: Ongoing, Progressing     Problem: Diabetes Comorbidity  Goal: Blood Glucose Level Within Targeted Range  Outcome: Ongoing, Progressing     Problem: Infection  Goal: Absence of Infection Signs and Symptoms  Outcome: Ongoing, Progressing  Intervention: Prevent or Manage Infection  Flowsheets (Taken 11/30/2023 2000)  Fever Reduction/Comfort Measures:   lightweight bedding   lightweight clothing  Infection Management: aseptic technique maintained     Problem: Skin Injury Risk Increased  Goal: Skin Health and Integrity  Outcome: Ongoing, Progressing  Intervention: Optimize Skin Protection  Flowsheets (Taken 11/30/2023 2000)  Head of Bed (HOB) Positioning: HOB at 30 degrees     Problem: Urinary Retention  Goal: Effective Urinary Elimination  Outcome: Ongoing, Progressing  Intervention: Promote Effective Urine Elimination  Flowsheets (Taken 11/30/2023 2000)  Bowel Function Promotion:   straining discouraged   prompt response to urge promoted  Urinary Elimination Promotion:   absorbent pad/diaper use  OPERATIVE REPORT  PATIENT NAME: Yajaira Mendez    :  1944  MRN: [id number]  Pt Location: [facility] OR ROOM 01    SURGERY DATE: 3/9/2024    Surgeons and Role:     * Juwan Griffin MD - Primary     * Jamilah Anderson PA-C - Assisting    Preop Diagnosis:  Patellar fracture [S82.009A]    Post-Op Diagnosis Codes:     * Patellar fracture [S82.009A]    Procedure(s):  Left - OPEN REDUCTION W/ INTERNAL FIXATION (ORIF) PATELLA    Specimen(s):  * No specimens in log *    Estimated Blood Loss:   Minimal    Drains:  * No LDAs found *    Anesthesia Type:   Spinal    Operative Indications:  Patellar fracture [S82.009A]    See consultation note for complete list of indications    Operative Findings:  See op note below    Complications:   None    Procedure and Technique:  On the day of surgery, patient was taken to the operating theater where monitored anesthesia with an LMA was administered.  Patient was placed on the operating table with all bony prominences padded.  A left upper thigh tourniquet was then placed.  The left lower extremity was prepped and draped in usual sterile fashion.  A timeout was performed to confirm patient, laterality, procedure and instrumentation.  All were in agreement the procedure began.    A direct anterior incision was made over the knee with a #10 scalpel through the skin and down to the level of the extensor mechanism.  Blunt dissection with a laparotomy sponge was then used to form full-thickness medial and lateral flaps.  Electrocautery was then used to expose the fracture edges at the mid pole of the patella.  A Enciso clamp was then placed across the patella to affect a reduction.  Biplanar fluoroscopy was then used to confirm fracture reduction.    2 pins were then advanced across the fracture in a proximal to distal fashion, pin location was confirmed with fluoroscopy and then the pins were measured.  The outer cortex was drilled and two 34 mm partially threaded cannulated screws  encouraged   bladder volume assessed by ultrasound   frequent voiding encouraged   toileting device within reach      were advanced over the guide pins.  Fracture reduction and screw placement was again confirmed via fluoroscopy.  Finally, 18-gauge Ortho wire was inserted through the cannulated screws in a figure-of-eight fashion and heavy needle drivers were employed to tension the wire.  The twist from the Ortho wire was then buried in the superolateral tissues.  Final implant positioning and fracture reduction was again confirmed via biplanar fluoroscopy.    The wound was thoroughly irrigated with sterile Betadine followed by sterile saline.  Local anesthesia was administered to the tissues and closure began.  Subdermal tissues were closed with 2-0 Vicryl and the skin was closed with a running 3-0 barbed Monocryl suture.  Skin was sealed with a glue and mesh closure and a sterile Mepilex dressing was placed.  The extremity was then wrapped in an elastic bandage and the knee immobilizer was replaced.  Anesthesia was discontinued and the patient was taken to the postoperative area in stable condition.    No competent resident was available to assist in the case.  Jamilah Anderson PA-c was necessary for safe positioning, retraction and closure.  I was present for the entirety of the case          SIGNATURE: Juwan Griffin MD  DATE: March 9, 2024  TIME: 9:08 AM

## 2024-03-14 ENCOUNTER — TELEPHONE (OUTPATIENT)
Dept: INTERNAL MEDICINE | Facility: CLINIC | Age: 74
End: 2024-03-14
Payer: MEDICARE

## 2024-03-14 DIAGNOSIS — M25.50 ARTHRALGIA, UNSPECIFIED JOINT: ICD-10-CM

## 2024-03-14 NOTE — TELEPHONE ENCOUNTER
----- Message from Olivia Montes sent at 3/14/2024  1:47 PM CDT -----  Regarding: refill  Type:  RX Refill Request    Who Called: pt  Refill or New Rx:refill  RX Name and Strength:diclofenac (CATAFLAM) 50 MG tablet  How is the patient currently taking it? (ex. 1XDay):1 day  Is this a 30 day or 90 day RX:  Preferred Pharmacy with phone number: [facility]   Local or Mail Order:mail order  Ordering Provider:dr haley  Would the patient rather a call back or a response via [facility]? C/b  Best Call Back Number:[phone]    Additional Information: pt states bottle says 100mg and that is what he has been taking 1 x day  Pt only has 1 pill left

## 2024-03-15 RX ORDER — DICLOFENAC POTASSIUM 50 MG/1
50 TABLET, FILM COATED ORAL 2 TIMES DAILY
Qty: 180 TABLET | Refills: 3 | Status: SHIPPED | OUTPATIENT
Start: 2024-03-15

## 2024-04-08 ENCOUNTER — TELEPHONE (OUTPATIENT)
Dept: INTERNAL MEDICINE | Facility: CLINIC | Age: 74
End: 2024-04-08
Payer: MEDICARE

## 2024-04-08 NOTE — TELEPHONE ENCOUNTER
Patient is requesting stronger pain medication, indicated he saw Dr. Hutchinson and he did XR but everything came back fine.  Patient indicated to me that since the swelling has gone down somewhat in his legs it is very hard/painful for him to walk    Current Rx: Tramadol 50 mg #30

## 2024-04-08 NOTE — TELEPHONE ENCOUNTER
Spoke to patient, he is asking for Nebulizer Machine     Advised patient that Home Health sent communication with request, will call him once taken care of.  He verbalized understanding

## 2024-04-08 NOTE — TELEPHONE ENCOUNTER
----- Message from Jammie Brothers sent at 4/8/2024  9:19 AM CDT -----  Regarding: medical advice  Type:  Needs Medical Advice    Who Called: pt     Would the patient rather a call back or a response via [facility]? C/b   Best Call Back Number: [phone]  Additional Information: pt called to speak with the nurse did not say what it was in regards to

## 2024-04-09 DIAGNOSIS — E11.9 TYPE 2 DIABETES MELLITUS WITHOUT COMPLICATION, WITHOUT LONG-TERM CURRENT USE OF INSULIN: ICD-10-CM

## 2024-04-09 DIAGNOSIS — R06.02 SOB (SHORTNESS OF BREATH): ICD-10-CM

## 2024-04-09 DIAGNOSIS — R06.2 WHEEZING: ICD-10-CM

## 2024-04-09 DIAGNOSIS — J44.9 CHRONIC OBSTRUCTIVE PULMONARY DISEASE, UNSPECIFIED COPD TYPE: Primary | ICD-10-CM

## 2024-04-09 DIAGNOSIS — F32.A DEPRESSION, UNSPECIFIED DEPRESSION TYPE: ICD-10-CM

## 2024-04-09 DIAGNOSIS — E78.5 HYPERLIPIDEMIA, UNSPECIFIED HYPERLIPIDEMIA TYPE: ICD-10-CM

## 2024-04-09 DIAGNOSIS — T78.40XS ALLERGY, SEQUELA: ICD-10-CM

## 2024-04-09 DIAGNOSIS — I10 HYPERTENSION, UNSPECIFIED TYPE: ICD-10-CM

## 2024-04-09 RX ORDER — ALBUTEROL SULFATE 0.83 MG/ML
2.5 SOLUTION RESPIRATORY (INHALATION) 4 TIMES DAILY PRN
Qty: 1 EACH | Refills: 3 | Status: SHIPPED | OUTPATIENT
Start: 2024-04-09

## 2024-04-15 ENCOUNTER — TELEPHONE (OUTPATIENT)
Dept: INTERNAL MEDICINE | Facility: CLINIC | Age: 74
End: 2024-04-15
Payer: MEDICARE

## 2024-04-15 DIAGNOSIS — E11.9 TYPE 2 DIABETES MELLITUS WITHOUT COMPLICATION, WITHOUT LONG-TERM CURRENT USE OF INSULIN: ICD-10-CM

## 2024-04-15 RX ORDER — FLASH GLUCOSE SENSOR
2 KIT MISCELLANEOUS
Qty: 2 KIT | Refills: 11 | Status: SHIPPED | OUTPATIENT
Start: 2024-04-15

## 2024-04-15 NOTE — TELEPHONE ENCOUNTER
----- Message from Yenni Lee sent at 4/15/2024  8:28 AM CDT -----  Regarding: refill  Type:  RX Refill Request    Who Called: Stevie    Refill or New Rx:refill    RX Name and Strength:  flash glucose sensor (FREESTYLE MITCH 2 SENSOR) Kit    How is the patient currently taking it? (ex. 1XDay):    Is this a 30 day or 90 day RX: year supply    Preferred Pharmacy with phone number:[facility] [phone]    Local or Mail Order:mail order    Ordering Provider:Dorian    Would the patient rather a call back or a response via [facility]? Cb if needed    Best Call Back Number:[phone]    Additional Information:

## 2024-04-25 DIAGNOSIS — G89.4 CHRONIC PAIN SYNDROME: ICD-10-CM

## 2024-04-25 RX ORDER — TRAMADOL HYDROCHLORIDE 50 MG/1
50 TABLET ORAL EVERY 8 HOURS PRN
Qty: 30 TABLET | Refills: 0 | Status: SHIPPED | OUTPATIENT
Start: 2024-04-25 | End: 2024-05-29 | Stop reason: SDUPTHER

## 2024-04-25 NOTE — TELEPHONE ENCOUNTER
----- Message from Jammie Brothers sent at 4/25/2024  9:40 AM CDT -----  Regarding: refill  Type:  RX Refill Request    Who Called: pt   Refill or New Rx:refill   RX Name and Strength:traMADoL (ULTRAM) 50 mg tablet  How is the patient currently taking it? (ex. 1XDay):1  Is this a 30 day or 90 day RX:30  Preferred Pharmacy with phone number:[facility] [address]  Local or Mail Order:local   Ordering Provider:Dorian  Would the patient rather a call back or a response via [facility]? C/b   Best Call Back Number:[phone]  Additional Information: pt is calling to a refill and to update pharmacy to the one listed above

## 2024-04-29 ENCOUNTER — TELEPHONE (OUTPATIENT)
Dept: INTERNAL MEDICINE | Facility: CLINIC | Age: 74
End: 2024-04-29
Payer: MEDICARE

## 2024-04-30 ENCOUNTER — EXTERNAL HOME HEALTH (OUTPATIENT)
Dept: HOME HEALTH SERVICES | Facility: HOSPITAL | Age: 74
End: 2024-04-30
Payer: MEDICARE

## 2024-05-02 ENCOUNTER — LAB VISIT (OUTPATIENT)
Dept: LAB | Facility: HOSPITAL | Age: 74
End: 2024-05-02
Attending: SPECIALIST
Payer: MEDICARE

## 2024-05-02 DIAGNOSIS — R97.20 ELEVATED PROSTATE SPECIFIC ANTIGEN (PSA): Primary | ICD-10-CM

## 2024-05-02 LAB
FREE/TOTAL PSA (OLG): 1.1
PSA FREE MFR SERPL: 111 %
PSA FREE SERPL-MCNC: 3.11 NG/ML
PSA SERPL-MCNC: 2.81 NG/ML

## 2024-05-02 PROCEDURE — 36415 COLL VENOUS BLD VENIPUNCTURE: CPT

## 2024-05-02 PROCEDURE — 84153 ASSAY OF PSA TOTAL: CPT

## 2024-05-07 DIAGNOSIS — E11.69 TYPE 2 DIABETES MELLITUS WITH OTHER SPECIFIED COMPLICATION, WITH LONG-TERM CURRENT USE OF INSULIN: ICD-10-CM

## 2024-05-07 DIAGNOSIS — I10 PRIMARY HYPERTENSION: Primary | ICD-10-CM

## 2024-05-07 DIAGNOSIS — Z79.4 TYPE 2 DIABETES MELLITUS WITH OTHER SPECIFIED COMPLICATION, WITH LONG-TERM CURRENT USE OF INSULIN: ICD-10-CM

## 2024-05-07 DIAGNOSIS — E78.2 MIXED HYPERLIPIDEMIA: ICD-10-CM

## 2024-05-07 DIAGNOSIS — M10.9 GOUT, UNSPECIFIED CAUSE, UNSPECIFIED CHRONICITY, UNSPECIFIED SITE: ICD-10-CM

## 2024-05-21 ENCOUNTER — TELEPHONE (OUTPATIENT)
Dept: INTERNAL MEDICINE | Facility: CLINIC | Age: 74
End: 2024-05-21
Payer: MEDICARE

## 2024-05-21 NOTE — TELEPHONE ENCOUNTER
"----- Message from Chelle Brown LPN sent at 5/13/2024  1:06 PM CDT -----  Regarding: Dr. Alarcon 05/29/2024  4 month rv 1020  Are there any outstanding tasks in chart? No, but needs FASTING labs, TO BE DONE AT  "[facility]" or lab location of choice PRIOR to appt    Is there any documentation of tasks? no    Has the pt seen another physician, been to ER, [facility], or admitted to hospital since last visit?    Has the pt done blood work or imaging since last visit?    5. PLEASE HAVE PATIENT BRING MEDICATION LIST OR BOTTLES TO EVERY OFFICE VISIT  "

## 2024-05-28 ENCOUNTER — LAB VISIT (OUTPATIENT)
Dept: LAB | Facility: HOSPITAL | Age: 74
End: 2024-05-28
Attending: INTERNAL MEDICINE
Payer: MEDICARE

## 2024-05-28 DIAGNOSIS — M10.9 GOUT, UNSPECIFIED CAUSE, UNSPECIFIED CHRONICITY, UNSPECIFIED SITE: ICD-10-CM

## 2024-05-28 DIAGNOSIS — E78.2 MIXED HYPERLIPIDEMIA: ICD-10-CM

## 2024-05-28 DIAGNOSIS — E11.69 TYPE 2 DIABETES MELLITUS WITH OTHER SPECIFIED COMPLICATION, WITH LONG-TERM CURRENT USE OF INSULIN: ICD-10-CM

## 2024-05-28 DIAGNOSIS — I10 PRIMARY HYPERTENSION: ICD-10-CM

## 2024-05-28 DIAGNOSIS — Z79.4 TYPE 2 DIABETES MELLITUS WITH OTHER SPECIFIED COMPLICATION, WITH LONG-TERM CURRENT USE OF INSULIN: ICD-10-CM

## 2024-05-28 LAB
ABS NEUT CALC (OHS): 10.9 X10(3)/MCL (ref 2.1–9.2)
ALBUMIN SERPL-MCNC: 3.8 G/DL (ref 3.4–4.8)
ALBUMIN/GLOB SERPL: 1.3 RATIO (ref 1.1–2)
ALP SERPL-CCNC: 54 UNIT/L (ref 40–150)
ALT SERPL-CCNC: 18 UNIT/L (ref 0–55)
ANION GAP SERPL CALC-SCNC: 6 MEQ/L
ANISOCYTOSIS BLD QL SMEAR: SLIGHT
AST SERPL-CCNC: 18 UNIT/L (ref 5–34)
BILIRUB SERPL-MCNC: 1 MG/DL
BUN SERPL-MCNC: 38.7 MG/DL (ref 8.4–25.7)
CALCIUM SERPL-MCNC: 9.5 MG/DL (ref 8.8–10)
CHLORIDE SERPL-SCNC: 108 MMOL/L (ref 98–107)
CHOLEST SERPL-MCNC: 138 MG/DL
CHOLEST/HDLC SERPL: 4 {RATIO} (ref 0–5)
CO2 SERPL-SCNC: 28 MMOL/L (ref 23–31)
CREAT SERPL-MCNC: 1.09 MG/DL (ref 0.73–1.18)
CREAT/UREA NIT SERPL: 36
EOSINOPHIL NFR BLD MANUAL: 0.5 X10(3)/MCL (ref 0–0.9)
EOSINOPHIL NFR BLD MANUAL: 3 % (ref 0–8)
ERYTHROCYTE [DISTWIDTH] IN BLOOD BY AUTOMATED COUNT: 15 % (ref 11.5–17)
EST. AVERAGE GLUCOSE BLD GHB EST-MCNC: 119.8 MG/DL
GFR SERPLBLD CREATININE-BSD FMLA CKD-EPI: >60 ML/MIN/1.73/M2
GLOBULIN SER-MCNC: 3 GM/DL (ref 2.4–3.5)
GLUCOSE SERPL-MCNC: 120 MG/DL (ref 82–115)
HBA1C MFR BLD: 5.8 %
HCT VFR BLD AUTO: 45.1 % (ref 42–52)
HDLC SERPL-MCNC: 37 MG/DL (ref 35–60)
HGB BLD-MCNC: 14.2 G/DL (ref 14–18)
LDLC SERPL CALC-MCNC: 76 MG/DL (ref 50–140)
LYMPHOCYTES NFR BLD MANUAL: 17 % (ref 13–40)
LYMPHOCYTES NFR BLD MANUAL: 2.81 X10(3)/MCL
MCH RBC QN AUTO: 32.2 PG (ref 27–31)
MCHC RBC AUTO-ENTMCNC: 31.5 G/DL (ref 33–36)
MCV RBC AUTO: 102.3 FL (ref 80–94)
METAMYELOCYTES NFR BLD MANUAL: 4 %
MONOCYTES NFR BLD MANUAL: 1.32 X10(3)/MCL (ref 0.1–1.3)
MONOCYTES NFR BLD MANUAL: 8 % (ref 2–11)
MYELOCYTES NFR BLD MANUAL: 2 %
NEUTROPHILS NFR BLD MANUAL: 61 % (ref 47–80)
NEUTS BAND NFR BLD MANUAL: 5 % (ref 0–11)
PLATELET # BLD AUTO: 139 X10(3)/MCL (ref 130–400)
PLATELET # BLD EST: ADEQUATE 10*3/UL
PMV BLD AUTO: 9.9 FL (ref 7.4–10.4)
POIKILOCYTOSIS BLD QL SMEAR: SLIGHT
POTASSIUM SERPL-SCNC: 5.3 MMOL/L (ref 3.5–5.1)
PROT SERPL-MCNC: 6.8 GM/DL (ref 5.8–7.6)
RBC # BLD AUTO: 4.41 X10(6)/MCL (ref 4.7–6.1)
SODIUM SERPL-SCNC: 142 MMOL/L (ref 136–145)
TRIGL SERPL-MCNC: 126 MG/DL (ref 34–140)
URATE SERPL-MCNC: 8 MG/DL (ref 3.5–7.2)
VLDLC SERPL CALC-MCNC: 25 MG/DL
WBC # SPEC AUTO: 16.51 X10(3)/MCL (ref 4.5–11.5)

## 2024-05-28 PROCEDURE — 83036 HEMOGLOBIN GLYCOSYLATED A1C: CPT

## 2024-05-28 PROCEDURE — 80053 COMPREHEN METABOLIC PANEL: CPT

## 2024-05-28 PROCEDURE — 80061 LIPID PANEL: CPT

## 2024-05-28 PROCEDURE — 36415 COLL VENOUS BLD VENIPUNCTURE: CPT

## 2024-05-28 PROCEDURE — 84550 ASSAY OF BLOOD/URIC ACID: CPT

## 2024-05-28 PROCEDURE — 85007 BL SMEAR W/DIFF WBC COUNT: CPT

## 2024-05-28 PROCEDURE — 85027 COMPLETE CBC AUTOMATED: CPT

## 2024-05-29 ENCOUNTER — OFFICE VISIT (OUTPATIENT)
Dept: INTERNAL MEDICINE | Facility: CLINIC | Age: 74
End: 2024-05-29
Payer: MEDICARE

## 2024-05-29 VITALS
HEART RATE: 69 BPM | SYSTOLIC BLOOD PRESSURE: 120 MMHG | BODY MASS INDEX: 30.51 KG/M2 | OXYGEN SATURATION: 98 % | WEIGHT: 206 LBS | HEIGHT: 69 IN | DIASTOLIC BLOOD PRESSURE: 76 MMHG

## 2024-05-29 DIAGNOSIS — M10.9 GOUT, UNSPECIFIED CAUSE, UNSPECIFIED CHRONICITY, UNSPECIFIED SITE: ICD-10-CM

## 2024-05-29 DIAGNOSIS — G89.4 CHRONIC PAIN SYNDROME: ICD-10-CM

## 2024-05-29 DIAGNOSIS — M48.02 CERVICAL STENOSIS OF SPINAL CANAL: ICD-10-CM

## 2024-05-29 DIAGNOSIS — Z79.4 TYPE 2 DIABETES MELLITUS WITH OTHER SPECIFIED COMPLICATION, WITH LONG-TERM CURRENT USE OF INSULIN: ICD-10-CM

## 2024-05-29 DIAGNOSIS — I10 PRIMARY HYPERTENSION: Primary | ICD-10-CM

## 2024-05-29 DIAGNOSIS — E11.69 TYPE 2 DIABETES MELLITUS WITH OTHER SPECIFIED COMPLICATION, WITH LONG-TERM CURRENT USE OF INSULIN: ICD-10-CM

## 2024-05-29 PROCEDURE — 1101F PT FALLS ASSESS-DOCD LE1/YR: CPT | Mod: CPTII,,, | Performed by: INTERNAL MEDICINE

## 2024-05-29 PROCEDURE — 99214 OFFICE O/P EST MOD 30 MIN: CPT | Mod: ,,, | Performed by: INTERNAL MEDICINE

## 2024-05-29 PROCEDURE — 3288F FALL RISK ASSESSMENT DOCD: CPT | Mod: CPTII,,, | Performed by: INTERNAL MEDICINE

## 2024-05-29 PROCEDURE — 1160F RVW MEDS BY RX/DR IN RCRD: CPT | Mod: CPTII,,, | Performed by: INTERNAL MEDICINE

## 2024-05-29 PROCEDURE — 1125F AMNT PAIN NOTED PAIN PRSNT: CPT | Mod: CPTII,,, | Performed by: INTERNAL MEDICINE

## 2024-05-29 PROCEDURE — 3078F DIAST BP <80 MM HG: CPT | Mod: CPTII,,, | Performed by: INTERNAL MEDICINE

## 2024-05-29 PROCEDURE — 3074F SYST BP LT 130 MM HG: CPT | Mod: CPTII,,, | Performed by: INTERNAL MEDICINE

## 2024-05-29 PROCEDURE — 1159F MED LIST DOCD IN RCRD: CPT | Mod: CPTII,,, | Performed by: INTERNAL MEDICINE

## 2024-05-29 RX ORDER — SULFAMETHOXAZOLE AND TRIMETHOPRIM 800; 160 MG/1; MG/1
1 TABLET ORAL 2 TIMES DAILY
Qty: 14 TABLET | Refills: 0 | Status: SHIPPED | OUTPATIENT
Start: 2024-05-29 | End: 2024-06-05

## 2024-05-29 RX ORDER — TRAMADOL HYDROCHLORIDE 50 MG/1
50 TABLET ORAL EVERY 8 HOURS PRN
Qty: 30 TABLET | Refills: 0 | Status: SHIPPED | OUTPATIENT
Start: 2024-05-29

## 2024-05-29 NOTE — PROGRESS NOTES
Patient ID: Stevie Cummins is a 74 y.o. male.  Chief Complaint: Follow-up (4 month )    HPI:   74-year-old male arteriosclerotic coronary heart disease, diabetes, recent neck surgery while in the hospital then short course a swing bed and now home, patient with home health   Patient is doing self  characterization due to urinary retention, here on  wellness exam to discuss blood work.  Blood work shows elevated white blood cells, patient admits that time he has not 100% sterile, with that said will go ahead and give her a prescription antibiotic to prevent a bladder infection   Trigger finger, otherwise the rest of the blood were within normal limits.  Patient needs prescription refills for tramadol       Current Outpatient Medications:     acetaminophen (TYLENOL) 325 MG tablet, Take 2 tablets (650 mg total) by mouth every 6 (six) hours as needed for Temperature greater than (or equal to 101 degree F)., Disp: 30 tablet, Rfl: 0    albuterol (PROVENTIL) 2.5 mg /3 mL (0.083 %) nebulizer solution, Take 3 mLs (2.5 mg total) by nebulization 4 (four) times daily as needed for Wheezing., Disp: 1 each, Rfl: 3    allopurinoL (ZYLOPRIM) 100 MG tablet, Take 1 tablet (100 mg total) by mouth once daily., Disp: 90 tablet, Rfl: 3    aspirin (ECOTRIN) 81 MG EC tablet, Take 1 tablet (81 mg total) by mouth once daily., Disp: 90 tablet, Rfl: 3    bisacodyL (DULCOLAX) 10 mg Supp, Place 1 suppository (10 mg total) rectally once daily., Disp: 12 suppository, Rfl: 0    citalopram (CELEXA) 20 MG tablet, Take 1 tablet (20 mg total) by mouth once daily., Disp: 90 tablet, Rfl: 3    diclofenac (CATAFLAM) 50 MG tablet, Take 1 tablet (50 mg total) by mouth 2 (two) times daily., Disp: 180 tablet, Rfl: 3    doxazosin (CARDURA) 8 MG Tab, Take 0.5 tabs bid, Disp: 90 tablet, Rfl: 3    flash glucose sensor (FREESTYLE MITCH 14 DAY SENSOR) Kit, 1 each by Misc.(Non-Drug; Combo Route) route 2 hours after meals and at bedtime., Disp: 1 kit, Rfl: 0    flash  glucose sensor (FREESTYLE MITCH 2 SENSOR) Kit, 2 each by Misc.(Non-Drug; Combo Route) route 2 hours after meals and at bedtime., Disp: 2 kit, Rfl: 11    fluticasone-umeclidin-vilanter (TRELEGY ELLIPTA) 100-62.5-25 mcg DsDv, Inhale 1 puff into the lungs nightly., Disp: 1 each, Rfl: 3    glipizide-metformin (METAGLIP) 2.5-250 mg per tablet, Take 1 tablet by mouth once daily., Disp: 90 tablet, Rfl: 3    insulin lispro 100 unit/mL pen, Inject 20 Units into the skin 3 (three) times daily., Disp: 10 mL, Rfl: 4    lisinopriL (PRINIVIL,ZESTRIL) 5 MG tablet, Take 1 tablet (5 mg total) by mouth once daily., Disp: 90 tablet, Rfl: 3    meclizine (ANTIVERT) 12.5 mg tablet, TAKE 1 TABLET BY MOUTH TWO TIMES A DAY, Disp: 180 tablet, Rfl: 3    montelukast (SINGULAIR) 10 mg tablet, Take 1 tablet (10 mg total) by mouth every evening., Disp: 90 tablet, Rfl: 3    multivitamin (THERAGRAN) per tablet, Take 1 tablet by mouth every morning., Disp: 90 tablet, Rfl: 3    simvastatin (ZOCOR) 40 MG tablet, TAKE 1 TABLET THREE TIMES  WEEKLY MONDAY, WEDNESDAY,  AND FRIDAY, Disp: 36 tablet, Rfl: 3    tamsulosin (FLOMAX) 0.4 mg Cap, Take 1 capsule (0.4 mg total) by mouth once daily., Disp: 90 capsule, Rfl: 3    TOUJEO SOLOSTAR U-300 INSULIN 300 unit/mL (1.5 mL) InPn pen, Inject 30 Units into the skin every evening., Disp: 4 pen , Rfl: 5    pantoprazole (PROTONIX) 40 MG tablet, Take 1 tablet (40 mg total) by mouth 2 (two) times a day. for 5 days, Disp: 10 tablet, Rfl: 0    traMADoL (ULTRAM) 50 mg tablet, Take 1 tablet (50 mg total) by mouth every 8 (eight) hours as needed for Pain., Disp: 30 tablet, Rfl: 0  ROS:   Constitutional: No weight gain, No fever, No chills, No fatigue.   Eyes: No blurring, No visual disturbances.   Ear/Nose/Mouth/Throat: No decreased hearing, No ear pain, No nasal congestion, No sore throat.   Respiratory: No shortness of breath, No cough, No wheezing.   Cardiovascular: No chest pain, No palpitations, No peripheral edema.  "  Gastrointestinal: No nausea, No vomiting, No diarrhea, No constipation, No abdominal pain.   Genitourinary: No dysuria, No hematuria. Urinary retention   Hematology/Lymphatics: No bruising tendency, No bleeding tendency, No swollen lymph glands.   Endocrine: No excessive thirst, No polyuria, No excessive hunger.   Musculoskeletal:  Ranexa rig backht middle  joint pain, No muscle pain, No decreased range of motion.   Integumentary: No rash, No pruritus.   Neurologic: No abnormal balance, No confusion, No headache.   Psychiatric: No anxiety, No depression, Not suicidal, No hallucinations.    PE/Vitals:   /76 (BP Location: Left arm, Patient Position: Sitting, BP Method: Medium (Manual))   Pulse 69   Ht 5' 9" (1.753 m)   Wt 93.4 kg (206 lb)   SpO2 98%   BMI 30.42 kg/m²   General: Alert and oriented, No acute distress.   Eye: Normal conjunctiva without exudate.  HENMT: Normocephalic/AT, Normal hearing, Oral mucosa is moist and pink   Neck: No goiter visualized.   Respiratory: Lungs CTAB, Respirations are non-labored, Breath sounds are equal, Symmetrical chest wall expansion.  Cardiovascular: Normal rate, Regular rhythm, No murmur, No edema.   Gastrointestinal: Non-distended.   Genitourinary: Deferred.  Musculoskeletal: Normal ROM, Normal gait, No deformities or amputations.  Integumentary: Warm, Dry, Intact. No diaphoresis, or flushing.  Neurologic: No focal deficits, Cranial Nerves II-XII are grossly intact.   Psychiatric: Cooperative, Appropriate mood & affect, Normal judgment, Non-suicidal.  Assessment/Plan   1. Primary hypertension  Comments:  Well-controlled Cardura Zestril    2. Chronic pain syndrome  Comments:  Presc refill for tramadol  Orders:  -     traMADoL (ULTRAM) 50 mg tablet; Take 1 tablet (50 mg total) by mouth every 8 (eight) hours as needed for Pain.  Dispense: 30 tablet; Refill: 0    3. Type 2 diabetes mellitus with other specified complication, with long-term current use of " insulin  Comments:  Hemoglobin A1c at target,    4. Cervical stenosis of spinal canal  Comments:  Patient to continue physical therapy doing well    5. Gout, unspecified cause, unspecified chronicity, unspecified site  Comments:  Labs discussed with the patient, continue allopurinol increase water intake      No orders of the defined types were placed in this encounter.    Education and counseling done face to face regarding medical conditions and plan. Contact office if new symptoms develop. significantly worsen seek emergency medical attention/go to ER. Follow up at least yearly for wellness or sooner PRN. Nurse to call patient with any results. The patient is receptive, expresses understanding and is agreeable to plan. All questions have been answered.  Follow up in about 4 months (around 9/29/2024).  Patient ID: Stevie Cummins is a 74 y.o. male.  Chief Complaint: Follow-up (4 month )    HPI:   ...      Current Outpatient Medications:     acetaminophen (TYLENOL) 325 MG tablet, Take 2 tablets (650 mg total) by mouth every 6 (six) hours as needed for Temperature greater than (or equal to 101 degree F)., Disp: 30 tablet, Rfl: 0    albuterol (PROVENTIL) 2.5 mg /3 mL (0.083 %) nebulizer solution, Take 3 mLs (2.5 mg total) by nebulization 4 (four) times daily as needed for Wheezing., Disp: 1 each, Rfl: 3    allopurinoL (ZYLOPRIM) 100 MG tablet, Take 1 tablet (100 mg total) by mouth once daily., Disp: 90 tablet, Rfl: 3    aspirin (ECOTRIN) 81 MG EC tablet, Take 1 tablet (81 mg total) by mouth once daily., Disp: 90 tablet, Rfl: 3    bisacodyL (DULCOLAX) 10 mg Supp, Place 1 suppository (10 mg total) rectally once daily., Disp: 12 suppository, Rfl: 0    citalopram (CELEXA) 20 MG tablet, Take 1 tablet (20 mg total) by mouth once daily., Disp: 90 tablet, Rfl: 3    diclofenac (CATAFLAM) 50 MG tablet, Take 1 tablet (50 mg total) by mouth 2 (two) times daily., Disp: 180 tablet, Rfl: 3    doxazosin (CARDURA) 8 MG Tab, Take 0.5  tabs bid, Disp: 90 tablet, Rfl: 3    flash glucose sensor (FREESTYLE MITCH 14 DAY SENSOR) Kit, 1 each by Misc.(Non-Drug; Combo Route) route 2 hours after meals and at bedtime., Disp: 1 kit, Rfl: 0    flash glucose sensor (FREESTYLE MITCH 2 SENSOR) Kit, 2 each by Misc.(Non-Drug; Combo Route) route 2 hours after meals and at bedtime., Disp: 2 kit, Rfl: 11    fluticasone-umeclidin-vilanter (TRELEGY ELLIPTA) 100-62.5-25 mcg DsDv, Inhale 1 puff into the lungs nightly., Disp: 1 each, Rfl: 3    glipizide-metformin (METAGLIP) 2.5-250 mg per tablet, Take 1 tablet by mouth once daily., Disp: 90 tablet, Rfl: 3    insulin lispro 100 unit/mL pen, Inject 20 Units into the skin 3 (three) times daily., Disp: 10 mL, Rfl: 4    lisinopriL (PRINIVIL,ZESTRIL) 5 MG tablet, Take 1 tablet (5 mg total) by mouth once daily., Disp: 90 tablet, Rfl: 3    meclizine (ANTIVERT) 12.5 mg tablet, TAKE 1 TABLET BY MOUTH TWO TIMES A DAY, Disp: 180 tablet, Rfl: 3    montelukast (SINGULAIR) 10 mg tablet, Take 1 tablet (10 mg total) by mouth every evening., Disp: 90 tablet, Rfl: 3    multivitamin (THERAGRAN) per tablet, Take 1 tablet by mouth every morning., Disp: 90 tablet, Rfl: 3    simvastatin (ZOCOR) 40 MG tablet, TAKE 1 TABLET THREE TIMES  WEEKLY MONDAY, WEDNESDAY,  AND FRIDAY, Disp: 36 tablet, Rfl: 3    tamsulosin (FLOMAX) 0.4 mg Cap, Take 1 capsule (0.4 mg total) by mouth once daily., Disp: 90 capsule, Rfl: 3    TOUJEO SOLOSTAR U-300 INSULIN 300 unit/mL (1.5 mL) InPn pen, Inject 30 Units into the skin every evening., Disp: 4 pen , Rfl: 5    pantoprazole (PROTONIX) 40 MG tablet, Take 1 tablet (40 mg total) by mouth 2 (two) times a day. for 5 days, Disp: 10 tablet, Rfl: 0    traMADoL (ULTRAM) 50 mg tablet, Take 1 tablet (50 mg total) by mouth every 8 (eight) hours as needed for Pain., Disp: 30 tablet, Rfl: 0  ROS:   Constitutional: No weight gain, No fever, No chills, No fatigue.   Eyes: No blurring, No visual disturbances.   Ear/Nose/Mouth/Throat:  "No decreased hearing, No ear pain, No nasal congestion, No sore throat.   Respiratory: No shortness of breath, No cough, No wheezing.   Cardiovascular: No chest pain, No palpitations, No peripheral edema.   Gastrointestinal: No nausea, No vomiting, No diarrhea, No constipation, No abdominal pain.   Genitourinary: No dysuria, No hematuria.   Hematology/Lymphatics: No bruising tendency, No bleeding tendency, No swollen lymph glands.   Endocrine: No excessive thirst, No polyuria, No excessive hunger.   Musculoskeletal: No joint pain, No muscle pain, No decreased range of motion.   Integumentary: No rash, No pruritus.   Neurologic: No abnormal balance, No confusion, No headache.   Psychiatric: No anxiety, No depression, Not suicidal, No hallucinations.    PE/Vitals:   /76 (BP Location: Left arm, Patient Position: Sitting, BP Method: Medium (Manual))   Pulse 69   Ht 5' 9" (1.753 m)   Wt 93.4 kg (206 lb)   SpO2 98%   BMI 30.42 kg/m²   General: Alert and oriented, No acute distress.   Eye: Normal conjunctiva without exudate.  HENMT: Normocephalic/AT, Normal hearing, Oral mucosa is moist and pink   Neck: No goiter visualized.   Respiratory: Lungs CTAB, Respirations are non-labored, Breath sounds are equal, Symmetrical chest wall expansion.  Cardiovascular: Normal rate, Regular rhythm, No murmur, No edema.   Gastrointestinal: Non-distended.   Genitourinary: Deferred.  Musculoskeletal: Normal ROM, Normal gait, No deformities or amputations.  Integumentary: Warm, Dry, Intact. No diaphoresis, or flushing.  Neurologic: No focal deficits, Cranial Nerves II-XII are grossly intact.   Psychiatric: Cooperative, Appropriate mood & affect, Normal judgment, Non-suicidal.  Assessment/Plan   1. Primary hypertension  Comments:  Well-controlled Cardura Zestril    2. Chronic pain syndrome  Comments:  Presc refill for tramadol  Orders:  -     traMADoL (ULTRAM) 50 mg tablet; Take 1 tablet (50 mg total) by mouth every 8 (eight) hours " as needed for Pain.  Dispense: 30 tablet; Refill: 0    3. Type 2 diabetes mellitus with other specified complication, with long-term current use of insulin  Comments:  Hemoglobin A1c at target,    4. Cervical stenosis of spinal canal  Comments:  Patient to continue physical therapy doing well    5. Gout, unspecified cause, unspecified chronicity, unspecified site  Comments:  Labs discussed with the patient, continue allopurinol increase water intake      No orders of the defined types were placed in this encounter.    Education and counseling done face to face regarding medical conditions and plan. Contact office if new symptoms develop. Should any symptoms ever significantly worsen seek emergency medical attention/go to ER. Follow up at least yearly for wellness or sooner PRN. Nurse to call patient with any results. The patient is receptive, expresses understanding and is agreeable to plan. All questions have been answered.  Follow up in about 4 months (around 9/29/2024).

## 2024-06-20 ENCOUNTER — TELEPHONE (OUTPATIENT)
Dept: INTERNAL MEDICINE | Facility: CLINIC | Age: 74
End: 2024-06-20
Payer: MEDICARE

## 2024-06-20 DIAGNOSIS — K64.9 HEMORRHOIDS, UNSPECIFIED HEMORRHOID TYPE: Primary | ICD-10-CM

## 2024-06-20 RX ORDER — HYDROCORTISONE ACETATE 25 MG/1
25 SUPPOSITORY RECTAL NIGHTLY
Qty: 7 SUPPOSITORY | Refills: 0 | Status: SHIPPED | OUTPATIENT
Start: 2024-06-20 | End: 2024-06-27

## 2024-06-20 RX ORDER — HYDROCORTISONE ACETATE 25 MG/1
25 SUPPOSITORY RECTAL NIGHTLY
COMMUNITY
Start: 2024-06-20 | End: 2024-06-20 | Stop reason: SDUPTHER

## 2024-06-20 NOTE — TELEPHONE ENCOUNTER
----- Message from Chelle Fox sent at 6/20/2024  9:39 AM CDT -----  Regarding: call back  .Who Called: Stevie Cummins    Caller is requesting assistance/information from provider's office.    Symptoms (please be specific): severe hemorrhoids    How long has patient had these symptoms:  2 weeks   List of preferred pharmacies on file (remove unneeded): [unfilled]  If different, enter pharmacy into here including location and phone number:       Preferred Method of Contact: Phone Call  Patient's Preferred Phone Number on File: [phone]   Best Call Back Number, if different:  Additional Information: pt requesting a call back

## 2024-06-24 ENCOUNTER — DOCUMENT SCAN (OUTPATIENT)
Dept: HOME HEALTH SERVICES | Facility: HOSPITAL | Age: 74
End: 2024-06-24
Payer: MEDICARE

## 2024-06-25 ENCOUNTER — TELEPHONE (OUTPATIENT)
Dept: INTERNAL MEDICINE | Facility: CLINIC | Age: 74
End: 2024-06-25
Payer: MEDICARE

## 2024-06-25 NOTE — TELEPHONE ENCOUNTER
----- Message from Chula Quinn sent at 6/25/2024 10:25 AM CDT -----  Regarding: kishan  Pt called stated he is experiencing rectal bleeding, I made him an kishan for Thursday with Margaret.  He stated he thinks its his hemorroids. I was not sure if you also wanted to see if you could send him something for it before his kishan on Thursday. Please Advise

## 2024-06-25 NOTE — TELEPHONE ENCOUNTER
Disp Refills Start End CAROL   hydrocortisone (ANUSOL-HC) 25 mg suppository 7 suppository 0 6/20/2024 6/27/2024 No   Sig - Route: Place 1 suppository (25 mg total) rectally every evening. for 7 days - Rectal   Sent to pharmacy as: hydrocortisone (ANUSOL-HC) 25 mg suppository   Class: Normal   Order: [id number]   Date/Time Signed: 6/20/2024 11:02       E-Prescribing Status: Receipt confirmed by pharmacy (6/20/2024

## 2024-06-25 NOTE — TELEPHONE ENCOUNTER
Spoke to patient, he advised me that he has 2 Suppositories left, does not want refill.  He will continue to use Prescribed Meds and the current OTC Meds he has on hand.  He indicated the bleeding has been going on x2 months.

## 2024-06-27 DIAGNOSIS — K64.9 HEMORRHOIDS, UNSPECIFIED HEMORRHOID TYPE: Primary | ICD-10-CM

## 2024-06-27 DIAGNOSIS — K62.5 RECTAL BLEEDING: ICD-10-CM

## 2024-06-27 PROBLEM — I63.9 CEREBROVASCULAR ACCIDENT (CVA): Status: ACTIVE | Noted: 2024-06-27

## 2024-06-27 PROBLEM — E78.5 HYPERLIPIDEMIA: Status: ACTIVE | Noted: 2024-06-27

## 2024-06-27 PROBLEM — I70.212 ATHEROSCLEROSIS OF NATIVE ARTERY OF LEFT LOWER EXTREMITY WITH INTERMITTENT CLAUDICATION: Status: ACTIVE | Noted: 2022-03-29

## 2024-06-27 PROBLEM — Z87.438 HISTORY OF BPH: Status: ACTIVE | Noted: 2024-06-27

## 2024-06-27 PROBLEM — I25.118 ATHSCL HEART DISEASE OF NATIVE COR ART W OTH ANG PCTRS: Status: ACTIVE | Noted: 2022-09-16

## 2024-06-27 PROBLEM — E11.9 DIABETES MELLITUS: Status: ACTIVE | Noted: 2024-06-27

## 2024-06-27 NOTE — TELEPHONE ENCOUNTER
Patient missed his appt, misunderstood date/time.  He is rescheduled for Monday.    Indicated Suppositories are not helping, OTC medication is not helping either  Wants to know if there is anything additional he can do

## 2024-07-01 ENCOUNTER — OFFICE VISIT (OUTPATIENT)
Dept: INTERNAL MEDICINE | Facility: CLINIC | Age: 74
End: 2024-07-01
Payer: MEDICARE

## 2024-07-01 ENCOUNTER — TELEPHONE (OUTPATIENT)
Dept: INTERNAL MEDICINE | Facility: CLINIC | Age: 74
End: 2024-07-01

## 2024-07-01 VITALS
DIASTOLIC BLOOD PRESSURE: 60 MMHG | HEIGHT: 69 IN | RESPIRATION RATE: 16 BRPM | SYSTOLIC BLOOD PRESSURE: 120 MMHG | WEIGHT: 204 LBS | BODY MASS INDEX: 30.21 KG/M2 | HEART RATE: 82 BPM | OXYGEN SATURATION: 93 %

## 2024-07-01 DIAGNOSIS — K64.8 OTHER HEMORRHOIDS: ICD-10-CM

## 2024-07-01 DIAGNOSIS — R06.02 EXERTIONAL SHORTNESS OF BREATH: ICD-10-CM

## 2024-07-01 DIAGNOSIS — D72.828 OTHER ELEVATED WHITE BLOOD CELL (WBC) COUNT: ICD-10-CM

## 2024-07-01 DIAGNOSIS — K62.5 RECTAL BLEEDING: Primary | ICD-10-CM

## 2024-07-01 DIAGNOSIS — K59.09 OTHER CONSTIPATION: ICD-10-CM

## 2024-07-01 DIAGNOSIS — D72.828 OTHER ELEVATED WHITE BLOOD CELL (WBC) COUNT: Primary | ICD-10-CM

## 2024-07-01 PROBLEM — D72.829 LEUCOCYTOSIS: Status: ACTIVE | Noted: 2024-07-01

## 2024-07-01 PROBLEM — K64.9 HEMORRHOIDS: Status: ACTIVE | Noted: 2024-07-01

## 2024-07-01 PROBLEM — E11.69 TYPE 2 DIABETES MELLITUS WITH OTHER SPECIFIED COMPLICATION, WITH LONG-TERM CURRENT USE OF INSULIN: Status: ACTIVE | Noted: 2024-06-27

## 2024-07-01 PROBLEM — Z79.4 TYPE 2 DIABETES MELLITUS WITH OTHER SPECIFIED COMPLICATION, WITH LONG-TERM CURRENT USE OF INSULIN: Status: ACTIVE | Noted: 2024-06-27

## 2024-07-01 LAB
ALBUMIN SERPL-MCNC: 4 G/DL (ref 3.4–4.8)
ALBUMIN/GLOB SERPL: 1.4 RATIO (ref 1.1–2)
ALP SERPL-CCNC: 56 UNIT/L (ref 40–150)
ALT SERPL-CCNC: 17 UNIT/L (ref 0–55)
ANION GAP SERPL CALC-SCNC: 11 MEQ/L
AST SERPL-CCNC: 15 UNIT/L (ref 5–34)
BASOPHILS # BLD AUTO: 0.26 X10(3)/MCL
BASOPHILS NFR BLD AUTO: 1.4 %
BILIRUB SERPL-MCNC: 0.8 MG/DL
BUN SERPL-MCNC: 46 MG/DL (ref 8.4–25.7)
CALCIUM SERPL-MCNC: 9.9 MG/DL (ref 8.8–10)
CHLORIDE SERPL-SCNC: 103 MMOL/L (ref 98–107)
CO2 SERPL-SCNC: 27 MMOL/L (ref 23–31)
CREAT SERPL-MCNC: 1.17 MG/DL (ref 0.73–1.18)
CREAT/UREA NIT SERPL: 39
EOSINOPHIL # BLD AUTO: 0.15 X10(3)/MCL (ref 0–0.9)
EOSINOPHIL NFR BLD AUTO: 0.8 %
ERYTHROCYTE [DISTWIDTH] IN BLOOD BY AUTOMATED COUNT: 15.9 % (ref 11.5–17)
ERYTHROCYTE [SEDIMENTATION RATE] IN BLOOD: 41 MM/HR (ref 0–15)
GFR SERPLBLD CREATININE-BSD FMLA CKD-EPI: >60 ML/MIN/1.73/M2
GLOBULIN SER-MCNC: 2.8 GM/DL (ref 2.4–3.5)
GLUCOSE SERPL-MCNC: 128 MG/DL (ref 82–115)
HCT VFR BLD AUTO: 39.4 % (ref 42–52)
HGB BLD-MCNC: 13 G/DL (ref 14–18)
IMM GRANULOCYTES # BLD AUTO: 0.96 X10(3)/MCL (ref 0–0.04)
IMM GRANULOCYTES NFR BLD AUTO: 5 %
LYMPHOCYTES # BLD AUTO: 1.5 X10(3)/MCL (ref 0.6–4.6)
LYMPHOCYTES NFR BLD AUTO: 7.8 %
MCH RBC QN AUTO: 33.1 PG (ref 27–31)
MCHC RBC AUTO-ENTMCNC: 33 G/DL (ref 33–36)
MCV RBC AUTO: 100.3 FL (ref 80–94)
MONOCYTES # BLD AUTO: 2.22 X10(3)/MCL (ref 0.1–1.3)
MONOCYTES NFR BLD AUTO: 11.6 %
NEUTROPHILS # BLD AUTO: 14.11 X10(3)/MCL (ref 2.1–9.2)
NEUTROPHILS NFR BLD AUTO: 73.4 %
NRBC BLD AUTO-RTO: 0 %
PLATELET # BLD AUTO: 167 X10(3)/MCL (ref 130–400)
PMV BLD AUTO: 10.7 FL (ref 7.4–10.4)
POTASSIUM SERPL-SCNC: 4.5 MMOL/L (ref 3.5–5.1)
PROT SERPL-MCNC: 6.8 GM/DL (ref 5.8–7.6)
RBC # BLD AUTO: 3.93 X10(6)/MCL (ref 4.7–6.1)
SODIUM SERPL-SCNC: 141 MMOL/L (ref 136–145)
WBC # BLD AUTO: 19.2 X10(3)/MCL (ref 4.5–11.5)

## 2024-07-01 PROCEDURE — 36415 COLL VENOUS BLD VENIPUNCTURE: CPT | Mod: ,,, | Performed by: NURSE PRACTITIONER

## 2024-07-01 PROCEDURE — 36415 COLL VENOUS BLD VENIPUNCTURE: CPT | Performed by: NURSE PRACTITIONER

## 2024-07-01 PROCEDURE — 85652 RBC SED RATE AUTOMATED: CPT | Performed by: NURSE PRACTITIONER

## 2024-07-01 PROCEDURE — 1160F RVW MEDS BY RX/DR IN RCRD: CPT | Mod: CPTII,,, | Performed by: NURSE PRACTITIONER

## 2024-07-01 PROCEDURE — 3078F DIAST BP <80 MM HG: CPT | Mod: CPTII,,, | Performed by: NURSE PRACTITIONER

## 2024-07-01 PROCEDURE — 99214 OFFICE O/P EST MOD 30 MIN: CPT | Mod: ,,, | Performed by: NURSE PRACTITIONER

## 2024-07-01 PROCEDURE — 85025 COMPLETE CBC W/AUTO DIFF WBC: CPT | Performed by: NURSE PRACTITIONER

## 2024-07-01 PROCEDURE — 1159F MED LIST DOCD IN RCRD: CPT | Mod: CPTII,,, | Performed by: NURSE PRACTITIONER

## 2024-07-01 PROCEDURE — 1101F PT FALLS ASSESS-DOCD LE1/YR: CPT | Mod: CPTII,,, | Performed by: NURSE PRACTITIONER

## 2024-07-01 PROCEDURE — 80053 COMPREHEN METABOLIC PANEL: CPT | Performed by: NURSE PRACTITIONER

## 2024-07-01 PROCEDURE — 3074F SYST BP LT 130 MM HG: CPT | Mod: CPTII,,, | Performed by: NURSE PRACTITIONER

## 2024-07-01 PROCEDURE — 3288F FALL RISK ASSESSMENT DOCD: CPT | Mod: CPTII,,, | Performed by: NURSE PRACTITIONER

## 2024-07-01 RX ORDER — PREDNISONE 50 MG/1
TABLET ORAL
Qty: 3 TABLET | Refills: 0 | Status: SHIPPED | OUTPATIENT
Start: 2024-07-01

## 2024-07-01 RX ORDER — HYDROCORTISONE 25 MG/G
CREAM TOPICAL 2 TIMES DAILY
Qty: 28 G | Refills: 0 | Status: SHIPPED | OUTPATIENT
Start: 2024-07-01 | End: 2024-07-01 | Stop reason: SDUPTHER

## 2024-07-01 RX ORDER — HYDROCORTISONE 25 MG/G
CREAM TOPICAL 2 TIMES DAILY
Qty: 28 G | Refills: 0 | Status: SHIPPED | OUTPATIENT
Start: 2024-07-01

## 2024-07-01 RX ORDER — DIPHENHYDRAMINE HCL 50 MG
CAPSULE ORAL
Qty: 1 CAPSULE | Refills: 0 | Status: SHIPPED | OUTPATIENT
Start: 2024-07-01

## 2024-07-01 NOTE — ASSESSMENT & PLAN NOTE
Referral was sent to GI on 6/27/24, but he has not received a call from them to schedule appointment.  Follow up on referral.  Anusol suppository not effective,  Anus is very sensitive to insert suppository.  RX Anusol cream sent to pharmacy.

## 2024-07-01 NOTE — TELEPHONE ENCOUNTER
Spoke with nanci at lab. Sed rate added. Spoke with pt voicing understanding and agrees to CT. Will await the rest of work up as well.

## 2024-07-01 NOTE — ASSESSMENT & PLAN NOTE
Currently on daily stool softener and uses dulcolax as needed for constipation.  Encouraged adequate fluid and fiber intake.

## 2024-07-01 NOTE — ASSESSMENT & PLAN NOTE
No bleeding today.  He is concerned that bleeding will start again today after he takes a laxative to have BM.  Will get CBC and CMP today.  Follow up on GI referral.

## 2024-07-01 NOTE — TELEPHONE ENCOUNTER
----- Message from Margaret Bowie NP sent at 7/1/2024  1:25 PM CDT -----  Staff, please see if Sed rate can be added to previously drawn labs.     Also, let him know that CBC reviewed noting persistently elevated WBCs with concern for possible infection. I spoke with Dr. Alarcon, who would like to get CT abd/pelvis. I see he is allergic to iodine and will need to be pre medicated with prednisone and Benadryl as directed.  Will await remaining work up.

## 2024-07-01 NOTE — PROGRESS NOTES
Internal Medicine    Patient Name:  Stevie Cummins   Patient ID: [id number]     Chief Complaint: Rectal Pain (Pt had rectal bleeding. Not bleeding anymore now experiencing rectal pain. )      HPI:     Stevie Cummins is a 74 y.o. male, known to Dr Alarcon, is here today for requested visit.  Past medical history significant for HTN, Afib, HLD, CHF, PVD, DM, chronic back pain.  Here with complaints of rectal bleeding and pain started on Monday of last week.  Started with hard stool.  Has BM every 3 days.  Applied recta-care cream, but reports increased pain with application.  He contacted office on 6/20/27 and reported rectal bleeding and pain.  Anusol supp was prescribed.  He again contacted office to report Anusol was not effective and was referred to GI for hemorrhoids and rectal bleeding on 6/27/24.  Has not heard from GI yet.  No bleeding today or yesterday.  Reports bleeding worsens with BM.  Rates rectal pain 2-4/10 today; describes as burning. He feels fatigued.    Last Medicare AWV: 09/22/2023      Past Medical History:   Diagnosis Date    BPH (benign prostatic hyperplasia)     CVA (cerebral vascular accident)     Depression     History of claustrophobia     HLD (hyperlipidemia)     HTN (hypertension)     Personal history of colonic polyps 07/08/2020    Colonoscopy Report    Type 2 diabetes mellitus without complications         Past Surgical History:   Procedure Laterality Date    COLONOSCOPY  07/08/2020    [facility], Sha Grimaldo MD    FUSION OF POSTERIOR COLUMN OF CERVICAL SPINE USING COMPUTER AIDED NAVIGATION N/A 11/20/2023    Procedure: FUSION, SPINE, POSTERIOR SPINAL COLUMN, CERVICAL, USING COMPUTER-ASSISTED NAVIGATION;  Surgeon: Arya Hutchinson MD;  Location: [facility];  Service: Neurosurgery;  Laterality: N/A;  PCDF  C3 - T1 //  PRONE // PRONE NORMAN // DRILL // SCOPE // O-ARM // DIRECT SPINE // NDM //    REQ 1100    PATENT FORAMEN OVALE CLOSURE  11/04/2022    PROSTATE SURGERY  2021     Urolift        Social History     Tobacco Use    Smoking status: Former    Smokeless tobacco: Never   Substance and Sexual Activity    Alcohol use: Not Currently    Drug use: Never    Sexual activity: Not Currently        Current Outpatient Medications   Medication Instructions    acetaminophen (TYLENOL) 650 mg, Oral, Every 6 hours PRN    albuterol (PROVENTIL) 2.5 mg, Nebulization, 4 times daily PRN    allopurinoL (ZYLOPRIM) 100 mg, Oral, Daily    aspirin (ECOTRIN) 81 mg, Oral, Daily    bisacodyL (DULCOLAX) 10 mg, Rectal, Daily    citalopram (CELEXA) 20 mg, Oral, Daily    diclofenac (CATAFLAM) 50 mg, Oral, 2 times daily    doxazosin (CARDURA) 8 MG Tab Take 0.5 tabs bid    flash glucose sensor (FREESTYLE MITCH 14 DAY SENSOR) Kit 1 each, Misc.(Non-Drug; Combo Route), 4 times daily after meals & nightly    flash glucose sensor (FREESTYLE MITCH 2 SENSOR) Kit 2 each, Misc.(Non-Drug; Combo Route), 4 times daily after meals & nightly    fluticasone-umeclidin-vilanter (TRELEGY ELLIPTA) 100-62.5-25 mcg DsDv 1 puff, Inhalation, Nightly    glipizide-metformin (METAGLIP) 2.5-250 mg per tablet 1 tablet, Oral, Daily    hydrocortisone 2.5 % cream Topical (Top), 2 times daily    insulin lispro 20 Units, Subcutaneous, 3 times daily    lisinopriL (PRINIVIL,ZESTRIL) 5 mg, Oral, Daily    meclizine (ANTIVERT) 12.5 mg, Oral, 2 times daily    montelukast (SINGULAIR) 10 mg, Oral, Nightly    multivitamin (THERAGRAN) per tablet 1 tablet, Oral, Every morning    pantoprazole (PROTONIX) 40 mg, Oral, 2 times daily    simvastatin (ZOCOR) 40 MG tablet TAKE 1 TABLET THREE TIMES  WEEKLY MONDAY, WEDNESDAY,  AND FRIDAY    tamsulosin (FLOMAX) 0.4 mg, Oral, Daily    TOUJEO SOLOSTAR U-300 INSULIN 30 Units, Subcutaneous, Nightly    traMADoL (ULTRAM) 50 mg, Oral, Every 8 hours PRN       Review of patient's allergies indicates:   Allergen Reactions    Nitrofurantoin Hives    Iodinated contrast media     Venom-honey bee         Patient Care Team:  Dorian  "Robert HALL MD as PCP - General (Internal Medicine)  Jordan Serna Jr., MD as Consulting Physician (Pulmonary Disease)  Néstor Solis MD as Consulting Physician (Urology)  Arya Hutchinson MD as Consulting Physician (Neurosurgery)     Subjective:     Review of Systems   Constitutional:  Negative for appetite change, chills, diaphoresis, fatigue and fever.   Eyes:  Negative for visual disturbance.   Respiratory:  Positive for shortness of breath (exertional, chronic). Negative for cough and wheezing.    Cardiovascular:  Negative for chest pain, palpitations and leg swelling.   Gastrointestinal:  Positive for constipation and rectal pain (and bleeding). Negative for abdominal pain, diarrhea, nausea and vomiting.   Endocrine: Negative for cold intolerance.   Genitourinary:  Negative for difficulty urinating, dysuria, frequency and hematuria.   Musculoskeletal:  Negative for arthralgias, joint swelling and myalgias.   Skin:  Negative for color change and rash.   Allergic/Immunologic: Negative.    Neurological: Negative.  Negative for dizziness, syncope, light-headedness and numbness.   Hematological: Negative.    Psychiatric/Behavioral: Negative.     All other systems reviewed and are negative.      Objective:     Visit Vitals  /60 (BP Location: Right arm, Patient Position: Sitting, BP Method: Small (Manual))   Pulse 82   Resp 16   Ht 5' 9" (1.753 m)   Wt 92.5 kg (204 lb)   SpO2 (!) 93%   BMI 30.13 kg/m²       Physical Exam  Vitals and nursing note reviewed.   Constitutional:       General: He is not in acute distress.     Appearance: He is obese. He is not ill-appearing.   HENT:      Head: Normocephalic and atraumatic.      Mouth/Throat:      Mouth: Mucous membranes are moist.      Pharynx: Oropharynx is clear.   Eyes:      General: No scleral icterus.     Extraocular Movements: Extraocular movements intact.      Conjunctiva/sclera: Conjunctivae normal.      Pupils: Pupils are equal, round, and reactive to " light.   Cardiovascular:      Rate and Rhythm: Normal rate and regular rhythm.      Heart sounds: Normal heart sounds. No murmur heard.     No friction rub. No gallop.   Pulmonary:      Effort: Pulmonary effort is normal. No respiratory distress.      Breath sounds: Normal breath sounds. No wheezing, rhonchi or rales.   Abdominal:      General: Bowel sounds are normal. There is no distension.      Palpations: Abdomen is soft. There is no mass.      Tenderness: There is no abdominal tenderness.   Genitourinary:     Rectum: Guaiac result positive. Tenderness (with inability to complete rectal exam) present.       Musculoskeletal:         General: Normal range of motion.      Cervical back: Normal range of motion and neck supple.   Skin:     General: Skin is warm and dry.      Coloration: Skin is pale.   Neurological:      General: No focal deficit present.      Mental Status: He is alert and oriented to person, place, and time.   Psychiatric:         Mood and Affect: Mood normal.         Behavior: Behavior normal.         Labs Reviewed:     Chemistry:  Lab Results   Component Value Date     05/28/2024    K 5.3 (H) 05/28/2024    BUN 38.7 (H) 05/28/2024    CREATININE 1.09 05/28/2024    EGFRNORACEVR >60 05/28/2024    GLUCOSE 120 (H) 05/28/2024    CALCIUM 9.5 05/28/2024    ALKPHOS 54 05/28/2024    LABPROT 6.8 05/28/2024    ALBUMIN 3.8 05/28/2024    BILIDIR 0.3 12/30/2021    IBILI 0.60 12/30/2021    AST 18 05/28/2024    ALT 18 05/28/2024    MG 1.80 12/04/2023    PHOS 3.3 12/04/2023    TSH 3.190 09/22/2023    PSA 2.81 05/02/2024        Lab Results   Component Value Date    HGBA1C 5.8 05/28/2024        Hematology:  Lab Results   Component Value Date    WBC 16.51 (H) 05/28/2024    HGB 14.2 05/28/2024    HCT 45.1 05/28/2024     05/28/2024       Lipid Panel:  Lab Results   Component Value Date    CHOL 138 05/28/2024    HDL 37 05/28/2024    LDL 76.00 05/28/2024    TRIG 126 05/28/2024    TOTALCHOLEST 4 05/28/2024         Urine:  Lab Results   Component Value Date    APPEARANCEUA Cloudy (A) 11/29/2023    SGUA 1.020 11/29/2023    PROTEINUA 100 (A) 11/29/2023    KETONESUA Negative 11/29/2023    LEUKOCYTESUR Large (A) 11/29/2023    RBCUA None Seen 11/29/2023    WBCUA 21-50 (A) 11/29/2023    BACTERIA Few (A) 11/29/2023    SQEPUA None Seen 11/14/2023    CREATRANDUR 55.1 (L) 09/22/2023        Assessment:       ICD-10-CM ICD-9-CM   1. Rectal bleeding  K62.5 569.3   2. Other hemorrhoids  K64.8 455.6   3. Other elevated white blood cell (WBC) count  D72.828 288.69   4. Exertional shortness of breath  R06.02 786.05   5. Other constipation  K59.09 564.09        Plan:     1. Rectal bleeding  Assessment & Plan:  No bleeding today.  He is concerned that bleeding will start again today after he takes a laxative to have BM.  Will get CBC and CMP today.  Follow up on GI referral.    Orders:  -     CBC Auto Differential  -     Comprehensive Metabolic Panel  -     Discontinue: hydrocortisone 2.5 % cream; Apply topically 2 (two) times daily.  Dispense: 28 g; Refill: 0  -     hydrocortisone 2.5 % cream; Apply topically 2 (two) times daily.  Dispense: 28 g; Refill: 0    2. Other hemorrhoids  Assessment & Plan:  Referral was sent to GI on 6/27/24, but he has not received a call from them to schedule appointment.  Follow up on referral.  Anusol suppository not effective,  Anus is very sensitive to insert suppository.  RX Anusol cream sent to pharmacy.    Orders:  -     CBC Auto Differential  -     Comprehensive Metabolic Panel  -     Discontinue: hydrocortisone 2.5 % cream; Apply topically 2 (two) times daily.  Dispense: 28 g; Refill: 0  -     hydrocortisone 2.5 % cream; Apply topically 2 (two) times daily.  Dispense: 28 g; Refill: 0    3. Other elevated white blood cell (WBC) count  Assessment & Plan:  Repeat CBC today.    Orders:  -     CBC Auto Differential  -     Comprehensive Metabolic Panel    4. Exertional shortness of breath  Assessment &  Plan:  Repeat CBC today.    Orders:  -     CBC Auto Differential  -     Comprehensive Metabolic Panel    5. Other constipation  Assessment & Plan:  Currently on daily stool softener and uses dulcolax as needed for constipation.  Encouraged adequate fluid and fiber intake.         I have reviewed the notes, assessments, and/or procedures performed by Rowena Sanches NP, I concur with her/his documentation of Stevie Cummins.     Follow up for Previously scheduled and PRN if need. In addition to their scheduled follow up, the patient has also been instructed to follow up on as needed basis.     Future Appointments   Date Time Provider Department Center   9/25/2024 11:00 AM Robert Alarcon MD [facility]        Margaret Bowie NP

## 2024-07-03 ENCOUNTER — TELEPHONE (OUTPATIENT)
Dept: INTERNAL MEDICINE | Facility: CLINIC | Age: 74
End: 2024-07-03
Payer: MEDICARE

## 2024-07-03 NOTE — TELEPHONE ENCOUNTER
----- Message from Margaret Bowie NP sent at 7/3/2024  3:41 PM CDT -----  CT abdomen/pelvis neg for acute findings with exception of moderate to large amount stool suggestive of constipation.  Also, small R inguinal hernia.  How is he feeling? Recommend Miralax 1 capful daily x 3 days, then as needed. Also, may attempt milk of magnesia 30-60 ml at bedtime to assist with stooling efforts.     Staff, please verify GI has contacted him and arranged appt.  Also, I would recommend referral to hematology regarding persistently elevated WBCs. Please refer to first available MD in [facility] for further blood eval.

## 2024-07-05 ENCOUNTER — TELEPHONE (OUTPATIENT)
Dept: INTERNAL MEDICINE | Facility: CLINIC | Age: 74
End: 2024-07-05
Payer: MEDICARE

## 2024-07-05 DIAGNOSIS — K62.5 RECTAL BLEEDING: Primary | ICD-10-CM

## 2024-07-05 DIAGNOSIS — K59.09 OTHER CONSTIPATION: Primary | ICD-10-CM

## 2024-07-05 DIAGNOSIS — K46.9 HERNIA OF ABDOMINAL CAVITY: ICD-10-CM

## 2024-07-05 RX ORDER — LACTULOSE 10 G/15ML
20 SOLUTION ORAL DAILY PRN
Qty: 150 ML | Refills: 0 | Status: SHIPPED | OUTPATIENT
Start: 2024-07-05

## 2024-07-05 NOTE — TELEPHONE ENCOUNTER
I sent RX for lactulose 20 gm daily as needed for constipation to hopefully help with evacuation.  Follow up as needed. Er precautions if pain gets worse

## 2024-07-05 NOTE — TELEPHONE ENCOUNTER
Spoke with pt. Voiced understanding but stating it hurts to pass gas, still bleeding when pooping and have tried stool softer. Most of the time it works but still having issues.checking in GI appt and need help with referral to [facility]

## 2024-07-05 NOTE — TELEPHONE ENCOUNTER
Referral for hematology /oncology Dr. Lambert sent. Attempted to call pt to advise him GI office with Dr. Taylor has made attempts to contact and were unable. LVM advising him to call the office to get the appt scheduled and to give me a call back ASAP.

## 2024-07-08 ENCOUNTER — TELEPHONE (OUTPATIENT)
Dept: INTERNAL MEDICINE | Facility: CLINIC | Age: 74
End: 2024-07-08
Payer: MEDICARE

## 2024-07-08 NOTE — TELEPHONE ENCOUNTER
----- Message from Wendi Turner sent at 7/8/2024  9:03 AM CDT -----  Regarding: meds  .Who Called: Stevie Cummins    Caller is requesting assistance/information from provider's office.    Symptoms (please be specific):    How long has patient had these symptoms:    List of preferred pharmacies on file (remove unneeded): [unfilled]  If different, enter pharmacy into here including location and phone number:       Preferred Method of Contact: Phone Call  Patient's Preferred Phone Number on File: [phone]   Best Call Back Number, if different:  Additional Information: States he received meds by mail and it has no directions. Would like a call from Ghanshyam.

## 2024-07-08 NOTE — TELEPHONE ENCOUNTER
Patient has been made aware/verbalized understanding.  He advised me certain test cause him itching when using contrast dye but he did the imaging with no issues

## 2024-07-08 NOTE — TELEPHONE ENCOUNTER
Please Clarify the Prednisone Instructions, patient has not started the medication and is confused on instructions.     Medication  predniSONE (DELTASONE) 50 MG Tab [6898]  Outpatient Medication Detail     Disp Refills Start End CAROL   predniSONE (DELTASONE) 50 MG Tab 3 tablet 0 7/1/2024 -- No   Sig: Take 50mg by mouth at 13 hours, 7 hours, and 1 hour before contrast administration.   Sent to pharmacy as: predniSONE (DELTASONE) 50 MG Tab     Patient indicated he already had the MRI Done, believes the medication was given to him due to him being allergic to contrast dye

## 2024-07-09 ENCOUNTER — TELEPHONE (OUTPATIENT)
Dept: HEMATOLOGY/ONCOLOGY | Facility: CLINIC | Age: 74
End: 2024-07-09
Payer: MEDICARE

## 2024-07-22 ENCOUNTER — TELEPHONE (OUTPATIENT)
Dept: INTERNAL MEDICINE | Facility: CLINIC | Age: 74
End: 2024-07-22
Payer: MEDICARE

## 2024-07-22 NOTE — TELEPHONE ENCOUNTER
----- Message from [facility] sent at 7/22/2024  1:08 PM CDT -----  .Type:  Patient Returning Call    Who Called: pt    Who Left Message for Patient: Margaret    Does the patient know what this is regarding?: update on his care    Would the patient rather a call back or a response via [facility]?      Best Call Back Number: [phone]        Additional Information:  pt returning a missed call to discuss an update on his health please advise thanks

## 2024-07-22 NOTE — TELEPHONE ENCOUNTER
Spoke to patient, he was able to see Gastro Physician.  Bleeding has stopped, the Gastro Doctor gave him some medication that is helping.   He also indicated Mrs. Robles helped him tremendously as well.  Just wanted to let us know that he is doing better.

## 2024-08-15 DIAGNOSIS — G89.4 CHRONIC PAIN SYNDROME: ICD-10-CM

## 2024-08-15 DIAGNOSIS — D72.828 OTHER ELEVATED WHITE BLOOD CELL (WBC) COUNT: ICD-10-CM

## 2024-08-15 DIAGNOSIS — K59.09 OTHER CONSTIPATION: ICD-10-CM

## 2024-08-15 RX ORDER — TRAMADOL HYDROCHLORIDE 50 MG/1
50 TABLET ORAL
Qty: 30 TABLET | Refills: 0 | Status: SHIPPED | OUTPATIENT
Start: 2024-08-15

## 2024-08-15 RX ORDER — LACTULOSE 10 G/15ML
SOLUTION ORAL; RECTAL
Qty: 150 ML | Refills: 0 | Status: SHIPPED | OUTPATIENT
Start: 2024-08-15

## 2024-08-15 RX ORDER — PREDNISONE 50 MG/1
TABLET ORAL
Qty: 3 TABLET | Refills: 0 | Status: SHIPPED | OUTPATIENT
Start: 2024-08-15

## 2024-09-16 DIAGNOSIS — E78.5 HYPERLIPIDEMIA, UNSPECIFIED HYPERLIPIDEMIA TYPE: Primary | ICD-10-CM

## 2024-09-16 DIAGNOSIS — Z79.4 TYPE 2 DIABETES MELLITUS WITH OTHER SPECIFIED COMPLICATION, WITH LONG-TERM CURRENT USE OF INSULIN: ICD-10-CM

## 2024-09-16 DIAGNOSIS — R53.83 FATIGUE, UNSPECIFIED TYPE: ICD-10-CM

## 2024-09-16 DIAGNOSIS — E11.69 TYPE 2 DIABETES MELLITUS WITH OTHER SPECIFIED COMPLICATION, WITH LONG-TERM CURRENT USE OF INSULIN: ICD-10-CM

## 2024-09-16 DIAGNOSIS — I10 BENIGN ESSENTIAL HYPERTENSION: ICD-10-CM

## 2024-09-16 DIAGNOSIS — Z00.00 WELLNESS EXAMINATION: ICD-10-CM

## 2024-09-17 ENCOUNTER — TELEPHONE (OUTPATIENT)
Dept: INTERNAL MEDICINE | Facility: CLINIC | Age: 74
End: 2024-09-17
Payer: MEDICARE

## 2024-09-17 NOTE — TELEPHONE ENCOUNTER
"----- Message from Chelle Brown LPN sent at 9/17/2024  7:40 AM CDT -----  Regarding: Dr. Alarcon 09/25/2024 Wellness 1100  Are there any outstanding tasks in chart? No, but needs FASTING labs, TO BE DONE AT  "[facility]" or lab location of choice PRIOR to appt    Is there any documentation of tasks? no    Has the pt seen another physician, been to ER, [facility], or admitted to hospital since last visit?    Has the pt done blood work or imaging since last visit?    5. PLEASE HAVE PATIENT BRING MEDICATION LIST OR BOTTLES TO EVERY OFFICE VISIT  "

## 2024-09-20 ENCOUNTER — LAB VISIT (OUTPATIENT)
Dept: LAB | Facility: HOSPITAL | Age: 74
End: 2024-09-20
Attending: INTERNAL MEDICINE
Payer: MEDICARE

## 2024-09-20 DIAGNOSIS — R53.83 FATIGUE, UNSPECIFIED TYPE: ICD-10-CM

## 2024-09-20 DIAGNOSIS — Z00.00 WELLNESS EXAMINATION: ICD-10-CM

## 2024-09-20 DIAGNOSIS — Z79.4 TYPE 2 DIABETES MELLITUS WITH OTHER SPECIFIED COMPLICATION, WITH LONG-TERM CURRENT USE OF INSULIN: ICD-10-CM

## 2024-09-20 DIAGNOSIS — E78.5 HYPERLIPIDEMIA, UNSPECIFIED HYPERLIPIDEMIA TYPE: ICD-10-CM

## 2024-09-20 DIAGNOSIS — E11.69 TYPE 2 DIABETES MELLITUS WITH OTHER SPECIFIED COMPLICATION, WITH LONG-TERM CURRENT USE OF INSULIN: ICD-10-CM

## 2024-09-20 DIAGNOSIS — I10 BENIGN ESSENTIAL HYPERTENSION: ICD-10-CM

## 2024-09-20 LAB
ABS NEUT CALC (OHS): 21.43 X10(3)/MCL (ref 2.1–9.2)
ALBUMIN SERPL-MCNC: 3.8 G/DL (ref 3.4–4.8)
ALBUMIN/GLOB SERPL: 1.1 RATIO (ref 1.1–2)
ALP SERPL-CCNC: 58 UNIT/L (ref 40–150)
ALT SERPL-CCNC: 18 UNIT/L (ref 0–55)
ANION GAP SERPL CALC-SCNC: 5 MEQ/L
ANISOCYTOSIS BLD QL SMEAR: ABNORMAL
AST SERPL-CCNC: 17 UNIT/L (ref 5–34)
BILIRUB SERPL-MCNC: 0.5 MG/DL
BUN SERPL-MCNC: 49.3 MG/DL (ref 8.4–25.7)
CALCIUM SERPL-MCNC: 9.9 MG/DL (ref 8.8–10)
CHLORIDE SERPL-SCNC: 106 MMOL/L (ref 98–107)
CHOLEST SERPL-MCNC: 131 MG/DL
CHOLEST/HDLC SERPL: 4 {RATIO} (ref 0–5)
CO2 SERPL-SCNC: 31 MMOL/L (ref 23–31)
CREAT SERPL-MCNC: 1.61 MG/DL (ref 0.73–1.18)
CREAT/UREA NIT SERPL: 31
EOSINOPHIL NFR BLD MANUAL: 0.26 X10(3)/MCL (ref 0–0.9)
EOSINOPHIL NFR BLD MANUAL: 1 % (ref 0–8)
ERYTHROCYTE [DISTWIDTH] IN BLOOD BY AUTOMATED COUNT: 13.9 % (ref 11.5–17)
EST. AVERAGE GLUCOSE BLD GHB EST-MCNC: 116.9 MG/DL
GFR SERPLBLD CREATININE-BSD FMLA CKD-EPI: 44 ML/MIN/1.73/M2
GLOBULIN SER-MCNC: 3.4 GM/DL (ref 2.4–3.5)
GLUCOSE SERPL-MCNC: 139 MG/DL (ref 82–115)
HBA1C MFR BLD: 5.7 %
HCT VFR BLD AUTO: 42.6 % (ref 42–52)
HDLC SERPL-MCNC: 32 MG/DL (ref 35–60)
HGB BLD-MCNC: 13.8 G/DL (ref 14–18)
LDLC SERPL CALC-MCNC: 35 MG/DL (ref 50–140)
LYMPHOCYTES NFR BLD MANUAL: 1.57 X10(3)/MCL
LYMPHOCYTES NFR BLD MANUAL: 6 % (ref 13–40)
MACROCYTES BLD QL SMEAR: ABNORMAL
MCH RBC QN AUTO: 35.3 PG (ref 27–31)
MCHC RBC AUTO-ENTMCNC: 32.4 G/DL (ref 33–36)
MCV RBC AUTO: 109 FL (ref 80–94)
MONOCYTES NFR BLD MANUAL: 11 % (ref 2–11)
MONOCYTES NFR BLD MANUAL: 2.87 X10(3)/MCL (ref 0.1–1.3)
NEUTROPHILS NFR BLD MANUAL: 76 % (ref 47–80)
NEUTS BAND NFR BLD MANUAL: 6 % (ref 0–11)
PLATELET # BLD AUTO: 150 X10(3)/MCL (ref 130–400)
PLATELET # BLD EST: ADEQUATE 10*3/UL
PMV BLD AUTO: 9.9 FL (ref 7.4–10.4)
POTASSIUM SERPL-SCNC: 5.2 MMOL/L (ref 3.5–5.1)
PROT SERPL-MCNC: 7.2 GM/DL (ref 5.8–7.6)
RBC # BLD AUTO: 3.91 X10(6)/MCL (ref 4.7–6.1)
RBC MORPH BLD: ABNORMAL
SODIUM SERPL-SCNC: 142 MMOL/L (ref 136–145)
TRIGL SERPL-MCNC: 320 MG/DL (ref 34–140)
TSH SERPL-ACNC: 3.25 UIU/ML (ref 0.35–4.94)
VLDLC SERPL CALC-MCNC: 64 MG/DL
WBC # BLD AUTO: 26.13 X10(3)/MCL (ref 4.5–11.5)

## 2024-09-20 PROCEDURE — 84443 ASSAY THYROID STIM HORMONE: CPT

## 2024-09-20 PROCEDURE — 85027 COMPLETE CBC AUTOMATED: CPT

## 2024-09-20 PROCEDURE — 36415 COLL VENOUS BLD VENIPUNCTURE: CPT

## 2024-09-20 PROCEDURE — 80053 COMPREHEN METABOLIC PANEL: CPT

## 2024-09-20 PROCEDURE — 83036 HEMOGLOBIN GLYCOSYLATED A1C: CPT

## 2024-09-20 PROCEDURE — 80061 LIPID PANEL: CPT

## 2024-09-20 PROCEDURE — 85007 BL SMEAR W/DIFF WBC COUNT: CPT

## 2024-09-25 ENCOUNTER — OFFICE VISIT (OUTPATIENT)
Dept: INTERNAL MEDICINE | Facility: CLINIC | Age: 74
End: 2024-09-25
Payer: MEDICARE

## 2024-09-25 VITALS
HEART RATE: 78 BPM | SYSTOLIC BLOOD PRESSURE: 118 MMHG | DIASTOLIC BLOOD PRESSURE: 60 MMHG | OXYGEN SATURATION: 98 % | BODY MASS INDEX: 31.4 KG/M2 | WEIGHT: 212 LBS | HEIGHT: 69 IN

## 2024-09-25 DIAGNOSIS — E11.69 TYPE 2 DIABETES MELLITUS WITH OTHER SPECIFIED COMPLICATION, WITH LONG-TERM CURRENT USE OF INSULIN: ICD-10-CM

## 2024-09-25 DIAGNOSIS — D72.829 LEUKOCYTOSIS, UNSPECIFIED TYPE: ICD-10-CM

## 2024-09-25 DIAGNOSIS — Z00.00 MEDICARE ANNUAL WELLNESS VISIT, SUBSEQUENT: Primary | ICD-10-CM

## 2024-09-25 DIAGNOSIS — T83.511D URINARY TRACT INFECTION ASSOCIATED WITH INDWELLING URETHRAL CATHETER, SUBSEQUENT ENCOUNTER: ICD-10-CM

## 2024-09-25 DIAGNOSIS — N39.0 URINARY TRACT INFECTION ASSOCIATED WITH INDWELLING URETHRAL CATHETER, SUBSEQUENT ENCOUNTER: ICD-10-CM

## 2024-09-25 DIAGNOSIS — Z79.4 TYPE 2 DIABETES MELLITUS WITH OTHER SPECIFIED COMPLICATION, WITH LONG-TERM CURRENT USE OF INSULIN: ICD-10-CM

## 2024-09-25 DIAGNOSIS — Z23 FLU VACCINE NEED: ICD-10-CM

## 2024-09-25 DIAGNOSIS — J44.9 CHRONIC OBSTRUCTIVE PULMONARY DISEASE, UNSPECIFIED COPD TYPE: ICD-10-CM

## 2024-09-25 RX ORDER — CIPROFLOXACIN 500 MG/1
500 TABLET ORAL 2 TIMES DAILY
Qty: 20 TABLET | Refills: 0 | Status: SHIPPED | OUTPATIENT
Start: 2024-09-25

## 2024-09-25 NOTE — PROGRESS NOTES
Patient ID: Stevie Cummins is a 74 y.o. male.  Chief Complaint: Medicare AWV (Wellness. Discuss labs (drawn 09/20/2024))    Patient Care Team:  Robert Alarcon MD as PCP - General (Internal Medicine)  Jordan Serna Jr., MD as Consulting Physician (Pulmonary Disease)  Néstor Solis MD as Consulting Physician (Urology)  Arya Hutchinson MD as Consulting Physician (Neurosurgery)     HPI:   Patient presents here today for above reason.     The patient's Health Maintenance was reviewed and the following appears to be due at this time:   Health Maintenance Due   Topic Date Due    Hepatitis C Screening  Never done    Shingles Vaccine (1 of 2) Never done    RSV Vaccine (Age 60+ and Pregnant patients) (1 - 1-dose 60+ series) Never done    Pneumococcal Vaccines (Age 65+) (2 of 2 - PCV) 10/13/2015    Eye Exam  11/12/2022    COVID-19 Vaccine (4 - 2023-24 season) 09/01/2024    Diabetes Urine Screening  09/22/2024        Past Medical History:  Past Medical History:   Diagnosis Date    BPH (benign prostatic hyperplasia)     CVA (cerebral vascular accident)     Depression     History of claustrophobia     HLD (hyperlipidemia)     HTN (hypertension)     Personal history of colonic polyps 07/08/2020    Colonoscopy Report    Type 2 diabetes mellitus without complications      Past Surgical History:   Procedure Laterality Date    COLONOSCOPY  07/08/2020    [facility], Sha Grimaldo MD    FUSION OF POSTERIOR COLUMN OF CERVICAL SPINE USING COMPUTER AIDED NAVIGATION N/A 11/20/2023    Procedure: FUSION, SPINE, POSTERIOR SPINAL COLUMN, CERVICAL, USING COMPUTER-ASSISTED NAVIGATION;  Surgeon: Arya Hutchinson MD;  Location: [facility];  Service: Neurosurgery;  Laterality: N/A;  PCDF  C3 - T1 //  PRONE // PRONE NORMAN // DRILL // SCOPE // O-ARM // DIRECT SPINE // NDM //    REQ 1100    PATENT FORAMEN OVALE CLOSURE  11/04/2022    PROSTATE SURGERY  2021    Urolift     Review of patient's allergies indicates:   Allergen  Reactions    Nitrofurantoin Hives    Iodinated contrast media     Venom-honey bee      Current Outpatient Medications on File Prior to Visit   Medication Sig Dispense Refill    acetaminophen (TYLENOL) 325 MG tablet Take 2 tablets (650 mg total) by mouth every 6 (six) hours as needed for Temperature greater than (or equal to 101 degree F). 30 tablet 0    albuterol (PROVENTIL) 2.5 mg /3 mL (0.083 %) nebulizer solution Take 3 mLs (2.5 mg total) by nebulization 4 (four) times daily as needed for Wheezing. 1 each 3    allopurinoL (ZYLOPRIM) 100 MG tablet Take 1 tablet (100 mg total) by mouth once daily. 90 tablet 3    aspirin (ECOTRIN) 81 MG EC tablet Take 1 tablet (81 mg total) by mouth once daily. 90 tablet 3    citalopram (CELEXA) 20 MG tablet Take 1 tablet (20 mg total) by mouth once daily. 90 tablet 3    diclofenac (CATAFLAM) 50 MG tablet Take 1 tablet (50 mg total) by mouth 2 (two) times daily. 180 tablet 3    diphenhydrAMINE (BENADRYL) 50 MG capsule Take 50mg by mouth 1 hour before contrast administration. 1 capsule 0    doxazosin (CARDURA) 8 MG Tab Take 0.5 tabs bid 90 tablet 3    flash glucose sensor (FREESTYLE MITCH 14 DAY SENSOR) Kit 1 each by Misc.(Non-Drug; Combo Route) route 2 hours after meals and at bedtime. 1 kit 0    flash glucose sensor (FREESTYLE MITCH 2 SENSOR) Kit 2 each by Misc.(Non-Drug; Combo Route) route 2 hours after meals and at bedtime. 2 kit 11    fluticasone-umeclidin-vilanter (TRELEGY ELLIPTA) 100-62.5-25 mcg DsDv Inhale 1 puff into the lungs nightly. 1 each 3    glipizide-metformin (METAGLIP) 2.5-250 mg per tablet Take 1 tablet by mouth once daily. 90 tablet 3    hydrocortisone 2.5 % cream Apply topically 2 (two) times daily. 28 g 0    insulin lispro 100 unit/mL pen Inject 20 Units into the skin 3 (three) times daily. 10 mL 4    lactulose (CHRONULAC) 10 gram/15 mL solution TAKE 2 TABLESPOONFULS      (30ML) DAILY AS NEEDED FOR CONSTIPATION 150 mL 0    lisinopriL (PRINIVIL,ZESTRIL) 5 MG  tablet Take 1 tablet (5 mg total) by mouth once daily. 90 tablet 3    meclizine (ANTIVERT) 12.5 mg tablet TAKE 1 TABLET BY MOUTH TWO TIMES A  tablet 3    montelukast (SINGULAIR) 10 mg tablet Take 1 tablet (10 mg total) by mouth every evening. 90 tablet 3    multivitamin (THERAGRAN) per tablet Take 1 tablet by mouth every morning. 90 tablet 3    pantoprazole (PROTONIX) 40 MG tablet Take 1 tablet (40 mg total) by mouth 2 (two) times a day. for 5 days 10 tablet 0    predniSONE (DELTASONE) 50 MG Tab TAKE 1 TABLET AT 13 HOURS, 7 HOURS AND 1 HOUR BEFORE  CONTRAST ADMINISTRATION. 3 tablet 0    simvastatin (ZOCOR) 40 MG tablet TAKE 1 TABLET THREE TIMES  WEEKLY MONDAY, WEDNESDAY,  AND FRIDAY 36 tablet 3    TOUJEO SOLOSTAR U-300 INSULIN 300 unit/mL (1.5 mL) InPn pen Inject 30 Units into the skin every evening. 4 pen 5    traMADoL (ULTRAM) 50 mg tablet Take 1 tablet (50 mg total) by mouth every 24 hours as needed for Pain. 30 tablet 0    [DISCONTINUED] tamsulosin (FLOMAX) 0.4 mg Cap Take 1 capsule (0.4 mg total) by mouth once daily. (Patient not taking: Reported on 9/25/2024) 90 capsule 3     No current facility-administered medications on file prior to visit.     Social History     Socioeconomic History    Marital status:    Tobacco Use    Smoking status: Former    Smokeless tobacco: Never   Substance and Sexual Activity    Alcohol use: Not Currently    Drug use: Never    Sexual activity: Not Currently     Social Determinants of Health     Financial Resource Strain: Low Risk  (11/30/2023)    Overall Financial Resource Strain (CARDIA)     Difficulty of Paying Living Expenses: Not hard at all   Food Insecurity: No Food Insecurity (11/30/2023)    Hunger Vital Sign     Worried About Running Out of Food in the Last Year: Never true     Ran Out of Food in the Last Year: Never true   Transportation Needs: No Transportation Needs (11/30/2023)    PRAPARE - Transportation     Lack of Transportation (Medical): No     Lack  "of Transportation (Non-Medical): No   Physical Activity: Inactive (11/30/2023)    Exercise Vital Sign     Days of Exercise per Week: 0 days     Minutes of Exercise per Session: 0 min   Stress: No Stress Concern Present (11/30/2023)    [employer] [place] [employer] - Occupational Stress Questionnaire     Feeling of Stress : Only a little   Housing Stability: Low Risk  (11/30/2023)    Housing Stability Vital Sign     Unable to Pay for Housing in the Last Year: No     Number of Places Lived in the Last Year: 1     Unstable Housing in the Last Year: No     No family history on file.                        Opioid Screening: Patient medication list reviewed, patient is not taking prescription opioids. Patient is not using additional opioids than prescribed. Patient is at low risk of substance abuse based on this opioid use history.    ROS:   Negative other than what is mentioned in HPI    Vitals/PE:   /60 (BP Location: Left arm, Patient Position: Sitting, BP Method: Small (Manual))   Pulse 78   Ht 5' 9" (1.753 m)   Wt 96.2 kg (212 lb)   SpO2 98%   BMI 31.31 kg/m²   Physical Exam    General: Alert and oriented, No acute distress.   Eye: Normal conjunctiva without exudate.  HENMT: Normocephalic/AT, Normal hearing, Oral mucosa is moist and pink   Neck: No goiter visualized.   Respiratory: Lungs CTAB, Respirations are non-labored, Breath sounds are equal, Symmetrical chest wall expansion.  Cardiovascular: Normal rate, Regular rhythm, No murmur, No edema.   Gastrointestinal: Non-distended.   Genitourinary: Deferred.  Musculoskeletal: Normal ROM, Normal gait, No deformities or amputations.  Integumentary: Warm, Dry, Intact. No diaphoresis, or flushing.  Neurologic: No focal deficits, Cranial Nerves II-XII are grossly intact.   Psychiatric: Cooperative, Appropriate mood & affect, Normal judgment, Non-suicidal.        7/7/2022     2:00 PM   Checklist of Activities of Daily Living   Bathing Independent "   Dressing Independent   Grooming Independent   Oral Care Independent   Toileting Independent   Transferring Independent   Walking Independent   Climbing Stairs Independent   Eating Independent   Shopping Independent   Cooking Independent   Managing Medications Independent   Using the Phone Independent   Housework Indpendent   Laundry Independent   Driving Independent   Managing Finances Independent         9/25/2024    11:00 AM 7/1/2024     9:20 AM 5/29/2024    10:20 AM 1/23/2024    10:40 AM 9/22/2023    10:20 AM 5/18/2023    10:20 AM 1/12/2023    10:00 AM   Fall Risk Assessment - Outpatient   Mobility Status Ambulatory Ambulatory w/ assistance Ambulatory Ambulatory w/ assistance Ambulatory w/ assistance Ambulatory Ambulatory   Number of falls 0 0 0 0 1 with injury 1 0   Identified as fall risk False True False True True False False                Assessment/Plan:   1. Medicare annual wellness visit, subsequent  Comments:  All labs discussed with the patient and review    2. Chronic obstructive pulmonary disease, unspecified COPD type  Comments:  Stable on inhalers with no exacerbation    3. Leukocytosis, unspecified type  Comments:  Since patient's self catheterized for the urine will go ahead and treated for a UTI    4. Type 2 diabetes mellitus with other specified complication, with long-term current use of insulin  Comments:  Continue same insulin regimen, hemoglobin A1c at target    5. Urinary tract infection associated with indwelling urethral catheter, subsequent encounter  Comments:  Prescription sent to his pharmacy  Orders:  -     Urinalysis, Reflex to Urine Culture; Future; Expected date: 10/07/2024  -     Urinalysis    6. Flu vaccine need  -     Influenza - Quadrivalent (Adjuvanted)    Other orders  -     ciprofloxacin HCl (CIPRO) 500 MG tablet; Take 1 tablet (500 mg total) by mouth 2 (two) times daily.  Dispense: 20 tablet; Refill: 0        Recommendations:  Diet (healthy food choices, reduce portions  and overall calorie intake)  Exercise 30-45 minutes at least 3x per week  Avoid excessive alcohol intake and tobacco use  Stay UTD with immunizations and preventative screenings   Yearly Labs     ..  Medications Ordered This Encounter   Medications    ciprofloxacin HCl (CIPRO) 500 MG tablet     Sig: Take 1 tablet (500 mg total) by mouth 2 (two) times daily.     Dispense:  20 tablet     Refill:  0        ..  Orders Placed This Encounter   Procedures    Influenza - Quadrivalent (Adjuvanted)    Urinalysis, Reflex to Urine Culture    Urinalysis         I have discontinued Stevie BLANDONYudelka Perryace's tamsulosin. I am also having him start on ciprofloxacin HCl. Additionally, I am having him maintain his FREESTYLE MITCH 14 DAY SENSOR, acetaminophen, glipizide-metformin, aspirin, doxazosin, citalopram, fluticasone-umeclidin-vilanter, insulin lispro, lisinopriL, montelukast, multivitamin, pantoprazole, simvastatin, TOUJEO SOLOSTAR U-300 INSULIN, meclizine, allopurinoL, diclofenac, albuterol, FREESTYLE MITCH 2 SENSOR, hydrocortisone, diphenhydrAMINE, predniSONE, traMADoL, and lactulose.    Orders Placed This Encounter   Procedures    Influenza - Quadrivalent (Adjuvanted)    Urinalysis, Reflex to Urine Culture     Standing Status:   Future     Number of Occurrences:   1     Standing Expiration Date:   11/24/2025     Order Specific Question:   Specimen Source     Answer:   Urine    Urinalysis           Order Specific Question:   Collection Type     Answer:   Urine, Clean Catch       Education and counseling done face to face regarding medical conditions and plan. Contact office if new symptoms develop. Should any symptoms ever significantly worsen seek emergency medical attention/go to ER. Follow up at least yearly for wellness or sooner PRN. Nurse to call patient with any results. The patient is receptive, expresses understanding and is agreeable to plan. All questions have been answered.      Advance Care Planning     Date:  09/25/2024  Patient did not wish or was not able to name a surrogate decision maker or provide an Advance Care Plan.           Follow up in about 4 months (around 1/25/2025).

## 2024-09-30 PROBLEM — I63.9 CEREBROVASCULAR ACCIDENT (CVA): Status: RESOLVED | Noted: 2024-06-27 | Resolved: 2024-09-30

## 2024-09-30 PROBLEM — K62.5 RECTAL BLEEDING: Status: RESOLVED | Noted: 2024-07-01 | Resolved: 2024-09-30

## 2024-10-07 ENCOUNTER — APPOINTMENT (OUTPATIENT)
Dept: LAB | Facility: HOSPITAL | Age: 74
End: 2024-10-07
Attending: INTERNAL MEDICINE
Payer: MEDICARE

## 2024-10-15 ENCOUNTER — TELEPHONE (OUTPATIENT)
Dept: INTERNAL MEDICINE | Facility: CLINIC | Age: 74
End: 2024-10-15
Payer: MEDICARE

## 2024-10-15 NOTE — TELEPHONE ENCOUNTER
Voicemail.....    Tried to contact patient to make him aware--Lisa (Patient Assistance Medication has been shipped to the the office and also his paperwork for next Patient Assistance is ready for      No Answer/Left VM

## 2024-11-27 DIAGNOSIS — E11.9 TYPE 2 DIABETES MELLITUS WITHOUT COMPLICATION, WITHOUT LONG-TERM CURRENT USE OF INSULIN: ICD-10-CM

## 2024-11-27 RX ORDER — INSULIN GLARGINE 300 U/ML
30 INJECTION, SOLUTION SUBCUTANEOUS NIGHTLY
Qty: 4 PEN | Refills: 5 | Status: SHIPPED | OUTPATIENT
Start: 2024-11-27

## 2024-12-19 DIAGNOSIS — E78.5 HYPERLIPIDEMIA, UNSPECIFIED HYPERLIPIDEMIA TYPE: ICD-10-CM

## 2024-12-19 RX ORDER — SIMVASTATIN 40 MG/1
TABLET, FILM COATED ORAL
Qty: 36 TABLET | Refills: 3 | Status: SHIPPED | OUTPATIENT
Start: 2024-12-19

## 2024-12-23 ENCOUNTER — TELEPHONE (OUTPATIENT)
Dept: INTERNAL MEDICINE | Facility: CLINIC | Age: 74
End: 2024-12-23
Payer: MEDICARE

## 2024-12-23 DIAGNOSIS — E11.69 TYPE 2 DIABETES MELLITUS WITH OTHER SPECIFIED COMPLICATION, WITH LONG-TERM CURRENT USE OF INSULIN: Primary | ICD-10-CM

## 2024-12-23 DIAGNOSIS — Z79.4 TYPE 2 DIABETES MELLITUS WITH OTHER SPECIFIED COMPLICATION, WITH LONG-TERM CURRENT USE OF INSULIN: Primary | ICD-10-CM

## 2024-12-23 NOTE — TELEPHONE ENCOUNTER
"----- Message from Nurse Corbett sent at 12/23/2024  9:23 AM CST -----  Regarding: Dr. Alarcon 12/30/2024 3 mth 1:40pm  Are there any outstanding tasks in chart? No, but needs FASTING labs, TO BE DONE AT  "[facility]" or lab location of choice PRIOR to appt    Is there any documentation of tasks? no    Has the pt seen another physician, been to ER, UCC, or admitted to hospital since last visit?    Has the pt done blood work or imaging since last visit?    5. PLEASE HAVE PATIENT BRING MEDICATION LIST OR BOTTLES TO EVERY OFFICE VISIT  "

## 2024-12-23 NOTE — TELEPHONE ENCOUNTER
----- Message from Maria G sent at 12/23/2024  3:43 PM CST -----  Who Called: Stevie BLANDON Orlandoace    Caller is requesting assistance/information from provider's office.    Symptoms (please be specific): n/a   How long has patient had these symptoms:  n/a  List of preferred pharmacies on file (remove unneeded):[facility] - [facility] - [address] [facility] AT [address]AL TO [facility] SITES          Preferred Method of Contact: Phone Call  Patient's Preferred Phone Number on File: [phone]   Best Call Back Number, if different:  Additional Information: Pt stated that he still did not receive simvastatin (ZOCOR) 40 MG tablet informed him refill was sent on 12/19/24.

## 2024-12-26 ENCOUNTER — TELEPHONE (OUTPATIENT)
Dept: INTERNAL MEDICINE | Facility: CLINIC | Age: 74
End: 2024-12-26
Payer: MEDICARE

## 2024-12-26 NOTE — TELEPHONE ENCOUNTER
----- Message from "[employer] sent at 12/26/2024  2:35 PM CST -----  .Type:  Patient Returning Call    Who Called:pt  Who Left Message for Patient:pt  Does the patient know what this is regarding?:Please call back about his insulin   Would the patient rather a call back or a response via [facility]? ALBERTO  Best Call Back Number:[phone]  Additional Information: Please call back about his insulin

## 2024-12-26 NOTE — TELEPHONE ENCOUNTER
Spoke with patient. He needed refill information to be sent to [facility]. Information routed to [phone]. Per request

## 2024-12-30 ENCOUNTER — LAB VISIT (OUTPATIENT)
Dept: LAB | Facility: HOSPITAL | Age: 74
End: 2024-12-30
Attending: INTERNAL MEDICINE
Payer: MEDICARE

## 2024-12-30 ENCOUNTER — OFFICE VISIT (OUTPATIENT)
Dept: INTERNAL MEDICINE | Facility: CLINIC | Age: 74
End: 2024-12-30
Payer: MEDICARE

## 2024-12-30 VITALS
HEIGHT: 69 IN | SYSTOLIC BLOOD PRESSURE: 116 MMHG | BODY MASS INDEX: 30.51 KG/M2 | WEIGHT: 206 LBS | DIASTOLIC BLOOD PRESSURE: 62 MMHG | HEART RATE: 57 BPM | OXYGEN SATURATION: 98 %

## 2024-12-30 DIAGNOSIS — F32.A DEPRESSION, UNSPECIFIED DEPRESSION TYPE: ICD-10-CM

## 2024-12-30 DIAGNOSIS — E11.69 TYPE 2 DIABETES MELLITUS WITH OTHER SPECIFIED COMPLICATION, WITH LONG-TERM CURRENT USE OF INSULIN: ICD-10-CM

## 2024-12-30 DIAGNOSIS — M1A.0710 CHRONIC GOUT OF RIGHT FOOT, UNSPECIFIED CAUSE: Primary | ICD-10-CM

## 2024-12-30 DIAGNOSIS — T78.40XS ALLERGY, SEQUELA: ICD-10-CM

## 2024-12-30 DIAGNOSIS — E11.9 TYPE 2 DIABETES MELLITUS WITHOUT COMPLICATION, WITHOUT LONG-TERM CURRENT USE OF INSULIN: ICD-10-CM

## 2024-12-30 DIAGNOSIS — I10 HYPERTENSION, UNSPECIFIED TYPE: ICD-10-CM

## 2024-12-30 DIAGNOSIS — Z79.4 TYPE 2 DIABETES MELLITUS WITH OTHER SPECIFIED COMPLICATION, WITH LONG-TERM CURRENT USE OF INSULIN: ICD-10-CM

## 2024-12-30 DIAGNOSIS — I10 BENIGN ESSENTIAL HYPERTENSION: ICD-10-CM

## 2024-12-30 DIAGNOSIS — E78.5 HYPERLIPIDEMIA, UNSPECIFIED HYPERLIPIDEMIA TYPE: ICD-10-CM

## 2024-12-30 DIAGNOSIS — M48.02 CERVICAL STENOSIS OF SPINAL CANAL: ICD-10-CM

## 2024-12-30 LAB
ALBUMIN SERPL-MCNC: 4 G/DL (ref 3.4–4.8)
ALBUMIN/GLOB SERPL: 1.3 RATIO (ref 1.1–2)
ALP SERPL-CCNC: 58 UNIT/L (ref 40–150)
ALT SERPL-CCNC: 17 UNIT/L (ref 0–55)
ANION GAP SERPL CALC-SCNC: 8 MEQ/L
AST SERPL-CCNC: 16 UNIT/L (ref 5–34)
BILIRUB SERPL-MCNC: 0.9 MG/DL
BUN SERPL-MCNC: 57.1 MG/DL (ref 8.4–25.7)
CALCIUM SERPL-MCNC: 9.9 MG/DL (ref 8.8–10)
CHLORIDE SERPL-SCNC: 107 MMOL/L (ref 98–107)
CO2 SERPL-SCNC: 27 MMOL/L (ref 23–31)
CREAT SERPL-MCNC: 1.09 MG/DL (ref 0.72–1.25)
CREAT UR-MCNC: 58.7 MG/DL (ref 63–166)
CREAT/UREA NIT SERPL: 52
EST. AVERAGE GLUCOSE BLD GHB EST-MCNC: 119.8 MG/DL
GFR SERPLBLD CREATININE-BSD FMLA CKD-EPI: >60 ML/MIN/1.73/M2
GLOBULIN SER-MCNC: 3.2 GM/DL (ref 2.4–3.5)
GLUCOSE SERPL-MCNC: 131 MG/DL (ref 82–115)
HBA1C MFR BLD: 5.8 %
MICROALBUMIN UR-MCNC: 12.5 UG/ML
MICROALBUMIN/CREAT RATIO PNL UR: 21.3 MG/GM CR (ref 0–30)
POTASSIUM SERPL-SCNC: 5.1 MMOL/L (ref 3.5–5.1)
PROT SERPL-MCNC: 7.2 GM/DL (ref 5.8–7.6)
SODIUM SERPL-SCNC: 142 MMOL/L (ref 136–145)

## 2024-12-30 PROCEDURE — 99214 OFFICE O/P EST MOD 30 MIN: CPT | Mod: 25,,, | Performed by: INTERNAL MEDICINE

## 2024-12-30 PROCEDURE — 1101F PT FALLS ASSESS-DOCD LE1/YR: CPT | Mod: CPTII,,, | Performed by: INTERNAL MEDICINE

## 2024-12-30 PROCEDURE — 80053 COMPREHEN METABOLIC PANEL: CPT

## 2024-12-30 PROCEDURE — 36415 COLL VENOUS BLD VENIPUNCTURE: CPT

## 2024-12-30 PROCEDURE — 83036 HEMOGLOBIN GLYCOSYLATED A1C: CPT

## 2024-12-30 PROCEDURE — 3078F DIAST BP <80 MM HG: CPT | Mod: CPTII,,, | Performed by: INTERNAL MEDICINE

## 2024-12-30 PROCEDURE — 3074F SYST BP LT 130 MM HG: CPT | Mod: CPTII,,, | Performed by: INTERNAL MEDICINE

## 2024-12-30 PROCEDURE — 3288F FALL RISK ASSESSMENT DOCD: CPT | Mod: CPTII,,, | Performed by: INTERNAL MEDICINE

## 2024-12-30 PROCEDURE — 1160F RVW MEDS BY RX/DR IN RCRD: CPT | Mod: CPTII,,, | Performed by: INTERNAL MEDICINE

## 2024-12-30 PROCEDURE — 96372 THER/PROPH/DIAG INJ SC/IM: CPT | Mod: ,,, | Performed by: INTERNAL MEDICINE

## 2024-12-30 PROCEDURE — 1159F MED LIST DOCD IN RCRD: CPT | Mod: CPTII,,, | Performed by: INTERNAL MEDICINE

## 2024-12-30 PROCEDURE — 82570 ASSAY OF URINE CREATININE: CPT

## 2024-12-30 PROCEDURE — 1125F AMNT PAIN NOTED PAIN PRSNT: CPT | Mod: CPTII,,, | Performed by: INTERNAL MEDICINE

## 2024-12-30 RX ORDER — ALLOPURINOL 100 MG/1
300 TABLET ORAL DAILY
Qty: 90 TABLET | Refills: 3 | Status: SHIPPED | OUTPATIENT
Start: 2024-12-30

## 2024-12-30 RX ORDER — KETOROLAC TROMETHAMINE 30 MG/ML
30 INJECTION, SOLUTION INTRAMUSCULAR; INTRAVENOUS ONCE
Status: COMPLETED | OUTPATIENT
Start: 2024-12-30 | End: 2024-12-30

## 2024-12-30 RX ADMIN — KETOROLAC TROMETHAMINE 30 MG: 30 INJECTION, SOLUTION INTRAMUSCULAR; INTRAVENOUS at 02:12

## 2024-12-30 NOTE — PROGRESS NOTES
Patient ID: Stevie Cummins is a 74 y.o. male.  Chief Complaint: Follow-up (3 mth discuss labs (drawn 12/30/2024))    HPI:   73 yo male  IDDM ,  gout ,. HBP, CAD , elevated cholesterol ,  here to discuss blood work , refers  recent gout attack , I will increase allopurinol to 300mg ,   and  colchicine .   Labs  discussed  all OK   Hga1c  at 5.8       Current Outpatient Medications:     acetaminophen (TYLENOL) 325 MG tablet, Take 2 tablets (650 mg total) by mouth every 6 (six) hours as needed for Temperature greater than (or equal to 101 degree F)., Disp: 30 tablet, Rfl: 0    albuterol (PROVENTIL) 2.5 mg /3 mL (0.083 %) nebulizer solution, Take 3 mLs (2.5 mg total) by nebulization 4 (four) times daily as needed for Wheezing., Disp: 1 each, Rfl: 3    aspirin (ECOTRIN) 81 MG EC tablet, Take 1 tablet (81 mg total) by mouth once daily., Disp: 90 tablet, Rfl: 3    citalopram (CELEXA) 20 MG tablet, Take 1 tablet (20 mg total) by mouth once daily., Disp: 90 tablet, Rfl: 3    diclofenac (CATAFLAM) 50 MG tablet, Take 1 tablet (50 mg total) by mouth 2 (two) times daily., Disp: 180 tablet, Rfl: 3    diphenhydrAMINE (BENADRYL) 50 MG capsule, Take 50mg by mouth 1 hour before contrast administration., Disp: 1 capsule, Rfl: 0    doxazosin (CARDURA) 8 MG Tab, Take 0.5 tabs bid, Disp: 90 tablet, Rfl: 3    flash glucose sensor (FREESTYLE MITCH 14 DAY SENSOR) Kit, 1 each by Misc.(Non-Drug; Combo Route) route 2 hours after meals and at bedtime., Disp: 1 kit, Rfl: 0    flash glucose sensor (FREESTYLE MITCH 2 SENSOR) Kit, 2 each by Misc.(Non-Drug; Combo Route) route 2 hours after meals and at bedtime., Disp: 2 kit, Rfl: 11    fluticasone-umeclidin-vilanter (TRELEGY ELLIPTA) 100-62.5-25 mcg DsDv, Inhale 1 puff into the lungs nightly., Disp: 1 each, Rfl: 3    glipizide-metformin (METAGLIP) 2.5-250 mg per tablet, Take 1 tablet by mouth once daily., Disp: 90 tablet, Rfl: 3    hydrocortisone 2.5 % cream, Apply topically 2 (two) times daily.,  Disp: 28 g, Rfl: 0    insulin lispro 100 unit/mL pen, Inject 20 Units into the skin 3 (three) times daily., Disp: 10 mL, Rfl: 4    lactulose (CHRONULAC) 10 gram/15 mL solution, TAKE 2 TABLESPOONFULS      (30ML) DAILY AS NEEDED FOR CONSTIPATION, Disp: 150 mL, Rfl: 0    lisinopriL (PRINIVIL,ZESTRIL) 5 MG tablet, Take 1 tablet (5 mg total) by mouth once daily., Disp: 90 tablet, Rfl: 3    meclizine (ANTIVERT) 12.5 mg tablet, TAKE 1 TABLET BY MOUTH TWO TIMES A DAY, Disp: 180 tablet, Rfl: 3    montelukast (SINGULAIR) 10 mg tablet, Take 1 tablet (10 mg total) by mouth every evening., Disp: 90 tablet, Rfl: 3    multivitamin (THERAGRAN) per tablet, Take 1 tablet by mouth every morning., Disp: 90 tablet, Rfl: 3    predniSONE (DELTASONE) 50 MG Tab, TAKE 1 TABLET AT 13 HOURS, 7 HOURS AND 1 HOUR BEFORE  CONTRAST ADMINISTRATION., Disp: 3 tablet, Rfl: 0    simvastatin (ZOCOR) 40 MG tablet, TAKE 1 TABLET THREE TIMES  WEEKLY, MONDAY, WEDNESDAY, AND FRIDAY, Disp: 36 tablet, Rfl: 3    TOUJEO SOLOSTAR U-300 INSULIN 300 unit/mL (1.5 mL) InPn pen, Inject 30 Units into the skin every evening., Disp: 4 Pen, Rfl: 5    traMADoL (ULTRAM) 50 mg tablet, Take 1 tablet (50 mg total) by mouth every 24 hours as needed for Pain., Disp: 30 tablet, Rfl: 0    allopurinoL (ZYLOPRIM) 100 MG tablet, Take 3 tablets (300 mg total) by mouth once daily., Disp: 90 tablet, Rfl: 3    pantoprazole (PROTONIX) 40 MG tablet, Take 1 tablet (40 mg total) by mouth 2 (two) times a day. for 5 days, Disp: 10 tablet, Rfl: 0    Current Facility-Administered Medications:     ketorolac injection 30 mg, 30 mg, Intramuscular, Once,   ROS:   Constitutional: No weight gain, No fever, No chills, No fatigue.   Eyes: No blurring, No visual disturbances.   Ear/Nose/Mouth/Throat: No decreased hearing, No ear pain, No nasal congestion, No sore throat.   Respiratory: No shortness of breath, No cough, No wheezing.   Cardiovascular: No chest pain, No palpitations, No peripheral edema.  "  Gastrointestinal: No nausea, No vomiting, No diarrhea, No constipation, No abdominal pain.   Genitourinary: No dysuria, No hematuria.   Hematology/Lymphatics: No bruising tendency, No bleeding tendency, No swollen lymph glands.   Endocrine: No excessive thirst, No polyuria, No excessive hunger.   Musculoskeletal: No joint pain, No muscle pain, No decreased range of motion.   Integumentary: No rash, No pruritus.   Neurologic: No abnormal balance, No confusion, No headache.   Psychiatric: No anxiety, No depression, Not suicidal, No hallucinations.    PE/Vitals:   /62 (BP Location: Left arm, Patient Position: Sitting)   Pulse (!) 57   Ht 5' 9" (1.753 m)   Wt 93.4 kg (206 lb)   SpO2 98%   BMI 30.42 kg/m²   General: Alert and oriented, No acute distress.   Eye: Normal conjunctiva without exudate.  HENMT: Normocephalic/AT, Normal hearing, Oral mucosa is moist and pink   Neck: No goiter visualized.   Respiratory: Lungs CTAB, Respirations are non-labored, Breath sounds are equal, Symmetrical chest wall expansion.  Cardiovascular: Normal rate, Regular rhythm, No murmur, No edema.   Gastrointestinal: Non-distended.   Genitourinary: Deferred.  Musculoskeletal: Normal ROM, Normal gait, No deformities or amputations.  Integumentary: Warm, Dry, Intact. No diaphoresis, or flushing.  Neurologic: No focal deficits, Cranial Nerves II-XII are grossly intact.   Psychiatric: Cooperative, Appropriate mood & affect, Normal judgment, Non-suicidal.  Assessment/Plan   1. Chronic gout of right foot, unspecified cause  Comments:  toradol 30  mg Im   increase allopurinol to 300mg    2. Cervical stenosis of spinal canal  Comments:  as per neurosurgeon   Toradol will cover    3. Benign essential hypertension  Comments:  stable  on lisinopril    4. Hyperlipidemia, unspecified hyperlipidemia type  Comments:  on statin well tolerated  Orders:  -     allopurinoL (ZYLOPRIM) 100 MG tablet; Take 3 tablets (300 mg total) by mouth once daily. "  Dispense: 90 tablet; Refill: 3    5. Type 2 diabetes mellitus with other specified complication, with long-term current use of insulin  Comments:  Hga1c  5.8   on insulin  doing well  Orders:  -     Hemoglobin A1C; Future; Expected date: 04/30/2025  -     Comprehensive Metabolic Panel; Future; Expected date: 04/30/2025  -     Microalbumin/Creatinine Ratio, Urine; Future; Expected date: 04/30/2025    6. Type 2 diabetes mellitus without complication, without long-term current use of insulin  -     allopurinoL (ZYLOPRIM) 100 MG tablet; Take 3 tablets (300 mg total) by mouth once daily.  Dispense: 90 tablet; Refill: 3    7. Hypertension, unspecified type  -     allopurinoL (ZYLOPRIM) 100 MG tablet; Take 3 tablets (300 mg total) by mouth once daily.  Dispense: 90 tablet; Refill: 3    8. Depression, unspecified depression type  -     allopurinoL (ZYLOPRIM) 100 MG tablet; Take 3 tablets (300 mg total) by mouth once daily.  Dispense: 90 tablet; Refill: 3    9. Allergy, sequela  -     allopurinoL (ZYLOPRIM) 100 MG tablet; Take 3 tablets (300 mg total) by mouth once daily.  Dispense: 90 tablet; Refill: 3    10. Hyperlipidemia, unspecified hyperlipidemia type  -     allopurinoL (ZYLOPRIM) 100 MG tablet; Take 3 tablets (300 mg total) by mouth once daily.  Dispense: 90 tablet; Refill: 3    Other orders  -     ketorolac injection 30 mg      Orders Placed This Encounter   Procedures    Hemoglobin A1C     Standing Status:   Future     Standing Expiration Date:   12/30/2025    Comprehensive Metabolic Panel     Standing Status:   Future     Standing Expiration Date:   12/30/2025    Microalbumin/Creatinine Ratio, Urine     Standing Status:   Future     Standing Expiration Date:   12/30/2025     Order Specific Question:   Specimen Source     Answer:   Urine     Education and counseling done face to face regarding medical conditions and plan. Contact office if new symptoms develop. Should any symptoms ever significantly worsen seek  emergency medical attention/go to ER. Follow up at least yearly for wellness or sooner PRN. Nurse to call patient with any results. The patient is receptive, expresses understanding and is agreeable to plan. All questions have been answered.  Follow up in about 4 months (around 4/30/2025).

## 2025-01-14 ENCOUNTER — TELEPHONE (OUTPATIENT)
Dept: INTERNAL MEDICINE | Facility: CLINIC | Age: 75
End: 2025-01-14
Payer: MEDICARE

## 2025-01-14 NOTE — TELEPHONE ENCOUNTER
----- Message from [employer] sent at 1/14/2025  9:08 AM CST -----  .Type:  Patient Returning Call    Who Called:pt  Who Left Message for Patient:pt  Does the patient know what this is regarding?:call back   Would the patient rather a call back or a response via [facility]?   Best Call Back Number:[phone]  Additional Information: Please have Ghanshyam call back about insurance and they don't cover his diabetes supplies without a Dr VZAQUEZ or something

## 2025-01-15 DIAGNOSIS — E11.9 TYPE 2 DIABETES MELLITUS WITHOUT COMPLICATION, WITHOUT LONG-TERM CURRENT USE OF INSULIN: ICD-10-CM

## 2025-01-15 RX ORDER — FLASH GLUCOSE SENSOR
2 KIT MISCELLANEOUS
Qty: 2 KIT | Refills: 11 | Status: SHIPPED | OUTPATIENT
Start: 2025-01-15 | End: 2025-01-16 | Stop reason: SDUPTHER

## 2025-01-15 RX ORDER — INSULIN LISPRO 100 [IU]/ML
20 INJECTION, SOLUTION INTRAVENOUS; SUBCUTANEOUS 3 TIMES DAILY
Qty: 10 ML | Refills: 4 | Status: SHIPPED | OUTPATIENT
Start: 2025-01-15 | End: 2026-01-15

## 2025-01-15 RX ORDER — FLASH GLUCOSE SCANNING READER
1 EACH MISCELLANEOUS ONCE
Qty: 1 EACH | Refills: 1 | Status: SHIPPED | OUTPATIENT
Start: 2025-01-15 | End: 2025-01-15

## 2025-01-16 ENCOUNTER — TELEPHONE (OUTPATIENT)
Dept: INTERNAL MEDICINE | Facility: CLINIC | Age: 75
End: 2025-01-16
Payer: MEDICARE

## 2025-01-16 DIAGNOSIS — E11.9 TYPE 2 DIABETES MELLITUS WITHOUT COMPLICATION, WITHOUT LONG-TERM CURRENT USE OF INSULIN: ICD-10-CM

## 2025-01-16 RX ORDER — FLASH GLUCOSE SENSOR
1 KIT MISCELLANEOUS
Qty: 1 KIT | Refills: 0 | Status: SHIPPED | OUTPATIENT
Start: 2025-01-16

## 2025-01-16 RX ORDER — FLASH GLUCOSE SENSOR
2 KIT MISCELLANEOUS
Qty: 2 KIT | Refills: 11 | Status: SHIPPED | OUTPATIENT
Start: 2025-01-16

## 2025-01-16 NOTE — TELEPHONE ENCOUNTER
----- Message from Mahsa sent at 1/16/2025  1:47 PM CST -----  Who Called: Stevie Cummins    Caller is requesting assistance/information from provider's office.    Symptoms (please be specific): n/a   How long has patient had these symptoms:  n/a  List of preferred pharmacies on file (remove unneeded): [unfilled]  If different, enter pharmacy into here including location and phone number: n/a      Preferred Method of Contact: Phone Call  Patient's Preferred Phone Number on File: [phone]   Best Call Back Number, if different:  Additional Information: medical advice, pt called to discuss with Ghanshyam to resend Rx for ( flash glucose sensor (FREESTYLE MITCH 2 SENSOR) Kit 2 kit) and have sent to [facility] PH: [phone], pt stated there are issues getting Rx for sensors from [facility] mail order pharmacy, please advise, thanks

## 2025-01-24 ENCOUNTER — TELEPHONE (OUTPATIENT)
Dept: INTERNAL MEDICINE | Facility: CLINIC | Age: 75
End: 2025-01-24
Payer: MEDICARE

## 2025-01-24 NOTE — TELEPHONE ENCOUNTER
----- Message from Adam sent at 1/17/2025  3:28 PM CST -----  Who Called: jenny- staff iris    Caller is requesting assistance/information from provider's office.    Symptoms (please be specific):     How long has patient had these symptoms:     List of preferred pharmacies on file (remove unneeded): [unfilled]  If different, enter pharmacy into here including location and phone number:        Preferred Method of Contact: Phone Call  Patient's Preferred Phone Number on File: [phone]   Best Call Back Number, if different:[phone]  Additional Information:   staff stated that their office doesn't proved flash glucose sensor (FREESTYLE MITCH 2 SENSOR) Kit          flash glucose sensor (FREESTYLE MITCH 14 DAY SENSOR) Kit

## 2025-02-05 ENCOUNTER — OFFICE VISIT (OUTPATIENT)
Dept: INTERNAL MEDICINE | Facility: CLINIC | Age: 75
End: 2025-02-05
Payer: MEDICARE

## 2025-02-05 VITALS
DIASTOLIC BLOOD PRESSURE: 68 MMHG | OXYGEN SATURATION: 97 % | WEIGHT: 203 LBS | HEIGHT: 69 IN | BODY MASS INDEX: 30.07 KG/M2 | HEART RATE: 64 BPM | SYSTOLIC BLOOD PRESSURE: 118 MMHG

## 2025-02-05 DIAGNOSIS — E66.01 SEVERE OBESITY (BMI 35.0-39.9) WITH COMORBIDITY: ICD-10-CM

## 2025-02-05 DIAGNOSIS — J44.9 CHRONIC OBSTRUCTIVE PULMONARY DISEASE, UNSPECIFIED COPD TYPE: ICD-10-CM

## 2025-02-05 DIAGNOSIS — Z79.4 TYPE 2 DIABETES MELLITUS WITH OTHER SPECIFIED COMPLICATION, WITH LONG-TERM CURRENT USE OF INSULIN: ICD-10-CM

## 2025-02-05 DIAGNOSIS — H61.21 RIGHT EAR IMPACTED CERUMEN: Primary | ICD-10-CM

## 2025-02-05 DIAGNOSIS — E11.69 TYPE 2 DIABETES MELLITUS WITH OTHER SPECIFIED COMPLICATION, WITH LONG-TERM CURRENT USE OF INSULIN: ICD-10-CM

## 2025-02-05 DIAGNOSIS — I48.20 CHRONIC ATRIAL FIBRILLATION: ICD-10-CM

## 2025-02-05 DIAGNOSIS — I50.42 CHRONIC COMBINED SYSTOLIC AND DIASTOLIC HEART FAILURE: ICD-10-CM

## 2025-02-05 PROCEDURE — 1125F AMNT PAIN NOTED PAIN PRSNT: CPT | Mod: CPTII,,, | Performed by: INTERNAL MEDICINE

## 2025-02-05 PROCEDURE — 3288F FALL RISK ASSESSMENT DOCD: CPT | Mod: CPTII,,, | Performed by: INTERNAL MEDICINE

## 2025-02-05 PROCEDURE — 1159F MED LIST DOCD IN RCRD: CPT | Mod: CPTII,,, | Performed by: INTERNAL MEDICINE

## 2025-02-05 PROCEDURE — 3078F DIAST BP <80 MM HG: CPT | Mod: CPTII,,, | Performed by: INTERNAL MEDICINE

## 2025-02-05 PROCEDURE — 1160F RVW MEDS BY RX/DR IN RCRD: CPT | Mod: CPTII,,, | Performed by: INTERNAL MEDICINE

## 2025-02-05 PROCEDURE — 99214 OFFICE O/P EST MOD 30 MIN: CPT | Mod: 25,,, | Performed by: INTERNAL MEDICINE

## 2025-02-05 PROCEDURE — 1101F PT FALLS ASSESS-DOCD LE1/YR: CPT | Mod: CPTII,,, | Performed by: INTERNAL MEDICINE

## 2025-02-05 PROCEDURE — 69209 REMOVE IMPACTED EAR WAX UNI: CPT | Mod: 50,,, | Performed by: INTERNAL MEDICINE

## 2025-02-05 PROCEDURE — 3074F SYST BP LT 130 MM HG: CPT | Mod: CPTII,,, | Performed by: INTERNAL MEDICINE

## 2025-02-05 NOTE — PROGRESS NOTES
Patient ID: Stevie Cummins is a 75 y.o. male.  Chief Complaint: Other Misc (Muffled ears)    HPI:   History of Present Illness           76 yo white male presents complaining of having muffled hearing. He also reports sinus congestion, mild cough, with seasonal allergies. Mucus is light green, clear. He denies fever, SOB, chills. Upon exam- Right Ear impacted with cerumen. Will clear out ear,     Current Outpatient Medications:     acetaminophen (TYLENOL) 325 MG tablet, Take 2 tablets (650 mg total) by mouth every 6 (six) hours as needed for Temperature greater than (or equal to 101 degree F)., Disp: 30 tablet, Rfl: 0    albuterol (PROVENTIL) 2.5 mg /3 mL (0.083 %) nebulizer solution, Take 3 mLs (2.5 mg total) by nebulization 4 (four) times daily as needed for Wheezing., Disp: 1 each, Rfl: 3    allopurinoL (ZYLOPRIM) 100 MG tablet, Take 3 tablets (300 mg total) by mouth once daily., Disp: 90 tablet, Rfl: 3    aspirin (ECOTRIN) 81 MG EC tablet, Take 1 tablet (81 mg total) by mouth once daily., Disp: 90 tablet, Rfl: 3    citalopram (CELEXA) 20 MG tablet, TAKE 1 TABLET ONCE DAILY, Disp: 90 tablet, Rfl: 3    diclofenac (CATAFLAM) 50 MG tablet, Take 1 tablet (50 mg total) by mouth 2 (two) times daily., Disp: 180 tablet, Rfl: 3    diphenhydrAMINE (BENADRYL) 50 MG capsule, Take 50mg by mouth 1 hour before contrast administration., Disp: 1 capsule, Rfl: 0    doxazosin (CARDURA) 8 MG Tab, TAKE 1/2 TABLET TWICE A DAY, Disp: 90 tablet, Rfl: 3    flash glucose sensor (FREESTYLE MITCH 14 DAY SENSOR) Kit, 1 each by Misc.(Non-Drug; Combo Route) route 2 hours after meals and at bedtime., Disp: 1 kit, Rfl: 0    flash glucose sensor (FREESTYLE MITCH 2 SENSOR) Kit, 2 each by Misc.(Non-Drug; Combo Route) route 2 hours after meals and at bedtime., Disp: 2 kit, Rfl: 11    fluticasone-umeclidin-vilanter (TRELEGY ELLIPTA) 100-62.5-25 mcg DsDv, Inhale 1 puff into the lungs nightly., Disp: 1 each, Rfl: 3    glipizide-metformin (METAGLIP)  2.5-250 mg per tablet, TAKE 1 TABLET ONCE DAILY, Disp: 90 tablet, Rfl: 3    hydrocortisone 2.5 % cream, Apply topically 2 (two) times daily., Disp: 28 g, Rfl: 0    insulin lispro 100 unit/mL pen, Inject 20 Units into the skin 3 (three) times daily., Disp: 10 mL, Rfl: 4    lactulose (CHRONULAC) 10 gram/15 mL solution, TAKE 2 TABLESPOONFULS      (30ML) DAILY AS NEEDED FOR CONSTIPATION, Disp: 150 mL, Rfl: 0    lisinopriL (PRINIVIL,ZESTRIL) 5 MG tablet, Take 1 tablet (5 mg total) by mouth once daily., Disp: 90 tablet, Rfl: 3    meclizine (ANTIVERT) 12.5 mg tablet, TAKE 1 TABLET BY MOUTH TWO TIMES A DAY, Disp: 180 tablet, Rfl: 3    montelukast (SINGULAIR) 10 mg tablet, TAKE 1 TABLET EVERY EVENING, Disp: 90 tablet, Rfl: 3    multivitamin (THERAGRAN) per tablet, Take 1 tablet by mouth every morning., Disp: 90 tablet, Rfl: 3    predniSONE (DELTASONE) 50 MG Tab, TAKE 1 TABLET AT 13 HOURS, 7 HOURS AND 1 HOUR BEFORE  CONTRAST ADMINISTRATION., Disp: 3 tablet, Rfl: 0    simvastatin (ZOCOR) 40 MG tablet, TAKE 1 TABLET THREE TIMES  WEEKLY, MONDAY, WEDNESDAY, AND FRIDAY, Disp: 36 tablet, Rfl: 3    TOUJEO SOLOSTAR U-300 INSULIN 300 unit/mL (1.5 mL) InPn pen, Inject 30 Units into the skin every evening., Disp: 4 Pen, Rfl: 5    traMADoL (ULTRAM) 50 mg tablet, Take 1 tablet (50 mg total) by mouth every 24 hours as needed for Pain., Disp: 30 tablet, Rfl: 0  ROS:   Constitutional: No weight gain, No fever, No chills, No fatigue.   Eyes: No blurring, No visual disturbances.   Ear/Nose/Mouth/Throat: Muffled hearing, No ear pain, minor  nasal congestion, No sore throat.   Respiratory: No shortness of breath, No cough, No wheezing.   Cardiovascular: No chest pain, No palpitations, No peripheral edema.   Gastrointestinal: No nausea, No vomiting, No diarrhea, No constipation, No abdominal pain.   Genitourinary: No dysuria, No hematuria.   Hematology/Lymphatics: No bruising tendency, No bleeding tendency, No swollen lymph glands.   Endocrine:  "No excessive thirst, No polyuria, No excessive hunger.   Musculoskeletal: No joint pain, No muscle pain, No decreased range of motion.   Integumentary: No rash, No pruritus.   Neurologic: No abnormal balance, No confusion, No headache.   Psychiatric: No anxiety, No depression, Not suicidal, No hallucinations.    PE/Vitals:   /68 (BP Location: Left arm, Patient Position: Sitting)   Pulse 64   Ht 5' 9" (1.753 m)   Wt 92.1 kg (203 lb)   SpO2 97%   BMI 29.98 kg/m²   General: Alert and oriented, No acute distress.   Eye: Normal conjunctiva without exudate.  HENMT: Normocephalic/AT, Normal hearing, Oral mucosa is moist and pink   Neck: No goiter visualized.   Respiratory: Lungs CTAB, Respirations are non-labored, Breath sounds are equal, Symmetrical chest wall expansion.  Cardiovascular: Normal rate, Regular rhythm, No murmur, No edema.   Gastrointestinal: Non-distended.   Genitourinary: Deferred.  Musculoskeletal: Normal ROM, Normal gait, No deformities or amputations.  Integumentary: Warm, Dry, Intact. No diaphoresis, or flushing.  Neurologic: No focal deficits, Cranial Nerves II-XII are grossly intact.   Psychiatric: Cooperative, Appropriate mood & affect, Normal judgment, Non-suicidal.  Assessment/Plan   Assessment & Plan             1. Right ear impacted cerumen  Comments:  clearing right ear out  recc Debrox    2. Type 2 diabetes mellitus with other specified complication, with long-term current use of insulin  Comments:  stable    3. Severe obesity (BMI 35.0-39.9) with comorbidity  Comments:  rec weight loss  low carb    4. Chronic combined systolic and diastolic heart failure  Comments:  Dr. barrow follows him    5. Chronic atrial fibrillation    6. Chronic obstructive pulmonary disease, unspecified COPD type  Comments:  Albuterol      No orders of the defined types were placed in this encounter.    Education and counseling done face to face regarding medical conditions and plan. Contact office if new " symptoms develop. Should any symptoms ever significantly worsen seek emergency medical attention/go to ER. Follow up at least yearly for wellness or sooner PRN. Nurse to call patient with any results. The patient is receptive, expresses understanding and is agreeable to plan. All questions have been answered.  Follow up as scheudled.  This note was generated with the assistance of ambient listening technology. Verbal consent was obtained by the patient and accompanying visitor(s) for the recording of patient appointment to facilitate this note. I attest to having reviewed and edited the generated note for accuracy, though some syntax or spelling errors may persist. Please contact the author of this note for any clarification.

## 2025-02-18 ENCOUNTER — TELEPHONE (OUTPATIENT)
Dept: INTERNAL MEDICINE | Facility: CLINIC | Age: 75
End: 2025-02-18
Payer: MEDICARE

## 2025-02-18 DIAGNOSIS — H92.03 EAR PAIN, BILATERAL: Primary | ICD-10-CM

## 2025-02-18 NOTE — TELEPHONE ENCOUNTER
----- Message from Madonna sent at 2/18/2025 10:12 AM CST -----  Regarding: referral  Who Called: Stevie CumminsDobao the patient already have the specialty appointment scheduled?:noIf yes, what is the date of that appointment?:Referral to What Specialty:ENTReason for Referral: ear issuesDoes the patient want the referral with a specific physician?:noIf yes, which provider?: Is the specialist an [facility]?:Preferred Method of Contact: Phone CallPatient's Preferred Phone Number on File: [phone] Best Call Back Number, if different:Additional Information:

## 2025-02-27 ENCOUNTER — TELEPHONE (OUTPATIENT)
Dept: INTERNAL MEDICINE | Facility: CLINIC | Age: 75
End: 2025-02-27
Payer: MEDICARE

## 2025-02-27 DIAGNOSIS — M25.50 ARTHRALGIA, UNSPECIFIED JOINT: ICD-10-CM

## 2025-02-27 DIAGNOSIS — I10 HYPERTENSION, UNSPECIFIED TYPE: ICD-10-CM

## 2025-02-27 DIAGNOSIS — E78.5 HYPERLIPIDEMIA, UNSPECIFIED HYPERLIPIDEMIA TYPE: ICD-10-CM

## 2025-02-27 DIAGNOSIS — E11.9 TYPE 2 DIABETES MELLITUS WITHOUT COMPLICATION, WITHOUT LONG-TERM CURRENT USE OF INSULIN: ICD-10-CM

## 2025-02-27 DIAGNOSIS — F32.A DEPRESSION, UNSPECIFIED DEPRESSION TYPE: ICD-10-CM

## 2025-02-27 DIAGNOSIS — T78.40XS ALLERGY, SEQUELA: ICD-10-CM

## 2025-02-27 NOTE — TELEPHONE ENCOUNTER
Incomplete Message....    Will have to contact patient back to further discuss NAME of Pharmacy and MEDICATIONS needing to be sent

## 2025-02-27 NOTE — TELEPHONE ENCOUNTER
----- Message from Adam sent at 2/27/2025 10:41 AM CST -----  Who Called: Stevie Menjivar is requesting assistance/information from provider's office.Symptoms (please be specific):   How long has patient had these symptoms:   List of preferred pharmacies on file (remove unneeded): [unfilled]If different, enter pharmacy into here including location and phone number:  Preferred Method of Contact: Phone CallPatient's Preferred Phone Number on File: [phone] Best Call Back Number, if different:Additional Information:  pt would like to speak with nurse about moving all script to to another pharmacy   fax [phone]

## 2025-02-28 RX ORDER — ALLOPURINOL 100 MG/1
300 TABLET ORAL DAILY
Qty: 90 TABLET | Refills: 3 | Status: SHIPPED | OUTPATIENT
Start: 2025-02-28

## 2025-02-28 RX ORDER — DICLOFENAC POTASSIUM 50 MG/1
50 TABLET, FILM COATED ORAL 2 TIMES DAILY
Qty: 180 TABLET | Refills: 3 | Status: SHIPPED | OUTPATIENT
Start: 2025-02-28

## 2025-02-28 RX ORDER — LISINOPRIL 5 MG/1
5 TABLET ORAL DAILY
Qty: 90 TABLET | Refills: 3 | Status: SHIPPED | OUTPATIENT
Start: 2025-02-28 | End: 2026-02-28

## 2025-02-28 NOTE — TELEPHONE ENCOUNTER
Spoke to  Yesy, she clarified Lisinopril, Allopurinol, Diclofenac--Sent to [facility]     Tamsulosin--Not in Med List

## 2025-03-11 DIAGNOSIS — M25.50 ARTHRALGIA, UNSPECIFIED JOINT: ICD-10-CM

## 2025-03-11 DIAGNOSIS — E78.5 HYPERLIPIDEMIA, UNSPECIFIED HYPERLIPIDEMIA TYPE: ICD-10-CM

## 2025-03-11 DIAGNOSIS — E11.9 TYPE 2 DIABETES MELLITUS WITHOUT COMPLICATION, WITHOUT LONG-TERM CURRENT USE OF INSULIN: ICD-10-CM

## 2025-03-11 DIAGNOSIS — I10 HYPERTENSION, UNSPECIFIED TYPE: ICD-10-CM

## 2025-03-11 DIAGNOSIS — F32.A DEPRESSION, UNSPECIFIED DEPRESSION TYPE: ICD-10-CM

## 2025-03-11 DIAGNOSIS — N32.81 OVERACTIVE BLADDER: Primary | ICD-10-CM

## 2025-03-11 DIAGNOSIS — T78.40XS ALLERGY, SEQUELA: ICD-10-CM

## 2025-03-11 RX ORDER — CITALOPRAM 20 MG/1
20 TABLET, FILM COATED ORAL DAILY
Qty: 90 TABLET | Refills: 3 | Status: SHIPPED | OUTPATIENT
Start: 2025-03-11

## 2025-03-11 RX ORDER — SIMVASTATIN 40 MG/1
TABLET, FILM COATED ORAL
Qty: 36 TABLET | Refills: 3 | Status: SHIPPED | OUTPATIENT
Start: 2025-03-11 | End: 2025-03-11 | Stop reason: SDUPTHER

## 2025-03-11 RX ORDER — MONTELUKAST SODIUM 10 MG/1
10 TABLET ORAL DAILY
Qty: 90 TABLET | Refills: 3 | Status: SHIPPED | OUTPATIENT
Start: 2025-03-11

## 2025-03-11 RX ORDER — SIMVASTATIN 40 MG/1
TABLET, FILM COATED ORAL
Qty: 36 TABLET | Refills: 3 | Status: SHIPPED | OUTPATIENT
Start: 2025-03-11

## 2025-03-11 RX ORDER — TAMSULOSIN HYDROCHLORIDE 0.4 MG/1
CAPSULE ORAL
Refills: 0 | OUTPATIENT
Start: 2025-03-11

## 2025-03-11 RX ORDER — FUROSEMIDE 40 MG/1
TABLET ORAL
Refills: 0 | OUTPATIENT
Start: 2025-03-11

## 2025-03-11 RX ORDER — LISINOPRIL 5 MG/1
5 TABLET ORAL DAILY
Qty: 90 TABLET | Refills: 3 | Status: SHIPPED | OUTPATIENT
Start: 2025-03-11

## 2025-03-11 RX ORDER — DOXAZOSIN 8 MG/1
TABLET ORAL
Qty: 90 TABLET | Refills: 3 | Status: SHIPPED | OUTPATIENT
Start: 2025-03-11

## 2025-03-11 RX ORDER — ALLOPURINOL 300 MG/1
300 TABLET ORAL DAILY
Qty: 90 TABLET | Refills: 3 | Status: SHIPPED | OUTPATIENT
Start: 2025-03-11

## 2025-03-11 RX ORDER — DICLOFENAC POTASSIUM 50 MG/1
50 TABLET, FILM COATED ORAL 2 TIMES DAILY
Qty: 180 TABLET | Refills: 3 | Status: SHIPPED | OUTPATIENT
Start: 2025-03-11

## 2025-03-11 RX ORDER — GLIPIZIDE AND METFORMIN HCL 2.5; 25 MG/1; MG/1
1 TABLET, FILM COATED ORAL DAILY
Qty: 90 TABLET | Refills: 3 | Status: SHIPPED | OUTPATIENT
Start: 2025-03-11

## 2025-04-08 ENCOUNTER — LAB VISIT (OUTPATIENT)
Dept: LAB | Facility: HOSPITAL | Age: 75
End: 2025-04-08
Attending: INTERNAL MEDICINE
Payer: MEDICARE

## 2025-04-08 DIAGNOSIS — I25.118 ATHSCL HEART DISEASE OF NATIVE COR ART W OTH ANG PCTRS: ICD-10-CM

## 2025-04-08 DIAGNOSIS — R06.09 DYSPNEA ON EXERTION: Primary | ICD-10-CM

## 2025-04-08 DIAGNOSIS — I10 ESSENTIAL HYPERTENSION, MALIGNANT: ICD-10-CM

## 2025-04-08 DIAGNOSIS — R42 DIZZINESS AND GIDDINESS: ICD-10-CM

## 2025-04-08 LAB
ANION GAP SERPL CALC-SCNC: 8 MEQ/L
BUN SERPL-MCNC: 29.8 MG/DL (ref 8.4–25.7)
CALCIUM SERPL-MCNC: 9.2 MG/DL (ref 8.8–10)
CHLORIDE SERPL-SCNC: 102 MMOL/L (ref 98–107)
CO2 SERPL-SCNC: 31 MMOL/L (ref 23–31)
CREAT SERPL-MCNC: 1 MG/DL (ref 0.72–1.25)
CREAT/UREA NIT SERPL: 30
GFR SERPLBLD CREATININE-BSD FMLA CKD-EPI: >60 ML/MIN/1.73/M2
GLUCOSE SERPL-MCNC: 134 MG/DL (ref 82–115)
POTASSIUM SERPL-SCNC: 4.6 MMOL/L (ref 3.5–5.1)
SODIUM SERPL-SCNC: 141 MMOL/L (ref 136–145)

## 2025-04-08 PROCEDURE — 80048 BASIC METABOLIC PNL TOTAL CA: CPT

## 2025-04-08 PROCEDURE — 36415 COLL VENOUS BLD VENIPUNCTURE: CPT

## 2025-04-22 ENCOUNTER — TELEPHONE (OUTPATIENT)
Dept: INTERNAL MEDICINE | Facility: CLINIC | Age: 75
End: 2025-04-22
Payer: MEDICARE

## 2025-04-22 NOTE — TELEPHONE ENCOUNTER
"----- Message from Nurse Corbett sent at 4/21/2025  8:23 AM CDT -----  Regarding: Dr. Alarcon 04/29/2025 4 mth 1100  Are there any outstanding tasks in chart? No, but needs FASTING labs, TO BE DONE AT  "[facility]" or lab location of choice PRIOR to apptIs there any documentation of tasks? noHas the pt seen another physician, been to ER, [facility], or admitted to hospital since last visit?Has the pt done blood work or imaging since last visit?5. PLEASE HAVE PATIENT BRING MEDICATION LIST OR BOTTLES TO EVERY OFFICE VISIT  "

## 2025-04-29 ENCOUNTER — TELEPHONE (OUTPATIENT)
Dept: INTERNAL MEDICINE | Facility: CLINIC | Age: 75
End: 2025-04-29

## 2025-04-29 ENCOUNTER — OFFICE VISIT (OUTPATIENT)
Dept: INTERNAL MEDICINE | Facility: CLINIC | Age: 75
End: 2025-04-29
Payer: MEDICARE

## 2025-04-29 VITALS
HEIGHT: 69 IN | WEIGHT: 200 LBS | DIASTOLIC BLOOD PRESSURE: 66 MMHG | HEART RATE: 97 BPM | SYSTOLIC BLOOD PRESSURE: 120 MMHG | OXYGEN SATURATION: 97 % | BODY MASS INDEX: 29.62 KG/M2

## 2025-04-29 DIAGNOSIS — J30.1 SEASONAL ALLERGIC RHINITIS DUE TO POLLEN: ICD-10-CM

## 2025-04-29 DIAGNOSIS — E11.69 TYPE 2 DIABETES MELLITUS WITH OTHER SPECIFIED COMPLICATION, WITH LONG-TERM CURRENT USE OF INSULIN: ICD-10-CM

## 2025-04-29 DIAGNOSIS — M54.2 NECK PAIN: ICD-10-CM

## 2025-04-29 DIAGNOSIS — M14.60 NEUROPATHIC ARTHRITIS: ICD-10-CM

## 2025-04-29 DIAGNOSIS — L29.9 PRURITUS: ICD-10-CM

## 2025-04-29 DIAGNOSIS — E78.5 HYPERLIPIDEMIA, UNSPECIFIED HYPERLIPIDEMIA TYPE: ICD-10-CM

## 2025-04-29 DIAGNOSIS — G89.4 CHRONIC PAIN SYNDROME: ICD-10-CM

## 2025-04-29 DIAGNOSIS — Z79.4 TYPE 2 DIABETES MELLITUS WITH OTHER SPECIFIED COMPLICATION, WITH LONG-TERM CURRENT USE OF INSULIN: ICD-10-CM

## 2025-04-29 DIAGNOSIS — M48.02 CERVICAL STENOSIS OF SPINAL CANAL: Primary | ICD-10-CM

## 2025-04-29 PROCEDURE — 1159F MED LIST DOCD IN RCRD: CPT | Mod: CPTII,,, | Performed by: INTERNAL MEDICINE

## 2025-04-29 PROCEDURE — 3078F DIAST BP <80 MM HG: CPT | Mod: CPTII,,, | Performed by: INTERNAL MEDICINE

## 2025-04-29 PROCEDURE — 99214 OFFICE O/P EST MOD 30 MIN: CPT | Mod: ,,, | Performed by: INTERNAL MEDICINE

## 2025-04-29 PROCEDURE — 1101F PT FALLS ASSESS-DOCD LE1/YR: CPT | Mod: CPTII,,, | Performed by: INTERNAL MEDICINE

## 2025-04-29 PROCEDURE — 1125F AMNT PAIN NOTED PAIN PRSNT: CPT | Mod: CPTII,,, | Performed by: INTERNAL MEDICINE

## 2025-04-29 PROCEDURE — 3074F SYST BP LT 130 MM HG: CPT | Mod: CPTII,,, | Performed by: INTERNAL MEDICINE

## 2025-04-29 PROCEDURE — 1160F RVW MEDS BY RX/DR IN RCRD: CPT | Mod: CPTII,,, | Performed by: INTERNAL MEDICINE

## 2025-04-29 PROCEDURE — 3288F FALL RISK ASSESSMENT DOCD: CPT | Mod: CPTII,,, | Performed by: INTERNAL MEDICINE

## 2025-04-29 RX ORDER — FLASH GLUCOSE SCANNING READER
EACH MISCELLANEOUS
COMMUNITY
Start: 2025-04-16

## 2025-04-29 RX ORDER — FUROSEMIDE 40 MG/1
40 TABLET ORAL 2 TIMES DAILY
COMMUNITY
Start: 2025-03-12

## 2025-04-29 RX ORDER — POTASSIUM CHLORIDE 20 MEQ/1
20 TABLET, EXTENDED RELEASE ORAL 2 TIMES DAILY
COMMUNITY

## 2025-04-29 NOTE — PROGRESS NOTES
"Patient ID: Stevie Cummins is a 75 y.o. male.  Chief Complaint: Follow-up (4 mth )    HPI:   History of Present Illness           Male insulin-dependent diabetes, hypertension, dyslipidemia history of cervical stenosis, status post low back surgery he will follow-up complaining of neck pain.  Also diffuse pruritus.  Patient had seen a commercial viral medication he will contact my office with the name in the meanwhile I provided with samples of right vent to take 1 a day, and patient advised not to take diclofenac and I provided with samples of Relafen DS to take for the neck pain once a day      Current Medications[1]  ROS:   Constitutional: No weight gain, No fever, No chills, No fatigue.   Eyes: No blurring, No visual disturbances.   Ear/Nose/Mouth/Throat: No decreased hearing, No ear pain, No nasal congestion, No sore throat.   Respiratory: No shortness of breath, No cough, No wheezing.   Cardiovascular: No chest pain, No palpitations, No peripheral edema.   Gastrointestinal: No nausea, No vomiting, No diarrhea, No constipation, No abdominal pain.   Genitourinary: No dysuria, No hematuria.   Hematology/Lymphatics: No bruising tendency, No bleeding tendency, No swollen lymph glands.   Endocrine: No excessive thirst, No polyuria, No excessive hunger.   Musculoskeletal: No joint pain, No muscle pain, No decreased range of motion.   Integumentary: No rash, No pruritus.   Neurologic: No abnormal balance, No confusion, No headache.   Psychiatric: No anxiety, No depression, Not suicidal, No hallucinations.    PE/Vitals:   /66 (BP Location: Left arm, Patient Position: Sitting)   Pulse 97   Ht 5' 9" (1.753 m)   Wt 90.7 kg (200 lb)   SpO2 97%   BMI 29.53 kg/m²   General: Alert and oriented, No acute distress.   Eye: Normal conjunctiva without exudate.  HENMT: Normocephalic/AT, Normal hearing, Oral mucosa is moist and pink   Neck: No goiter visualized.   Respiratory: Lungs CTAB, Respirations are non-labored, " Breath sounds are equal, Symmetrical chest wall expansion.  Cardiovascular: Normal rate, Regular rhythm, No murmur, No edema.   Gastrointestinal: Non-distended.   Genitourinary: Deferred.  Musculoskeletal:  Decreased ROM of the cervical spine, Normal gait, No deformities or amputations.  Integumentary: Warm, Dry, Intact. No diaphoresis, or flushing.  Neurologic: No focal deficits, Cranial Nerves II-XII are grossly intact.   Psychiatric: Cooperative, Appropriate mood & affect, Normal judgment, Non-suicidal.  Assessment/Plan   Assessment & Plan             1. Cervical stenosis of spinal canal  Comments:  Samples of Relafen DS    2. Hyperlipidemia, unspecified hyperlipidemia type  Comments:  On statin tolerated well    3. Type 2 diabetes mellitus with other specified complication, with long-term current use of insulin  Comments:  Return to clinic with a hemoglobin A1c urine microalbumin and a BNP    4. Neuropathic arthritis  Comments:  Stable    5. Pruritus  Comments:  Samples of right vent  Overview:  Samples of right vent      6. Neck pain  Comments:  Continue therapy with they go chiropractor, as well as a trial of Relafen  Overview:  Continue therapy with they go chiropractor, as well as a trial of Relafen      7. Chronic pain syndrome    8. Seasonal allergic rhinitis due to pollen      No orders of the defined types were placed in this encounter.    Education and counseling done face to face regarding medical conditions and plan. Contact office if new symptoms develop. Should any symptoms ever significantly worsen seek emergency medical attention/go to ER. Follow up at least yearly for wellness or sooner PRN. Nurse to call patient with any results. The patient is receptive, expresses understanding and is agreeable to plan. All questions have been answered.  No follow-ups on file.  This note was generated with the assistance of ambient listening technology. Verbal consent was obtained by the patient and  accompanying visitor(s) for the recording of patient appointment to facilitate this note. I attest to having reviewed and edited the generated note for accuracy, though some syntax or spelling errors may persist. Please contact the author of this note for any clarification.            [1]   Current Outpatient Medications:     acetaminophen (TYLENOL) 325 MG tablet, Take 2 tablets (650 mg total) by mouth every 6 (six) hours as needed for Temperature greater than (or equal to 101 degree F)., Disp: 30 tablet, Rfl: 0    albuterol (PROVENTIL) 2.5 mg /3 mL (0.083 %) nebulizer solution, Take 3 mLs (2.5 mg total) by nebulization 4 (four) times daily as needed for Wheezing., Disp: 1 each, Rfl: 3    allopurinoL (ZYLOPRIM) 300 MG tablet, Take 1 tablet (300 mg total) by mouth once daily., Disp: 90 tablet, Rfl: 3    aspirin (ECOTRIN) 81 MG EC tablet, Take 1 tablet (81 mg total) by mouth once daily., Disp: 90 tablet, Rfl: 3    citalopram (CELEXA) 20 MG tablet, Take 1 tablet (20 mg total) by mouth once daily., Disp: 90 tablet, Rfl: 3    diclofenac (CATAFLAM) 50 MG tablet, Take 1 tablet (50 mg total) by mouth 2 (two) times daily., Disp: 180 tablet, Rfl: 3    diphenhydrAMINE (BENADRYL) 50 MG capsule, Take 50mg by mouth 1 hour before contrast administration., Disp: 1 capsule, Rfl: 0    doxazosin (CARDURA) 8 MG Tab, Take 1/2 tab oral BID, Disp: 90 tablet, Rfl: 3    flash glucose sensor (FREESTYLE MITCH 14 DAY SENSOR) Kit, 1 each by Misc.(Non-Drug; Combo Route) route 2 hours after meals and at bedtime., Disp: 1 kit, Rfl: 0    flash glucose sensor (FREESTYLE MITCH 2 SENSOR) Kit, 2 each by Misc.(Non-Drug; Combo Route) route 2 hours after meals and at bedtime., Disp: 2 kit, Rfl: 11    fluticasone-umeclidin-vilanter (TRELEGY ELLIPTA) 100-62.5-25 mcg DsDv, Inhale 1 puff into the lungs nightly., Disp: 1 each, Rfl: 3    FREESTYLE MITCH 2 READER Misc, , Disp: , Rfl:     furosemide (LASIX) 40 MG tablet, Take 40 mg by mouth 2 (two) times daily.,  Disp: , Rfl:     glipizide-metformin (METAGLIP) 2.5-250 mg per tablet, Take 1 tablet by mouth Daily., Disp: 90 tablet, Rfl: 3    hydrocortisone 2.5 % cream, Apply topically 2 (two) times daily., Disp: 28 g, Rfl: 0    insulin lispro 100 unit/mL pen, Inject 20 Units into the skin 3 (three) times daily., Disp: 10 mL, Rfl: 4    lactulose (CHRONULAC) 10 gram/15 mL solution, TAKE 2 TABLESPOONFULS      (30ML) DAILY AS NEEDED FOR CONSTIPATION, Disp: 150 mL, Rfl: 0    lisinopriL (PRINIVIL,ZESTRIL) 5 MG tablet, Take 1 tablet (5 mg total) by mouth once daily., Disp: 90 tablet, Rfl: 3    meclizine (ANTIVERT) 12.5 mg tablet, TAKE 1 TABLET BY MOUTH TWO TIMES A DAY, Disp: 180 tablet, Rfl: 3    montelukast (SINGULAIR) 10 mg tablet, Take 1 tablet (10 mg total) by mouth once daily., Disp: 90 tablet, Rfl: 3    multivitamin (THERAGRAN) per tablet, Take 1 tablet by mouth every morning., Disp: 90 tablet, Rfl: 3    predniSONE (DELTASONE) 50 MG Tab, TAKE 1 TABLET AT 13 HOURS, 7 HOURS AND 1 HOUR BEFORE  CONTRAST ADMINISTRATION., Disp: 3 tablet, Rfl: 0    simvastatin (ZOCOR) 40 MG tablet, Take 1 tab po on Monday, Wednesday and Friday, Disp: 36 tablet, Rfl: 3    TOUJEO SOLOSTAR U-300 INSULIN 300 unit/mL (1.5 mL) InPn pen, Inject 30 Units into the skin every evening., Disp: 4 Pen, Rfl: 5    traMADoL (ULTRAM) 50 mg tablet, Take 1 tablet (50 mg total) by mouth every 24 hours as needed for Pain., Disp: 30 tablet, Rfl: 0    potassium chloride SA (K-DUR,KLOR-CON) 20 MEQ tablet, Take 20 mEq by mouth 2 (two) times daily., Disp: , Rfl:

## 2025-04-29 NOTE — TELEPHONE ENCOUNTER
Additional Information: Pt called stating he had a visit today and calling to let Dr. Alarcon know, the medication he was inquiring about for his itching is Nemluvio. Please advise, thank you.     **Meds not indicated in chart, Do you want to prescribe?

## 2025-04-29 NOTE — TELEPHONE ENCOUNTER
----- Message from Esther sent at 4/29/2025  2:10 PM CDT -----  .Who Called: Stevie Menjivar is requesting assistance/information from provider's office.Symptoms (please be specific): N/A How long has patient had these symptoms: N/AList of preferred pharmacies on file (remove unneeded): [facility] [address] [place] [address] Phone: [phone]: [phone] Method of Contact: Phone CallPatient's Preferred Phone Number on File: [phone] Best Call Back Number, if different:Additional Information: Pt called stating he had a visit today and calling to let Dr. Alarcon know, the medication he was inquiring about for his itching is Nemluvio. Please advise, thank you.

## 2025-05-01 NOTE — TELEPHONE ENCOUNTER
Per Dr Alarcon--[place] is an Infusion given by Rheumatologist.    Patient advised me that he has been dealing with the itching for years, unsure whom the correct physician is that he should see about this issue.    **Do you want to refer to Dermatology or Allergist for further evaluation?

## 2025-05-02 DIAGNOSIS — L29.9 ITCHING: Primary | ICD-10-CM

## 2025-05-07 ENCOUNTER — TELEPHONE (OUTPATIENT)
Dept: INTERNAL MEDICINE | Facility: CLINIC | Age: 75
End: 2025-05-07
Payer: MEDICARE

## 2025-05-07 NOTE — TELEPHONE ENCOUNTER
Additional Information: medical advice, pt called to discuss that he was advised by dermatologist  referral they do not accept his insurance, please advise, thanks     DUPLICATE MESSAGE

## 2025-05-07 NOTE — TELEPHONE ENCOUNTER
----- Message from Yenni sent at 5/7/2025  9:10 AM CDT -----  Regarding: referral update  Good Morning,This is Yenni with [facility](referral scheduling).  I contacted Dr Comer to follow up on  referral.   [facility] stated they have referral, but patient declined to schedule. They are not in network with insurance.  Please send referral elsewhere.   Your assistance is greatly appreciated.   Thanks

## 2025-05-07 NOTE — TELEPHONE ENCOUNTER
Copied from CRM #[id number]. Topic: General Inquiry - Patient Advice  >> May 7, 2025  1:12 PM Mahsa wrote:  Who Called: Stevie Perryaw    Caller is requesting assistance/information from provider's office.    Symptoms (please be specific): NA   How long has patient had these symptoms:  NA  List of preferred pharmacies on file (remove unneeded): [unfilled]  If different, enter pharmacy into here including location and phone number: NA      Preferred Method of Contact: Phone Call  Patient's Preferred Phone Number on File: [phone]   Best Call Back Number, if different:  Additional Information: medical advice, pt called to discuss that he was advised by dermatologist  referral they do not accept his insurance, please advise, thanks

## 2025-05-13 DIAGNOSIS — N32.81 OVERACTIVE BLADDER: Primary | ICD-10-CM

## 2025-05-13 RX ORDER — TAMSULOSIN HYDROCHLORIDE 0.4 MG/1
0.4 CAPSULE ORAL DAILY
Qty: 90 CAPSULE | Refills: 3 | Status: SHIPPED | OUTPATIENT
Start: 2025-05-13

## 2025-05-15 ENCOUNTER — LAB VISIT (OUTPATIENT)
Dept: LAB | Facility: HOSPITAL | Age: 75
End: 2025-05-15
Attending: INTERNAL MEDICINE
Payer: MEDICARE

## 2025-05-15 DIAGNOSIS — I25.118 ATHSCL HEART DISEASE OF NATIVE COR ART W OTH ANG PCTRS: ICD-10-CM

## 2025-05-15 DIAGNOSIS — I10 ESSENTIAL HYPERTENSION, MALIGNANT: ICD-10-CM

## 2025-05-15 DIAGNOSIS — Z82.49 FAMILY HISTORY OF ISCHEMIC HEART DISEASE: Primary | ICD-10-CM

## 2025-05-15 LAB
ANION GAP SERPL CALC-SCNC: 11 MEQ/L
BUN SERPL-MCNC: 47.1 MG/DL (ref 8.4–25.7)
CALCIUM SERPL-MCNC: 9.7 MG/DL (ref 8.8–10)
CHLORIDE SERPL-SCNC: 103 MMOL/L (ref 98–107)
CO2 SERPL-SCNC: 27 MMOL/L (ref 23–31)
CREAT SERPL-MCNC: 1.35 MG/DL (ref 0.72–1.25)
CREAT/UREA NIT SERPL: 35
GFR SERPLBLD CREATININE-BSD FMLA CKD-EPI: 54 ML/MIN/1.73/M2
GLUCOSE SERPL-MCNC: 143 MG/DL (ref 82–115)
POTASSIUM SERPL-SCNC: 4.7 MMOL/L (ref 3.5–5.1)
SODIUM SERPL-SCNC: 141 MMOL/L (ref 136–145)

## 2025-05-15 PROCEDURE — 80048 BASIC METABOLIC PNL TOTAL CA: CPT

## 2025-05-15 PROCEDURE — 36415 COLL VENOUS BLD VENIPUNCTURE: CPT

## 2025-06-02 ENCOUNTER — LAB VISIT (OUTPATIENT)
Dept: LAB | Facility: HOSPITAL | Age: 75
End: 2025-06-02
Attending: INTERNAL MEDICINE
Payer: MEDICARE

## 2025-06-02 DIAGNOSIS — I10 ESSENTIAL HYPERTENSION, MALIGNANT: Primary | ICD-10-CM

## 2025-06-02 DIAGNOSIS — E11.69 TYPE 2 DIABETES MELLITUS WITH OTHER SPECIFIED COMPLICATION, WITH LONG-TERM CURRENT USE OF INSULIN: ICD-10-CM

## 2025-06-02 DIAGNOSIS — Z79.4 TYPE 2 DIABETES MELLITUS WITH OTHER SPECIFIED COMPLICATION, WITH LONG-TERM CURRENT USE OF INSULIN: ICD-10-CM

## 2025-06-02 DIAGNOSIS — R06.09 DYSPNEA ON EXERTION: ICD-10-CM

## 2025-06-02 LAB
ANION GAP SERPL CALC-SCNC: 10 MEQ/L
BUN SERPL-MCNC: 32.3 MG/DL (ref 8.4–25.7)
CALCIUM SERPL-MCNC: 9.3 MG/DL (ref 8.8–10)
CHLORIDE SERPL-SCNC: 106 MMOL/L (ref 98–107)
CO2 SERPL-SCNC: 26 MMOL/L (ref 23–31)
CREAT SERPL-MCNC: 0.93 MG/DL (ref 0.72–1.25)
CREAT/UREA NIT SERPL: 35
GFR SERPLBLD CREATININE-BSD FMLA CKD-EPI: >60 ML/MIN/1.73/M2
GLUCOSE SERPL-MCNC: 144 MG/DL (ref 82–115)
POTASSIUM SERPL-SCNC: 4.3 MMOL/L (ref 3.5–5.1)
SODIUM SERPL-SCNC: 142 MMOL/L (ref 136–145)

## 2025-06-02 PROCEDURE — 80048 BASIC METABOLIC PNL TOTAL CA: CPT

## 2025-06-02 PROCEDURE — 36415 COLL VENOUS BLD VENIPUNCTURE: CPT

## 2025-06-27 ENCOUNTER — TELEPHONE (OUTPATIENT)
Dept: INTERNAL MEDICINE | Facility: CLINIC | Age: 75
End: 2025-06-27
Payer: MEDICARE

## 2025-06-27 DIAGNOSIS — K04.7 TOOTH INFECTION: Primary | ICD-10-CM

## 2025-06-27 RX ORDER — AMOXICILLIN 500 MG/1
500 TABLET, FILM COATED ORAL EVERY 12 HOURS
Qty: 14 TABLET | Refills: 0 | Status: SHIPPED | OUTPATIENT
Start: 2025-06-27 | End: 2025-07-04

## 2025-06-27 NOTE — TELEPHONE ENCOUNTER
Additional Information:  PT calling in for Ghanshyam. Stated he has an oral abscess inside cheek...its been draining and has stopped. Need antibiotics called in to [facility] pharmacy in [place] until he can see his dentist

## 2025-06-27 NOTE — TELEPHONE ENCOUNTER
Copied from CRM #[id number]. Topic: General Inquiry - Patient Advice  >> Jun 27, 2025  9:26 AM Joie wrote:  .Who Called: Stevie Cummins    Caller is requesting assistance/information from provider's office.    Symptoms (please be specific):  Abscess   How long has patient had these symptoms:  A week   List of preferred pharmacies on file (remove unneeded): [facility] in [place]   If different, enter pharmacy into here including location and phone number:       Preferred Method of Contact: Phone Call  Patient's Preferred Phone Number on File: [phone]   Best Call Back Number, if different:  Additional Information:  PT calling in for Ghanshyam. Stated he has an oral abscess inside cheek...its been draining and has stopped. Need antibiotics called in to [facility] in [place] until he can see his dentist

## 2025-07-10 DIAGNOSIS — I10 HYPERTENSION, UNSPECIFIED TYPE: Primary | ICD-10-CM

## 2025-07-10 RX ORDER — FUROSEMIDE 40 MG/1
40 TABLET ORAL 2 TIMES DAILY
Qty: 180 TABLET | Refills: 3 | Status: SHIPPED | OUTPATIENT
Start: 2025-07-10

## 2025-08-21 ENCOUNTER — TELEPHONE (OUTPATIENT)
Dept: INTERNAL MEDICINE | Facility: CLINIC | Age: 75
End: 2025-08-21
Payer: MEDICARE

## 2025-08-22 DIAGNOSIS — E11.69 TYPE 2 DIABETES MELLITUS WITH OTHER SPECIFIED COMPLICATION, WITH LONG-TERM CURRENT USE OF INSULIN: Primary | ICD-10-CM

## 2025-08-22 DIAGNOSIS — Z79.4 TYPE 2 DIABETES MELLITUS WITH OTHER SPECIFIED COMPLICATION, WITH LONG-TERM CURRENT USE OF INSULIN: Primary | ICD-10-CM

## 2025-08-22 RX ORDER — FLASH GLUCOSE SCANNING READER
EACH MISCELLANEOUS
Qty: 2 EACH | Refills: 3 | Status: SHIPPED | OUTPATIENT
Start: 2025-08-22

## 2025-08-28 ENCOUNTER — LAB VISIT (OUTPATIENT)
Dept: LAB | Facility: HOSPITAL | Age: 75
End: 2025-08-28
Attending: INTERNAL MEDICINE
Payer: MEDICARE

## 2025-08-28 DIAGNOSIS — Z79.4 TYPE 2 DIABETES MELLITUS WITH OTHER SPECIFIED COMPLICATION, WITH LONG-TERM CURRENT USE OF INSULIN: ICD-10-CM

## 2025-08-28 DIAGNOSIS — E11.69 TYPE 2 DIABETES MELLITUS WITH OTHER SPECIFIED COMPLICATION, WITH LONG-TERM CURRENT USE OF INSULIN: ICD-10-CM

## 2025-08-28 LAB
ALBUMIN SERPL-MCNC: 3.9 G/DL (ref 3.4–4.8)
ALBUMIN/CREAT UR: 20.8 MG/GM CR (ref 0–30)
ALBUMIN/GLOB SERPL: 1.1 RATIO (ref 1.1–2)
ALP SERPL-CCNC: 80 UNIT/L (ref 40–150)
ALT SERPL-CCNC: 17 UNIT/L (ref 0–55)
ANION GAP SERPL CALC-SCNC: 10 MEQ/L
AST SERPL-CCNC: 27 UNIT/L (ref 11–45)
BILIRUB SERPL-MCNC: 0.9 MG/DL
BUN SERPL-MCNC: 45.1 MG/DL (ref 8.4–25.7)
CALCIUM SERPL-MCNC: 9.6 MG/DL (ref 8.8–10)
CHLORIDE SERPL-SCNC: 103 MMOL/L (ref 98–107)
CO2 SERPL-SCNC: 26 MMOL/L (ref 23–31)
CREAT SERPL-MCNC: 1.32 MG/DL (ref 0.72–1.25)
CREAT UR-MCNC: 55.7 MG/DL (ref 63–166)
CREAT/UREA NIT SERPL: 34
EST. AVERAGE GLUCOSE BLD GHB EST-MCNC: 71 MG/DL
GFR SERPLBLD CREATININE-BSD FMLA CKD-EPI: 56 ML/MIN/1.73/M2
GLOBULIN SER-MCNC: 3.4 GM/DL (ref 2.4–3.5)
GLUCOSE SERPL-MCNC: 120 MG/DL (ref 82–115)
HBA1C MFR BLD: 4.1 %
MICROALBUMIN UR-MCNC: 11.6 UG/ML
POTASSIUM SERPL-SCNC: 4.6 MMOL/L (ref 3.5–5.1)
PROT SERPL-MCNC: 7.3 GM/DL (ref 5.8–7.6)
SODIUM SERPL-SCNC: 139 MMOL/L (ref 136–145)

## 2025-08-28 PROCEDURE — 36415 COLL VENOUS BLD VENIPUNCTURE: CPT

## 2025-08-28 PROCEDURE — 80053 COMPREHEN METABOLIC PANEL: CPT

## 2025-08-28 PROCEDURE — 82043 UR ALBUMIN QUANTITATIVE: CPT

## 2025-08-28 PROCEDURE — 83036 HEMOGLOBIN GLYCOSYLATED A1C: CPT

## 2025-08-29 ENCOUNTER — OFFICE VISIT (OUTPATIENT)
Dept: INTERNAL MEDICINE | Facility: CLINIC | Age: 75
End: 2025-08-29
Payer: MEDICARE

## 2025-08-29 VITALS — HEART RATE: 55 BPM | OXYGEN SATURATION: 98 % | WEIGHT: 189 LBS | HEIGHT: 69 IN | BODY MASS INDEX: 27.99 KG/M2

## 2025-08-29 DIAGNOSIS — I10 BENIGN ESSENTIAL HYPERTENSION: ICD-10-CM

## 2025-08-29 DIAGNOSIS — E16.2 HYPOGLYCEMIA: ICD-10-CM

## 2025-08-29 DIAGNOSIS — E11.9 TYPE 2 DIABETES MELLITUS WITHOUT COMPLICATION, WITHOUT LONG-TERM CURRENT USE OF INSULIN: ICD-10-CM

## 2025-08-29 DIAGNOSIS — I48.20 CHRONIC ATRIAL FIBRILLATION: Primary | ICD-10-CM

## (undated) DEVICE — BUR BONE CUT MICRO TPS 3X3.8MM

## (undated) DEVICE — COVER CAMERA OPERATING ROOM

## (undated) DEVICE — SUT VICRYL PLUS 0 CT-1 18IN

## (undated) DEVICE — SPONGE PATTY SURGICAL .5X3IN

## (undated) DEVICE — TIP KERRISON RONGEUR SHARP 3MM

## (undated) DEVICE — GLOVE PROTEXIS BLUE LATEX 8

## (undated) DEVICE — KIT SURGIFLO HEMOSTATIC MATRIX

## (undated) DEVICE — SUT BONE WAX 2.5 GRMS 12/BX

## (undated) DEVICE — DRAPE OPMI STERILE

## (undated) DEVICE — GLOVE PROTEXIS LTX MICRO  7.5

## (undated) DEVICE — NDL HYPO 22GX1 1/2 SYR 10ML LL

## (undated) DEVICE — BENZOIN TINCTURE 0.66ML

## (undated) DEVICE — SOL NACL IRR 1000ML BTL

## (undated) DEVICE — DRAPE O-ARM STERILE

## (undated) DEVICE — DRAIN ROUND SUCTION 10FR

## (undated) DEVICE — DRESSING TELFA N ADH 3X8

## (undated) DEVICE — ELECTRODE BLD EXT 6.50 ST DISP

## (undated) DEVICE — GLOVE PROTEXIS PI SYN SURG 6.5

## (undated) DEVICE — DRAPE SURG LAP INCISE ADHESIVE

## (undated) DEVICE — DRAPE FULL SHEET 70X100IN

## (undated) DEVICE — Device

## (undated) DEVICE — BLADE EZ CLEAN 2 1/2

## (undated) DEVICE — DRAPE ORTH SPLIT 77X108IN

## (undated) DEVICE — SHEET AVIENE MICFIB 1.4X1.4IN

## (undated) DEVICE — TRAY CATH FOL SIL URIMTR 16FR

## (undated) DEVICE — SPONGE SURGIFOAM 100 8.5X12X10

## (undated) DEVICE — KIT POS JACKSON TABLE NO HDRST

## (undated) DEVICE — DRAPE TOP 53X102IN

## (undated) DEVICE — TUBE SUCTION MEDI-VAC STERILE

## (undated) DEVICE — KIT SURGICAL TURNOVER

## (undated) DEVICE — TIP KERRISON RONGEUR SHARP 4MM

## (undated) DEVICE — INSTRUMENT FRAZIER 10FR W/VENT

## (undated) DEVICE — SYR IRRIGATION BULB STER 60ML

## (undated) DEVICE — 14MM DRILL BIT

## (undated) DEVICE — DISH PETRI MED 3.5IN

## (undated) DEVICE — GLOVE PROTEXIS BLUE LATEX 7

## (undated) DEVICE — SUT VICRYL 4-0 18 P-3

## (undated) DEVICE — COVER HD BACK TABLE 6FT

## (undated) DEVICE — ELECTRODE BLADE E-Z CLEAN 4IN

## (undated) DEVICE — CLOSURE SKIN STERI STRIP 1/2X4

## (undated) DEVICE — GOWN X-LG STERILE BACK

## (undated) DEVICE — SUT VICRYL PLUS 3-0 X-1 18IN

## (undated) DEVICE — APPLICATOR CHLORAPREP ORN 26ML

## (undated) DEVICE — PAD DERMAPROX XL 22X14X.5IN

## (undated) DEVICE — DRESSING TELFA STRL 4X3 LF

## (undated) DEVICE — SPHERE NDI PASSIVE

## (undated) DEVICE — GAUZE SPONGE XRAY 4X4

## (undated) DEVICE — DRAPE SURG W/TWL 17 5/8X23

## (undated) DEVICE — RESERVOIR JACKSON-PRATT 100CC